# Patient Record
Sex: MALE | Race: WHITE | NOT HISPANIC OR LATINO | Employment: OTHER | URBAN - METROPOLITAN AREA
[De-identification: names, ages, dates, MRNs, and addresses within clinical notes are randomized per-mention and may not be internally consistent; named-entity substitution may affect disease eponyms.]

---

## 2017-02-01 ENCOUNTER — SLEEP CENTER VISIT (OUTPATIENT)
Dept: SLEEP MEDICINE | Facility: MEDICAL CENTER | Age: 67
End: 2017-02-01
Payer: MEDICARE

## 2017-02-01 VITALS
SYSTOLIC BLOOD PRESSURE: 116 MMHG | HEIGHT: 72 IN | RESPIRATION RATE: 16 BRPM | DIASTOLIC BLOOD PRESSURE: 66 MMHG | HEART RATE: 82 BPM | WEIGHT: 191 LBS | BODY MASS INDEX: 25.87 KG/M2

## 2017-02-01 DIAGNOSIS — R42 DIZZINESS, NONSPECIFIC: ICD-10-CM

## 2017-02-01 DIAGNOSIS — I25.10 ATHEROSCLEROSIS OF NATIVE CORONARY ARTERY OF NATIVE HEART WITHOUT ANGINA PECTORIS: ICD-10-CM

## 2017-02-01 DIAGNOSIS — I10 ESSENTIAL HYPERTENSION: ICD-10-CM

## 2017-02-01 DIAGNOSIS — G47.33 OSA (OBSTRUCTIVE SLEEP APNEA): ICD-10-CM

## 2017-02-01 PROCEDURE — 99203 OFFICE O/P NEW LOW 30 MIN: CPT | Performed by: INTERNAL MEDICINE

## 2017-02-01 NOTE — PROGRESS NOTES
Chief Complaint   Patient presents with   • New Patient     Sleep eval       HPI: This patient is a 66 y.o. Male who is referred for sleep apnea reevaluation. He has a history of blurred vision and dizziness of unclear etiology. He has had workup by Dr. Calvo with unremarkable MRI of the brain, other than mild sinus congestion. He underwent sinus surgery without subjective improvement. He does not identify specific triggers to his symptoms. He has chronic tinnitus, denies vertigo. He underwent polysomnography in 2011 identifying mild ANTIONE with AHI of 5 events per hour associated with desaturations for 24 minutes, to a lexii of 87%. He did not pursue specific treatment for his ANTIONE. His wife notes ongoing intermittent snoring and apneas. He feels that overall he sleeps well and awakens rested. In terms of sleep habits he goes to bed by 11:30 PM, falls asleep within minutes, denies any significant awakenings nocturnally. He gets up at 7:30 AM feeling rested. He denies any significant daytime hypersomnolence. He drinks 4-5 cups of coffee in the morning, denies any alcohol, tobacco or recreational drug use. Uses unspecified Chinese herbs for high cholesterol and prostate. His BMI is 25.  He has a history of coronary disease status post myocardial infarction. He also has a history of chronic gastric bloating, treated with gabapentin.      Past Medical History   Diagnosis Date   • Acute myocardial infarction of other anterior wall, episode of care unspecified     • Anxiety state, unspecified     • Unspecified essential hypertension     • S/P coronary artery stent placement     • Hyperlipidemia    • CAD (coronary artery disease) September 2009     PCI/stent (Promus 3.0 x 15mm) to the LAD.   • Chickenpox    • Mumps        Social History     Social History   • Marital Status:      Spouse Name: N/A   • Number of Children: N/A   • Years of Education: N/A     Occupational History   • Not on file.     Social History Main  Topics   • Smoking status: Former Smoker     Types: Cigarettes     Quit date: 08/30/1997   • Smokeless tobacco: Never Used   • Alcohol Use: No   • Drug Use: No   • Sexual Activity: Not on file     Other Topics Concern   • Not on file     Social History Narrative       Family History   Problem Relation Age of Onset   • Heart Disease Maternal Aunt    • Cancer Mother      colon       Current Outpatient Prescriptions on File Prior to Visit   Medication Sig Dispense Refill   • GINSENG CHINESE RED PO Take  by mouth.     • gabapentin (NEURONTIN) 100 MG Cap Take 100 mg by mouth 3 times a day.     • pantoprazole (PROTONIX) 40 MG TBEC Take 40 mg by mouth every day.     • aspirin 81 MG tablet Take 81 mg by mouth every day.     • metformin (GLUCOPHAGE) 500 MG TABS Take 500 mg by mouth every day.     • fenofibrate (TRICOR) 48 MG TABS Take 48 mg by mouth every day.     • Probiotic Product (PROBIOTIC DAILY PO) Take  by mouth every day.     • nitroglycerin (NITROSTAT) 0.4 MG SL Tab Place 1 Tab under tongue as needed for Chest Pain (Place 1 tablet under the tongue every 5 minutes up to 3 doses as needed for chest pain). 25 Tab 5   • Lidocaine-Hydrocortisone Ace 3-1 % KIT by Apply externally route. APPLY TO AFFECTED AREA  Indications: NOT TAKING  2   • timolol (TIMOPTIC) 0.5 % SOLN Indications: NOT TAKING  3     No current facility-administered medications on file prior to visit.       Allergies: Levaquin; Aspirin; Lisinopril; and Statins    ROS:   Constitutional: Denies fevers, chills, night sweats, fatigue or weight loss  Eyes: Denies vision loss, pain, drainage, double vision  Ears, Nose, Throat: As in history of present illness  Cardiovascular: Denies chest pain, tightness, palpitations, orthopnea or edema  Respiratory: Denies shortness of breath, cough, wheezing, hemoptysis  Sleep: As in history of present illness  GI: Denies heartburn, dysphagia, nausea, abdominal pain, diarrhea or constipation  : Denies frequent urination,  hematuria, discharge or painful urination  Musculoskeletal: Denies back pain, painful joints, sore muscles  Neurological: Denies weakness or headaches  Skin: No rashes    Blood pressure 116/66, pulse 82, resp. rate 16, height 1.829 m (6'), weight 86.637 kg (191 lb).  Multi-Ox Readings  Multi Ox #1     O2 sat % at rest     O2 sat % on exertion     O2 sat average on exertion     Multi Ox #2     O2 sat % at rest     O2 sat % on exertion     O2 sat average on exertion       Oxygen Use     Oxygen Frequency     Duration of need     Is the patient mobile within the home?     CPAP Use? no   BIPAP Use?     Servo Titration         Physical Exam:  Appearance: Well-nourished, well-developed, in no acute distress  HEENT: Normocephalic, atraumatic, white sclera, PERRLA, oropharynx clear, Mallampati 3  Neck: No adenopathy or masses  Respiratory: no intercostal retractions or accessory muscle use  Lungs auscultation: Clear to auscultation bilaterally  Cardiovascular: Regular rate rhythm. No murmurs, rubs or gallops.  No LE edema  Abdomen: soft, nondistended  Gait: Normal  Digits: No clubbing, cyanosis  Motor: No focal deficits  Orientation: Oriented to time, person and place    Diagnosis:  1. ANTIONE (obstructive sleep apnea)  OVERNIGHT HOME SLEEP STUDY   2. Atherosclerosis of native coronary artery of native heart without angina pectoris     3. Essential hypertension     4. Dizziness, nonspecific         Plan:  The patient has a history of mild ANTIONE diagnosed per home polysomnography in 2011, now returns for reevaluation of persistent intermittent snoring and apneas. He is on no current treatment.  He describes dizziness of unclear etiology, has undergone ENT and cardiac workup. He is aware that these symptoms are not classic for sleep disordered breathing however is interested in reevaluation of ANTIONE.  We will arrange for home polysomnography with close follow-up.  Return for after testing, follow up visit with Kailee Galan  MD.

## 2017-02-01 NOTE — Clinical Note
Kailee Galan M.D.  [facility]   [address] [place] [address]  Phone: [phone] - Fax: [phone]           Encounter Date: 2/1/2017  Provider: Kailee Galan M.D.  Location of Care: [facility]      Patient:   Darren Brunson   MR Number: [id number]   YOB: 1950     PROGRESS NOTE:  Chief Complaint   Patient presents with   • New Patient     Sleep eval       HPI: This patient is a 66 y.o. Male who is referred for sleep apnea reevaluation. He has a history of blurred vision and dizziness of unclear etiology. He has had workup by Dr. Calvo with unremarkable MRI of the brain, other than mild sinus congestion. He underwent sinus surgery without subjective improvement. He does not identify specific triggers to his symptoms. He has chronic tinnitus, denies vertigo. He underwent polysomnography in 2011 identifying mild ANTIONE with AHI of 5 events per hour associated with desaturations for 24 minutes, to a lexii of 87%. He did not pursue specific treatment for his ANTIONE. His wife notes ongoing intermittent snoring and apneas. He feels that overall he sleeps well and awakens rested. In terms of sleep habits he goes to bed by 11:30 PM, falls asleep within minutes, denies any significant awakenings nocturnally. He gets up at 7:30 AM feeling rested. He denies any significant daytime hypersomnolence. He drinks 4-5 cups of coffee in the morning, denies any alcohol, tobacco or recreational drug use. Uses unspecified Chinese herbs for high cholesterol and prostate. His BMI is 25.  He has a history of coronary disease status post myocardial infarction. He also has a history of chronic gastric bloating, treated with gabapentin.      Past Medical History   Diagnosis Date   • Acute myocardial infarction of other anterior wall, episode of care unspecified     • Anxiety state, unspecified     • Unspecified essential hypertension     •  S/P coronary artery stent placement     • Hyperlipidemia    • CAD (coronary artery disease) September 2009     PCI/stent (Promus 3.0 x 15mm) to the LAD.   • Chickenpox    • Mumps        Social History     Social History   • Marital Status:      Spouse Name: N/A   • Number of Children: N/A   • Years of Education: N/A     Occupational History   • Not on file.     Social History Main Topics   • Smoking status: Former Smoker     Types: Cigarettes     Quit date: 08/30/1997   • Smokeless tobacco: Never Used   • Alcohol Use: No   • Drug Use: No   • Sexual Activity: Not on file     Other Topics Concern   • Not on file     Social History Narrative       Family History   Problem Relation Age of Onset   • Heart Disease Maternal Aunt    • Cancer Mother      colon       Current Outpatient Prescriptions on File Prior to Visit   Medication Sig Dispense Refill   • GINSENG CHINESE RED PO Take  by mouth.     • gabapentin (NEURONTIN) 100 MG Cap Take 100 mg by mouth 3 times a day.     • pantoprazole (PROTONIX) 40 MG TBEC Take 40 mg by mouth every day.     • aspirin 81 MG tablet Take 81 mg by mouth every day.     • metformin (GLUCOPHAGE) 500 MG TABS Take 500 mg by mouth every day.     • fenofibrate (TRICOR) 48 MG TABS Take 48 mg by mouth every day.     • Probiotic Product (PROBIOTIC DAILY PO) Take  by mouth every day.     • nitroglycerin (NITROSTAT) 0.4 MG SL Tab Place 1 Tab under tongue as needed for Chest Pain (Place 1 tablet under the tongue every 5 minutes up to 3 doses as needed for chest pain). 25 Tab 5   • Lidocaine-Hydrocortisone Ace 3-1 % KIT by Apply externally route. APPLY TO AFFECTED AREA  Indications: NOT TAKING  2   • timolol (TIMOPTIC) 0.5 % SOLN Indications: NOT TAKING  3     No current facility-administered medications on file prior to visit.       Allergies: Levaquin; Aspirin; Lisinopril; and Statins    ROS:   Constitutional: Denies fevers, chills, night sweats, fatigue or weight loss  Eyes: Denies vision loss,  pain, drainage, double vision  Ears, Nose, Throat: As in history of present illness  Cardiovascular: Denies chest pain, tightness, palpitations, orthopnea or edema  Respiratory: Denies shortness of breath, cough, wheezing, hemoptysis  Sleep: As in history of present illness  GI: Denies heartburn, dysphagia, nausea, abdominal pain, diarrhea or constipation  : Denies frequent urination, hematuria, discharge or painful urination  Musculoskeletal: Denies back pain, painful joints, sore muscles  Neurological: Denies weakness or headaches  Skin: No rashes    Blood pressure 116/66, pulse 82, resp. rate 16, height 1.829 m (6'), weight 86.637 kg (191 lb).  Multi-Ox Readings  Multi Ox #1     O2 sat % at rest     O2 sat % on exertion     O2 sat average on exertion     Multi Ox #2     O2 sat % at rest     O2 sat % on exertion     O2 sat average on exertion       Oxygen Use     Oxygen Frequency     Duration of need     Is the patient mobile within the home?     CPAP Use? no   BIPAP Use?     Servo Titration         Physical Exam:  Appearance: Well-nourished, well-developed, in no acute distress  HEENT: Normocephalic, atraumatic, white sclera, PERRLA, oropharynx clear, Mallampati 3  Neck: No adenopathy or masses  Respiratory: no intercostal retractions or accessory muscle use  Lungs auscultation: Clear to auscultation bilaterally  Cardiovascular: Regular rate rhythm. No murmurs, rubs or gallops.  No LE edema  Abdomen: soft, nondistended  Gait: Normal  Digits: No clubbing, cyanosis  Motor: No focal deficits  Orientation: Oriented to time, person and place    Diagnosis:  1. ANTIONE (obstructive sleep apnea)  OVERNIGHT HOME SLEEP STUDY   2. Atherosclerosis of native coronary artery of native heart without angina pectoris     3. Essential hypertension     4. Dizziness, nonspecific         Plan:  The patient has a history of mild ANTIONE diagnosed per home polysomnography in 2011, now returns for reevaluation of persistent intermittent  snoring and apneas. He is on no current treatment.  He describes dizziness of unclear etiology, has undergone ENT and cardiac workup. He is aware that these symptoms are not classic for sleep disordered breathing however is interested in reevaluation of ANTIONE.  We will arrange for home polysomnography with close follow-up.  Return for after testing, follow up visit with Kailee Galan MD.            Electronically signed by Kailee Galan M.D.  on 02/01/2017    Kailee Pappas M.D.  [address] [place] [id number]  VIA Facsimile: [phone]

## 2017-02-01 NOTE — MR AVS SNAPSHOT
Darren Brunson   2017 1:20 PM   Sleep Center Visit   MRN: [id number]    Department:  [facility]   Dept Phone:  [phone]    Description:  Male : 1950   Provider:  Kailee Galan M.D.           Reason for Visit     New Patient Sleep eval      Allergies as of 2017     Allergen Noted Reactions    Levaquin 2014       Aspirin 2012       Lisinopril 2012       Statins [Hmg-Coa-R Inhibitors] 2012         You were diagnosed with     ANTIONE (obstructive sleep apnea)   [984931]       Atherosclerosis of native coronary artery of native heart without angina pectoris   [0272837]       Essential hypertension   [6639115]       Dizziness, nonspecific   [924784]         Vital Signs     Blood Pressure Pulse Respirations Height Weight Body Mass Index    116/66 mmHg 82 16 1.829 m (6') 86.637 kg (191 lb) 25.90 kg/m2    Smoking Status                   Former Smoker           Basic Information     Date Of Birth Sex Race Ethnicity Preferred Language    1950 Male White Non- English      Your appointments     2017  2:10 PM   Sleep Study Home with SLEEP CLINIC   [facility] (--)    [address]  [address]  [place] [address]   [phone]            2017  9:40 AM   Follow UP with Kailee Galan M.D.   [facility] (--)    [address]  [place] [address]   [phone]            2017 11:00 AM   FOLLOW UP with Matty Amado M.D.   [facility] (--)    [address], [address] [facility] [address]  [place] [address]   [phone]              Problem List              ICD-10-CM Priority Class Noted - Resolved    Acute myocardial infarction of other anterior wall, episode of care unspecified I21.09 High  3/30/2012 - Present    Anxiety state F41.1 Medium  3/30/2012 - Present    Coronary atherosclerosis of native coronary artery I25.10 High  3/30/2012 - Present     Hypercholesteremia E78.00 High  3/30/2012 - Present    Essential hypertension I10 High  3/30/2012 - Present    S/P coronary artery stent placement Z95.5 High  3/30/2012 - Present    Other chest pain R07.89 High  8/30/2012 - Present    Statin intolerance Z78.9   8/5/2013 - Present    ACE inhibitor-aggravated angioedema T46.4X1A, T78.3XXA   9/8/2014 - Present      Health Maintenance        Date Due Completion Dates    IMM DTaP/Tdap/Td Vaccine (1 - Tdap) 6/10/1967 ---    COLONOSCOPY 6/10/1998 ---    IMM ZOSTER VACCINE 6/10/2008 ---    IMM PNEUMOCOCCAL 65+ (ADULT) LOW/MEDIUM RISK SERIES (1 of 2 - PCV13) 6/10/2013 ---    IMM INFLUENZA (1) 9/1/2016 ---            Current Immunizations     No immunizations on file.      Below and/or attached are the medications your provider expects you to take. Review all of your home medications and newly ordered medications with your provider and/or pharmacist. Follow medication instructions as directed by your provider and/or pharmacist. Please keep your medication list with you and share with your provider. Update the information when medications are discontinued, doses are changed, or new medications (including over-the-counter products) are added; and carry medication information at all times in the event of emergency situations     Allergies:  LEVAQUIN - (reactions not documented)     ASPIRIN - (reactions not documented)     LISINOPRIL - (reactions not documented)     STATINS - (reactions not documented)               Medications  Valid as of: February 01, 2017 -  2:06 PM    Generic Name Brand Name Tablet Size Instructions for use    Aspirin (Tab) aspirin 81 MG Take 81 mg by mouth every day.        Fenofibrate (Tab) TRICOR 48 MG Take 48 mg by mouth every day.        Gabapentin (Cap) NEURONTIN 100 MG Take 100 mg by mouth 3 times a day.        Ginseng   Take  by mouth.        Lidocaine-Hydrocortisone Ace (Kit) Lidocaine-Hydrocortisone Ace 3-1 % by Apply externally route. APPLY TO  AFFECTED AREA  Indications: NOT TAKING        MetFORMIN HCl (Tab) GLUCOPHAGE 500 MG Take 500 mg by mouth every day.        Nitroglycerin (SL Tab) NITROSTAT 0.4 MG Place 1 Tab under tongue as needed for Chest Pain (Place 1 tablet under the tongue every 5 minutes up to 3 doses as needed for chest pain).        Pantoprazole Sodium (Tablet Delayed Response) PROTONIX 40 MG Take 40 mg by mouth every day.        Probiotic Product   Take  by mouth every day.        Timolol Maleate (Solution) TIMOPTIC 0.5 % Indications: NOT TAKING        .                 Medicines prescribed today were sent to:     [id number] - [place], [place] - [address]    [address] [place] [address]    Phone: [phone] Fax: [phone]    Open 24 Hours?: No      Medication refill instructions:       If your prescription bottle indicates you have medication refills left, it is not necessary to call your provider’s office. Please contact your pharmacy and they will refill your medication.    If your prescription bottle indicates you do not have any refills left, you may request refills at any time through one of the following ways: The online [employer] system (except Urgent Care), by calling your provider’s office, or by asking your pharmacy to contact your provider’s office with a refill request. Medication refills are processed only during regular business hours and may not be available until the next business day. Your provider may request additional information or to have a follow-up visit with you prior to refilling your medication.   *Please Note: Medication refills are assigned a new Rx number when refilled electronically. Your pharmacy may indicate that no refills were authorized even though a new prescription for the same medication is available at the pharmacy. Please request the medicine by name with the pharmacy before contacting your provider for a refill.        Your To Do List     Future Labs/Procedures Complete By Expires     OVERNIGHT HOME SLEEP STUDY  As directed 2/1/2018         MojoPages Access Code: [email or web address]  Expires: 3/3/2017  2:06 PM    MojoPages  A secure, online tool to manage your health information     [employer]’s MojoPages® is a secure, online tool that connects you to your personalized health information from the privacy of your home -- day or night - making it very easy for you to manage your healthcare. Once the activation process is completed, you can even access your medical information using the MojoPages alva, which is available for free in the Apple Alva store or Google Play store.     MojoPages provides the following levels of access (as shown below):   My Chart Features   [facility] Primary Care Doctor [facility]  Specialists [facility]  [facility]  Primary Care  Doctor   Email your healthcare team securely and privately 24/7 X X X    Manage appointments: schedule your next appointment; view details of past/upcoming appointments X      Request prescription refills. X      View recent personal medical records, including lab and immunizations X X X X   View health record, including health history, allergies, medications X X X X   Read reports about your outpatient visits, procedures, consult and ER notes X X X X   See your discharge summary, which is a recap of your hospital and/or ER visit that includes your diagnosis, lab results, and care plan. X X       How to register for MojoPages:  1. Go to  [email or web address].  2. Click on the Sign Up Now box, which takes you to the New Member Sign Up page. You will need to provide the following information:  a. Enter your MojoPages Access Code exactly as it appears at the top of this page. (You will not need to use this code after you’ve completed the sign-up process. If you do not sign up before the expiration date, you must request a new code.)   b. Enter your date of birth.   c. Enter your home email address.   d. Click Submit, and follow the next screen’s  instructions.  3. Create a KnewCoint ID. This will be your KnewCoint login ID and cannot be changed, so think of one that is secure and easy to remember.  4. Create a KnewCoint password. You can change your password at any time.  5. Enter your Password Reset Question and Answer. This can be used at a later time if you forget your password.   6. Enter your e-mail address. This allows you to receive e-mail notifications when new information is available in Eyefreight.  7. Click Sign Up. You can now view your health information.    For assistance activating your Eyefreight account, call [phone]

## 2017-02-06 ENCOUNTER — HOME STUDY (OUTPATIENT)
Dept: SLEEP MEDICINE | Facility: MEDICAL CENTER | Age: 67
End: 2017-02-06
Attending: INTERNAL MEDICINE
Payer: MEDICARE

## 2017-02-06 DIAGNOSIS — G47.33 OSA (OBSTRUCTIVE SLEEP APNEA): ICD-10-CM

## 2017-02-06 PROCEDURE — G0399 HOME SLEEP TEST/TYPE 3 PORTA: HCPCS | Performed by: INTERNAL MEDICINE

## 2017-02-06 NOTE — MR AVS SNAPSHOT
Darren Brunson   2017 2:10 PM   Appointment   MRN: [id number]    Department:  [facility]   Dept Phone:  [phone]    Description:  Male : 1950   Provider:  [facility]           Allergies as of 2017     Allergen Noted Reactions    Levaquin 2014       Aspirin 2012       Lisinopril 2012       Statins [Hmg-Coa-R Inhibitors] 2012         You were diagnosed with     ANTIONE (obstructive sleep apnea)   [347049]         Vital Signs     Smoking Status                   Former Smoker           Basic Information     Date Of Birth Sex Race Ethnicity Preferred Language    1950 Male White Non- English      Your appointments     2017  9:40 AM   Follow UP with Kailee Galan M.D.   [facility] (--)    [address]  [address]  [place] [address] [phone]            2017 11:00 AM   FOLLOW UP with Matty Amado M.D.   [facility] (--)    [address]  [place] [address] [phone]              Problem List              ICD-10-CM Priority Class Noted - Resolved    Acute myocardial infarction of other anterior wall, episode of care unspecified I21.09 High  3/30/2012 - Present    Anxiety state F41.1 Medium  3/30/2012 - Present    Coronary atherosclerosis of native coronary artery I25.10 High  3/30/2012 - Present    Hypercholesteremia E78.00 High  3/30/2012 - Present    Essential hypertension I10 High  3/30/2012 - Present    S/P coronary artery stent placement Z95.5 High  3/30/2012 - Present    Other chest pain R07.89 High  2012 - Present    Statin intolerance Z78.9   2013 - Present    ACE inhibitor-aggravated angioedema T46.4X1A, T78.3XXA   2014 - Present      Health Maintenance        Date Due Completion Dates    IMM DTaP/Tdap/Td Vaccine (1 - Tdap) 6/10/1967 ---    COLONOSCOPY 6/10/1998 ---    IMM ZOSTER VACCINE 6/10/2008 ---    IMM PNEUMOCOCCAL  65+ (ADULT) LOW/MEDIUM RISK SERIES (1 of 2 - PCV13) 6/10/2013 ---    IMM INFLUENZA (1) 9/1/2016 ---            Current Immunizations     No immunizations on file.      Below and/or attached are the medications your provider expects you to take. Review all of your home medications and newly ordered medications with your provider and/or pharmacist. Follow medication instructions as directed by your provider and/or pharmacist. Please keep your medication list with you and share with your provider. Update the information when medications are discontinued, doses are changed, or new medications (including over-the-counter products) are added; and carry medication information at all times in the event of emergency situations     Allergies:  LEVAQUIN - (reactions not documented)     ASPIRIN - (reactions not documented)     LISINOPRIL - (reactions not documented)     STATINS - (reactions not documented)               Medications  Valid as of: February 06, 2017 -  3:16 PM    Generic Name Brand Name Tablet Size Instructions for use    Aspirin (Tab) aspirin 81 MG Take 81 mg by mouth every day.        Fenofibrate (Tab) TRICOR 48 MG Take 48 mg by mouth every day.        Gabapentin (Cap) NEURONTIN 100 MG Take 100 mg by mouth 3 times a day.        Ginseng   Take  by mouth.        Lidocaine-Hydrocortisone Ace (Kit) Lidocaine-Hydrocortisone Ace 3-1 % by Apply externally route. APPLY TO AFFECTED AREA  Indications: NOT TAKING        MetFORMIN HCl (Tab) GLUCOPHAGE 500 MG Take 500 mg by mouth every day.        Nitroglycerin (SL Tab) NITROSTAT 0.4 MG Place 1 Tab under tongue as needed for Chest Pain (Place 1 tablet under the tongue every 5 minutes up to 3 doses as needed for chest pain).        Pantoprazole Sodium (Tablet Delayed Response) PROTONIX 40 MG Take 40 mg by mouth every day.        Probiotic Product   Take  by mouth every day.        Timolol Maleate (Solution) TIMOPTIC 0.5 % Indications: NOT TAKING        .                    Medicines prescribed today were sent to:     [facility]/PHARMACY #[id number] - [place], [place] - [address]    [address] [place] [address]    Phone: [phone] Fax: [phone]    Open 24 Hours?: No      Medication refill instructions:       If your prescription bottle indicates you have medication refills left, it is not necessary to call your provider’s office. Please contact your pharmacy and they will refill your medication.    If your prescription bottle indicates you do not have any refills left, you may request refills at any time through one of the following ways: The online [employer] system (except Urgent Care), by calling your provider’s office, or by asking your pharmacy to contact your provider’s office with a refill request. Medication refills are processed only during regular business hours and may not be available until the next business day. Your provider may request additional information or to have a follow-up visit with you prior to refilling your medication.   *Please Note: Medication refills are assigned a new Rx number when refilled electronically. Your pharmacy may indicate that no refills were authorized even though a new prescription for the same medication is available at the pharmacy. Please request the medicine by name with the pharmacy before contacting your provider for a refill.           [employer] Access Code: [email or web address]  Expires: 3/3/2017  2:06 PM    [employer]  A secure, online tool to manage your health information     [employer]’s [employer]® is a secure, online tool that connects you to your personalized health information from the privacy of your home -- day or night - making it very easy for you to manage your healthcare. Once the activation process is completed, you can even access your medical information using the [employer] alva, which is available for free in the Apple Alva store or Google Play store.     [employer] provides the following levels of access (as shown below):   My  Chart Features   [facility] Doctor [facility]  Specialists [facility]  Urgent  Care [facility]  Primary Care  Doctor   Email your healthcare team securely and privately 24/7 X X X    Manage appointments: schedule your next appointment; view details of past/upcoming appointments X      Request prescription refills. X      View recent personal medical records, including lab and immunizations X X X X   View health record, including health history, allergies, medications X X X X   Read reports about your outpatient visits, procedures, consult and ER notes X X X X   See your discharge summary, which is a recap of your hospital and/or ER visit that includes your diagnosis, lab results, and care plan. X X       How to register for [employer]:  1. Go to  [email or web address].  2. Click on the Sign Up Now box, which takes you to the New Member Sign Up page. You will need to provide the following information:  a. Enter your [employer] Access Code exactly as it appears at the top of this page. (You will not need to use this code after you’ve completed the sign-up process. If you do not sign up before the expiration date, you must request a new code.)   b. Enter your date of birth.   c. Enter your home email address.   d. Click Submit, and follow the next screen’s instructions.  3. Create a [employer] ID. This will be your [employer] login ID and cannot be changed, so think of one that is secure and easy to remember.  4. Create a [employer] password. You can change your password at any time.  5. Enter your Password Reset Question and Answer. This can be used at a later time if you forget your password.   6. Enter your e-mail address. This allows you to receive e-mail notifications when new information is available in [employer].  7. Click Sign Up. You can now view your health information.    For assistance activating your [employer] account, call [phone]

## 2017-02-07 NOTE — PROCEDURES
Mr. Brunson presented with symptoms suggesting sleep-disordered breathing. He has a history of coronary artery disease but does not have medical or sleep diagnoses that would contraindicate home sleep test.    7 hours and 14 minutes of data are available for review in the information appears to be of good quality for analysis.    The apnea hypopnea index is 32.1 events per hour, including hypopnea and apnea events. The lowest arterial oxygen saturation is 79% on room air with an oxygen desaturation index of 39.5 events per hour. He spends 58% of the study time, more than 4 hours in total, with a saturation below 89%. The heart rate varies from 6205 bpm. Intermittent snoring is identified.    Assessment:  Severe obstructive sleep apnea hypopnea with an apnea hypopnea index of 32.1 events per hour and a lowest arterial oxygen saturation of 79% on room air.    Recommendations:  Airway pressurization, ideally guided by titration polysomnogram. Auto titrating CPAP is an option. Alternative treatments for sleep-disordered breathing include reconstructive otolaryngologic surgery, dental appliances and weight loss. If any these latter treatment options are selected, a follow-up polysomnogram is suggested to assess the efficacy of therapy. Behavioral measures including avoidance of sedatives, alcohol and supine body position may be of additional benefit.

## 2017-02-24 ENCOUNTER — SLEEP CENTER VISIT (OUTPATIENT)
Dept: SLEEP MEDICINE | Facility: MEDICAL CENTER | Age: 67
End: 2017-02-24
Payer: MEDICARE

## 2017-02-24 VITALS
SYSTOLIC BLOOD PRESSURE: 132 MMHG | HEIGHT: 72 IN | RESPIRATION RATE: 16 BRPM | DIASTOLIC BLOOD PRESSURE: 90 MMHG | BODY MASS INDEX: 25.73 KG/M2 | HEART RATE: 81 BPM | OXYGEN SATURATION: 95 % | WEIGHT: 190 LBS

## 2017-02-24 DIAGNOSIS — J40 BRONCHITIS: ICD-10-CM

## 2017-02-24 DIAGNOSIS — G47.33 OSA (OBSTRUCTIVE SLEEP APNEA): ICD-10-CM

## 2017-02-24 PROCEDURE — 99213 OFFICE O/P EST LOW 20 MIN: CPT | Performed by: INTERNAL MEDICINE

## 2017-02-24 NOTE — MR AVS SNAPSHOT
Darren Brunson   2017 9:40 AM   Sleep Center Visit   MRN: [id number]    Department:  [facility]   Dept Phone:  [phone]    Description:  Male : 1950   Provider:  Kailee Galan M.D.           Reason for Visit     Follow-Up HST results      Allergies as of 2017     Allergen Noted Reactions    Levaquin 2014       Aspirin 2012       Lisinopril 2012       Statins [Hmg-Coa-R Inhibitors] 2012         You were diagnosed with     ANTIONE (obstructive sleep apnea)   [249987]       Bronchitis   [833029]         Vital Signs     Blood Pressure Pulse Respirations Height Weight Body Mass Index    132/90 mmHg 81 16 1.829 m (6') 86.183 kg (190 lb) 25.76 kg/m2    Oxygen Saturation Smoking Status                95% Former Smoker          Basic Information     Date Of Birth Sex Race Ethnicity Preferred Language    1950 Male White Non- English      Your appointments     2017  3:00 PM   Follow UP with Kailee Galan M.D.   [facility] (--)    [address]  [place] [address] [phone]            2017 11:00 AM   FOLLOW UP with Matty Amado M.D.   [facility] (--)    [address]  [place] [address] [phone]              Problem List              ICD-10-CM Priority Class Noted - Resolved    Acute myocardial infarction of other anterior wall, episode of care unspecified I21.09 High  3/30/2012 - Present    Anxiety state F41.1 Medium  3/30/2012 - Present    Coronary atherosclerosis of native coronary artery I25.10 High  3/30/2012 - Present    Hypercholesteremia E78.00 High  3/30/2012 - Present    Essential hypertension I10 High  3/30/2012 - Present    S/P coronary artery stent placement Z95.5 High  3/30/2012 - Present    Other chest pain R07.89 High  2012 - Present    Statin intolerance Z78.9   2013 - Present    ACE inhibitor-aggravated  angioedema T46.4X1A, T78.3XXA   9/8/2014 - Present      Health Maintenance        Date Due Completion Dates    IMM DTaP/Tdap/Td Vaccine (1 - Tdap) 6/10/1967 ---    COLONOSCOPY 6/10/1998 ---    IMM ZOSTER VACCINE 6/10/2008 ---    IMM PNEUMOCOCCAL 65+ (ADULT) LOW/MEDIUM RISK SERIES (1 of 2 - PCV13) 6/10/2013 ---    IMM INFLUENZA (1) 9/1/2016 ---            Current Immunizations     No immunizations on file.      Below and/or attached are the medications your provider expects you to take. Review all of your home medications and newly ordered medications with your provider and/or pharmacist. Follow medication instructions as directed by your provider and/or pharmacist. Please keep your medication list with you and share with your provider. Update the information when medications are discontinued, doses are changed, or new medications (including over-the-counter products) are added; and carry medication information at all times in the event of emergency situations     Allergies:  LEVAQUIN - (reactions not documented)     ASPIRIN - (reactions not documented)     LISINOPRIL - (reactions not documented)     STATINS - (reactions not documented)               Medications  Valid as of: February 24, 2017 - 10:13 AM    Generic Name Brand Name Tablet Size Instructions for use    Aspirin (Tab) aspirin 81 MG Take 81 mg by mouth every day.        Fenofibrate (Tab) TRICOR 48 MG Take 48 mg by mouth every day.        Gabapentin (Cap) NEURONTIN 100 MG Take 100 mg by mouth 3 times a day.        Ginseng   Take  by mouth.        Hydrocodone-Chlorpheniramine (Solution) Hydrocodone-Chlorpheniramine 5-4 MG/5ML Take 1 Dose by mouth 2 Times a Day. Take 1 tsp 2 times daily as needed for cough        Lidocaine-Hydrocortisone Ace (Kit) Lidocaine-Hydrocortisone Ace 3-1 % by Apply externally route. APPLY TO AFFECTED AREA  Indications: NOT TAKING        MetFORMIN HCl (Tab) GLUCOPHAGE 500 MG Take 500 mg by mouth every day.        Nitroglycerin (SL  Tab) NITROSTAT 0.4 MG Place 1 Tab under tongue as needed for Chest Pain (Place 1 tablet under the tongue every 5 minutes up to 3 doses as needed for chest pain).        Pantoprazole Sodium (Tablet Delayed Response) PROTONIX 40 MG Take 40 mg by mouth every day.        Probiotic Product   Take  by mouth every day.        Timolol Maleate (Solution) TIMOPTIC 0.5 % Indications: NOT TAKING        .                 Medicines prescribed today were sent to:     [facility]/PHARMACY #[id number] - [place], [place] - [id number] [facility] [place] [address]    Phone: [phone] Fax: [phone]    Open 24 Hours?: No      Medication refill instructions:       If your prescription bottle indicates you have medication refills left, it is not necessary to call your provider’s office. Please contact your pharmacy and they will refill your medication.    If your prescription bottle indicates you do not have any refills left, you may request refills at any time through one of the following ways: The online [email or web address] system (except Urgent Care), by calling your provider’s office, or by asking your pharmacy to contact your provider’s office with a refill request. Medication refills are processed only during regular business hours and may not be available until the next business day. Your provider may request additional information or to have a follow-up visit with you prior to refilling your medication.   *Please Note: Medication refills are assigned a new Rx number when refilled electronically. Your pharmacy may indicate that no refills were authorized even though a new prescription for the same medication is available at the pharmacy. Please request the medicine by name with the pharmacy before contacting your provider for a refill.           [employer] Status: Patient Declined

## 2017-02-24 NOTE — PROGRESS NOTES
Chief Complaint   Patient presents with   • Follow-Up     HST results     HPI: This patient is a 66 y.o. Male who returns to discuss home sleep study results He has a history of blurred vision and dizziness of unclear etiology. He has had workup by Dr. Calvo with unremarkable MRI of the brain, other than mild sinus congestion. He underwent sinus surgery without subjective improvement. He does not identify specific triggers to his symptoms. He has chronic tinnitus, denies vertigo. He underwent polysomnography in 2011 identifying mild ANTIONE with AHI of 5 events per hour associated with desaturations for 24 minutes, to a lexii of 87%. He did not pursue specific treatment for his ANTIONE. His wife notes ongoing intermittent snoring and apneas. He feels that overall he sleeps well and awakens rested. He has a history of coronary disease status post myocardial infarction.  Home polysomnography was obtained for reassessment of his sleep disordered breathing. Results showed now severe obstructive sleep apnea with AHI 32 events per hour, 61 events per hour in the supine position, with associated oxygen desaturations to 79%.  He has been ill with productive cough over the past week, denies fevers, chills or myalgias.      Past Medical History   Diagnosis Date   • Acute myocardial infarction of other anterior wall, episode of care unspecified     • Anxiety state, unspecified     • Unspecified essential hypertension     • S/P coronary artery stent placement     • Hyperlipidemia    • CAD (coronary artery disease) September 2009     PCI/stent (Promus 3.0 x 15mm) to the LAD.   • Chickenpox    • Mumps        Social History     Social History   • Marital Status:      Spouse Name: N/A   • Number of Children: N/A   • Years of Education: N/A     Occupational History   • Not on file.     Social History Main Topics   • Smoking status: Former Smoker     Types: Cigarettes     Quit date: 08/30/1997   • Smokeless tobacco: Never Used   •  Alcohol Use: No   • Drug Use: No   • Sexual Activity: Not on file     Other Topics Concern   • Not on file     Social History Narrative       Family History   Problem Relation Age of Onset   • Heart Disease Maternal Aunt    • Cancer Mother      colon       Current Outpatient Prescriptions on File Prior to Visit   Medication Sig Dispense Refill   • GINSENG CHINESE RED PO Take  by mouth.     • gabapentin (NEURONTIN) 100 MG Cap Take 100 mg by mouth 3 times a day.     • pantoprazole (PROTONIX) 40 MG TBEC Take 40 mg by mouth every day.     • aspirin 81 MG tablet Take 81 mg by mouth every day.     • metformin (GLUCOPHAGE) 500 MG TABS Take 500 mg by mouth every day.     • fenofibrate (TRICOR) 48 MG TABS Take 48 mg by mouth every day.     • Probiotic Product (PROBIOTIC DAILY PO) Take  by mouth every day.     • nitroglycerin (NITROSTAT) 0.4 MG SL Tab Place 1 Tab under tongue as needed for Chest Pain (Place 1 tablet under the tongue every 5 minutes up to 3 doses as needed for chest pain). 25 Tab 5   • Lidocaine-Hydrocortisone Ace 3-1 % KIT by Apply externally route. APPLY TO AFFECTED AREA  Indications: NOT TAKING  2   • timolol (TIMOPTIC) 0.5 % SOLN Indications: NOT TAKING  3     No current facility-administered medications on file prior to visit.       Allergies: Levaquin; Aspirin; Lisinopril; and Statins    ROS:   Constitutional: Denies fevers, chills, night sweats, fatigue or weight loss  Eyes: Denies vision loss, pain, drainage, double vision  Ears, Nose, Throat: Denies earache, difficulty hearing, tinnitus, nasal congestion, hoarseness  Cardiovascular: Denies chest pain, tightness, palpitations, orthopnea or edema  Respiratory: Denies shortness of breath, +cough, denies wheezing, hemoptysis  Sleep: As in history of present illness  GI: Denies heartburn, dysphagia, nausea, abdominal pain, diarrhea or constipation  : Denies frequent urination, hematuria, discharge or painful urination  Musculoskeletal: Denies back pain,  painful joints, sore muscles  Neurological: Denies weakness or headaches  Skin: No rashes    Blood pressure 132/90, pulse 81, resp. rate 16, height 1.829 m (6'), weight 86.183 kg (190 lb), SpO2 95 %.  Multi-Ox Readings  Multi Ox #1     O2 sat % at rest     O2 sat % on exertion     O2 sat average on exertion     Multi Ox #2     O2 sat % at rest     O2 sat % on exertion     O2 sat average on exertion       Oxygen Use     Oxygen Frequency     Duration of need     Is the patient mobile within the home?     CPAP Use?     BIPAP Use?     Servo Titration         Physical Exam:  Appearance: Well-nourished, well-developed, in no acute distress  HEENT: Normocephalic, atraumatic, white sclera, PERRLA, oropharynx clear  Neck: No adenopathy or masses  Respiratory: no intercostal retractions or accessory muscle use  Lungs auscultation: Decreased breath sounds, scattered rhonchi  Cardiovascular: Regular rate rhythm. No murmurs, rubs or gallops.  No LE edema  Abdomen: soft, nondistended  Gait: Normal  Digits: No clubbing, cyanosis  Motor: No focal deficits  Orientation: Oriented to time, person and place    Diagnosis:  1. ANTIONE (obstructive sleep apnea)     2. Bronchitis  Hydrocodone-Chlorpheniramine 5-4 MG/5ML Solution       Plan:  The patient's home sleep study confirms severe obstructive sleep apnea syndrome, warranting treatment. Recommend AutoPap therapy 5-20 cm of water using nasal pillows with follow-up within 6 weeks to monitor response to treatment. If he cannot tolerate CPAP, then UPPP or a dental appliance could be considered.  Prescription for hydrocodone cough syrup provided when necessary cough. NARxCELIECER was performed.  Return in about 6 weeks (around 4/7/2017) for follow up visit with Kailee Galan MD.

## 2017-02-27 ENCOUNTER — TELEPHONE (OUTPATIENT)
Dept: SLEEP MEDICINE | Facility: MEDICAL CENTER | Age: 67
End: 2017-02-27

## 2017-02-27 DIAGNOSIS — J40 BRONCHITIS: ICD-10-CM

## 2017-02-27 NOTE — TELEPHONE ENCOUNTER
"Pt came in stating the the Rx Dr. Galan gave him last week for the hydrocodone is incorrect according to the pharmacy adn they will not fill it. The Rx is supposed to read, \"Hydrocodone-Chlorpheniramine ER susp 5ML q12hrs Quantity of 240 per his last Rx he got for this. I advised him I would put the message in to see what we can do, let me know thanks!  "

## 2017-02-28 NOTE — TELEPHONE ENCOUNTER
Spoke with his pharmacist as the Rx is standard for [facility].  Also spoke with Dr. Moyer regarding the medication and was okay with me reordering it. Rx hand written.

## 2017-05-10 ENCOUNTER — SLEEP CENTER VISIT (OUTPATIENT)
Dept: SLEEP MEDICINE | Facility: MEDICAL CENTER | Age: 67
End: 2017-05-10
Payer: MEDICARE

## 2017-05-10 VITALS
BODY MASS INDEX: 25.63 KG/M2 | HEART RATE: 77 BPM | TEMPERATURE: 97.5 F | SYSTOLIC BLOOD PRESSURE: 118 MMHG | WEIGHT: 189.2 LBS | HEIGHT: 72 IN | DIASTOLIC BLOOD PRESSURE: 78 MMHG | RESPIRATION RATE: 16 BRPM

## 2017-05-10 DIAGNOSIS — G47.33 OSA (OBSTRUCTIVE SLEEP APNEA): ICD-10-CM

## 2017-05-10 DIAGNOSIS — I10 ESSENTIAL HYPERTENSION: ICD-10-CM

## 2017-05-10 PROCEDURE — 99213 OFFICE O/P EST LOW 20 MIN: CPT | Performed by: INTERNAL MEDICINE

## 2017-05-10 NOTE — PROGRESS NOTES
Chief Complaint   Patient presents with   • Follow-Up     6 week follow up       HPI: This patient is a 66 y.o. Male who returns for ANTIONE. To reiterate, he has a history of blurred vision and dizziness with workup by Dr. Calvo with unremarkable MRI of the brain, other than mild sinus congestion. He underwent sinus surgery without subjective improvement. He underwent polysomnography in 2011 identifying mild ANTIONE with AHI of 5 events per hour associated with desaturations for 24 minutes, to a lexii of 87%. He did not pursue specific treatment for his ANTIONE at that time. Updated home polysomnography showed severe obstructive sleep apnea with AHI 32 events per hour, 61 events per hour in the supine position, with associated oxygen desaturations to 79%.  He was started on AutoPap:5-20 cm H20 with compliance card showing 87% usage, for average 5 hours and 46 minutes. His average CPAP pressures are 6 cm of water, and AHI 12.5. Significant mask leakage is noted. He is tried various trials of CPAP mask and cannot tolerate any of them. He states he feels utterly miserable using CPAP and cannot use treatment.        Past Medical History   Diagnosis Date   • Acute myocardial infarction of other anterior wall, episode of care unspecified     • Anxiety state, unspecified     • Unspecified essential hypertension     • S/P coronary artery stent placement     • Hyperlipidemia    • CAD (coronary artery disease) September 2009     PCI/stent (Promus 3.0 x 15mm) to the LAD.   • Chickenpox    • Mumps        Social History     Social History   • Marital Status:      Spouse Name: N/A   • Number of Children: N/A   • Years of Education: N/A     Occupational History   • Not on file.     Social History Main Topics   • Smoking status: Former Smoker     Types: Cigarettes     Quit date: 08/30/1997   • Smokeless tobacco: Never Used   • Alcohol Use: No   • Drug Use: No   • Sexual Activity: Not on file     Other Topics Concern   • Not on file      Social History Narrative       Family History   Problem Relation Age of Onset   • Heart Disease Maternal Aunt    • Cancer Mother      colon       Current Outpatient Prescriptions on File Prior to Visit   Medication Sig Dispense Refill   • GINSENG CHINESE RED PO Take  by mouth.     • gabapentin (NEURONTIN) 100 MG Cap Take 100 mg by mouth 3 times a day.     • pantoprazole (PROTONIX) 40 MG TBEC Take 40 mg by mouth every day.     • aspirin 81 MG tablet Take 81 mg by mouth every day.     • metformin (GLUCOPHAGE) 500 MG TABS Take 500 mg by mouth every day.     • fenofibrate (TRICOR) 48 MG TABS Take 48 mg by mouth every day.     • Probiotic Product (PROBIOTIC DAILY PO) Take  by mouth every day.     • Hydrocodone-Chlorpheniramine 5-4 MG/5ML Solution Take 1 Dose by mouth 2 Times a Day. Take 1 tsp 2 times daily as needed for cough 120 mL 0   • nitroglycerin (NITROSTAT) 0.4 MG SL Tab Place 1 Tab under tongue as needed for Chest Pain (Place 1 tablet under the tongue every 5 minutes up to 3 doses as needed for chest pain). 25 Tab 5   • Lidocaine-Hydrocortisone Ace 3-1 % KIT by Apply externally route. APPLY TO AFFECTED AREA  Indications: NOT TAKING  2   • timolol (TIMOPTIC) 0.5 % SOLN Indications: NOT TAKING  3     No current facility-administered medications on file prior to visit.       Allergies: Levaquin; Aspirin; Lisinopril; and Statins    ROS:   Constitutional: Denies fevers, chills, night sweats, fatigue or weight loss  Eyes: Denies vision loss, pain, drainage, double vision  Ears, Nose, Throat: Denies earache, difficulty hearing, tinnitus, nasal congestion, hoarseness  Cardiovascular: Denies chest pain, tightness, palpitations, orthopnea or edema  Respiratory: Denies shortness of breath, cough, wheezing, hemoptysis  Sleep: Denies daytime sleepiness, snoring, apneas, insomnia, morning headaches  GI: Denies heartburn, dysphagia, nausea, abdominal pain, diarrhea or constipation  : Denies frequent urination, hematuria,  discharge or painful urination  Musculoskeletal: Denies back pain, painful joints, sore muscles  Neurological: Denies weakness or headaches  Skin: No rashes    Blood pressure 118/78, pulse 77, temperature 36.4 °C (97.5 °F), temperature source Temporal, resp. rate 16, height 1.829 m (6'), weight 85.821 kg (189 lb 3.2 oz).  Multi-Ox Readings  Multi Ox #1     O2 sat % at rest     O2 sat % on exertion     O2 sat average on exertion     Multi Ox #2     O2 sat % at rest     O2 sat % on exertion     O2 sat average on exertion       Oxygen Use     Oxygen Frequency     Duration of need     Is the patient mobile within the home?     CPAP Use? yes   BIPAP Use?     Servo Titration         Physical Exam:  Appearance: Well-nourished, well-developed, in no acute distress  HEENT: Normocephalic, atraumatic, white sclera, PERRLA, oropharynx clear  Neck: No adenopathy or masses  Respiratory: no intercostal retractions or accessory muscle use  Lungs auscultation: Clear to auscultation bilaterally  Cardiovascular: Regular rate rhythm. No murmurs, rubs or gallops.  No LE edema  Abdomen: soft, nondistended  Gait: Normal  Digits: No clubbing, cyanosis  Motor: No focal deficits  Orientation: Oriented to time, person and place    Diagnosis:  1. ANTIONE (obstructive sleep apnea)         Plan:  The patient is intolerant of CPAP. We discussed alternative therapies which may be less effective, such as UPPP or a dental appliance. He had talked about a dental appliance with his dentist, and would prefer surgical treatment if possible.  He has an appointment pending with Dr. Alanis for the next week to address.  He will discontinue CPAP. Recommend avoid sleeping supine, minimizing alcohol or sedatives.  No Follow-up on file.

## 2017-05-10 NOTE — Clinical Note
Kailee Galan M.D.  [facility]   [address]   [place] [address]  Phone: [phone] - Fax: [phone]           Encounter Date: 5/10/2017  Provider: Kailee Galan M.D.  Location of Care: [facility]      Patient:   Darren Brunson   MR Number: [id number]   YOB: 1950     PROGRESS NOTE:  Chief Complaint   Patient presents with   • Follow-Up     6 week follow up       HPI: This patient is a 66 y.o. Male who returns for ANTIONE. To reiterate, he has a history of blurred vision and dizziness with workup by Dr. Calvo with unremarkable MRI of the brain, other than mild sinus congestion. He underwent sinus surgery without subjective improvement. He underwent polysomnography in 2011 identifying mild ANTIONE with AHI of 5 events per hour associated with desaturations for 24 minutes, to a lexii of 87%. He did not pursue specific treatment for his ANTIONE at that time. Updated home polysomnography showed severe obstructive sleep apnea with AHI 32 events per hour, 61 events per hour in the supine position, with associated oxygen desaturations to 79%.  He was started on AutoPap:5-20 cm H20 with compliance card showing 87% usage, for average 5 hours and 46 minutes. His average CPAP pressures are 6 cm of water, and AHI 12.5. Significant mask leakage is noted. He is tried various trials of CPAP mask and cannot tolerate any of them. He states he feels utterly miserable using CPAP and cannot use treatment.        Past Medical History   Diagnosis Date   • Acute myocardial infarction of other anterior wall, episode of care unspecified     • Anxiety state, unspecified     • Unspecified essential hypertension     • S/P coronary artery stent placement     • Hyperlipidemia    • CAD (coronary artery disease) September 2009     PCI/stent (Promus 3.0 x 15mm) to the LAD.   • Chickenpox    • Mumps        Social History     Social History   • Marital  Status:      Spouse Name: N/A   • Number of Children: N/A   • Years of Education: N/A     Occupational History   • Not on file.     Social History Main Topics   • Smoking status: Former Smoker     Types: Cigarettes     Quit date: 08/30/1997   • Smokeless tobacco: Never Used   • Alcohol Use: No   • Drug Use: No   • Sexual Activity: Not on file     Other Topics Concern   • Not on file     Social History Narrative       Family History   Problem Relation Age of Onset   • Heart Disease Maternal Aunt    • Cancer Mother      colon       Current Outpatient Prescriptions on File Prior to Visit   Medication Sig Dispense Refill   • GINSENG CHINESE RED PO Take  by mouth.     • gabapentin (NEURONTIN) 100 MG Cap Take 100 mg by mouth 3 times a day.     • pantoprazole (PROTONIX) 40 MG TBEC Take 40 mg by mouth every day.     • aspirin 81 MG tablet Take 81 mg by mouth every day.     • metformin (GLUCOPHAGE) 500 MG TABS Take 500 mg by mouth every day.     • fenofibrate (TRICOR) 48 MG TABS Take 48 mg by mouth every day.     • Probiotic Product (PROBIOTIC DAILY PO) Take  by mouth every day.     • Hydrocodone-Chlorpheniramine 5-4 MG/5ML Solution Take 1 Dose by mouth 2 Times a Day. Take 1 tsp 2 times daily as needed for cough 120 mL 0   • nitroglycerin (NITROSTAT) 0.4 MG SL Tab Place 1 Tab under tongue as needed for Chest Pain (Place 1 tablet under the tongue every 5 minutes up to 3 doses as needed for chest pain). 25 Tab 5   • Lidocaine-Hydrocortisone Ace 3-1 % KIT by Apply externally route. APPLY TO AFFECTED AREA  Indications: NOT TAKING  2   • timolol (TIMOPTIC) 0.5 % SOLN Indications: NOT TAKING  3     No current facility-administered medications on file prior to visit.       Allergies: Levaquin; Aspirin; Lisinopril; and Statins    ROS:   Constitutional: Denies fevers, chills, night sweats, fatigue or weight loss  Eyes: Denies vision loss, pain, drainage, double vision  Ears, Nose, Throat: Denies earache, difficulty hearing,  tinnitus, nasal congestion, hoarseness  Cardiovascular: Denies chest pain, tightness, palpitations, orthopnea or edema  Respiratory: Denies shortness of breath, cough, wheezing, hemoptysis  Sleep: Denies daytime sleepiness, snoring, apneas, insomnia, morning headaches  GI: Denies heartburn, dysphagia, nausea, abdominal pain, diarrhea or constipation  : Denies frequent urination, hematuria, discharge or painful urination  Musculoskeletal: Denies back pain, painful joints, sore muscles  Neurological: Denies weakness or headaches  Skin: No rashes    Blood pressure 118/78, pulse 77, temperature 36.4 °C (97.5 °F), temperature source Temporal, resp. rate 16, height 1.829 m (6'), weight 85.821 kg (189 lb 3.2 oz).  Multi-Ox Readings  Multi Ox #1     O2 sat % at rest     O2 sat % on exertion     O2 sat average on exertion     Multi Ox #2     O2 sat % at rest     O2 sat % on exertion     O2 sat average on exertion       Oxygen Use     Oxygen Frequency     Duration of need     Is the patient mobile within the home?     CPAP Use? yes   BIPAP Use?     Servo Titration         Physical Exam:  Appearance: Well-nourished, well-developed, in no acute distress  HEENT: Normocephalic, atraumatic, white sclera, PERRLA, oropharynx clear  Neck: No adenopathy or masses  Respiratory: no intercostal retractions or accessory muscle use  Lungs auscultation: Clear to auscultation bilaterally  Cardiovascular: Regular rate rhythm. No murmurs, rubs or gallops.  No LE edema  Abdomen: soft, nondistended  Gait: Normal  Digits: No clubbing, cyanosis  Motor: No focal deficits  Orientation: Oriented to time, person and place    Diagnosis:  1. ANTIONE (obstructive sleep apnea)         Plan:  The patient is intolerant of CPAP. We discussed alternative therapies which may be less effective, such as UPPP or a dental appliance. He had talked about a dental appliance with his dentist, and would prefer surgical treatment if possible.  He has an appointment  pending with Dr. Alanis for the next week to address.  He will discontinue CPAP.  No Follow-up on file.            Electronically signed by Kailee Galan M.D.  on 05/10/2017    CHARLI Sanchez M.D.  [address] [facility]  [place] [address]  VIA Facsimile: [phone]

## 2017-05-10 NOTE — MR AVS SNAPSHOT
Darren Brunson   5/10/2017 11:00 AM   Sleep Center Visit   MRN: [id number]    Department:  [facility]   Dept Phone:  [phone]    Description:  Male : 1950   Provider:  Kailee Galan M.D.           Reason for Visit     Follow-Up 6 week follow up      Allergies as of 5/10/2017     Allergen Noted Reactions    Levaquin 2014       Aspirin 2012       Lisinopril 2012       Statins [Hmg-Coa-R Inhibitors] 2012         You were diagnosed with     ANTIONE (obstructive sleep apnea)   [397487]         Vital Signs     Blood Pressure Pulse Temperature Respirations Height Weight    118/78 mmHg 77 36.4 °C (97.5 °F) (Temporal) 16 1.829 m (6') 85.821 kg (189 lb 3.2 oz)    Body Mass Index Smoking Status                25.65 kg/m2 Former Smoker          Basic Information     Date Of Birth Sex Race Ethnicity Preferred Language    1950 Male White Non- English      Your appointments     2017 11:00 AM   FOLLOW UP with Matty Amado M.D.   [facility] (--)    [address], [address]  [place] [id number] [phone]              Problem List              ICD-10-CM Priority Class Noted - Resolved    Acute myocardial infarction of other anterior wall, episode of care unspecified I21.09 High  3/30/2012 - Present    Anxiety state F41.1 Medium  3/30/2012 - Present    Coronary atherosclerosis of native coronary artery I25.10 High  3/30/2012 - Present    Hypercholesteremia E78.00 High  3/30/2012 - Present    Essential hypertension I10 High  3/30/2012 - Present    S/P coronary artery stent placement Z95.5 High  3/30/2012 - Present    Other chest pain R07.89 High  2012 - Present    Statin intolerance Z78.9   2013 - Present    ACE inhibitor-aggravated angioedema T46.4X1A, T78.3XXA   2014 - Present      Health Maintenance        Date Due Completion Dates    IMM DTaP/Tdap/Td Vaccine (1 - Tdap) 6/10/1967 ---    COLONOSCOPY  6/10/1998 ---    IMM ZOSTER VACCINE 6/10/2008 ---    IMM PNEUMOCOCCAL 65+ (ADULT) LOW/MEDIUM RISK SERIES (1 of 2 - PCV13) 6/10/2013 ---            Current Immunizations     13-VALENT PCV PREVNAR 7/20/2015    Influenza TIV (IM) 10/1/2016, 10/8/2015    Pneumococcal polysaccharide vaccine (PPSV-23) 1/12/2017      Below and/or attached are the medications your provider expects you to take. Review all of your home medications and newly ordered medications with your provider and/or pharmacist. Follow medication instructions as directed by your provider and/or pharmacist. Please keep your medication list with you and share with your provider. Update the information when medications are discontinued, doses are changed, or new medications (including over-the-counter products) are added; and carry medication information at all times in the event of emergency situations     Allergies:  LEVAQUIN - (reactions not documented)     ASPIRIN - (reactions not documented)     LISINOPRIL - (reactions not documented)     STATINS - (reactions not documented)               Medications  Valid as of: May 10, 2017 - 11:24 AM    Generic Name Brand Name Tablet Size Instructions for use    Aspirin (Tab) aspirin 81 MG Take 81 mg by mouth every day.        Fenofibrate (Tab) TRICOR 48 MG Take 48 mg by mouth every day.        Gabapentin (Cap) NEURONTIN 100 MG Take 100 mg by mouth 3 times a day.        Ginseng   Take  by mouth.        Hydrocodone-Chlorpheniramine (Solution) Hydrocodone-Chlorpheniramine 5-4 MG/5ML Take 1 Dose by mouth 2 Times a Day. Take 1 tsp 2 times daily as needed for cough        Lidocaine-Hydrocortisone Ace (Kit) Lidocaine-Hydrocortisone Ace 3-1 % by Apply externally route. APPLY TO AFFECTED AREA  Indications: NOT TAKING        MetFORMIN HCl (Tab) GLUCOPHAGE 500 MG Take 500 mg by mouth every day.        Nitroglycerin (SL Tab) NITROSTAT 0.4 MG Place 1 Tab under tongue as needed for Chest Pain (Place 1 tablet under the tongue every  5 minutes up to 3 doses as needed for chest pain).        Pantoprazole Sodium (Tablet Delayed Response) PROTONIX 40 MG Take 40 mg by mouth every day.        Probiotic Product   Take  by mouth every day.        Timolol Maleate (Solution) TIMOPTIC 0.5 % Indications: NOT TAKING        .                 Medicines prescribed today were sent to:     [facility]/PHARMACY #[id number] - [place], [place] - [address] [place] [address]    Phone: [phone] Fax: [phone]    Open 24 Hours?: No      Medication refill instructions:       If your prescription bottle indicates you have medication refills left, it is not necessary to call your provider’s office. Please contact your pharmacy and they will refill your medication.    If your prescription bottle indicates you do not have any refills left, you may request refills at any time through one of the following ways: The online dotloop system (except Urgent Care), by calling your provider’s office, or by asking your pharmacy to contact your provider’s office with a refill request. Medication refills are processed only during regular business hours and may not be available until the next business day. Your provider may request additional information or to have a follow-up visit with you prior to refilling your medication.   *Please Note: Medication refills are assigned a new Rx number when refilled electronically. Your pharmacy may indicate that no refills were authorized even though a new prescription for the same medication is available at the pharmacy. Please request the medicine by name with the pharmacy before contacting your provider for a refill.           [employer] Status: Patient Declined

## 2017-06-02 ENCOUNTER — OFFICE VISIT (OUTPATIENT)
Dept: CARDIOLOGY | Facility: MEDICAL CENTER | Age: 67
End: 2017-06-02
Payer: MEDICARE

## 2017-06-02 VITALS
DIASTOLIC BLOOD PRESSURE: 80 MMHG | HEIGHT: 72 IN | SYSTOLIC BLOOD PRESSURE: 110 MMHG | WEIGHT: 188 LBS | HEART RATE: 62 BPM | BODY MASS INDEX: 25.47 KG/M2 | OXYGEN SATURATION: 96 %

## 2017-06-02 DIAGNOSIS — I10 ESSENTIAL HYPERTENSION: ICD-10-CM

## 2017-06-02 DIAGNOSIS — E78.00 HYPERCHOLESTEREMIA: ICD-10-CM

## 2017-06-02 DIAGNOSIS — Z95.5 S/P CORONARY ARTERY STENT PLACEMENT: ICD-10-CM

## 2017-06-02 DIAGNOSIS — I25.10 ATHEROSCLEROSIS OF NATIVE CORONARY ARTERY OF NATIVE HEART WITHOUT ANGINA PECTORIS: ICD-10-CM

## 2017-06-02 PROCEDURE — 1036F TOBACCO NON-USER: CPT | Performed by: INTERNAL MEDICINE

## 2017-06-02 PROCEDURE — G8417 CALC BMI ABV UP PARAM F/U: HCPCS | Performed by: INTERNAL MEDICINE

## 2017-06-02 PROCEDURE — 4040F PNEUMOC VAC/ADMIN/RCVD: CPT | Performed by: INTERNAL MEDICINE

## 2017-06-02 PROCEDURE — G8598 ASA/ANTIPLAT THER USED: HCPCS | Performed by: INTERNAL MEDICINE

## 2017-06-02 PROCEDURE — G8432 DEP SCR NOT DOC, RNG: HCPCS | Performed by: INTERNAL MEDICINE

## 2017-06-02 PROCEDURE — 3017F COLORECTAL CA SCREEN DOC REV: CPT | Mod: 8P | Performed by: INTERNAL MEDICINE

## 2017-06-02 PROCEDURE — 1101F PT FALLS ASSESS-DOCD LE1/YR: CPT | Mod: 8P | Performed by: INTERNAL MEDICINE

## 2017-06-02 PROCEDURE — 99213 OFFICE O/P EST LOW 20 MIN: CPT | Performed by: INTERNAL MEDICINE

## 2017-06-02 RX ORDER — METFORMIN HYDROCHLORIDE 500 MG/1
500 TABLET, EXTENDED RELEASE ORAL EVERY EVENING
COMMUNITY
Start: 2017-05-30

## 2017-06-02 RX ORDER — KETOROLAC TROMETHAMINE 4 MG/ML
0.4 SOLUTION/ DROPS OPHTHALMIC DAILY
Refills: 0 | COMMUNITY
Start: 2017-05-18 | End: 2017-10-16

## 2017-06-02 ASSESSMENT — ENCOUNTER SYMPTOMS
HEARTBURN: 1
CONSTITUTIONAL NEGATIVE: 1
FEVER: 0
CARDIOVASCULAR NEGATIVE: 1
PSYCHIATRIC NEGATIVE: 1
NEUROLOGICAL NEGATIVE: 1

## 2017-06-02 NOTE — MR AVS SNAPSHOT
"        Darren Brunson   2017 11:00 AM   Office Visit   MRN: [id number]    Department:  [facility]   Dept Phone:  [phone]    Description:  Male : 1950   Provider:  Matty Amado M.D.           Reason for Visit     Follow-Up           Allergies as of 2017     Allergen Noted Reactions    Levaquin 2014       Aspirin 2012       Lisinopril 2012       Statins [Hmg-Coa-R Inhibitors] 2012         Vital Signs     Blood Pressure Pulse Height Weight Body Mass Index Oxygen Saturation    110/80 mmHg 62 1.829 m (6' 0.01\") 85.276 kg (188 lb) 25.49 kg/m2 96%    Smoking Status                   Former Smoker           Basic Information     Date Of Birth Sex Race Ethnicity Preferred Language    1950 Male White Non- English      Problem List              ICD-10-CM Priority Class Noted - Resolved    Acute myocardial infarction of other anterior wall, episode of care unspecified I21.09 High  3/30/2012 - Present    Anxiety state F41.1 Medium  3/30/2012 - Present    Coronary atherosclerosis of native coronary artery I25.10 High  3/30/2012 - Present    Hypercholesteremia E78.00 High  3/30/2012 - Present    Essential hypertension I10 High  3/30/2012 - Present    S/P coronary artery stent placement Z95.5 High  3/30/2012 - Present    Other chest pain R07.89 High  2012 - Present    Statin intolerance Z78.9   2013 - Present    ACE inhibitor-aggravated angioedema T46.4X1A, T78.3XXA   2014 - Present      Health Maintenance        Date Due Completion Dates    IMM DTaP/Tdap/Td Vaccine (1 - Tdap) 6/10/1967 ---    COLONOSCOPY 6/10/1998 ---    IMM ZOSTER VACCINE 6/10/2008 ---            Current Immunizations     13-VALENT PCV PREVNAR 2015    Influenza TIV (IM) 10/1/2016, 10/8/2015    Pneumococcal polysaccharide vaccine (PPSV-23) 2017      Below and/or attached are the medications your provider expects you to take. Review all of your home medications and newly " ordered medications with your provider and/or pharmacist. Follow medication instructions as directed by your provider and/or pharmacist. Please keep your medication list with you and share with your provider. Update the information when medications are discontinued, doses are changed, or new medications (including over-the-counter products) are added; and carry medication information at all times in the event of emergency situations     Allergies:  LEVAQUIN - (reactions not documented)     ASPIRIN - (reactions not documented)     LISINOPRIL - (reactions not documented)     STATINS - (reactions not documented)               Medications  Valid as of: June 02, 2017 - 11:44 AM    Generic Name Brand Name Tablet Size Instructions for use    Aspirin (Tab) aspirin 81 MG Take 81 mg by mouth every day.        Fenofibrate (Tab) TRICOR 48 MG Take 48 mg by mouth every day.        Gabapentin (Cap) NEURONTIN 100 MG Take 100 mg by mouth 3 times a day.        Ginseng   Take  by mouth.        Hydrocodone-Chlorpheniramine (Solution) Hydrocodone-Chlorpheniramine 5-4 MG/5ML Take 1 Dose by mouth 2 Times a Day. Take 1 tsp 2 times daily as needed for cough        Ketorolac Tromethamine (Solution) KETOROLAC TROMETHAMINE (OPHTH) 0.4 % Place 0.4 L in both eyes every day.        Lidocaine-Hydrocortisone Ace (Kit) Lidocaine-Hydrocortisone Ace 3-1 % by Apply externally route. APPLY TO AFFECTED AREA  Indications: NOT TAKING        MetFORMIN HCl (Tab) GLUCOPHAGE 500 MG Take 500 mg by mouth every day.        MetFORMIN HCl (TABLET SR 24 HR) GLUCOPHAGE  MG Take 500 mg by mouth every day.        Nitroglycerin (SL Tab) NITROSTAT 0.4 MG Place 1 Tab under tongue as needed for Chest Pain (Place 1 tablet under the tongue every 5 minutes up to 3 doses as needed for chest pain).        Pantoprazole Sodium (Tablet Delayed Response) PROTONIX 40 MG Take 40 mg by mouth every day.        Probiotic Product   Take  by mouth every day.        Timolol Maleate  (Solution) TIMOPTIC 0.5 % Indications: NOT TAKING        .                 Medicines prescribed today were sent to:     [facility]ACY #[id number] - [place], [place] - [address]    [address] [place] [address] [place] [address]    Phone: [phone] Fax: [phone]    Open 24 Hours?: No      Medication refill instructions:       If your prescription bottle indicates you have medication refills left, it is not necessary to call your provider’s office. Please contact your pharmacy and they will refill your medication.    If your prescription bottle indicates you do not have any refills left, you may request refills at any time through one of the following ways: The online [employer] system (except Urgent Care), by calling your provider’s office, or by asking your pharmacy to contact your provider’s office with a refill request. Medication refills are processed only during regular business hours and may not be available until the next business day. Your provider may request additional information or to have a follow-up visit with you prior to refilling your medication.   *Please Note: Medication refills are assigned a new Rx number when refilled electronically. Your pharmacy may indicate that no refills were authorized even though a new prescription for the same medication is available at the pharmacy. Please request the medicine by name with the pharmacy before contacting your provider for a refill.           [employer] Status: Patient Declined

## 2017-06-02 NOTE — Clinical Note
[facility]   [address], [facility] [address]  [place] [address]  Phone: [phone]  Fax: [phone]              Darren Brunson  1950    Encounter Date: 6/2/2017    Matty Amado M.D.          PROGRESS NOTE:  Subjective:   Darren Brunson is a 66 y.o. male who presents today for follow-up of coronary artery disease with stenting of his right coronary artery in February 2015 with overlapping 3.5 mm stents, and stenting of his LAD in March, 2009. He's had no angina at all. The patient cannot tolerate statins and does not want to consider PCSK9 inhibitor drugs. His cholesterol is actually well-controlled on herbal medications. He has not been too physically active. The patient is going to have surgery for sleep apnea in the near future. He cannot tolerate BiPAP masks.  Healthwise, is also some difficulty with abdominal pain and bloating and is following up with GI regarding this.   Past Medical History   Diagnosis Date   • Acute myocardial infarction of other anterior wall, episode of care unspecified     • Anxiety state, unspecified     • Unspecified essential hypertension     • S/P coronary artery stent placement     • Hyperlipidemia    • CAD (coronary artery disease) September 2009     PCI/stent (Promus 3.0 x 15mm) to the LAD.   • Chickenpox    • Mumps      Past Surgical History   Procedure Laterality Date   • Other  November 2011     Sinus surgery   • Tonsillectomy     • Sinuscope       Family History   Problem Relation Age of Onset   • Heart Disease Maternal Aunt    • Cancer Mother      colon     History   Smoking status   • Former Smoker   • Types: Cigarettes   • Quit date: 08/30/1997   Smokeless tobacco   • Never Used     Allergies   Allergen Reactions   • Levaquin    • Aspirin    • Lisinopril    • Statins [Hmg-Coa-R Inhibitors]      Outpatient Encounter Prescriptions as of 6/2/2017   Medication Sig Dispense Refill   • metformin ER (GLUCOPHAGE XR) 500 MG  "TABLET SR 24 HR Take 500 mg by mouth every day.     • GINSENG CHINESE RED PO Take  by mouth.     • gabapentin (NEURONTIN) 100 MG Cap Take 100 mg by mouth 3 times a day.     • timolol (TIMOPTIC) 0.5 % SOLN Indications: NOT TAKING  3   • pantoprazole (PROTONIX) 40 MG TBEC Take 40 mg by mouth every day.     • aspirin 81 MG tablet Take 81 mg by mouth every day.     • metformin (GLUCOPHAGE) 500 MG TABS Take 500 mg by mouth every day.     • fenofibrate (TRICOR) 48 MG TABS Take 48 mg by mouth every day.     • Probiotic Product (PROBIOTIC DAILY PO) Take  by mouth every day.     • KETOROLAC TROMETHAMINE, OPHTH, 0.4 % Solution Place 0.4 L in both eyes every day.  0   • Hydrocodone-Chlorpheniramine 5-4 MG/5ML Solution Take 1 Dose by mouth 2 Times a Day. Take 1 tsp 2 times daily as needed for cough 120 mL 0   • nitroglycerin (NITROSTAT) 0.4 MG SL Tab Place 1 Tab under tongue as needed for Chest Pain (Place 1 tablet under the tongue every 5 minutes up to 3 doses as needed for chest pain). 25 Tab 5   • Lidocaine-Hydrocortisone Ace 3-1 % KIT by Apply externally route. APPLY TO AFFECTED AREA  Indications: NOT TAKING  2     No facility-administered encounter medications on file as of 6/2/2017.     Review of Systems   Constitutional: Negative.  Negative for fever.   Respiratory:        History of sleep apnea   Cardiovascular: Negative.    Gastrointestinal: Positive for heartburn.   Neurological: Negative.    Psychiatric/Behavioral: Negative.         Objective:   /80 mmHg  Pulse 62  Ht 1.829 m (6' 0.01\")  Wt 85.276 kg (188 lb)  BMI 25.49 kg/m2  SpO2 96%    Physical Exam   Constitutional: He is oriented to person, place, and time. He appears well-developed and well-nourished. No distress.   HENT:   Head: Atraumatic.   Eyes: Conjunctivae and EOM are normal. Pupils are equal, round, and reactive to light.   Neck: Neck supple. No JVD present.   Cardiovascular: Normal rate and regular rhythm.    No murmur " heard.  Pulmonary/Chest: Effort normal and breath sounds normal. No respiratory distress. He has no wheezes. He has no rales.   Abdominal: Soft. There is no tenderness.   Musculoskeletal: He exhibits no edema.   Neurological: He is alert and oriented to person, place, and time.   Skin: Skin is warm and dry. He is not diaphoretic.       Assessment:     1. Hypercholesteremia     2. Essential hypertension     3. S/P coronary artery stent placement     4. Atherosclerosis of native coronary artery of native heart without angina pectoris         Medical Decision Making:  Today's Assessment / Status / Plan:     The patient seems to do well without any angina at all. He is going to have his lipid profile checked in a month or so and send me a copy. Consider stress testing next visit. Otherwise continue his current medical regimen.    Cc: Diego Vo M.D.  [address] [place] [address]  Diego [place]ER[place] [address]  VIA Facsimile: [phone]

## 2017-06-02 NOTE — PROGRESS NOTES
Subjective:   Darren Brunson is a 66 y.o. male who presents today for follow-up of coronary artery disease with stenting of his right coronary artery in February 2015 with overlapping 3.5 mm stents, and stenting of his LAD in March, 2009. He's had no angina at all. The patient cannot tolerate statins and does not want to consider PCSK9 inhibitor drugs. His cholesterol is actually well-controlled on herbal medications. He has not been too physically active. The patient is going to have surgery for sleep apnea in the near future. He cannot tolerate BiPAP masks.  Healthwise, is also some difficulty with abdominal pain and bloating and is following up with GI regarding this.   Past Medical History   Diagnosis Date   • Acute myocardial infarction of other anterior wall, episode of care unspecified     • Anxiety state, unspecified     • Unspecified essential hypertension     • S/P coronary artery stent placement     • Hyperlipidemia    • CAD (coronary artery disease) September 2009     PCI/stent (Promus 3.0 x 15mm) to the LAD.   • Chickenpox    • Mumps      Past Surgical History   Procedure Laterality Date   • Other  November 2011     Sinus surgery   • Tonsillectomy     • Sinuscope       Family History   Problem Relation Age of Onset   • Heart Disease Maternal Aunt    • Cancer Mother      colon     History   Smoking status   • Former Smoker   • Types: Cigarettes   • Quit date: 08/30/1997   Smokeless tobacco   • Never Used     Allergies   Allergen Reactions   • Levaquin    • Aspirin    • Lisinopril    • Statins [Hmg-Coa-R Inhibitors]      Outpatient Encounter Prescriptions as of 6/2/2017   Medication Sig Dispense Refill   • metformin ER (GLUCOPHAGE XR) 500 MG TABLET SR 24 HR Take 500 mg by mouth every day.     • GINSENG CHINESE RED PO Take  by mouth.     • gabapentin (NEURONTIN) 100 MG Cap Take 100 mg by mouth 3 times a day.     • timolol (TIMOPTIC) 0.5 % SOLN Indications: NOT TAKING  3   • pantoprazole (PROTONIX) 40 MG  "TBEC Take 40 mg by mouth every day.     • aspirin 81 MG tablet Take 81 mg by mouth every day.     • metformin (GLUCOPHAGE) 500 MG TABS Take 500 mg by mouth every day.     • fenofibrate (TRICOR) 48 MG TABS Take 48 mg by mouth every day.     • Probiotic Product (PROBIOTIC DAILY PO) Take  by mouth every day.     • KETOROLAC TROMETHAMINE, OPHTH, 0.4 % Solution Place 0.4 L in both eyes every day.  0   • Hydrocodone-Chlorpheniramine 5-4 MG/5ML Solution Take 1 Dose by mouth 2 Times a Day. Take 1 tsp 2 times daily as needed for cough 120 mL 0   • nitroglycerin (NITROSTAT) 0.4 MG SL Tab Place 1 Tab under tongue as needed for Chest Pain (Place 1 tablet under the tongue every 5 minutes up to 3 doses as needed for chest pain). 25 Tab 5   • Lidocaine-Hydrocortisone Ace 3-1 % KIT by Apply externally route. APPLY TO AFFECTED AREA  Indications: NOT TAKING  2     No facility-administered encounter medications on file as of 6/2/2017.     Review of Systems   Constitutional: Negative.  Negative for fever.   Respiratory:        History of sleep apnea   Cardiovascular: Negative.    Gastrointestinal: Positive for heartburn.   Neurological: Negative.    Psychiatric/Behavioral: Negative.         Objective:   /80 mmHg  Pulse 62  Ht 1.829 m (6' 0.01\")  Wt 85.276 kg (188 lb)  BMI 25.49 kg/m2  SpO2 96%    Physical Exam   Constitutional: He is oriented to person, place, and time. He appears well-developed and well-nourished. No distress.   HENT:   Head: Atraumatic.   Eyes: Conjunctivae and EOM are normal. Pupils are equal, round, and reactive to light.   Neck: Neck supple. No JVD present.   Cardiovascular: Normal rate and regular rhythm.    No murmur heard.  Pulmonary/Chest: Effort normal and breath sounds normal. No respiratory distress. He has no wheezes. He has no rales.   Abdominal: Soft. There is no tenderness.   Musculoskeletal: He exhibits no edema.   Neurological: He is alert and oriented to person, place, and time.   Skin: " Skin is warm and dry. He is not diaphoretic.       Assessment:     1. Hypercholesteremia     2. Essential hypertension     3. S/P coronary artery stent placement     4. Atherosclerosis of native coronary artery of native heart without angina pectoris         Medical Decision Making:  Today's Assessment / Status / Plan:     The patient seems to do well without any angina at all. He is going to have his lipid profile checked in a month or so and send me a copy. Consider stress testing next visit. Otherwise continue his current medical regimen.    Cc: Diego Vo

## 2017-06-22 ENCOUNTER — TELEPHONE (OUTPATIENT)
Dept: PULMONOLOGY | Facility: HOSPICE | Age: 67
End: 2017-06-22

## 2017-06-22 NOTE — TELEPHONE ENCOUNTER
1. Caller Name: Patient                      Call Back Number: [phone] (home) [phone] (work)    2. Message: Patient called and has some questions about his Central Apnea. He would like to speak to .    3. Patient approves office to leave a detailed voicemail/MyChart message: N\A

## 2017-07-17 ENCOUNTER — TELEPHONE (OUTPATIENT)
Dept: PULMONOLOGY | Facility: HOSPICE | Age: 67
End: 2017-07-17

## 2017-07-17 NOTE — TELEPHONE ENCOUNTER
1. Caller Name: Patient                      Call Back Number: [phone]    2. Message: He would like  to call him in regard's to a question he has about central sleep apnea before he goes back to see Dr.Van Sim.    3. Patient approves office to leave a detailed voicemail/MyChart message: yes

## 2017-10-04 ENCOUNTER — TELEPHONE (OUTPATIENT)
Dept: CARDIOLOGY | Facility: MEDICAL CENTER | Age: 67
End: 2017-10-04

## 2017-10-04 NOTE — TELEPHONE ENCOUNTER
Clearance form received from [facility]-Dr Sanchez for pt's Nasal procedure on 10/23/17.    Clearance form to RS folder to be signed

## 2017-10-05 NOTE — TELEPHONE ENCOUNTER
To Dr. Amado for clearance for open septal reconstruction scheduled 10-23-17.    Kandice Gunter  [phone] phone, [phone] fax.

## 2017-10-12 NOTE — TELEPHONE ENCOUNTER
Clearance letter faxed.        Lyn Wheat R.N. routed conversation to You 18 hours ago (4:14 PM)      Lyn Wheat R.N. routed conversation to Lyn Wheat R.N. 20 hours ago (2:54 PM)      Matty Amado M.D.   You 2 days ago      Low risk for surgery.   ACC guidelines recommend he stay on aspirin if possible, but that is up to surgeon. (Routing comment)

## 2017-10-16 DIAGNOSIS — Z01.810 PRE-OPERATIVE CARDIOVASCULAR EXAMINATION: ICD-10-CM

## 2017-10-16 DIAGNOSIS — Z01.812 PRE-OPERATIVE LABORATORY EXAMINATION: ICD-10-CM

## 2017-10-16 LAB
ANION GAP SERPL CALC-SCNC: 6 MMOL/L (ref 0–11.9)
BUN SERPL-MCNC: 21 MG/DL (ref 8–22)
CALCIUM SERPL-MCNC: 7.1 MG/DL (ref 8.5–10.5)
CHLORIDE SERPL-SCNC: 101 MMOL/L (ref 96–112)
CO2 SERPL-SCNC: 28 MMOL/L (ref 20–33)
CREAT SERPL-MCNC: 0.81 MG/DL (ref 0.5–1.4)
ERYTHROCYTE [DISTWIDTH] IN BLOOD BY AUTOMATED COUNT: 42.7 FL (ref 35.9–50)
GFR SERPL CREATININE-BSD FRML MDRD: >60 ML/MIN/1.73 M 2
GLUCOSE SERPL-MCNC: 97 MG/DL (ref 65–99)
HCT VFR BLD AUTO: 42.3 % (ref 42–52)
HGB BLD-MCNC: 14.9 G/DL (ref 14–18)
MCH RBC QN AUTO: 31.7 PG (ref 27–33)
MCHC RBC AUTO-ENTMCNC: 35.2 G/DL (ref 33.7–35.3)
MCV RBC AUTO: 90 FL (ref 81.4–97.8)
PLATELET # BLD AUTO: 211 K/UL (ref 164–446)
PMV BLD AUTO: 9.4 FL (ref 9–12.9)
POTASSIUM SERPL-SCNC: 3.6 MMOL/L (ref 3.6–5.5)
RBC # BLD AUTO: 4.7 M/UL (ref 4.7–6.1)
SODIUM SERPL-SCNC: 135 MMOL/L (ref 135–145)
WBC # BLD AUTO: 3.6 K/UL (ref 4.8–10.8)

## 2017-10-16 PROCEDURE — 93005 ELECTROCARDIOGRAM TRACING: CPT

## 2017-10-16 PROCEDURE — 93010 ELECTROCARDIOGRAM REPORT: CPT | Performed by: INTERNAL MEDICINE

## 2017-10-16 PROCEDURE — 85027 COMPLETE CBC AUTOMATED: CPT

## 2017-10-16 PROCEDURE — 80048 BASIC METABOLIC PNL TOTAL CA: CPT

## 2017-10-16 PROCEDURE — 36415 COLL VENOUS BLD VENIPUNCTURE: CPT

## 2017-10-17 LAB — EKG IMPRESSION: NORMAL

## 2017-10-23 ENCOUNTER — HOSPITAL ENCOUNTER (OUTPATIENT)
Facility: MEDICAL CENTER | Age: 67
End: 2017-10-23
Attending: OTOLARYNGOLOGY | Admitting: OTOLARYNGOLOGY
Payer: MEDICARE

## 2017-10-23 VITALS
TEMPERATURE: 98.4 F | HEIGHT: 72 IN | WEIGHT: 185.19 LBS | DIASTOLIC BLOOD PRESSURE: 87 MMHG | RESPIRATION RATE: 20 BRPM | BODY MASS INDEX: 25.08 KG/M2 | HEART RATE: 97 BPM | OXYGEN SATURATION: 95 % | SYSTOLIC BLOOD PRESSURE: 142 MMHG

## 2017-10-23 PROBLEM — J34.9: Status: ACTIVE | Noted: 2017-10-23

## 2017-10-23 PROBLEM — G47.33 OBSTRUCTIVE SLEEP APNEA SYNDROME: Status: ACTIVE | Noted: 2017-10-23

## 2017-10-23 LAB — GLUCOSE BLD-MCNC: 96 MG/DL (ref 65–99)

## 2017-10-23 PROCEDURE — 700111 HCHG RX REV CODE 636 W/ 250 OVERRIDE (IP)

## 2017-10-23 PROCEDURE — 501838 HCHG SUTURE GENERAL: Performed by: OTOLARYNGOLOGY

## 2017-10-23 PROCEDURE — 160002 HCHG RECOVERY MINUTES (STAT): Performed by: OTOLARYNGOLOGY

## 2017-10-23 PROCEDURE — 160035 HCHG PACU - 1ST 60 MINS PHASE I: Performed by: OTOLARYNGOLOGY

## 2017-10-23 PROCEDURE — 500126 HCHG BOVIE, NEEDLE TIP: Performed by: OTOLARYNGOLOGY

## 2017-10-23 PROCEDURE — 500127 HCHG BOVIE, NEEDLE TIP 1: Performed by: OTOLARYNGOLOGY

## 2017-10-23 PROCEDURE — 501423 HCHG SPONGE, SURGIFOAM 12X7: Performed by: OTOLARYNGOLOGY

## 2017-10-23 PROCEDURE — 88304 TISSUE EXAM BY PATHOLOGIST: CPT

## 2017-10-23 PROCEDURE — 82962 GLUCOSE BLOOD TEST: CPT

## 2017-10-23 PROCEDURE — 500257: Performed by: OTOLARYNGOLOGY

## 2017-10-23 PROCEDURE — A9270 NON-COVERED ITEM OR SERVICE: HCPCS

## 2017-10-23 PROCEDURE — 160028 HCHG SURGERY MINUTES - 1ST 30 MINS LEVEL 3: Performed by: OTOLARYNGOLOGY

## 2017-10-23 PROCEDURE — 160009 HCHG ANES TIME/MIN: Performed by: OTOLARYNGOLOGY

## 2017-10-23 PROCEDURE — 160048 HCHG OR STATISTICAL LEVEL 1-5: Performed by: OTOLARYNGOLOGY

## 2017-10-23 PROCEDURE — 500123 HCHG BOVIE, CONTROL W/BLADE: Performed by: OTOLARYNGOLOGY

## 2017-10-23 PROCEDURE — 502573 HCHG PACK, ENT: Performed by: OTOLARYNGOLOGY

## 2017-10-23 PROCEDURE — 700101 HCHG RX REV CODE 250

## 2017-10-23 PROCEDURE — 700102 HCHG RX REV CODE 250 W/ 637 OVERRIDE(OP)

## 2017-10-23 PROCEDURE — 160039 HCHG SURGERY MINUTES - EA ADDL 1 MIN LEVEL 3: Performed by: OTOLARYNGOLOGY

## 2017-10-23 PROCEDURE — 500125 HCHG BOVIE, HANDLE: Performed by: OTOLARYNGOLOGY

## 2017-10-23 PROCEDURE — 500331 HCHG COTTONOID, SURG PATTIE: Performed by: OTOLARYNGOLOGY

## 2017-10-23 PROCEDURE — 88305 TISSUE EXAM BY PATHOLOGIST: CPT

## 2017-10-23 PROCEDURE — 160036 HCHG PACU - EA ADDL 30 MINS PHASE I: Performed by: OTOLARYNGOLOGY

## 2017-10-23 RX ORDER — BACITRACIN ZINC 500 [USP'U]/G
OINTMENT TOPICAL
Status: DISCONTINUED
Start: 2017-10-23 | End: 2017-10-23 | Stop reason: HOSPADM

## 2017-10-23 RX ORDER — OXYMETAZOLINE HYDROCHLORIDE 0.05 G/100ML
SPRAY NASAL
Status: COMPLETED
Start: 2017-10-23 | End: 2017-10-23

## 2017-10-23 RX ORDER — OXYCODONE HCL 5 MG/5 ML
SOLUTION, ORAL ORAL
Status: COMPLETED
Start: 2017-10-23 | End: 2017-10-23

## 2017-10-23 RX ORDER — ONDANSETRON 4 MG/1
4 TABLET, ORALLY DISINTEGRATING ORAL EVERY 6 HOURS PRN
Qty: 10 TAB | Refills: 0 | Status: SHIPPED | OUTPATIENT
Start: 2017-10-23 | End: 2018-03-19

## 2017-10-23 RX ORDER — OXYCODONE HYDROCHLORIDE 5 MG/1
5 TABLET ORAL EVERY 4 HOURS PRN
Qty: 30 TAB | Refills: 0 | Status: SHIPPED | OUTPATIENT
Start: 2017-10-23 | End: 2018-03-19

## 2017-10-23 RX ORDER — AMOXICILLIN 400 MG/5ML
800 POWDER, FOR SUSPENSION ORAL 2 TIMES DAILY
Qty: 200 ML | Refills: 0 | Status: SHIPPED | OUTPATIENT
Start: 2017-10-23 | End: 2018-03-19

## 2017-10-23 RX ORDER — OXYCODONE HYDROCHLORIDE 5 MG/1
5 TABLET ORAL
Status: DISCONTINUED | OUTPATIENT
Start: 2017-10-23 | End: 2017-10-23 | Stop reason: HOSPADM

## 2017-10-23 RX ORDER — LIDOCAINE HYDROCHLORIDE AND EPINEPHRINE 10; 10 MG/ML; UG/ML
INJECTION, SOLUTION INFILTRATION; PERINEURAL
Status: DISCONTINUED | OUTPATIENT
Start: 2017-10-23 | End: 2017-10-23 | Stop reason: HOSPADM

## 2017-10-23 RX ORDER — SODIUM CHLORIDE, SODIUM LACTATE, POTASSIUM CHLORIDE, CALCIUM CHLORIDE 600; 310; 30; 20 MG/100ML; MG/100ML; MG/100ML; MG/100ML
INJECTION, SOLUTION INTRAVENOUS CONTINUOUS
Status: DISCONTINUED | OUTPATIENT
Start: 2017-10-23 | End: 2017-10-23

## 2017-10-23 RX ORDER — IBUPROFEN 400 MG/1
800 TABLET ORAL
Status: DISCONTINUED | OUTPATIENT
Start: 2017-10-23 | End: 2017-10-23 | Stop reason: HOSPADM

## 2017-10-23 RX ORDER — MORPHINE SULFATE 4 MG/ML
1 INJECTION, SOLUTION INTRAMUSCULAR; INTRAVENOUS
Status: DISCONTINUED | OUTPATIENT
Start: 2017-10-23 | End: 2017-10-23 | Stop reason: HOSPADM

## 2017-10-23 RX ORDER — GINSENG 100 MG
CAPSULE ORAL
Status: DISCONTINUED | OUTPATIENT
Start: 2017-10-23 | End: 2017-10-23 | Stop reason: HOSPADM

## 2017-10-23 RX ORDER — ACETAMINOPHEN 500 MG
TABLET ORAL
Status: DISCONTINUED
Start: 2017-10-23 | End: 2017-10-23 | Stop reason: HOSPADM

## 2017-10-23 RX ORDER — DEXTROSE AND SODIUM CHLORIDE 5; .45 G/100ML; G/100ML
INJECTION, SOLUTION INTRAVENOUS CONTINUOUS
Status: DISCONTINUED | OUTPATIENT
Start: 2017-10-23 | End: 2017-10-23 | Stop reason: HOSPADM

## 2017-10-23 RX ORDER — ONDANSETRON 2 MG/ML
4 INJECTION INTRAMUSCULAR; INTRAVENOUS EVERY 6 HOURS PRN
Status: DISCONTINUED | OUTPATIENT
Start: 2017-10-23 | End: 2017-10-23 | Stop reason: HOSPADM

## 2017-10-23 RX ADMIN — OXYMETAZOLINE HYDROCHLORIDE 2 SPRAY: 5 SPRAY NASAL at 09:45

## 2017-10-23 RX ADMIN — OXYCODONE HYDROCHLORIDE 5 MG: 5 SOLUTION ORAL at 13:15

## 2017-10-23 RX ADMIN — FENTANYL CITRATE 25 MCG: 50 INJECTION, SOLUTION INTRAMUSCULAR; INTRAVENOUS at 15:09

## 2017-10-23 RX ADMIN — FENTANYL CITRATE 25 MCG: 50 INJECTION, SOLUTION INTRAMUSCULAR; INTRAVENOUS at 13:10

## 2017-10-23 RX ADMIN — SODIUM CHLORIDE, SODIUM LACTATE, POTASSIUM CHLORIDE, CALCIUM CHLORIDE: 600; 310; 30; 20 INJECTION, SOLUTION INTRAVENOUS at 09:10

## 2017-10-23 ASSESSMENT — PAIN SCALES - GENERAL
PAINLEVEL_OUTOF10: 5
PAINLEVEL_OUTOF10: 2
PAINLEVEL_OUTOF10: 2
PAINLEVEL_OUTOF10: 3
PAINLEVEL_OUTOF10: 4
PAINLEVEL_OUTOF10: 3
PAINLEVEL_OUTOF10: 0
PAINLEVEL_OUTOF10: 0
PAINLEVEL_OUTOF10: 4
PAINLEVEL_OUTOF10: 0
PAINLEVEL_OUTOF10: 2
PAINLEVEL_OUTOF10: 4

## 2017-10-23 NOTE — OR SURGEON
Immediate Post OP Note    PreOp Diagnosis: nasal obstruction, ANTIONE skin lesion  PostOp Diagnosis: same  Procedure(s):  SEPTAL RECONSTRUCTION - Wound Class: Clean Contaminated  UVULOPHARYNGOPALATOPLASTY - Wound Class: Clean Contaminated  BIOPSY GENERAL LEFT UPPER NECK - Wound Class: Clean    Surgeon(s):  CHARLI Sanchez M.D.    Anesthesiologist/Type of Anesthesia:  Anesthesiologist: Jose Sams M.D./General    Surgical Staff:  Circulator: Izzy Galan R.N.; Elana Hager R.N.  Relief Scrub: Manuel Little  Scrub Person: Darren Becker R.N.    Specimens:  * No specimens in log *    Estimated Blood Loss: min  Findings: vestib stenosis, low palate, skin lesion left upper neck    Complications: 0      10/23/2017 1:00 PM CHARLI Sanchez

## 2017-10-23 NOTE — OR NURSING
1254 Pt received to pacu, awake but sleepy, with spontaneous respirations noted.  Vitals signs taken and stable.  No distress.Nasal splint intact to outside bridge of nose. Nasal sling and 4x4 dressing applied to nose.    1315  Pt more awake, complains of throat pain, medicated. Pt taking sips of water.    1345  Pt denies pain and nausea, taking sips of water. No drainage noted from nose.     1430  Pt awake, wife here at bedside. Pt denies pain and nausea.    1455  Handoff report to Sheila SILVA.

## 2017-10-23 NOTE — OR NURSING
1451 Report received from Lisa Berger.  4/10. Nasal drip pad CDI.      1509 Iv pain medication given per Pt request.  4/10 pain.      1530 Dc instructions completed with PT and his wife.    1545 Pt is sitting on EOB getting dressed.  Steady gait noted.  States tolerable pain.    1548 Pt ambulated to bathroom.  Pt voided without difficulty.      1600 Dc to car via wheel chair. Pt has all belongings.

## 2017-10-23 NOTE — DISCHARGE INSTRUCTIONS
ACTIVITY: Rest and take it easy for the first 24 hours.  A responsible adult is recommended to remain with you during that time.  It is normal to feel sleepy.  We encourage you to not do anything that requires balance, judgment or coordination.    MILD FLU-LIKE SYMPTOMS ARE NORMAL. YOU MAY EXPERIENCE GENERALIZED MUSCLE ACHES, THROAT IRRITATION, HEADACHE AND/OR SOME NAUSEA.    FOR 24 HOURS DO NOT:  Drive, operate machinery or run household appliances.  Drink beer or alcoholic beverages.   Make important decisions or sign legal documents.    SPECIAL INSTRUCTIONS: *FOLLOW INSTRUCTIONS FROM DR. ROCHA. SEE ATTACHED INSTRUCTIONS. KEEP HEAD ELEVATED UP AT ALL TIMES. DO NOT BLOW NOSE.**    DIET: To avoid nausea, slowly advance diet as tolerated, avoiding spicy or greasy foods for the first day.  Add more substantial food to your diet according to your physician's instructions.  Babies can be fed formula or breast milk as soon as they are hungry.  INCREASE FLUIDS AND FIBER TO AVOID CONSTIPATION.    SURGICAL DRESSING/BATHING: *CHANGE NASAL DRESSING AS NEEDED.**    FOLLOW-UP APPOINTMENT:  A follow-up appointment should be arranged with your doctor in *THURSDAY FOR SPLINT REMOVAL.**; call to schedule.    You should CALL YOUR PHYSICIAN if you develop:  Fever greater than 101 degrees F.  Pain not relieved by medication, or persistent nausea or vomiting.  Excessive bleeding (blood soaking through dressing) or unexpected drainage from the wound.  Extreme redness or swelling around the incision site, drainage of pus or foul smelling drainage.  Inability to urinate or empty your bladder within 8 hours.  Problems with breathing or chest pain.    You should call 911 if you develop problems with breathing or chest pain.  If you are unable to contact your doctor or surgical center, you should go to the nearest emergency room or urgent care center.  Physician's telephone #: *[phone]**    If any questions arise, call your doctor.  If  your doctor is not available, please feel free to call the Surgical Center at [phone].  The Center is open Monday through Friday from 7AM to 7PM.  You can also call the HEALTH HOTLINE open 24 hours/day, 7 days/week and speak to a nurse at [phone], or toll free at [phone].    A registered nurse may call you a few days after your surgery to see how you are doing after your procedure.    MEDICATIONS: Resume taking daily medication.  Take prescribed pain medication with food.  If no medication is prescribed, you may take non-aspirin pain medication if needed.  PAIN MEDICATION CAN BE VERY CONSTIPATING.  Take a stool softener or laxative such as senokot, pericolace, or milk of magnesia if needed.    Prescription given for *OXYCODONE (PAIN MEDICINE), ZOFRAN (NAUSEA MEDICINE), AMOXICILLIN (ANTIBIOTIC)**.  Last pain medication given at *OXYCODONE GIVEN AT 1:15PM.**.    If your physician has prescribed pain medication that includes Acetaminophen (Tylenol), do not take additional Acetaminophen (Tylenol) while taking the prescribed medication.    Depression / Suicide Risk    As you are discharged from this [facility] facility, it is important to learn how to keep safe from harming yourself.    Recognize the warning signs:  · Abrupt changes in personality, positive or negative- including increase in energy   · Giving away possessions  · Change in eating patterns- significant weight changes-  positive or negative  · Change in sleeping patterns- unable to sleep or sleeping all the time   · Unwillingness or inability to communicate  · Depression  · Unusual sadness, discouragement and loneliness  · Talk of wanting to die  · Neglect of personal appearance   · Rebelliousness- reckless behavior  · Withdrawal from people/activities they love  · Confusion- inability to concentrate     If you or a loved one observes any of these behaviors or has concerns about self-harm, here's what you can do:  · Talk about it-  your feelings and reasons for harming yourself  · Remove any means that you might use to hurt yourself (examples: pills, rope, extension cords, firearm)  · Get professional help from the community (Mental Health, Substance Abuse, psychological counseling)  · Do not be alone:Call your Safe Contact- someone whom you trust who will be there for you.  · Call your local CRISIS HOTLINE 260-9894 or 960-379-6990  · Call your local Children's Mobile Crisis Response Team [place] (237) 295-0055 or www.NotaryAct  · Call the toll free National Suicide Prevention Hotlines   · National Suicide Prevention Lifeline 048-712-BXEQ (3669)  · National Hope Line Network 800-SUICIDE (842-2971)

## 2017-10-28 NOTE — OP REPORT
DATE OF SERVICE:  10/23/2017    PREOPERATIVE DIAGNOSIS:  Nasal obstruction despite previous nasoseptoplasty   with previous open reduction of nasal septal fracture.    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    PROCEDURE:  Open septal reconstruction with placement of  graft.    SURGEON:  NICK Sanchez MD    ANESTHESIA:  General endotracheal anesthesia.    ANESTHESIOLOGIST:  Jose Sams MD    COMPLICATIONS:  None.    INDICATIONS FOR OPERATION:  This is a very pleasant gentleman who has had   previous history of nasal obstruction, also obstructive sleep apnea, coronary   artery disease and some other medical issues.  The patient is currently well   and ready for improvement of the nasal breathing to help with his breathing at   night.  He has had previous nasoseptoplasty which did give some improvement   for several years until he began having more problems with lower lateral   cartilage collapse.  There are no contraindications for surgery.    DETAILS OF THE OPERATION:  After obtaining informed consent from patient,   patient was brought to the operating room and placed supine on the operating   room table.  General anesthesia was administered and the patient was intubated   without difficulties using the GlideScope.  The eyes were taped shut for   protection.  The patient was prepped and draped in a suitable fashion for   nasal surgery.  A time-out confirmed patient and procedure.  Approximately 8   mL of 1% lidocaine with 1:100,000 epinephrine was injected intranasally and   over the nasal dorsum.  Following sufficient time for local anesthetic and   vasoconstrictive effect, the 11 blade was used to open a gull-wing incision   across the columella, which had been previously marked.  Marginal incisions   were extended  internally and the skin flap was elevated off of the lower   lateral, upper lateral cartilages and bone.  Mucoperichondrial flaps were   elevated bilaterally.  The patient was noted to have sufficient quadrangular   cartilage remaining for the purpose of harvesting the  graft.  The   dissection ensued and the cartilage was harvested from the quadrangular   cartilage also allowing for further straightening of the nasal septum, which   was noted to be deviated more superiorly to the left.  The cartilage was   fashioned into  grafts and also caudal strut.  The upper lateral   cartilages were transected from the septum.  The  grafts were placed   between the upper lateral cartilages and septum securing with 1 interrupted   Vicryl suture.  The caudal strut was placed between the medial crura and   secured using a 4-0 chromic suture in an interrupted fashion.  The lower   lateral cartilages were reapproximated in the midline with a 5-0 black nylon   suture.  The skin was redraped and the incision closed using several   interrupted 6-0 black nylon suture.  Mastisol was placed over the nasal dorsum   and half inch Steri-Strips were applied.  The inferior turbinates were   outfractured bilaterally.  Surgifoam was placed in the patient's nose   bilaterally.  No splints were placed.  A 4-0 plain on a mini Kolton was used to   reapproximate the mucoperichondrial flaps prior to application of the   Surgifoam.  The patient was returned to the anesthesiologist for reversal of   the anesthetic.  Patient was awakened in the operating room, extubated and   brought to the recovery room in excellent condition breathing spontaneously.    There were no complications.  All needle and sponge counts were correct x2 at   the end of the procedure.       ____________________________________     M MD SREE WASHINGTON / ISAIAH    DD:  10/27/2017 17:11:45  DT:  10/27/2017 18:06:31    D#:  [id number]  Job#:  [id number]

## 2017-11-13 NOTE — OP REPORT
DATE OF SERVICE:  10/23/2017    ADDENDUM    He had nasal obstruction with extreme vestibular stenosis as well as an   exophytic pigmented skin lesion on the left neck as well as asymmetry of the   palate with a very floppy uvula.  The addendum is to record the treatment of   the palate with uvulopalatopharyngoplasty and for the neck biopsy with   excisional biopsy of the skin lesion on the left neck.    PREOPERATIVE DIAGNOSES:  1.  Sleep apnea with asymmetry and redundant tissue in the palate.  2.  Pigmented lesion of left neck skin.    POSTOPERATIVE DIAGNOSES:  1.  Sleep apnea with asymmetry and redundant tissue in the palate.  2.  Pigmented lesion of left neck skin.    PROCEDURES:  1.  Uvulopalatopharyngoplasty.  2.  Excisional biopsy, left neck skin measuring 6 mm.    SURGEON:  NICK Sanchez MD    ANESTHESIA:  General endotracheal anesthesia.    ANESTHESIOLOGIST:  Jose Sams MD.  This was in conjunction with nasal   surgery.    COMPLICATIONS:  None.    INDICATIONS FOR OPERATION:  This is a 67-year-old who presented with the   above.  He also had the neck skin lesion and the palate issues contributing to   the sleep apnea.  There were no contraindications for surgery.    DETAILS OF THE OPERATION:  After obtaining informed consent and undergoing a   nasal surgery, please see previous operative report on 10/23/2017, the table   was rotated 90 degrees.  The McIvor mouthpiece was carefully inserted into the   oral cavity exposing the oropharynx.  The palate was then trimmed and a   marsha-uvula was created.  Several 3-0 chromic sutures were placed from the   posterior aspect of the palatal mucosa to the anterior aspect.  The area was   injected with 2 mL of 0.5% Marcaine with 1:200,000 epinephrine.  The left neck   biopsy was addressed by injection with 1% lidocaine with 1:100,000   epinephrine.  A marking pen was used to morris about the lesion.  This was   excised using a 15 blade.  Several interrupted 4-0  Vicryl sutures were placed   to reapproximate the subcutaneous tissues.  A 5-0 fast absorbing was used to   reapproximate the skin.  The specimen was sent to pathology for cancer   precautions.  The patient was then returned to the anesthesiologist for   reversal of the anesthetic.  Patient was awakened in the operating room,   extubated and brought to the recovery room in excellent condition breathing   spontaneously.  There were no complications.  All needle and sponge counts   were correct x2 at the end of the procedure.       ____________________________________     M JAVED ROCHA MD MAV / NTS    DD:  11/13/2017 09:49:43  DT:  11/13/2017 10:03:34    D#:  [id number]  Job#:  [id number]

## 2017-11-29 ENCOUNTER — OFFICE VISIT (OUTPATIENT)
Dept: CARDIOLOGY | Facility: MEDICAL CENTER | Age: 67
End: 2017-11-29
Payer: MEDICARE

## 2017-11-29 VITALS
OXYGEN SATURATION: 93 % | HEART RATE: 88 BPM | DIASTOLIC BLOOD PRESSURE: 70 MMHG | WEIGHT: 188 LBS | BODY MASS INDEX: 25.47 KG/M2 | HEIGHT: 72 IN | SYSTOLIC BLOOD PRESSURE: 120 MMHG | RESPIRATION RATE: 14 BRPM

## 2017-11-29 DIAGNOSIS — E78.00 HYPERCHOLESTEREMIA: ICD-10-CM

## 2017-11-29 DIAGNOSIS — Z95.5 S/P CORONARY ARTERY STENT PLACEMENT: ICD-10-CM

## 2017-11-29 DIAGNOSIS — I10 ESSENTIAL HYPERTENSION: ICD-10-CM

## 2017-11-29 DIAGNOSIS — I25.10 ATHEROSCLEROSIS OF NATIVE CORONARY ARTERY OF NATIVE HEART WITHOUT ANGINA PECTORIS: ICD-10-CM

## 2017-11-29 DIAGNOSIS — Z78.9 STATIN INTOLERANCE: ICD-10-CM

## 2017-11-29 PROCEDURE — 99214 OFFICE O/P EST MOD 30 MIN: CPT | Performed by: INTERNAL MEDICINE

## 2017-11-29 RX ORDER — DOXYCYCLINE 100 MG/1
CAPSULE ORAL
COMMUNITY
Start: 2017-11-17 | End: 2018-03-19

## 2017-11-29 RX ORDER — PHENAZOPYRIDINE HYDROCHLORIDE 100 MG/1
TABLET, FILM COATED ORAL
COMMUNITY
Start: 2017-11-17 | End: 2018-03-19

## 2017-11-29 RX ORDER — FLUCONAZOLE 150 MG/1
TABLET ORAL
COMMUNITY
Start: 2017-11-22 | End: 2018-03-19

## 2017-11-29 RX ORDER — ROSUVASTATIN CALCIUM 5 MG/1
5 TABLET, COATED ORAL
Qty: 15 TAB | Refills: 6 | Status: SHIPPED | OUTPATIENT
Start: 2017-11-29 | End: 2018-03-19

## 2017-11-29 RX ORDER — CIPROFLOXACIN 500 MG/1
500 TABLET, FILM COATED ORAL 2 TIMES DAILY
COMMUNITY
Start: 2017-11-28 | End: 2018-03-19

## 2017-11-29 ASSESSMENT — ENCOUNTER SYMPTOMS
CARDIOVASCULAR NEGATIVE: 1
CONSTITUTIONAL NEGATIVE: 1
PSYCHIATRIC NEGATIVE: 1
HEARTBURN: 1
FEVER: 0
NEUROLOGICAL NEGATIVE: 1

## 2017-11-30 ENCOUNTER — TELEPHONE (OUTPATIENT)
Dept: CARDIOLOGY | Facility: MEDICAL CENTER | Age: 67
End: 2017-11-30

## 2017-11-30 NOTE — PROGRESS NOTES
Subjective:   Darren Brunson is a 66 y.o. male who presents today for follow-up of coronary artery disease with stenting of his right coronary artery in February 2015 with overlapping 3.5 mm stents, and stenting of his LAD in March, 2009.   The patient is intolerant to statins and is taking a Chinese herbal medicine. LDL is a little over 100. He's had no angina.  The patient had recent surgery for sleep apnea and is still recovering from this but is feeling better. He has not been as physically active as he was previously.  Past Medical History:   Diagnosis Date   • Acute myocardial infarction of other anterior wall, episode of care unspecified 2009; 2014   • Anxiety state, unspecified     • CAD (coronary artery disease) September 2009    PCI/stent (Promus 3.0 x 15mm) to the LAD.   • Cataract     left eye   • Chickenpox    • Diabetes (CMS-HCC)     on oral meds   • Glaucoma    • Heart murmur    • High cholesterol    • Hyperlipidemia    • Mumps    • Pain    • S/P coronary artery stent placement     • Sleep apnea    • Snoring    • Unspecified essential hypertension       Past Surgical History:   Procedure Laterality Date   • SEPTAL RECONSTRUCTION N/A 10/23/2017    Procedure: SEPTAL RECONSTRUCTION;  Surgeon: CHARLI Sanchez M.D.;  Location: SURGERY SAME DAY [facility];  Service: Ent   • UVULOPHARYNGOPALATOPLASTY N/A 10/23/2017    Procedure: UVULOPHARYNGOPALATOPLASTY;  Surgeon: CHARLI Sanchez M.D.;  Location: SURGERY SAME DAY [facility];  Service: Ent   • BIOPSY GENERAL Left 10/23/2017    Procedure: BIOPSY GENERAL LEFT UPPER NECK;  Surgeon: CHARLI Sanchez M.D.;  Location: SURGERY SAME DAY [facility];  Service: Ent   • OTHER  November 2011    Sinus surgery   • INGUINAL HERNIA REPAIR BILATERAL     • OTHER CARDIAC SURGERY      cardiac cath with stent placement   • SINUSCOPE     • TONSILLECTOMY     • UMBILICAL HERNIA REPAIR       Family History   Problem Relation Age of Onset   • Heart Disease Maternal  "Aunt    • Cancer Mother      colon     History   Smoking Status   • Former Smoker   • Types: Cigarettes   • Quit date: 8/30/1997   Smokeless Tobacco   • Never Used     Allergies   Allergen Reactions   • Aspirin      High dose only - aspirin induced tinnitus   • Lisinopril    • Statins [Hmg-Coa-R Inhibitors]      Outpatient Encounter Prescriptions as of 11/29/2017   Medication Sig Dispense Refill   • ciprofloxacin (CIPRO) 500 MG Tab Take 500 mg by mouth 2 times a day. X 14 days, Started 11/28/17     • rosuvastatin (CRESTOR) 5 MG Tab Take 1 Tab by mouth 2X A WEEK. 15 Tab 6   • NON SPECIFIED \"kera he wan\" and \"Ivan cruz qiwan\"  Herbs for cholesterol and prostate     • metformin ER (GLUCOPHAGE XR) 500 MG TABLET SR 24 HR Take 500 mg by mouth every day.     • gabapentin (NEURONTIN) 100 MG Cap Take 100 mg by mouth every evening.     • nitroglycerin (NITROSTAT) 0.4 MG SL Tab Place 1 Tab under tongue as needed for Chest Pain (Place 1 tablet under the tongue every 5 minutes up to 3 doses as needed for chest pain). 25 Tab 5   • pantoprazole (PROTONIX) 40 MG TBEC Take 40 mg by mouth every day.     • aspirin 81 MG tablet Take 81 mg by mouth every day.     • fenofibrate (TRICOR) 48 MG TABS Take 48 mg by mouth every day.     • Probiotic Product (PROBIOTIC DAILY PO) Take  by mouth every day.     • doxycycline (MONODOX) 100 MG capsule      • fluconazole (DIFLUCAN) 150 MG tablet      • phenazopyridine (PYRIDIUM) 100 MG Tab      • ondansetron (ZOFRAN ODT) 4 MG TABLET DISPERSIBLE Take 1 Tab by mouth every 6 hours as needed for Nausea/Vomiting. (Patient not taking: Reported on 11/29/2017) 10 Tab 0   • amoxicillin (AMOXIL) 400 MG/5ML suspension Take 10 mL by mouth 2 times a day. (Patient not taking: Reported on 11/29/2017) 200 mL 0   • oxycodone immediate-release (ROXICODONE) 5 MG Tab Take 1 Tab by mouth every four hours as needed for Severe Pain. (Patient not taking: Reported on 11/29/2017) 30 Tab 0     No facility-administered " encounter medications on file as of 11/29/2017.      Review of Systems   Constitutional: Negative.  Negative for fever.   Respiratory:        History of sleep apnea   Cardiovascular: Negative.    Gastrointestinal: Positive for heartburn.   Neurological: Negative.    Psychiatric/Behavioral: Negative.         Objective:   /70   Pulse 88   Resp 14   Ht 1.829 m (6')   Wt 85.3 kg (188 lb)   SpO2 93%   BMI 25.50 kg/m²     Physical Exam   Constitutional: He is oriented to person, place, and time. He appears well-developed and well-nourished. No distress.   HENT:   Head: Atraumatic.   Eyes: Conjunctivae and EOM are normal. Pupils are equal, round, and reactive to light.   Neck: Neck supple. No JVD present.   Cardiovascular: Normal rate and regular rhythm.    No murmur heard.  Pulmonary/Chest: Effort normal and breath sounds normal. No respiratory distress. He has no wheezes. He has no rales.   Abdominal: Soft. There is no tenderness.   Musculoskeletal: He exhibits no edema.   Neurological: He is alert and oriented to person, place, and time.   Skin: Skin is warm and dry. He is not diaphoretic.       Assessment:     1. Atherosclerosis of native coronary artery of native heart without angina pectoris  LIPID PROFILE   2. Essential hypertension  LIPID PROFILE   3. Hypercholesteremia  LIPID PROFILE   4. S/P coronary artery stent placement  LIPID PROFILE   5. Statin intolerance  LIPID PROFILE       Medical Decision Making:  Today's Assessment / Status / Plan:     He has not had any chest pains. I would like to be more aggressive with his LDL and we discussed trying low-dose rosuvastatin today. He was started on 5 mg twice weekly and labs will be checked in 2 months. He is still reluctant to tryPCSK9 inhibitors.  Consider stress testing when he returns in 6 months.

## 2017-12-01 NOTE — TELEPHONE ENCOUNTER
----- Message from Deisi Otto sent at 11/30/2017  1:36 PM PST -----  Regarding: Requesting call back from RS has another question about medication  Contact: [phone]  LUAN/Prisca Rosenbaum is requesting a call back from Dr. Amado states its non urgent. Has another question about his medication in regards to his visit yesterday. He can be reached at [phone].

## 2017-12-01 NOTE — TELEPHONE ENCOUNTER
"Dr. Amado recommended pt. Start Crestor 5mg 2 times eekly at [facility] yesterday.  Pt. calls back today to relate he REALLY doesn't want to take a statin. He has had a \"bad\" year, not exercising or following usual diet, and he thinks his cholesterol is not that abnormal.   "

## 2017-12-04 NOTE — TELEPHONE ENCOUNTER
Pt. Will exercise more, follow stricter diet, and get CMP, LP drawn  the first week in Feb. At that point he and Dr. Amado can revisit statinToby UMANA to Dr. Amado.

## 2017-12-14 NOTE — TELEPHONE ENCOUNTER
Matty Amado M.D.   Prisca Lopez L.P.ESTHER 2 hours ago (2:57 PM)      My recommendation remains the same

## 2017-12-14 NOTE — TELEPHONE ENCOUNTER
Called pt., left detailed message that Dr. Amado recommends he take Crestor 5mg twice a week.

## 2018-01-22 ENCOUNTER — TELEPHONE (OUTPATIENT)
Dept: CARDIOLOGY | Facility: MEDICAL CENTER | Age: 68
End: 2018-01-22

## 2018-01-22 NOTE — TELEPHONE ENCOUNTER
----- Message from Deisi Otto sent at 1/22/2018  2:25 PM PST -----  Regarding: Pt has question about medication   Contact: [phone]  Jyoti    Pt has a question about medication, did not want to specify further. He would please like a call back at [phone].

## 2018-01-22 NOTE — TELEPHONE ENCOUNTER
Pt. Said when he saw Dr. Amado in Nov., Dr. Amado said that it was okay for Dr. Khan' PA, Akin Darnell, to RX Viagra.  To Dr. Amado for authorization; then nurse will call Akin Darnell.

## 2018-01-23 NOTE — TELEPHONE ENCOUNTER
Faxed Dr. Amado's note below to GUIDO Shah, [phone]. Pt. Advised.        Message   Received: Today   Message Contents   Matty Amado M.D.  TESSA Chatman.P.ESTHER   Caller: Unspecified (Yesterday,  3:05 PM)             OK for viagra

## 2018-01-24 ENCOUNTER — TELEPHONE (OUTPATIENT)
Dept: CARDIOLOGY | Facility: MEDICAL CENTER | Age: 68
End: 2018-01-24

## 2018-01-25 NOTE — TELEPHONE ENCOUNTER
----- Message from Nelida Riojas R.N. sent at 1/24/2018  3:58 PM PST -----  Regarding: FW: refax to different fax#  Contact: [phone]      ----- Message -----  From: Staci Flores  Sent: 1/24/2018   3:28 PM  To: Lyn Wheat R.N.  Subject: refax to different fax#                          RS/lyn    Pt calling to ask you to refax same information as odalys muellerxed yesterday to GUIDO Shah, but to a completely different fax # at same urology office.  Please fax to: [phone], attention: Miguelina.    Pt is requesting a call back when completed. [phone]

## 2018-01-25 NOTE — TELEPHONE ENCOUNTER
refaxed Prisca's progress note from yesterday with ok to prescribe Viagra to number requested. Confirmation received.

## 2018-03-19 ENCOUNTER — APPOINTMENT (OUTPATIENT)
Dept: RADIOLOGY | Facility: MEDICAL CENTER | Age: 68
End: 2018-03-19
Attending: EMERGENCY MEDICINE
Payer: MEDICARE

## 2018-03-19 ENCOUNTER — HOSPITAL ENCOUNTER (OUTPATIENT)
Facility: MEDICAL CENTER | Age: 68
End: 2018-03-20
Attending: EMERGENCY MEDICINE | Admitting: INTERNAL MEDICINE
Payer: MEDICARE

## 2018-03-19 ENCOUNTER — RESOLUTE PROFESSIONAL BILLING HOSPITAL PROF FEE (OUTPATIENT)
Dept: HOSPITALIST | Facility: MEDICAL CENTER | Age: 68
End: 2018-03-19
Payer: MEDICARE

## 2018-03-19 DIAGNOSIS — R07.9 CHEST PAIN, UNSPECIFIED TYPE: ICD-10-CM

## 2018-03-19 LAB
ALBUMIN SERPL BCP-MCNC: 4.1 G/DL (ref 3.2–4.9)
ALBUMIN/GLOB SERPL: 1.4 G/DL
ALP SERPL-CCNC: 39 U/L (ref 30–99)
ALT SERPL-CCNC: 25 U/L (ref 2–50)
ANION GAP SERPL CALC-SCNC: 12 MMOL/L (ref 0–11.9)
APTT PPP: 29 SEC (ref 24.7–36)
AST SERPL-CCNC: 22 U/L (ref 12–45)
BASOPHILS # BLD AUTO: 0.4 % (ref 0–1.8)
BASOPHILS # BLD: 0.02 K/UL (ref 0–0.12)
BILIRUB SERPL-MCNC: 0.5 MG/DL (ref 0.1–1.5)
BNP SERPL-MCNC: 36 PG/ML (ref 0–100)
BUN SERPL-MCNC: 15 MG/DL (ref 8–22)
CALCIUM SERPL-MCNC: 9.4 MG/DL (ref 8.5–10.5)
CHLORIDE SERPL-SCNC: 99 MMOL/L (ref 96–112)
CO2 SERPL-SCNC: 25 MMOL/L (ref 20–33)
CREAT SERPL-MCNC: 0.85 MG/DL (ref 0.5–1.4)
EKG IMPRESSION: NORMAL
EOSINOPHIL # BLD AUTO: 0.09 K/UL (ref 0–0.51)
EOSINOPHIL NFR BLD: 1.8 % (ref 0–6.9)
ERYTHROCYTE [DISTWIDTH] IN BLOOD BY AUTOMATED COUNT: 44.1 FL (ref 35.9–50)
GLOBULIN SER CALC-MCNC: 3 G/DL (ref 1.9–3.5)
GLUCOSE BLD-MCNC: 95 MG/DL (ref 65–99)
GLUCOSE SERPL-MCNC: 100 MG/DL (ref 65–99)
HCT VFR BLD AUTO: 46.5 % (ref 42–52)
HGB BLD-MCNC: 15.8 G/DL (ref 14–18)
IMM GRANULOCYTES # BLD AUTO: 0.01 K/UL (ref 0–0.11)
IMM GRANULOCYTES NFR BLD AUTO: 0.2 % (ref 0–0.9)
INR PPP: 0.99 (ref 0.87–1.13)
LIPASE SERPL-CCNC: <3 U/L (ref 11–82)
LYMPHOCYTES # BLD AUTO: 0.83 K/UL (ref 1–4.8)
LYMPHOCYTES NFR BLD: 16.3 % (ref 22–41)
MCH RBC QN AUTO: 30.6 PG (ref 27–33)
MCHC RBC AUTO-ENTMCNC: 34 G/DL (ref 33.7–35.3)
MCV RBC AUTO: 89.9 FL (ref 81.4–97.8)
MONOCYTES # BLD AUTO: 0.52 K/UL (ref 0–0.85)
MONOCYTES NFR BLD AUTO: 10.2 % (ref 0–13.4)
NEUTROPHILS # BLD AUTO: 3.63 K/UL (ref 1.82–7.42)
NEUTROPHILS NFR BLD: 71.1 % (ref 44–72)
NRBC # BLD AUTO: 0 K/UL
NRBC BLD-RTO: 0 /100 WBC
PLATELET # BLD AUTO: 222 K/UL (ref 164–446)
PMV BLD AUTO: 8.8 FL (ref 9–12.9)
POTASSIUM SERPL-SCNC: 3.3 MMOL/L (ref 3.6–5.5)
PROT SERPL-MCNC: 7.1 G/DL (ref 6–8.2)
PROTHROMBIN TIME: 12.8 SEC (ref 12–14.6)
RBC # BLD AUTO: 5.17 M/UL (ref 4.7–6.1)
SODIUM SERPL-SCNC: 136 MMOL/L (ref 135–145)
TROPONIN I SERPL-MCNC: <0.01 NG/ML (ref 0–0.04)
WBC # BLD AUTO: 5.1 K/UL (ref 4.8–10.8)

## 2018-03-19 PROCEDURE — 700102 HCHG RX REV CODE 250 W/ 637 OVERRIDE(OP): Performed by: INTERNAL MEDICINE

## 2018-03-19 PROCEDURE — 71045 X-RAY EXAM CHEST 1 VIEW: CPT

## 2018-03-19 PROCEDURE — 99220 PR INITIAL OBSERVATION CARE,LEVL III: CPT | Performed by: INTERNAL MEDICINE

## 2018-03-19 PROCEDURE — 80053 COMPREHEN METABOLIC PANEL: CPT

## 2018-03-19 PROCEDURE — 83880 ASSAY OF NATRIURETIC PEPTIDE: CPT

## 2018-03-19 PROCEDURE — 700102 HCHG RX REV CODE 250 W/ 637 OVERRIDE(OP): Performed by: EMERGENCY MEDICINE

## 2018-03-19 PROCEDURE — A9270 NON-COVERED ITEM OR SERVICE: HCPCS | Performed by: INTERNAL MEDICINE

## 2018-03-19 PROCEDURE — 85730 THROMBOPLASTIN TIME PARTIAL: CPT

## 2018-03-19 PROCEDURE — A9270 NON-COVERED ITEM OR SERVICE: HCPCS | Performed by: STUDENT IN AN ORGANIZED HEALTH CARE EDUCATION/TRAINING PROGRAM

## 2018-03-19 PROCEDURE — 93005 ELECTROCARDIOGRAM TRACING: CPT

## 2018-03-19 PROCEDURE — 83690 ASSAY OF LIPASE: CPT

## 2018-03-19 PROCEDURE — 80048 BASIC METABOLIC PNL TOTAL CA: CPT

## 2018-03-19 PROCEDURE — A9270 NON-COVERED ITEM OR SERVICE: HCPCS | Performed by: EMERGENCY MEDICINE

## 2018-03-19 PROCEDURE — 99215 OFFICE O/P EST HI 40 MIN: CPT | Performed by: INTERNAL MEDICINE

## 2018-03-19 PROCEDURE — 85610 PROTHROMBIN TIME: CPT

## 2018-03-19 PROCEDURE — 84484 ASSAY OF TROPONIN QUANT: CPT

## 2018-03-19 PROCEDURE — 85027 COMPLETE CBC AUTOMATED: CPT

## 2018-03-19 PROCEDURE — 99285 EMERGENCY DEPT VISIT HI MDM: CPT

## 2018-03-19 PROCEDURE — G0378 HOSPITAL OBSERVATION PER HR: HCPCS

## 2018-03-19 PROCEDURE — 93005 ELECTROCARDIOGRAM TRACING: CPT | Performed by: EMERGENCY MEDICINE

## 2018-03-19 PROCEDURE — 36415 COLL VENOUS BLD VENIPUNCTURE: CPT

## 2018-03-19 PROCEDURE — 82962 GLUCOSE BLOOD TEST: CPT

## 2018-03-19 PROCEDURE — 700102 HCHG RX REV CODE 250 W/ 637 OVERRIDE(OP): Performed by: STUDENT IN AN ORGANIZED HEALTH CARE EDUCATION/TRAINING PROGRAM

## 2018-03-19 PROCEDURE — 85025 COMPLETE CBC W/AUTO DIFF WBC: CPT

## 2018-03-19 RX ORDER — DEXTROSE MONOHYDRATE 25 G/50ML
25 INJECTION, SOLUTION INTRAVENOUS
Status: DISCONTINUED | OUTPATIENT
Start: 2018-03-19 | End: 2018-03-20 | Stop reason: HOSPADM

## 2018-03-19 RX ORDER — POLYETHYLENE GLYCOL 3350 17 G/17G
1 POWDER, FOR SOLUTION ORAL
Status: DISCONTINUED | OUTPATIENT
Start: 2018-03-19 | End: 2018-03-20 | Stop reason: HOSPADM

## 2018-03-19 RX ORDER — FENOFIBRATE 67 MG/1
67 CAPSULE ORAL DAILY
Status: DISCONTINUED | OUTPATIENT
Start: 2018-03-20 | End: 2018-03-20 | Stop reason: HOSPADM

## 2018-03-19 RX ORDER — COVID-19 ANTIGEN TEST
1 KIT MISCELLANEOUS 2 TIMES DAILY PRN
COMMUNITY
End: 2020-06-26

## 2018-03-19 RX ORDER — BISACODYL 10 MG
10 SUPPOSITORY, RECTAL RECTAL
Status: DISCONTINUED | OUTPATIENT
Start: 2018-03-19 | End: 2018-03-20 | Stop reason: HOSPADM

## 2018-03-19 RX ORDER — ACETAMINOPHEN 325 MG/1
650 TABLET ORAL EVERY 6 HOURS PRN
Status: DISCONTINUED | OUTPATIENT
Start: 2018-03-19 | End: 2018-03-20 | Stop reason: HOSPADM

## 2018-03-19 RX ORDER — GABAPENTIN 100 MG/1
100 CAPSULE ORAL EVERY EVENING
Status: DISCONTINUED | OUTPATIENT
Start: 2018-03-19 | End: 2018-03-20 | Stop reason: HOSPADM

## 2018-03-19 RX ORDER — PANTOPRAZOLE SODIUM 40 MG/1
40 TABLET, DELAYED RELEASE ORAL DAILY
Status: DISCONTINUED | OUTPATIENT
Start: 2018-03-19 | End: 2018-03-19

## 2018-03-19 RX ORDER — OMEPRAZOLE 20 MG/1
20 CAPSULE, DELAYED RELEASE ORAL DAILY
Status: DISCONTINUED | OUTPATIENT
Start: 2018-03-20 | End: 2018-03-20 | Stop reason: HOSPADM

## 2018-03-19 RX ORDER — AMOXICILLIN 250 MG
2 CAPSULE ORAL 2 TIMES DAILY
Status: DISCONTINUED | OUTPATIENT
Start: 2018-03-19 | End: 2018-03-20 | Stop reason: HOSPADM

## 2018-03-19 RX ORDER — NITROGLYCERIN 0.4 MG/1
0.4 TABLET SUBLINGUAL PRN
Status: DISCONTINUED | OUTPATIENT
Start: 2018-03-19 | End: 2018-03-20 | Stop reason: HOSPADM

## 2018-03-19 RX ORDER — ASPIRIN 81 MG/1
162 TABLET, CHEWABLE ORAL ONCE
Status: COMPLETED | OUTPATIENT
Start: 2018-03-19 | End: 2018-03-19

## 2018-03-19 RX ORDER — ROSUVASTATIN CALCIUM 5 MG/1
5 TABLET, COATED ORAL
Status: DISCONTINUED | OUTPATIENT
Start: 2018-03-19 | End: 2018-03-20 | Stop reason: HOSPADM

## 2018-03-19 RX ORDER — ZINC OXIDE 13 %
1 CREAM (GRAM) TOPICAL DAILY
Status: DISCONTINUED | OUTPATIENT
Start: 2018-03-19 | End: 2018-03-19

## 2018-03-19 RX ADMIN — STANDARDIZED SENNA CONCENTRATE AND DOCUSATE SODIUM 2 TABLET: 8.6; 5 TABLET, FILM COATED ORAL at 21:59

## 2018-03-19 RX ADMIN — ROSUVASTATIN CALCIUM 5 MG: 5 TABLET, FILM COATED ORAL at 21:59

## 2018-03-19 RX ADMIN — ACETAMINOPHEN 650 MG: 325 TABLET, FILM COATED ORAL at 23:27

## 2018-03-19 RX ADMIN — GUAIFENESIN 200 MG: 100 SOLUTION ORAL at 23:27

## 2018-03-19 RX ADMIN — GABAPENTIN 100 MG: 100 CAPSULE ORAL at 21:59

## 2018-03-19 RX ADMIN — ASPIRIN 162 MG: 81 TABLET, CHEWABLE ORAL at 16:05

## 2018-03-19 ASSESSMENT — LIFESTYLE VARIABLES
ALCOHOL_USE: NO
EVER_SMOKED: YES

## 2018-03-19 ASSESSMENT — ENCOUNTER SYMPTOMS
FEVER: 1
SHORTNESS OF BREATH: 0

## 2018-03-19 ASSESSMENT — PAIN SCALES - GENERAL
PAINLEVEL_OUTOF10: 3
PAINLEVEL_OUTOF10: 2
PAINLEVEL_OUTOF10: 3
PAINLEVEL_OUTOF10: 3

## 2018-03-19 ASSESSMENT — PATIENT HEALTH QUESTIONNAIRE - PHQ9
SUM OF ALL RESPONSES TO PHQ QUESTIONS 1-9: 0
2. FEELING DOWN, DEPRESSED, IRRITABLE, OR HOPELESS: NOT AT ALL
SUM OF ALL RESPONSES TO PHQ9 QUESTIONS 1 AND 2: 0
1. LITTLE INTEREST OR PLEASURE IN DOING THINGS: NOT AT ALL

## 2018-03-19 NOTE — CONSULTS
Cardiology Consult Note    Date of note:    3/19/2018    Referring MD:  Dr. Pramod Lazar MD    Patient ID:    Name:             Darren Brunson   YOB: 1950  Age:                 67 y.o.  male   MRN:               [id number]                                                             Consult Question: Evaluation of chest discomfort    History of Present Illness:      Darren Brunson is a 67-year-old man with a history of coronary artery disease status post PCI to his LAD in 2009 and then PCI to his RCA in February 2015 at which time his LAD stents were patent. He had his most recent angiogram at [facility] in 2015 and notable to that admission was that he did have elevated troponins at that time in addition to his atypical chest pain.    He comes in today with 2 days of a left arm numbness and tingling that radiates to his left shoulder blade as well as a burning midsternal chest discomfort and left jaw discomfort. His episodes last for seconds at a time and are not provoked by exertion by the history he gives me. He can identify no other palliative or provoking factors and he has not been diaphoretic with such episodes. He has no other cardiovascular complaints today. He has stopped his outpatient statin related to concern for side effects. He has previously declined a CS canine inhibitor.    Also of note his outpatient cardiologist was planning for a stress test in the near future.    Review of Systems:  All others reviewed and negative.    Past Medical History:   Past Medical History:   Diagnosis Date   • Acute myocardial infarction of other anterior wall, episode of care unspecified 2009; 2014   • Anxiety state, unspecified     • CAD (coronary artery disease) September 2009    PCI/stent (Promus 3.0 x 15mm) to the LAD.   • Cataract     left eye   • Chickenpox    • Diabetes (CMS-HCC)     on oral meds   • Glaucoma    • Heart murmur    • High cholesterol    •  "Hyperlipidemia    • Mumps    • Pain    • S/P coronary artery stent placement     • Sleep apnea    • Snoring    • Unspecified essential hypertension       There are no active hospital problems to display for this patient.      Past Surgical History:  Past Surgical History:   Procedure Laterality Date   • SEPTAL RECONSTRUCTION N/A 10/23/2017    Procedure: SEPTAL RECONSTRUCTION;  Surgeon: CHARLI Sanchez M.D.;  Location: SURGERY SAME DAY [facility];  Service: Ent   • UVULOPHARYNGOPALATOPLASTY N/A 10/23/2017    Procedure: UVULOPHARYNGOPALATOPLASTY;  Surgeon: CHARLI Sanchez M.D.;  Location: SURGERY SAME DAY [facility];  Service: Ent   • BIOPSY GENERAL Left 10/23/2017    Procedure: BIOPSY GENERAL LEFT UPPER NECK;  Surgeon: CHARLI Sanchez M.D.;  Location: SURGERY SAME DAY [facility];  Service: Ent   • OTHER  November 2011    Sinus surgery   • INGUINAL HERNIA REPAIR BILATERAL     • OTHER CARDIAC SURGERY      cardiac cath with stent placement   • SINUSCOPE     • TONSILLECTOMY     • UMBILICAL HERNIA REPAIR         Hospital Medications:  No current facility-administered medications for this encounter.     Current Outpatient Prescriptions:   •  rosuvastatin (CRESTOR) 5 MG Tab, Take 1 Tab by mouth 2X A WEEK., Disp: 15 Tab, Rfl: 6  •  NON SPECIFIED, \"kera he wan\" and \"Ji annika cruz qiwan\"  Herbs for cholesterol and prostate, Disp: , Rfl:   •  metformin ER (GLUCOPHAGE XR) 500 MG TABLET SR 24 HR, Take 500 mg by mouth every day., Disp: , Rfl:   •  gabapentin (NEURONTIN) 100 MG Cap, Take 100 mg by mouth every evening., Disp: , Rfl:   •  nitroglycerin (NITROSTAT) 0.4 MG SL Tab, Place 1 Tab under tongue as needed for Chest Pain (Place 1 tablet under the tongue every 5 minutes up to 3 doses as needed for chest pain)., Disp: 25 Tab, Rfl: 5  •  pantoprazole (PROTONIX) 40 MG TBEC, Take 40 mg by mouth every day., Disp: , Rfl:   •  aspirin 81 MG tablet, Take 81 mg by mouth every day., Disp: , Rfl:   •  fenofibrate (TRICOR) " "48 MG TABS, Take 48 mg by mouth every day., Disp: , Rfl:   •  Probiotic Product (PROBIOTIC DAILY PO), Take  by mouth every day., Disp: , Rfl:     Current Outpatient Medications:    (Not in a hospital admission)    Medication Allergy:  Allergies   Allergen Reactions   • Aspirin      High dose only - aspirin induced tinnitus   • Ciprofloxacin      Tendon pain   • Lisinopril    • Statins [Hmg-Coa-R Inhibitors]        Family History:  Family History   Problem Relation Age of Onset   • Heart Disease Maternal Aunt    • Cancer Mother      colon       Social History:  Social History     Social History   • Marital status:      Spouse name: N/A   • Number of children: N/A   • Years of education: N/A     Occupational History   • Not on file.     Social History Main Topics   • Smoking status: Former Smoker     Types: Cigarettes     Quit date: 8/30/1997   • Smokeless tobacco: Never Used   • Alcohol use No   • Drug use: No   • Sexual activity: Not on file     Other Topics Concern   • Not on file     Social History Narrative   • No narrative on file         Physical Exam:  Vitals/   Weight/BMI: Body mass index is 29.21 kg/m².  Blood pressure 133/91, pulse 96, temperature 36.8 °C (98.2 °F), resp. rate 15, height 1.702 m (5' 7\"), weight 84.6 kg (186 lb 8.2 oz), SpO2 97 %.  Vitals:    03/19/18 1517 03/19/18 1521 03/19/18 1530 03/19/18 1600   BP: 133/91      Pulse: 99 97 98 96   Resp: 15      Temp:       SpO2: 97% 97% 98% 97%   Weight:       Height:         Oxygen Therapy:  Pulse Oximetry: 97 %, O2 Delivery: None (Room Air)    General: Pleasant, elderly man accompanied by his wife in no distress  HEENT: No jugular venous distension sitting upright, pupils equal, round and reactive to light, extraocular movements intact  Heart: Normal rate, regular rhythm, no murmurs, no rubs or gallops  Lungs: Clear to auscultation bilaterally  Abdomen: Bowel sounds positive, non tender, non distended, no masses   Extremities: No lower " extremity edema, no clubbing, no cyanosis  Neurological: Alert and oriented x 3, no focal deficit  Skin: no rashes    MDM (Data Review):     Records reviewed and summarized in current documentation    Lab Data Review:  Recent Results (from the past 24 hour(s))   EKG (ER)    Collection Time: 18 12:46 PM   Result Value Ref Range    Report       [facility]    Test Date:  2018  Pt Name:    MONE CUELLAR              Department: ER  MRN:        [id number]                      Room:  Gender:     Male                         Technician: [id number]  :        1950                   Requested By:ER TRIAGE PROTOCOL  Order #:    [id number]                    Reading MD:    Measurements  Intervals                                Axis  Rate:       102                          P:          59  TX:         176                          QRS:        39  QRSD:       94                           T:          21  QT:         352  QTc:        459    Interpretive Statements  SINUS TACHYCARDIA  Compared to ECG 10/16/2017 11:59:46  Sinus rhythm no longer present     Troponin    Collection Time: 18  1:16 PM   Result Value Ref Range    Troponin I <0.01 0.00 - 0.04 ng/mL   Btype Natriuretic Peptide    Collection Time: 18  1:16 PM   Result Value Ref Range    B Natriuretic Peptide 36 0 - 100 pg/mL   CBC with Differential    Collection Time: 18  1:16 PM   Result Value Ref Range    WBC 5.1 4.8 - 10.8 K/uL    RBC 5.17 4.70 - 6.10 M/uL    Hemoglobin 15.8 14.0 - 18.0 g/dL    Hematocrit 46.5 42.0 - 52.0 %    MCV 89.9 81.4 - 97.8 fL    MCH 30.6 27.0 - 33.0 pg    MCHC 34.0 33.7 - 35.3 g/dL    RDW 44.1 35.9 - 50.0 fL    Platelet Count 222 164 - 446 K/uL    MPV 8.8 (L) 9.0 - 12.9 fL    Neutrophils-Polys 71.10 44.00 - 72.00 %    Lymphocytes 16.30 (L) 22.00 - 41.00 %    Monocytes 10.20 0.00 - 13.40 %    Eosinophils 1.80 0.00 - 6.90 %    Basophils 0.40 0.00 - 1.80 %    Immature Granulocytes 0.20  0.00 - 0.90 %    Nucleated RBC 0.00 /100 WBC    Neutrophils (Absolute) 3.63 1.82 - 7.42 K/uL    Lymphs (Absolute) 0.83 (L) 1.00 - 4.80 K/uL    Monos (Absolute) 0.52 0.00 - 0.85 K/uL    Eos (Absolute) 0.09 0.00 - 0.51 K/uL    Baso (Absolute) 0.02 0.00 - 0.12 K/uL    Immature Granulocytes (abs) 0.01 0.00 - 0.11 K/uL    NRBC (Absolute) 0.00 K/uL   Complete Metabolic Panel (CMP)    Collection Time: 18  1:16 PM   Result Value Ref Range    Sodium 136 135 - 145 mmol/L    Potassium 3.3 (L) 3.6 - 5.5 mmol/L    Chloride 99 96 - 112 mmol/L    Co2 25 20 - 33 mmol/L    Anion Gap 12.0 (H) 0.0 - 11.9    Glucose 100 (H) 65 - 99 mg/dL    Bun 15 8 - 22 mg/dL    Creatinine 0.85 0.50 - 1.40 mg/dL    Calcium 9.4 8.5 - 10.5 mg/dL    AST(SGOT) 22 12 - 45 U/L    ALT(SGPT) 25 2 - 50 U/L    Alkaline Phosphatase 39 30 - 99 U/L    Total Bilirubin 0.5 0.1 - 1.5 mg/dL    Albumin 4.1 3.2 - 4.9 g/dL    Total Protein 7.1 6.0 - 8.2 g/dL    Globulin 3.0 1.9 - 3.5 g/dL    A-G Ratio 1.4 g/dL   Prothrombin Time    Collection Time: 18  1:16 PM   Result Value Ref Range    PT 12.8 12.0 - 14.6 sec    INR 0.99 0.87 - 1.13   APTT    Collection Time: 18  1:16 PM   Result Value Ref Range    APTT 29.0 24.7 - 36.0 sec   Lipase    Collection Time: 18  1:16 PM   Result Value Ref Range    Lipase <3 (L) 11 - 82 U/L   ESTIMATED GFR    Collection Time: 18  1:16 PM   Result Value Ref Range    GFR If African American >60 >60 mL/min/1.73 m 2    GFR If Non African American >60 >60 mL/min/1.73 m 2       Imaging/Procedures Review:      EKG (from today, tracings reviewed, my interpretation): Shows sinus tachycardia at a rate of 102 bpm, some nonspecific inferior T-wave flattening and uptrending lateral ST changes again nonspecific. Comparing it to his previous EKG from 10/16/2017 the rate is a bit faster, and otherwise there are no significant changes      Medical Decision Makin. Chest pain: This is atypical for angina, and he has no  biomarker elevation as he previously exhibited at the time of progression of his coronary artery disease. Provided that his troponin continues to be negative as it is trended I would order a stress test and echocardiogram tomorrow. If his troponin becomes elevated, then I would plan for cardiac catheterization. I reviewed that decision making with the patient was comfortable with it. I discussed with him also my recommendation to resume Crestor at 5 mg by mouth daily at bedtime until we have some clarity as to the nature of his chest discomfort. Otherwise, resume outpatient aspirin, and titrate beta blocker as pressures will allow. ACE inhibitor is contraindicated in the setting of an allergy.   - Again, nothing by mouth after midnight    Rj Lewis MD  [facility]

## 2018-03-19 NOTE — ED NOTES
Chest pain protocol initiated.  Blood drawn and sent to lab.  Returned to lobby and assured that he would be roomed as soon as possible.

## 2018-03-19 NOTE — ED PROVIDER NOTES
"ED Provider Note    Scribed for Froilan Lazar M.D. by Emiliano Malik. 3/19/2018, 3:35 PM.    Primary care provider: Kailee Pappas M.D.  Means of arrival: Walk in  History obtained from: Patient  History limited by: None    CHIEF COMPLAINT  Chief Complaint   Patient presents with   • Chest Pain     dull ache - hx of MI w stent placement   • Arm Pain     L arm pain started friday worse today   • Jaw Pain     started today \"numbness\"       HPI  Darren Brunson is a 67 y.o. male who presents to the Emergency Department complaining of chest pain described as a dull ache onset yesterday with associated left arm pain described as tingling onset Friday and jaw pain onset today described as numbness. He has also been experiencing associated low grade fevers measured at 100 °F. His chest pain is intermittent and lasts for approximately a couple seconds. He reports he does not present normally for MIs, and today's symptoms feel similar to his last MI. His jaw numbness is a new symptom. He has a history of 2 Mis with stent placement. He takes aspirin every day, however has not taken any today. No complaints of shortness of breath at this time.    REVIEW OF SYSTEMS  Review of Systems   Constitutional: Positive for fever.   HENT:        Jaw pain   Respiratory: Negative for shortness of breath.    Cardiovascular: Positive for chest pain.   Musculoskeletal:        Left arm pain   All other systems reviewed and are negative.    C.     PAST MEDICAL HISTORY   has a past medical history of Acute myocardial infarction of other anterior wall, episode of care unspecified (2009; 2014); Anxiety state, unspecified ( ); CAD (coronary artery disease) (September 2009); Cataract; Chickenpox; Diabetes (CMS-[facility]); Glaucoma; Heart murmur; High cholesterol; Hyperlipidemia; Mumps; Pain; S/P coronary artery stent placement ( ); Sleep apnea; Snoring; and Unspecified essential hypertension ( ).    SURGICAL HISTORY   has a past surgical " "history that includes other (November 2011); tonsillectomy; sinuscope; other cardiac surgery; umbilical hernia repair; inguinal hernia repair bilateral; septal reconstruction (N/A, 10/23/2017); uvulopharyngopalatoplasty (N/A, 10/23/2017); and biopsy general (Left, 10/23/2017).    SOCIAL HISTORY  Social History   Substance Use Topics   • Smoking status: Former Smoker     Types: Cigarettes     Quit date: 8/30/1997   • Smokeless tobacco: Never Used   • Alcohol use No      History   Drug Use No       FAMILY HISTORY  Family History   Problem Relation Age of Onset   • Heart Disease Maternal Aunt    • Cancer Mother      colon       CURRENT MEDICATIONS  No current facility-administered medications on file prior to encounter.      Current Outpatient Prescriptions on File Prior to Encounter   Medication Sig Dispense Refill   • rosuvastatin (CRESTOR) 5 MG Tab Take 1 Tab by mouth 2X A WEEK. 15 Tab 6   • NON SPECIFIED \"kera he wan\" and \"Ji annika cruz qiwan\"  Herbs for cholesterol and prostate     • metformin ER (GLUCOPHAGE XR) 500 MG TABLET SR 24 HR Take 500 mg by mouth every day.     • gabapentin (NEURONTIN) 100 MG Cap Take 100 mg by mouth every evening.     • nitroglycerin (NITROSTAT) 0.4 MG SL Tab Place 1 Tab under tongue as needed for Chest Pain (Place 1 tablet under the tongue every 5 minutes up to 3 doses as needed for chest pain). 25 Tab 5   • pantoprazole (PROTONIX) 40 MG TBEC Take 40 mg by mouth every day.     • aspirin 81 MG tablet Take 81 mg by mouth every day.     • fenofibrate (TRICOR) 48 MG TABS Take 48 mg by mouth every day.     • Probiotic Product (PROBIOTIC DAILY PO) Take  by mouth every day.        ALLERGIES  Allergies   Allergen Reactions   • Aspirin      High dose only - aspirin induced tinnitus   • Ciprofloxacin      Tendon pain   • Lisinopril    • Statins [Hmg-Coa-R Inhibitors]        PHYSICAL EXAM  VITAL SIGNS: /91   Pulse 97   Temp 36.8 °C (98.2 °F)   Resp 15   Ht 1.702 m (5' 7\")   Wt 84.6 kg " (186 lb 8.2 oz)   SpO2 97%   BMI 29.21 kg/m²     Constitutional:  No acute distress  HENT:  Moist mucous membranes  Eyes:  No conjunctivitis or icterus  Neck:  trachea is midline, no palpable thyroid  Lymphatic:  No cervical lymphadenopathy  Cardiovascular:  Regular rate and rhythm, no murmurs  Thorax & Lungs:  Normal breath sounds, no rhonchi  Abdomen:  Soft, Non-tender  Skin:.  no rash  Back:  Non-tender, no CVA tenderness  Extremities:   no edema  Vascular:  symmetric radial pulse  Neurologic:  Normal gross motor    LABS  Labs Reviewed   CBC WITH DIFFERENTIAL - Abnormal; Notable for the following:        Result Value    MPV 8.8 (*)     Lymphocytes 16.30 (*)     Lymphs (Absolute) 0.83 (*)     All other components within normal limits    Narrative:     Indicate which anticoagulants the patient is on:->UNKNOWN   COMP METABOLIC PANEL - Abnormal; Notable for the following:     Potassium 3.3 (*)     Anion Gap 12.0 (*)     Glucose 100 (*)     All other components within normal limits    Narrative:     Indicate which anticoagulants the patient is on:->UNKNOWN   LIPASE - Abnormal; Notable for the following:     Lipase <3 (*)     All other components within normal limits    Narrative:     Indicate which anticoagulants the patient is on:->UNKNOWN   TROPONIN    Narrative:     Indicate which anticoagulants the patient is on:->UNKNOWN   BTYPE NATRIURETIC PEPTIDE    Narrative:     Indicate which anticoagulants the patient is on:->UNKNOWN   PROTHROMBIN TIME    Narrative:     Indicate which anticoagulants the patient is on:->UNKNOWN   APTT    Narrative:     Indicate which anticoagulants the patient is on:->UNKNOWN   ESTIMATED GFR    Narrative:     Indicate which anticoagulants the patient is on:->UNKNOWN     All labs reviewed by me.    EKG Interpretation  Interpreted by me  Normal Sinus Tachycardia Rhythm. Rate 102  Normal corrected QTc  Normal QRS  Normal Axis  Non diagnostic ST changes    RADIOLOGY  DX-CHEST-LIMITED (1 VIEW)    Final Result      No acute cardiopulmonary disease.        The radiologist's interpretation of all radiological studies have been reviewed by me.    COURSE & MEDICAL DECISION MAKING  Pertinent Labs & Imaging studies reviewed. (See chart for details)    3:35 PM - Patient seen and examined at bedside. Patient will be treated with aspirin 162 mg. Ordered chest x-ray, estimated GFR, troponin, BNP, CBC, CMP, prothrombin time, APTT, lipase and EKG to evaluate his symptoms. The differential diagnoses include but are not limited to: Myocardial infarction. Discussed with the patient secondary to his history, I will admit him to the hospital for monitoring.     3:43 PM Reviewed the patient's EKG and preliminary lab results.     4:07 PM I discussed the patient's case and the above findings with Dr. Rj Lewis (cardiology) who will consult.     4:20 PM I discussed the patient's case and the above findings with Dr. Willis (Hospitalist) who agrees to admit       DISPOSITION:  Patient will be admitted to Dr. Willis, Hospitalist, in guarded condition.     FINAL IMPRESSION  1. Chest pain, unspecified type          I, Emiliano Malik (Mack), am scribing for, and in the presence of, Froilan Lazar M.D..    Electronically signed by: Emiliano Malik (Mack), 3/19/2018    IFroilan M.D. personally performed the services described in this documentation, as scribed by Emiliano Malik in my presence, and it is both accurate and complete.    The note accurately reflects work and decisions made by me.  Froilan Lazar  3/19/2018  5:05 PM

## 2018-03-19 NOTE — ED TRIAGE NOTES
"Pt ambulates to triage after EKG  Chief Complaint   Patient presents with   • Chest Pain     dull ache - hx of MI w stent placement   • Arm Pain     L arm pain started friday worse today   • Jaw Pain     started today \"numbness\"   Pt asked to wait in lobby, pt updated on triage process and pt asked to inform RN of any changes.     "

## 2018-03-20 ENCOUNTER — PATIENT OUTREACH (OUTPATIENT)
Dept: HEALTH INFORMATION MANAGEMENT | Facility: OTHER | Age: 68
End: 2018-03-20

## 2018-03-20 ENCOUNTER — APPOINTMENT (OUTPATIENT)
Dept: RADIOLOGY | Facility: MEDICAL CENTER | Age: 68
End: 2018-03-20
Attending: INTERNAL MEDICINE
Payer: MEDICARE

## 2018-03-20 VITALS
HEART RATE: 87 BPM | TEMPERATURE: 98.5 F | SYSTOLIC BLOOD PRESSURE: 140 MMHG | BODY MASS INDEX: 28.72 KG/M2 | OXYGEN SATURATION: 92 % | DIASTOLIC BLOOD PRESSURE: 76 MMHG | RESPIRATION RATE: 18 BRPM | HEIGHT: 67 IN | WEIGHT: 182.98 LBS

## 2018-03-20 PROBLEM — E11.9 TYPE 2 DIABETES MELLITUS (HCC): Status: ACTIVE | Noted: 2018-03-20

## 2018-03-20 LAB
ANION GAP SERPL CALC-SCNC: 11 MMOL/L (ref 0–11.9)
BUN SERPL-MCNC: 14 MG/DL (ref 8–22)
CALCIUM SERPL-MCNC: 9 MG/DL (ref 8.5–10.5)
CHLORIDE SERPL-SCNC: 101 MMOL/L (ref 96–112)
CHOLEST SERPL-MCNC: 160 MG/DL (ref 100–199)
CO2 SERPL-SCNC: 22 MMOL/L (ref 20–33)
CREAT SERPL-MCNC: 0.95 MG/DL (ref 0.5–1.4)
ERYTHROCYTE [DISTWIDTH] IN BLOOD BY AUTOMATED COUNT: 43 FL (ref 35.9–50)
GLUCOSE BLD-MCNC: 109 MG/DL (ref 65–99)
GLUCOSE BLD-MCNC: 89 MG/DL (ref 65–99)
GLUCOSE SERPL-MCNC: 180 MG/DL (ref 65–99)
HCT VFR BLD AUTO: 41.7 % (ref 42–52)
HDLC SERPL-MCNC: 44 MG/DL
HGB BLD-MCNC: 14.7 G/DL (ref 14–18)
LDLC SERPL CALC-MCNC: 98 MG/DL
LV EJECT FRACT  99904: 70
LV EJECT FRACT MOD 2C 99903: 67.88
LV EJECT FRACT MOD 4C 99902: 73.48
LV EJECT FRACT MOD BP 99901: 72.05
MCH RBC QN AUTO: 31.3 PG (ref 27–33)
MCHC RBC AUTO-ENTMCNC: 35.3 G/DL (ref 33.7–35.3)
MCV RBC AUTO: 88.7 FL (ref 81.4–97.8)
PLATELET # BLD AUTO: 227 K/UL (ref 164–446)
PMV BLD AUTO: 9 FL (ref 9–12.9)
POTASSIUM SERPL-SCNC: 3.5 MMOL/L (ref 3.6–5.5)
RBC # BLD AUTO: 4.7 M/UL (ref 4.7–6.1)
SODIUM SERPL-SCNC: 134 MMOL/L (ref 135–145)
TRIGL SERPL-MCNC: 88 MG/DL (ref 0–149)
TROPONIN I SERPL-MCNC: <0.01 NG/ML (ref 0–0.04)
TROPONIN I SERPL-MCNC: <0.01 NG/ML (ref 0–0.04)
WBC # BLD AUTO: 4.4 K/UL (ref 4.8–10.8)

## 2018-03-20 PROCEDURE — 84484 ASSAY OF TROPONIN QUANT: CPT

## 2018-03-20 PROCEDURE — 700111 HCHG RX REV CODE 636 W/ 250 OVERRIDE (IP): Performed by: INTERNAL MEDICINE

## 2018-03-20 PROCEDURE — A9502 TC99M TETROFOSMIN: HCPCS

## 2018-03-20 PROCEDURE — 93306 TTE W/DOPPLER COMPLETE: CPT

## 2018-03-20 PROCEDURE — 700111 HCHG RX REV CODE 636 W/ 250 OVERRIDE (IP)

## 2018-03-20 PROCEDURE — 700102 HCHG RX REV CODE 250 W/ 637 OVERRIDE(OP): Performed by: STUDENT IN AN ORGANIZED HEALTH CARE EDUCATION/TRAINING PROGRAM

## 2018-03-20 PROCEDURE — G0378 HOSPITAL OBSERVATION PER HR: HCPCS

## 2018-03-20 PROCEDURE — 82962 GLUCOSE BLOOD TEST: CPT

## 2018-03-20 PROCEDURE — 80061 LIPID PANEL: CPT

## 2018-03-20 PROCEDURE — 36415 COLL VENOUS BLD VENIPUNCTURE: CPT

## 2018-03-20 PROCEDURE — 700102 HCHG RX REV CODE 250 W/ 637 OVERRIDE(OP): Performed by: INTERNAL MEDICINE

## 2018-03-20 PROCEDURE — A9270 NON-COVERED ITEM OR SERVICE: HCPCS | Performed by: STUDENT IN AN ORGANIZED HEALTH CARE EDUCATION/TRAINING PROGRAM

## 2018-03-20 PROCEDURE — 99217 PR OBSERVATION CARE DISCHARGE: CPT | Performed by: HOSPITALIST

## 2018-03-20 PROCEDURE — 93306 TTE W/DOPPLER COMPLETE: CPT | Mod: 26 | Performed by: INTERNAL MEDICINE

## 2018-03-20 PROCEDURE — 96372 THER/PROPH/DIAG INJ SC/IM: CPT

## 2018-03-20 PROCEDURE — A9270 NON-COVERED ITEM OR SERVICE: HCPCS | Performed by: INTERNAL MEDICINE

## 2018-03-20 PROCEDURE — 94760 N-INVAS EAR/PLS OXIMETRY 1: CPT

## 2018-03-20 PROCEDURE — 99212 OFFICE O/P EST SF 10 MIN: CPT | Performed by: INTERNAL MEDICINE

## 2018-03-20 RX ORDER — REGADENOSON 0.08 MG/ML
INJECTION, SOLUTION INTRAVENOUS
Status: COMPLETED
Start: 2018-03-20 | End: 2018-03-20

## 2018-03-20 RX ADMIN — ASPIRIN 81 MG: 81 TABLET, COATED ORAL at 08:50

## 2018-03-20 RX ADMIN — GUAIFENESIN 200 MG: 100 SOLUTION ORAL at 08:50

## 2018-03-20 RX ADMIN — REGADENOSON 0.4 MG: 0.08 INJECTION, SOLUTION INTRAVENOUS at 15:13

## 2018-03-20 RX ADMIN — OMEPRAZOLE 20 MG: 20 CAPSULE, DELAYED RELEASE ORAL at 08:50

## 2018-03-20 RX ADMIN — ACETAMINOPHEN 650 MG: 325 TABLET, FILM COATED ORAL at 07:37

## 2018-03-20 RX ADMIN — STANDARDIZED SENNA CONCENTRATE AND DOCUSATE SODIUM 2 TABLET: 8.6; 5 TABLET, FILM COATED ORAL at 08:50

## 2018-03-20 RX ADMIN — ENOXAPARIN SODIUM 40 MG: 100 INJECTION SUBCUTANEOUS at 08:51

## 2018-03-20 ASSESSMENT — COPD QUESTIONNAIRES
DO YOU EVER COUGH UP ANY MUCUS OR PHLEGM?: NO/ONLY WITH OCCASIONAL COLDS OR INFECTIONS
COPD SCREENING SCORE: 4
HAVE YOU SMOKED AT LEAST 100 CIGARETTES IN YOUR ENTIRE LIFE: YES
DURING THE PAST 4 WEEKS HOW MUCH DID YOU FEEL SHORT OF BREATH: NONE/LITTLE OF THE TIME

## 2018-03-20 ASSESSMENT — ENCOUNTER SYMPTOMS
FALLS: 0
MYALGIAS: 0
CHILLS: 0
WHEEZING: 0
EYE PAIN: 0
VOMITING: 0
BLOOD IN STOOL: 0
EYE REDNESS: 0
FEVER: 0
PALPITATIONS: 0
DIZZINESS: 0
CONSTIPATION: 0
WEAKNESS: 0
TREMORS: 0
NERVOUS/ANXIOUS: 0
CARDIOVASCULAR NEGATIVE: 1
HEMOPTYSIS: 0
COUGH: 0
LOSS OF CONSCIOUSNESS: 0
NAUSEA: 0
INSOMNIA: 0
FOCAL WEAKNESS: 0
SEIZURES: 0
DIARRHEA: 0
SHORTNESS OF BREATH: 0
HEADACHES: 0
ABDOMINAL PAIN: 0

## 2018-03-20 ASSESSMENT — PAIN SCALES - GENERAL
PAINLEVEL_OUTOF10: 3
PAINLEVEL_OUTOF10: 2
PAINLEVEL_OUTOF10: 0
PAINLEVEL_OUTOF10: 1
PAINLEVEL_OUTOF10: 5

## 2018-03-20 ASSESSMENT — LIFESTYLE VARIABLES
EVER_SMOKED: YES
EVER_SMOKED: YES

## 2018-03-20 NOTE — CARE PLAN
Problem: Pain Management  Goal: Pain level will decrease to patient's comfort goal  Outcome: PROGRESSING AS EXPECTED  Pain is assessed throughout the shift. Pharmacological and non-pharmacological options to treat pain are offered to pt. Pt is medicated per MAR.

## 2018-03-20 NOTE — H&P
"Hospital Medicine History and Physical    Date of Service  3/19/2018    Chief Complaint  Chief Complaint   Patient presents with   • Chest Pain     dull ache - hx of MI w stent placement   • Arm Pain     L arm pain started friday worse today   • Jaw Pain     started today \"numbness\"       History of Presenting Illness  67 y.o. male who presented 3/19/2018 with Chest Pain (dull ache - hx of MI w stent placement); Arm Pain (L arm pain started friday worse today); and Jaw Pain (started today \"numbness\")  Had minor chest discomfort yesterday, described as a pressure, on and off, 1/10 intensity, like a \"short heartburn\". Today had pain in the back and L jaw. He also felt his BP was high.No other symptoms to describe. He has a history of CAD with stents and follows Cardiology. Currently not smoking. Has, HTN, HPL and diabetes. Fam history of heart disease, none premature.  At the ED, afebrile, hemodynamically stable. CXR clear, EKG sinus. Trop x 1 negative. Because of his history of CAD, Dr. Lewis, Cardiology consulted.  When I saw him at ED, not having chest pain. No acute distress. Auscultation clear.    Primary Care Physician  Kailee Pappas M.D.    Consultants  Dr. Lewis cardiology    Code Status  full    Review of Systems  Review of Systems   Constitutional: Negative for chills and fever.   HENT: Negative for congestion, hearing loss and nosebleeds.    Eyes: Negative for pain and redness.   Respiratory: Negative for cough, hemoptysis, shortness of breath and wheezing.    Cardiovascular: Positive for chest pain. Negative for palpitations.   Gastrointestinal: Negative for abdominal pain, blood in stool, constipation, diarrhea, nausea and vomiting.   Genitourinary: Negative for dysuria, frequency and hematuria.   Musculoskeletal: Negative for falls, joint pain and myalgias.   Skin: Negative for rash.   Neurological: Negative for dizziness, tremors, focal weakness, seizures, loss of consciousness, weakness " "and headaches.   Psychiatric/Behavioral: The patient is not nervous/anxious and does not have insomnia.    All other systems reviewed and are negative.       Past Medical History  Past Medical History:   Diagnosis Date   • CAD (coronary artery disease) September 2009    PCI/stent (Promus 3.0 x 15mm) to the LAD.   • Acute myocardial infarction of other anterior wall, episode of care unspecified 2009; 2014   • Anxiety state, unspecified     • Cataract     left eye   • Chickenpox    • Diabetes (CMS-HCC)     on oral meds   • Glaucoma    • Heart murmur    • High cholesterol    • Hyperlipidemia    • Mumps    • Pain    • S/P coronary artery stent placement     • Sleep apnea    • Snoring    • Unspecified essential hypertension         Surgical History  Past Surgical History:   Procedure Laterality Date   • SEPTAL RECONSTRUCTION N/A 10/23/2017    Procedure: SEPTAL RECONSTRUCTION;  Surgeon: CHARLI Sanchez M.D.;  Location: SURGERY SAME DAY [facility];  Service: Ent   • UVULOPHARYNGOPALATOPLASTY N/A 10/23/2017    Procedure: UVULOPHARYNGOPALATOPLASTY;  Surgeon: CHARLI Sanchez M.D.;  Location: SURGERY SAME DAY [facility];  Service: Ent   • BIOPSY GENERAL Left 10/23/2017    Procedure: BIOPSY GENERAL LEFT UPPER NECK;  Surgeon: CHARLI Sanchez M.D.;  Location: SURGERY SAME DAY [facility];  Service: Ent   • OTHER  November 2011    Sinus surgery   • INGUINAL HERNIA REPAIR BILATERAL     • OTHER CARDIAC SURGERY      cardiac cath with stent placement   • SINUSCOPE     • TONSILLECTOMY     • UMBILICAL HERNIA REPAIR         Medications  No current facility-administered medications on file prior to encounter.      Current Outpatient Prescriptions on File Prior to Encounter   Medication Sig Dispense Refill   • NON SPECIFIED \"kera he wan\" and \"Ji annika cruz qiwan\"  Herbs for cholesterol and prostate     • metformin ER (GLUCOPHAGE XR) 500 MG TABLET SR 24 HR Take 500 mg by mouth every day.     • gabapentin (NEURONTIN) 100 MG " Cap Take 100-200 mg by mouth every evening.     • pantoprazole (PROTONIX) 40 MG TBEC Take 40 mg by mouth every day.     • aspirin 81 MG tablet Take 81 mg by mouth every day.     • fenofibrate (TRICOR) 48 MG TABS Take 48 mg by mouth every day.     • Probiotic Product (PROBIOTIC DAILY PO) Take  by mouth every day.         Family History  Family History   Problem Relation Age of Onset   • Heart Disease Maternal Aunt    • Cancer Mother      colon       Social History  Social History   Substance Use Topics   • Smoking status: Former Smoker     Types: Cigarettes     Quit date: 1997   • Smokeless tobacco: Never Used   • Alcohol use No       Allergies  Allergies   Allergen Reactions   • Ciprofloxacin      Tendon pain   • Lisinopril    • Statins [Hmg-Coa-R Inhibitors]         Physical Exam  Laboratory   Hemodynamics  Temp (24hrs), Av.8 °C (98.3 °F), Min:36.8 °C (98.2 °F), Max:36.8 °C (98.3 °F)   Temperature: 36.8 °C (98.3 °F)  Pulse  Av.8  Min: 87  Max: 115 Heart Rate (Monitored): 92  Blood Pressure : (!) 170/95 (RN notified), NIBP: 143/92      Respiratory      Respiration: 16, Pulse Oximetry: 94 %             Physical Exam   Constitutional: He appears well-developed and well-nourished.   HENT:   Head: Normocephalic and atraumatic.   Eyes: Conjunctivae and EOM are normal. No scleral icterus.   Neck: Normal range of motion. Neck supple.   Cardiovascular: Normal rate and regular rhythm.  Exam reveals no gallop and no friction rub.    No murmur heard.  Pulmonary/Chest: Effort normal and breath sounds normal. No respiratory distress. He has no wheezes. He has no rales.   Abdominal: Soft. Bowel sounds are normal. He exhibits no distension. There is no tenderness. There is no rebound and no guarding.   Musculoskeletal: He exhibits no edema or tenderness.   Neurological: He is alert.   Skin: Skin is warm.   Psychiatric: He has a normal mood and affect. His behavior is normal.       Recent Labs      18   1316    WBC  5.1   RBC  5.17   HEMOGLOBIN  15.8   HEMATOCRIT  46.5   MCV  89.9   MCH  30.6   MCHC  34.0   RDW  44.1   PLATELETCT  222   MPV  8.8*     Recent Labs      03/19/18   1316   SODIUM  136   POTASSIUM  3.3*   CHLORIDE  99   CO2  25   GLUCOSE  100*   BUN  15   CREATININE  0.85   CALCIUM  9.4     Recent Labs      03/19/18   1316   ALTSGPT  25   ASTSGOT  22   ALKPHOSPHAT  39   TBILIRUBIN  0.5   LIPASE  <3*   GLUCOSE  100*     Recent Labs      03/19/18   1316   APTT  29.0   INR  0.99     Recent Labs      03/19/18   1316   BNPBTYPENAT  36         Lab Results   Component Value Date    TROPONINI <0.01 03/19/2018     Urinalysis:  No results found for: SPECGRAVITY, GLUCOSEUR, KETONES, NITRITE, WBCURINE, RBCURINE, BACTERIA, EPITHELCELL     Imaging  Dx-chest-limited (1 View)    Result Date: 3/19/2018  3/19/2018 3:19 PM HISTORY/REASON FOR EXAM:  Chest Pain. TECHNIQUE/EXAM DESCRIPTION AND NUMBER OF VIEWS: Single AP view of the chest. COMPARISON: None FINDINGS: The lungs are clear. The cardiac silhouette is normal in size. There are no pleural effusions There are no pneumothoraces. No significant bony abnormality is present.     No acute cardiopulmonary disease.     Assessment/Plan     I anticipate this patient obs status    * Chest pain- (present on admission)   Assessment & Plan    History of CAD with stents  Cardiology consulted  Telemetry, trend troponins, stress test if troponins are negative, ASA, statin, ordered lipid panel.          Essential hypertension- (present on admission)   Assessment & Plan    Continue statin        Hypercholesteremia- (present on admission)   Assessment & Plan    Continue Tricor        Type 2 diabetes mellitus (CMS-HCC)   Assessment & Plan    Hold metformin in case of contrast study  Correctional insulin        ACE inhibitor-aggravated angioedema- (present on admission)   Assessment & Plan    Do not give ACEI or ARBs            VTE prophylaxis: pharmacologic.    I spent 73 minutes, reviewing  the chart, notes, vitals, labs, imaging, ordering labs, evaluating Darren Brunson for assessment, enacting the plan above. 50% of the time was spent in counseling Darren Brunson and family, answering questions. Discussed with ED physician. Medical decision making is therefore complex. Time was devoted to counseling and coordinating care including review of records, pertinent lab data and studies, as well as discussing diagnostic evaluation and work up, planned therapeutic interventions and future disposition of care. Where indicated, the assessment and plan reflect discussion of patient with consultants, other healthcare providers, family members, and additional research needed to obtain further information in formulating the plan of care for Darren Brunson.

## 2018-03-20 NOTE — PROGRESS NOTES
Pt sitting in chair, VSS, A/Ox4. Discussed lab values with pt per patient request. Pt verbalized understanding.

## 2018-03-20 NOTE — PROGRESS NOTES
"Pt verbalized having the same chest discomfort that goes to the left jaw and left arm that he came in with, pt rates it a 3/10. Requested medication that was \"not nitro or morphine\". Pt also requested cough medicine. Pt states he came in with a small cold and would like something for the cough if he will be NPO at midnight. MD Olmos notified. New orders placed.   "

## 2018-03-20 NOTE — ASSESSMENT & PLAN NOTE
History of CAD with stents  Cardiology consulted  Telemetry, trend troponins, stress test if troponins are negative, ASA, statin, ordered lipid panel.

## 2018-03-20 NOTE — PROGRESS NOTES
Pt arrived to floor with RN and on a zoll. Pt placed on tele monitor. Monitor room informed. Pt updated on POC. Pt denies any needs at this time. Bed locked and in lowest position. Call light within reach. Hourly rounding in place.

## 2018-03-20 NOTE — DISCHARGE INSTRUCTIONS
Discharge Instructions    Discharged to home by car with relative. Discharged via walking, hospital escort: Refused.  Special equipment needed: Not Applicable    Be sure to schedule a follow-up appointment with your primary care doctor or any specialists as instructed.     Discharge Plan:   Diet Plan: Discussed  Activity Level: Discussed  Confirmed Follow up Appointment: Patient to Call and Schedule Appointment  Confirmed Symptoms Management: Discussed  Medication Reconciliation Updated: Yes  Influenza Vaccine Indication: Not indicated: Previously immunized this influenza season and > 8 years of age    I understand that a diet low in cholesterol, fat, and sodium is recommended for good health. Unless I have been given specific instructions below for another diet, I accept this instruction as my diet prescription.   Other diet: cardiac, heart healthy diet    Special Instructions: None    · Is patient discharged on Warfarin / Coumadin?   No     Chest Pain, Nonspecific  It is often hard to give a specific diagnosis for the cause of chest pain. There is always a chance that your pain could be related to something serious, like a heart attack or a blood clot in the lungs. You need to follow up with your caregiver for further evaluation. More lab tests or other studies such as X-rays, electrocardiography, stress testing, or cardiac imaging may be needed to find the cause of your pain.  Most of the time, nonspecific chest pain improves within 2 to 3 days with rest and mild pain medicine. For the next few days, avoid physical exertion or activities that bring on pain. Do not smoke. Avoid drinking alcohol. Call your caregiver for routine follow-up as advised.   SEEK IMMEDIATE MEDICAL CARE IF:  · You develop increased chest pain or pain that radiates to the arm, neck, jaw, back, or abdomen.   · You develop shortness of breath, increased coughing, or you start coughing up blood.   · You have severe back or abdominal pain,  nausea, or vomiting.   · You develop severe weakness, fainting, fever, or chills.   Document Released: 12/18/2006 Document Revised: 03/11/2013 Document Reviewed: 06/06/2008  [employer]® Patient Information ©2013 [employer].    Depression / Suicide Risk    As you are discharged from this [facility] facility, it is important to learn how to keep safe from harming yourself.    Recognize the warning signs:  · Abrupt changes in personality, positive or negative- including increase in energy   · Giving away possessions  · Change in eating patterns- significant weight changes-  positive or negative  · Change in sleeping patterns- unable to sleep or sleeping all the time   · Unwillingness or inability to communicate  · Depression  · Unusual sadness, discouragement and loneliness  · Talk of wanting to die  · Neglect of personal appearance   · Rebelliousness- reckless behavior  · Withdrawal from people/activities they love  · Confusion- inability to concentrate     If you or a loved one observes any of these behaviors or has concerns about self-harm, here's what you can do:  · Talk about it- your feelings and reasons for harming yourself  · Remove any means that you might use to hurt yourself (examples: pills, rope, extension cords, firearm)  · Get professional help from the community (Mental Health, Substance Abuse, psychological counseling)  · Do not be alone:Call your Safe Contact- someone whom you trust who will be there for you.  · Call your local CRISIS HOTLINE 408-6079 or 663-244-5865  · Call your local Children's Mobile Crisis Response Team [place] (968) 010-0183 or www.Inv[employer].cz  · Call the toll free National Suicide Prevention Hotlines   · National Suicide Prevention Lifeline 937-778-TTSI (9522)  · National Hope Line Network 800-SUICIDE (436-3103)

## 2018-03-20 NOTE — PROGRESS NOTES
Bedside report received from night nurse. Assumed care of pt. Pt awake sitting in bed. A/Ox4. Tele box on. Pt updated on plan of care. Verbalized understanding. Call light in reach. Bed locked and in lowest position with 2 upper bed rails up.

## 2018-03-20 NOTE — DISCHARGE PLANNING
Care Transition Team Assessment    Information Source  Orientation : Oriented x 4  Information Given By: Patient  Who is responsible for making decisions for patient? : Patient    Readmission Evaluation  Is this a readmission?: No    Elopement Risk  Legal Hold: No  Ambulatory or Self Mobile in Wheelchair: Yes  Disoriented: No  Psychiatric Symptoms: None  History of Wandering: No  Elopement this Admit: No  Vocalizing Wanting to Leave: No  Displays Behaviors, Body Language Wanting to Leave: No-Not at Risk for Elopement  Elopement Risk: Not at Risk for Elopement    Interdisciplinary Discharge Planning  Does Admitting Nurse Feel This Could be a Complex Discharge?: No  Primary Care Physician: Dr. Kailee Pappas  Lives with - Patient's Self Care Capacity: Spouse  Support Systems: [employer] [place] [employer], Family Member(s), Friends / Neighbors  Housing / Facility: [address]  Do You Take your Prescribed Medications Regularly: Yes  Able to Return to Previous ADL's: Yes  Mobility Issues: No  Prior Services: None  Patient Expects to be Discharged to:: home  Assistance Needed: No  Durable Medical Equipment: Not Applicable    Discharge Preparedness  What is your plan after discharge?: Other (comment) (home with spouse)  What are your discharge supports?: Spouse  Prior Functional Level: Drives Self, Independent with Activities of Daily Living, Independent with Medication Management    Functional Assesment  Prior Functional Level: Drives Self, Independent with Activities of Daily Living, Independent with Medication Management    Finances  Financial Barriers to Discharge: No  Prescription Coverage: Yes    Vision / Hearing Impairment  Vision Impairment : Yes  Right Eye Vision: Wears Glasses (reading)  Left Eye Vision: Wears Glasses (reading)  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)    Values / Beliefs / Concerns  Values / Beliefs Concerns : No    Advance Directive  Advance Directive?: Living Will, DPOA for Health  Care (not on file)  Durable Power of  Name and Contact : Maribel Brunson [phone]    Domestic Abuse  Have you ever been the victim of abuse or violence?: No    Psychological Assessment  History of Substance Abuse: None  History of Psychiatric Problems: No    Discharge Risks or Barriers  Discharge risks or barriers?: No    Anticipated Discharge Information  Anticipated discharge disposition: Home  Discharge Address: [address] [place] [address]  Discharge Contact Phone Number: [phone]

## 2018-03-20 NOTE — CARE PLAN
Problem: Safety  Goal: Will remain free from falls    Intervention: Assess risk factors for falls  Pt mobility assessed during morning assessment. Pt is steady on feet without assistance. Pt denied dizziness or weakness.

## 2018-03-20 NOTE — CARE PLAN
Problem: Safety  Goal: Will remain free from injury  Outcome: PROGRESSING AS EXPECTED  Fall precautions in place. Bed wheels locked, bed is in the lowest position. Call light in reach. Non-skid socks provided. Patient provides successful verbalization of fall and safety precautions and demonstration of use of call bell. Upper side rails are up. Hourly rounding in progress.

## 2018-03-20 NOTE — CARE PLAN
Problem: Communication  Goal: The ability to communicate needs accurately and effectively will improve    Intervention: Educate patient and significant other/support system about the plan of care, procedures, treatments, medications and allow for questions  Pt educated on plan of care for the day verbally and on the white board. Pt encouraged to ask questions and verbalized understanding. Educated pt on procedures (echo and stress test) for the day and new medications.

## 2018-03-21 ENCOUNTER — PATIENT OUTREACH (OUTPATIENT)
Dept: HEALTH INFORMATION MANAGEMENT | Facility: OTHER | Age: 68
End: 2018-03-21

## 2018-03-21 NOTE — PROGRESS NOTES
Cardiology Progress Note               Author: Rj Lewis Date & Time created: 3/20/2018  5:24 PM     ID: 67-year-old man with history of coronary artery disease status post PCI of his LAD in  and RCA in 2015 who presents with chest discomfort atypical for angina and negative biomarkers.    Interval History:  Myocardial perfusion imaging showing to be negative for ischemia    Chief Complaint:  Follow-up chest pain    Review of Systems   Cardiovascular: Negative.    All other systems reviewed and are negative.      Physical Exam   Constitutional: He is oriented to person, place, and time. He appears well-developed and well-nourished. No distress.   HENT:   Head: Normocephalic and atraumatic.   Eyes: EOM are normal. Pupils are equal, round, and reactive to light.   Neck: No JVD present.   Cardiovascular: Normal rate, regular rhythm and intact distal pulses.  Exam reveals no gallop and no friction rub.    No murmur heard.  Pulmonary/Chest: Effort normal and breath sounds normal. No respiratory distress. He has no wheezes. He has no rales. He exhibits no tenderness.   Abdominal: Soft. Bowel sounds are normal. He exhibits no distension.   Musculoskeletal: He exhibits no edema.   Neurological: He is alert and oriented to person, place, and time.   Skin: Skin is warm and dry. No rash noted. He is not diaphoretic. No erythema. No pallor.   Psychiatric: He has a normal mood and affect. Judgment and thought content normal.   Nursing note and vitals reviewed.      Hemodynamics:  Temp (24hrs), Av.8 °C (98.2 °F), Min:36.6 °C (97.9 °F), Max:36.9 °C (98.5 °F)  Temperature: 36.9 °C (98.5 °F)  Pulse  Av.1  Min: 76  Max: 115Heart Rate (Monitored): 92  Blood Pressure : 140/76, NIBP: 143/92     Respiratory:    Respiration: 18, Pulse Oximetry: 92 %, O2 Daily Delivery Respiratory : Room Air with O2 Available     Work Of Breathing / Effort: Mild  RUL Breath Sounds: Clear, RML Breath Sounds: Clear, RLL Breath  Sounds: Clear;Diminished, MARION Breath Sounds: Clear;Diminished, LLL Breath Sounds: Clear;Diminished  Fluids:  Date 03/20/18 0700 - 03/21/18 0659   Shift 8728-1083 2517-3884 5249-3640 24 Hour Total   I  N  T  A  K  E   Other 50   50    Shift Total 50   50   O  U  T  P  U  T   Urine 200   200    Shift Total 200   200   Weight (kg) 83 83 83 83       Weight: 83 kg (182 lb 15.7 oz)  GI/Nutrition:  Orders Placed This Encounter   Procedures   • DIET NPO     Standing Status:   Standing     Number of Occurrences:   8     Order Specific Question:   Restrict to:     Answer:   Sips with Medications [3]   • DISCONTINUE DIET TRAY     Standing Status:   Standing     Number of Occurrences:   1     Lab Results:  Recent Labs      03/19/18   1316  03/19/18   2332   WBC  5.1  4.4*   RBC  5.17  4.70   HEMOGLOBIN  15.8  14.7   HEMATOCRIT  46.5  41.7*   MCV  89.9  88.7   MCH  30.6  31.3   MCHC  34.0  35.3   RDW  44.1  43.0   PLATELETCT  222  227   MPV  8.8*  9.0     Recent Labs      03/19/18   1316  03/19/18   2332   SODIUM  136  134*   POTASSIUM  3.3*  3.5*   CHLORIDE  99  101   CO2  25  22   GLUCOSE  100*  180*   BUN  15  14   CREATININE  0.85  0.95   CALCIUM  9.4  9.0     Recent Labs      03/19/18   1316   APTT  29.0   INR  0.99     Recent Labs      03/19/18   1316   BNPBTYPENAT  36     Recent Labs      03/19/18   1316  03/19/18   2332  03/20/18   0633   TROPONINI  <0.01  <0.01  <0.01   BNPBTYPENAT  36   --    --      Recent Labs      03/20/18   0633   TRIGLYCERIDE  88   HDL  44   LDL  98         Medical Decision Making, by Problem:  Active Hospital Problems    Diagnosis   • *Chest pain [R07.9]   • Essential hypertension [I10]   • Hypercholesteremia [E78.00]   • Type 2 diabetes mellitus (CMS-HCC) [E11.9]   • ACE inhibitor-aggravated angioedema [T78.3XXA, T46.4X5A]       Plan:    Noncardiac chest pain: Imaging shows no progression of his coronary artery disease. I do not think that he needs additional workup at this time.    From my  perspective he needs no additional testing, and is stable for discharge. I will sign off at this time.    Coronary artery disease: Continue outpatient medical management.    Quality-Core Measures   Reviewed items::  EKG reviewed, Labs reviewed, Medications reviewed and Radiology images reviewed    Thank you for allowing me to participate in the care of this patient. Please call if I any clarification would be useful.    Rj Lewis MD  Cardiologist, [facility] [place]

## 2018-03-21 NOTE — PROGRESS NOTES
Pt discharged home with wife. Pt refused wheelchair and wanted to walk with wife. Belongings with pt. Tele box removed. IV removed. Discharge instructions reviewed and pt verbalized understanding.

## 2018-03-23 ENCOUNTER — TELEPHONE (OUTPATIENT)
Dept: CARDIOLOGY | Facility: MEDICAL CENTER | Age: 68
End: 2018-03-23

## 2018-03-23 RX ORDER — ROSUVASTATIN CALCIUM 5 MG/1
TABLET, COATED ORAL
Qty: 8 TAB | Refills: 0
Start: 2018-03-23 | End: 2020-06-26

## 2018-03-23 NOTE — TELEPHONE ENCOUNTER
----- Message from Yoselin Weaver sent at 3/23/2018 11:48 AM PDT -----  Regarding: Question about going out of town  LUAN/Prisca    Patient wants to make sure that it's okay for him to go out of town and he can be reached at [phone].

## 2018-03-23 NOTE — TELEPHONE ENCOUNTER
Pt. Went to ER recently for chest pain. Stress test and troponins were negative/normal and pain was dx'd as muscoloskeletal.  Pt. Advised that he may leave town. He has FV 4-26-18 with Dr. Amado. He saw his PCP today.  He said he wants to restart Crestor 5mg twice a week.

## 2018-06-07 ENCOUNTER — APPOINTMENT (RX ONLY)
Dept: URBAN - METROPOLITAN AREA CLINIC 31 | Facility: CLINIC | Age: 68
Setting detail: DERMATOLOGY
End: 2018-06-07

## 2018-06-07 DIAGNOSIS — L82.0 INFLAMED SEBORRHEIC KERATOSIS: ICD-10-CM

## 2018-06-07 DIAGNOSIS — Z87.2 PERSONAL HISTORY OF DISEASES OF THE SKIN AND SUBCUTANEOUS TISSUE: ICD-10-CM

## 2018-06-07 DIAGNOSIS — L81.4 OTHER MELANIN HYPERPIGMENTATION: ICD-10-CM

## 2018-06-07 DIAGNOSIS — L82.1 OTHER SEBORRHEIC KERATOSIS: ICD-10-CM

## 2018-06-07 DIAGNOSIS — D22 MELANOCYTIC NEVI: ICD-10-CM

## 2018-06-07 PROBLEM — L57.0 ACTINIC KERATOSIS: Status: ACTIVE | Noted: 2018-06-07

## 2018-06-07 PROBLEM — D48.5 NEOPLASM OF UNCERTAIN BEHAVIOR OF SKIN: Status: ACTIVE | Noted: 2018-06-07

## 2018-06-07 PROBLEM — D22.5 MELANOCYTIC NEVI OF TRUNK: Status: ACTIVE | Noted: 2018-06-07

## 2018-06-07 PROBLEM — E78.5 HYPERLIPIDEMIA, UNSPECIFIED: Status: ACTIVE | Noted: 2018-06-07

## 2018-06-07 PROCEDURE — 17110 DESTRUCTION B9 LES UP TO 14: CPT

## 2018-06-07 PROCEDURE — ? LIQUID NITROGEN

## 2018-06-07 PROCEDURE — ? BIOPSY BY SHAVE METHOD

## 2018-06-07 PROCEDURE — ? ADDITIONAL NOTES

## 2018-06-07 PROCEDURE — ? COUNSELING

## 2018-06-07 PROCEDURE — 11101: CPT

## 2018-06-07 PROCEDURE — 99212 OFFICE O/P EST SF 10 MIN: CPT | Mod: 25

## 2018-06-07 PROCEDURE — 11100: CPT | Mod: 59

## 2018-06-07 ASSESSMENT — LOCATION DETAILED DESCRIPTION DERM
LOCATION DETAILED: LEFT SUPERIOR PARIETAL SCALP
LOCATION DETAILED: RIGHT DISTAL DORSAL FOREARM
LOCATION DETAILED: LEFT CLAVICULAR SKIN
LOCATION DETAILED: RIGHT MEDIAL UPPER BACK
LOCATION DETAILED: EPIGASTRIC SKIN
LOCATION DETAILED: LEFT CLAVICULAR NECK
LOCATION DETAILED: RIGHT CLAVICULAR NECK
LOCATION DETAILED: LEFT INFERIOR UPPER BACK

## 2018-06-07 ASSESSMENT — LOCATION SIMPLE DESCRIPTION DERM
LOCATION SIMPLE: RIGHT ANTERIOR NECK
LOCATION SIMPLE: RIGHT FOREARM
LOCATION SIMPLE: LEFT UPPER BACK
LOCATION SIMPLE: ABDOMEN
LOCATION SIMPLE: LEFT ANTERIOR NECK
LOCATION SIMPLE: RIGHT UPPER BACK
LOCATION SIMPLE: LEFT CLAVICULAR SKIN
LOCATION SIMPLE: SCALP

## 2018-06-07 ASSESSMENT — LOCATION ZONE DERM
LOCATION ZONE: ARM
LOCATION ZONE: NECK
LOCATION ZONE: SCALP
LOCATION ZONE: TRUNK

## 2018-06-07 NOTE — PROCEDURE: BIOPSY BY SHAVE METHOD
Billing Type: Third-Party Bill
Biopsy Type: H and E
Lab Facility: 
Consent: Written consent was obtained and risks were reviewed including but not limited to scarring, infection, bleeding, scabbing, incomplete removal, nerve damage and allergy to anesthesia.
Wound Care: Aquaphor
Electrodesiccation And Curettage Text: The wound bed was treated with electrodesiccation and curettage after the biopsy was performed.
Biopsy Method: Personna blade
Size Of Lesion In Cm: 0.9
Lab: [id number]
Dressing: bandage
Hemostasis: Drysol and Electrocautery
Cryotherapy Text: The wound bed was treated with cryotherapy after the biopsy was performed.
X Size Of Lesion In Cm: 0
Notification Instructions: Patient will be notified of biopsy results. However, patient instructed to call the office if not contacted within 2 weeks.
Post-Care Instructions: I reviewed with the patient in detail post-care instructions. Patient is to keep the biopsy site dry overnight, and then apply bacitracin or petroleum twice daily until healed. Patient may apply hydrogen peroxide soaks to remove any crusting.
Destruction After The Procedure: No
Detail Level: Detailed
Electrodesiccation Text: The wound bed was treated with electrodesiccation after the biopsy was performed.
Type Of Destruction Used: Electrodesiccation
Anesthesia Volume In Cc: 1
Render Post-Care Instructions In Note?: yes
Silver Nitrate Text: The wound bed was treated with silver nitrate after the biopsy was performed.
Anesthesia Type: 1% lidocaine with 1:100,000 epinephrine and a 1:12 solution of 8.4% sodium bicarbonate
X Size Of Lesion In Cm: 1.5
Size Of Lesion In Cm: 2.5

## 2018-06-07 NOTE — PROCEDURE: ADDITIONAL NOTES
Additional Notes: Previously biopsied: 04/22/2014 [id number] A. Lt. ant.vertex Dx: Macular SK; 01/21/2014 [id number] Lt.frontal scalp Dx: SK w/inflammation.

## 2018-08-02 ENCOUNTER — OFFICE VISIT (OUTPATIENT)
Dept: CARDIOLOGY | Facility: MEDICAL CENTER | Age: 68
End: 2018-08-02
Payer: MEDICARE

## 2018-08-02 VITALS
SYSTOLIC BLOOD PRESSURE: 132 MMHG | HEIGHT: 67 IN | BODY MASS INDEX: 28.88 KG/M2 | DIASTOLIC BLOOD PRESSURE: 80 MMHG | OXYGEN SATURATION: 94 % | WEIGHT: 184 LBS | HEART RATE: 85 BPM

## 2018-08-02 DIAGNOSIS — I25.10 ATHEROSCLEROSIS OF NATIVE CORONARY ARTERY OF NATIVE HEART WITHOUT ANGINA PECTORIS: ICD-10-CM

## 2018-08-02 DIAGNOSIS — E78.00 HYPERCHOLESTEREMIA: ICD-10-CM

## 2018-08-02 DIAGNOSIS — I10 ESSENTIAL HYPERTENSION: ICD-10-CM

## 2018-08-02 DIAGNOSIS — Z78.9 STATIN INTOLERANCE: ICD-10-CM

## 2018-08-02 PROCEDURE — 99213 OFFICE O/P EST LOW 20 MIN: CPT | Performed by: INTERNAL MEDICINE

## 2018-08-02 RX ORDER — BUDESONIDE 0.5 MG/2ML
INHALANT ORAL
Refills: 4 | COMMUNITY
Start: 2018-05-31 | End: 2020-06-26

## 2018-08-02 ASSESSMENT — ENCOUNTER SYMPTOMS
FEVER: 0
HEARTBURN: 1
NEUROLOGICAL NEGATIVE: 1
CONSTITUTIONAL NEGATIVE: 1
CARDIOVASCULAR NEGATIVE: 1
PSYCHIATRIC NEGATIVE: 1

## 2018-08-03 NOTE — PROGRESS NOTES
Chief Complaint   Patient presents with   • Coronary Artery Disease       Subjective:   Darren Brunson is a 68 y.o. male who presents today for follow-up of coronary disease with stenting of his right coronary artery in February 2015 and prior stenting of his LAD in 2009.  He was hospitalized with atypical chest pain in March and nuclear scan at that time showed no ischemia.  Lab from  July 23 reveals LDL 91.  He cannot take statins.    Past Medical History:   Diagnosis Date   • Acute myocardial infarction of other anterior wall, episode of care unspecified 2009; 2014   • Anxiety state, unspecified     • CAD (coronary artery disease) September 2009    PCI/stent (Promus 3.0 x 15mm) to the LAD.   • Cataract     left eye   • Chickenpox    • Diabetes (HCC)     on oral meds   • Glaucoma    • Heart murmur    • High cholesterol    • Hyperlipidemia    • Mumps    • Pain    • S/P coronary artery stent placement     • Sleep apnea    • Snoring    • Unspecified essential hypertension       Past Surgical History:   Procedure Laterality Date   • SEPTAL RECONSTRUCTION N/A 10/23/2017    Procedure: SEPTAL RECONSTRUCTION;  Surgeon: CHARLI Sanchez M.D.;  Location: SURGERY SAME DAY [facility];  Service: Ent   • UVULOPHARYNGOPALATOPLASTY N/A 10/23/2017    Procedure: UVULOPHARYNGOPALATOPLASTY;  Surgeon: CHARLI Sanchez M.D.;  Location: SURGERY SAME DAY [facility];  Service: Ent   • BIOPSY GENERAL Left 10/23/2017    Procedure: BIOPSY GENERAL LEFT UPPER NECK;  Surgeon: CHARLI Sanchez M.D.;  Location: SURGERY SAME DAY [facility];  Service: Ent   • OTHER  November 2011    Sinus surgery   • INGUINAL HERNIA REPAIR BILATERAL     • OTHER CARDIAC SURGERY      cardiac cath with stent placement   • SINUSCOPE     • TONSILLECTOMY     • UMBILICAL HERNIA REPAIR       Family History   Problem Relation Age of Onset   • Heart Disease Maternal Aunt    • Cancer Mother         colon     Social History     Social History   • Marital  "status:      Spouse name: N/A   • Number of children: N/A   • Years of education: N/A     Occupational History   • Not on file.     Social History Main Topics   • Smoking status: Former Smoker     Types: Cigarettes     Quit date: 8/30/1997   • Smokeless tobacco: Never Used   • Alcohol use No   • Drug use: No   • Sexual activity: Not on file     Other Topics Concern   • Not on file     Social History Narrative   • No narrative on file     Allergies   Allergen Reactions   • Crestor [Rosuvastatin Calcium] Unspecified     Muscle pain    • Ciprofloxacin      Tendon pain   • Lisinopril    • Statins [Hmg-Coa-R Inhibitors]      Outpatient Encounter Prescriptions as of 8/2/2018   Medication Sig Dispense Refill   • budesonide (PULMICORT) 0.5 MG/2ML Suspension INHALE 1 VIAL IN NEBULIZER AS DIRECTED TWICE A DAY  4   • NON SPECIFIED \"kera he wan\" and \"Ivan annika cruz qiwan\"  Herbs for cholesterol and prostate     • metformin ER (GLUCOPHAGE XR) 500 MG TABLET SR 24 HR Take 500 mg by mouth every day.     • gabapentin (NEURONTIN) 100 MG Cap Take 100-200 mg by mouth every evening.     • pantoprazole (PROTONIX) 40 MG TBEC Take 40 mg by mouth every day.     • aspirin 81 MG tablet Take 81 mg by mouth every day.     • fenofibrate (TRICOR) 48 MG TABS Take 48 mg by mouth every day.     • Probiotic Product (PROBIOTIC DAILY PO) Take  by mouth every day.     • rosuvastatin (CRESTOR) 5 MG Tab Take 1 tablet Mon. And Fri. 8 Tab 0   • Naproxen Sodium (ALEVE) 220 MG Cap Take 1 Cap by mouth 2 times a day as needed (PAIN, MUSCLE ACHE).       No facility-administered encounter medications on file as of 8/2/2018.      Review of Systems   Constitutional: Negative.  Negative for fever.   Respiratory:        History of sleep apnea   Cardiovascular: Negative.    Gastrointestinal: Positive for heartburn.   Neurological: Negative.    Psychiatric/Behavioral: Negative.         Objective:   /80   Pulse 85   Ht 1.702 m (5' 7\")   Wt 83.5 kg (184 lb) "   SpO2 94%   BMI 28.82 kg/m²     Physical Exam   Constitutional: He is oriented to person, place, and time. No distress.   HENT:   Head: Normocephalic and atraumatic.   Cardiovascular: Normal rate and regular rhythm.    No murmur heard.  Pulmonary/Chest: Breath sounds normal. No respiratory distress. He has no wheezes.   Abdominal: Soft. Bowel sounds are normal. He exhibits no distension.   Musculoskeletal: He exhibits no edema.   Neurological: He is alert and oriented to person, place, and time.   Skin: Skin is warm and dry.   Psychiatric: He has a normal mood and affect.       Assessment:     1. Essential hypertension     2. Hypercholesteremia     3. Statin intolerance     4. Atherosclerosis of native coronary artery of native heart without angina pectoris         Medical Decision Making:  Today's Assessment / Status / Plan:   Coronary disease: Status post RCA and LAD.  He is doing well.  No angina.  He is statin intolerant.  The patient does take a Chinese herbal medicine for his lipids.  Return in 6 months.

## 2018-10-25 DIAGNOSIS — I25.119 CORONARY ARTERY DISEASE INVOLVING NATIVE CORONARY ARTERY OF NATIVE HEART WITH ANGINA PECTORIS (HCC): ICD-10-CM

## 2018-10-25 RX ORDER — NITROGLYCERIN 0.4 MG/1
0.4 TABLET SUBLINGUAL PRN
Qty: 25 TAB | Refills: 5 | Status: SHIPPED | OUTPATIENT
Start: 2018-10-25 | End: 2020-07-28

## 2019-07-16 ENCOUNTER — TELEPHONE (OUTPATIENT)
Dept: CARDIOLOGY | Facility: MEDICAL CENTER | Age: 69
End: 2019-07-16

## 2019-07-16 NOTE — TELEPHONE ENCOUNTER
"Pt. Has FV 8-2-19 with Rs.   He has been referred to a neurologist. Sx.:\"forehead pressure\", imbalance \"when I'm walking and I stop, I feel like Im still moving\".   He has seen ENT for these sx. Dr. Cadena ruled out ENT causes.   Pt. Saw Dr. Cortez, neurologist in , but didn't like him. He has been referred to Dr. Mcdaniel and Dr. Barger. Dr. Mcdaniel is too busy. Pt. Wants to know Dr. Amado's opinion of Dr. Barger.  To Dr. Amado.  "

## 2019-07-16 NOTE — TELEPHONE ENCOUNTER
----- Message from Miranda Otto sent at 7/16/2019  3:18 PM PDT -----  Regarding: linda ROLAND opinion on another specialty doctor  Contact: [phone]  LUAN/Prisca    Pt is calling to get Dr. Amado opinion on another specialty doctor did not specify( not cardiology relation). Please call pt at [phone] for further details.

## 2019-07-17 NOTE — TELEPHONE ENCOUNTER
Matty Amado M.D.  TESSA Chatman.PCHRIS.   Caller: Unspecified (Yesterday,  3:46 PM)             I have no idea who Toby Hearn is. I think probably new to [facility]      Advised patient as above, he verbalized understanding.

## 2019-08-01 ENCOUNTER — APPOINTMENT (RX ONLY)
Dept: URBAN - METROPOLITAN AREA CLINIC 4 | Facility: CLINIC | Age: 69
Setting detail: DERMATOLOGY
End: 2019-08-01

## 2019-08-01 DIAGNOSIS — L81.4 OTHER MELANIN HYPERPIGMENTATION: ICD-10-CM

## 2019-08-01 DIAGNOSIS — Z87.2 PERSONAL HISTORY OF DISEASES OF THE SKIN AND SUBCUTANEOUS TISSUE: ICD-10-CM

## 2019-08-01 DIAGNOSIS — L82.1 OTHER SEBORRHEIC KERATOSIS: ICD-10-CM

## 2019-08-01 DIAGNOSIS — L81.0 POSTINFLAMMATORY HYPERPIGMENTATION: ICD-10-CM

## 2019-08-01 DIAGNOSIS — L82.0 INFLAMED SEBORRHEIC KERATOSIS: ICD-10-CM

## 2019-08-01 DIAGNOSIS — D22 MELANOCYTIC NEVI: ICD-10-CM

## 2019-08-01 PROBLEM — D22.5 MELANOCYTIC NEVI OF TRUNK: Status: ACTIVE | Noted: 2019-08-01

## 2019-08-01 PROCEDURE — ? PATIENT SPECIFIC COUNSELING

## 2019-08-01 PROCEDURE — 99213 OFFICE O/P EST LOW 20 MIN: CPT | Mod: 25

## 2019-08-01 PROCEDURE — ? COUNSELING

## 2019-08-01 PROCEDURE — ? LIQUID NITROGEN

## 2019-08-01 PROCEDURE — 17110 DESTRUCTION B9 LES UP TO 14: CPT

## 2019-08-01 ASSESSMENT — LOCATION DETAILED DESCRIPTION DERM
LOCATION DETAILED: SUPERIOR LUMBAR SPINE
LOCATION DETAILED: LEFT INFERIOR UPPER BACK
LOCATION DETAILED: LEFT MEDIAL UPPER BACK
LOCATION DETAILED: PERIUMBILICAL SKIN
LOCATION DETAILED: LEFT POSTERIOR SHOULDER
LOCATION DETAILED: RIGHT SUPERIOR UPPER BACK
LOCATION DETAILED: LEFT MID-UPPER BACK

## 2019-08-01 ASSESSMENT — LOCATION ZONE DERM
LOCATION ZONE: ARM
LOCATION ZONE: TRUNK

## 2019-08-01 ASSESSMENT — LOCATION SIMPLE DESCRIPTION DERM
LOCATION SIMPLE: LEFT SHOULDER
LOCATION SIMPLE: RIGHT UPPER BACK
LOCATION SIMPLE: ABDOMEN
LOCATION SIMPLE: LEFT UPPER BACK
LOCATION SIMPLE: LOWER BACK

## 2019-08-01 NOTE — PROCEDURE: PATIENT SPECIFIC COUNSELING
The patient declined an Rx for a topical steroid today. He is advised to moisturize with heavy creams, aquaphor or Vaseline. He may also try otc HC Cream if he desires.\\nIf the otc topical fail to given improvement, he may call to get an Rx for a topical steroid.
Detail Level: Detailed
Detail Level: Zone

## 2019-08-02 ENCOUNTER — OFFICE VISIT (OUTPATIENT)
Dept: CARDIOLOGY | Facility: MEDICAL CENTER | Age: 69
End: 2019-08-02
Payer: MEDICARE

## 2019-08-02 VITALS
HEIGHT: 72 IN | DIASTOLIC BLOOD PRESSURE: 74 MMHG | BODY MASS INDEX: 23.98 KG/M2 | WEIGHT: 177 LBS | SYSTOLIC BLOOD PRESSURE: 126 MMHG | OXYGEN SATURATION: 95 % | HEART RATE: 92 BPM

## 2019-08-02 DIAGNOSIS — I25.10 ATHEROSCLEROSIS OF NATIVE CORONARY ARTERY OF NATIVE HEART WITHOUT ANGINA PECTORIS: ICD-10-CM

## 2019-08-02 DIAGNOSIS — T78.3XXA ANGIOTENSIN CONVERTING ENZYME INHIBITOR-AGGRAVATED ANGIOEDEMA, INITIAL ENCOUNTER: ICD-10-CM

## 2019-08-02 DIAGNOSIS — T46.4X5A ANGIOTENSIN CONVERTING ENZYME INHIBITOR-AGGRAVATED ANGIOEDEMA, INITIAL ENCOUNTER: ICD-10-CM

## 2019-08-02 DIAGNOSIS — Z78.9 STATIN INTOLERANCE: ICD-10-CM

## 2019-08-02 DIAGNOSIS — I10 ESSENTIAL HYPERTENSION: ICD-10-CM

## 2019-08-02 DIAGNOSIS — E78.00 HYPERCHOLESTEREMIA: ICD-10-CM

## 2019-08-02 PROCEDURE — 99214 OFFICE O/P EST MOD 30 MIN: CPT | Performed by: INTERNAL MEDICINE

## 2019-08-02 ASSESSMENT — ENCOUNTER SYMPTOMS
CARDIOVASCULAR NEGATIVE: 1
HEADACHES: 1
PSYCHIATRIC NEGATIVE: 1
CONSTITUTIONAL NEGATIVE: 1
FEVER: 0
HEARTBURN: 1

## 2019-08-02 NOTE — PROGRESS NOTES
Chief Complaint   Patient presents with   • HTN (Controlled)       Subjective:   Darren Brunson is a 69 y.o. male who presents today for follow-up of coronary artery disease.  Underwent stenting of his RCA in February 2015, stenting of his LAD in 2009.  The patient has a history of hyperlipidemia and is intolerant to statins.  He has been tried on several different statins and even failed low-dose (5 mg) rosuvastatin given thrice weekly.  He is failed low-dose Mevacor as well as other statins.  PCSK9 inhibitors have been recommended in the past but the patient has been reluctant to try them.  Patient was taking a Chinese herbal preparation which he tolerated and had some initial success with.  However most recent lab reveals total cholesterol is 212 with non-HDL cholesterol 158 and LDL of 138.    Past Medical History:   Diagnosis Date   • Acute myocardial infarction of other anterior wall, episode of care unspecified 2009; 2014   • Anxiety state, unspecified     • CAD (coronary artery disease) September 2009    PCI/stent (Promus 3.0 x 15mm) to the LAD.   • Cataract     left eye   • Chickenpox    • Diabetes (HCC)     on oral meds   • Glaucoma    • Heart murmur    • High cholesterol    • Hyperlipidemia    • Mumps    • Pain    • S/P coronary artery stent placement     • Sleep apnea    • Snoring    • Unspecified essential hypertension       Past Surgical History:   Procedure Laterality Date   • SEPTAL RECONSTRUCTION N/A 10/23/2017    Procedure: SEPTAL RECONSTRUCTION;  Surgeon: CHARLI Sanchez M.D.;  Location: SURGERY SAME DAY [facility];  Service: Ent   • UVULOPHARYNGOPALATOPLASTY N/A 10/23/2017    Procedure: UVULOPHARYNGOPALATOPLASTY;  Surgeon: CHARLI Sanchez M.D.;  Location: SURGERY SAME DAY [facility];  Service: Ent   • BIOPSY GENERAL Left 10/23/2017    Procedure: BIOPSY GENERAL LEFT UPPER NECK;  Surgeon: CHARLI Sanchez M.D.;  Location: SURGERY SAME DAY [facility];  Service: Ent   • OTHER   2011    Sinus surgery   • INGUINAL HERNIA REPAIR BILATERAL     • OTHER CARDIAC SURGERY      cardiac cath with stent placement   • SINUSCOPE     • TONSILLECTOMY     • UMBILICAL HERNIA REPAIR       Family History   Problem Relation Age of Onset   • Heart Disease Maternal Aunt    • Cancer Mother         colon     Social History     Socioeconomic History   • Marital status:      Spouse name: Not on file   • Number of children: Not on file   • Years of education: Not on file   • Highest education level: Not on file   Occupational History   • Not on file   Social Needs   • Financial resource strain: Not on file   • Food insecurity:     Worry: Not on file     Inability: Not on file   • Transportation needs:     Medical: Not on file     Non-medical: Not on file   Tobacco Use   • Smoking status: Former Smoker     Types: Cigarettes     Last attempt to quit: 1997     Years since quittin.9   • Smokeless tobacco: Never Used   Substance and Sexual Activity   • Alcohol use: No   • Drug use: No   • Sexual activity: Not on file   Lifestyle   • Physical activity:     Days per week: Not on file     Minutes per session: Not on file   • Stress: Not on file   Relationships   • Social connections:     Talks on phone: Not on file     Gets together: Not on file     Attends Yazidism service: Not on file     Active member of club or organization: Not on file     Attends meetings of clubs or organizations: Not on file     Relationship status: Not on file   • Intimate partner violence:     Fear of current or ex partner: Not on file     Emotionally abused: Not on file     Physically abused: Not on file     Forced sexual activity: Not on file   Other Topics Concern   • Not on file   Social History Narrative   • Not on file     Allergies   Allergen Reactions   • Crestor [Rosuvastatin Calcium] Unspecified     Muscle pain    • Ciprofloxacin      Tendon pain   • Lisinopril    • Statins [Hmg-Coa-R Inhibitors]      Outpatient  "Encounter Medications as of 8/2/2019   Medication Sig Dispense Refill   • Alirocumab (PRALUENT) 75 MG/ML Solution Pen-injector Inject 75 mg as instructed every 14 days for 90 days. 6 PEN 3   • nitroglycerin (NITROSTAT) 0.4 MG SL Tab Place 1 Tab under tongue as needed for Chest Pain (Place 1 tablet under the tongue every 5 minutes up to 3 doses as needed for chest pain). 25 Tab 5   • budesonide (PULMICORT) 0.5 MG/2ML Suspension INHALE 1 VIAL IN NEBULIZER AS DIRECTED TWICE A DAY  4   • NON SPECIFIED \"kera sahu wan\" and \"Ivan cruz qiwan\"  Herbs for cholesterol and prostate     • metformin ER (GLUCOPHAGE XR) 500 MG TABLET SR 24 HR Take 500 mg by mouth every day.     • gabapentin (NEURONTIN) 100 MG Cap Take 100-200 mg by mouth every evening.     • pantoprazole (PROTONIX) 40 MG TBEC Take 40 mg by mouth every day.     • aspirin 81 MG tablet Take 81 mg by mouth every day.     • fenofibrate (TRICOR) 48 MG TABS Take 48 mg by mouth every day.     • Probiotic Product (PROBIOTIC DAILY PO) Take  by mouth every day.     • rosuvastatin (CRESTOR) 5 MG Tab Take 1 tablet Mon. And Fri. (Patient not taking: Reported on 8/2/2019) 8 Tab 0   • Naproxen Sodium (ALEVE) 220 MG Cap Take 1 Cap by mouth 2 times a day as needed (PAIN, MUSCLE ACHE).       No facility-administered encounter medications on file as of 8/2/2019.      Review of Systems   Constitutional: Negative.  Negative for fever.   Respiratory:        History of sleep apnea.  Intolerant to BiPAP mask   Cardiovascular: Negative.    Gastrointestinal: Positive for heartburn.   Neurological: Positive for headaches.   Psychiatric/Behavioral: Negative.         Objective:   /74 (BP Location: Left arm, Patient Position: Sitting, BP Cuff Size: Adult)   Pulse 92   Ht 1.829 m (6')   Wt 80.3 kg (177 lb)   SpO2 95%   BMI 24.01 kg/m²     Physical Exam   Constitutional: He is oriented to person, place, and time. No distress.   HENT:   Head: Normocephalic and atraumatic.   Eyes: Pupils " are equal, round, and reactive to light. No scleral icterus.   Neck: No JVD present.   Cardiovascular: Normal rate and regular rhythm.   No murmur heard.  Pulmonary/Chest: Breath sounds normal. No respiratory distress. He has no wheezes.   Abdominal: Soft. Bowel sounds are normal. He exhibits no distension.   Musculoskeletal: He exhibits no edema.   Neurological: He is alert and oriented to person, place, and time.   Skin: Skin is warm and dry.   Psychiatric: He has a normal mood and affect.       Assessment:     1. Angiotensin converting enzyme inhibitor-aggravated angioedema, initial encounter  Lipid Profile   2. Essential hypertension     3. Hypercholesteremia  Lipid Profile   4. Statin intolerance     5. Atherosclerosis of native coronary artery of native heart without angina pectoris         Medical Decision Making:  Today's Assessment / Status / Plan:   Coronary disease: Status post stenting of LAD and RCA.  He has had no angina.  Myocardial perfusion scan in February, 2018 showed no ischemia.    Hypertension: Under control with dietary measures he developed angioedema with ACE inhibitors.    Hyperlipidemia: Patient has a history of statin intolerance he is failed low-dose Mevacor and low-dose rosuvastatin documented in recent notes and also has been on a atorvastatin another medications in the same family that he cannot tolerate.  He was on a Chinese herbal preparation but his LDL is not to target.  He is finally consented to trying purulent and this will be started at low dose.  Check lab in 2 months.

## 2019-08-19 ENCOUNTER — TELEPHONE (OUTPATIENT)
Dept: CARDIOLOGY | Facility: MEDICAL CENTER | Age: 69
End: 2019-08-19

## 2019-08-19 DIAGNOSIS — Z78.9 STATIN INTOLERANCE: ICD-10-CM

## 2019-08-19 DIAGNOSIS — Z95.5 S/P CORONARY ARTERY STENT PLACEMENT: ICD-10-CM

## 2019-08-19 DIAGNOSIS — I25.10 ATHEROSCLEROSIS OF NATIVE CORONARY ARTERY OF NATIVE HEART WITHOUT ANGINA PECTORIS: ICD-10-CM

## 2019-08-19 DIAGNOSIS — E78.00 HYPERCHOLESTEREMIA: ICD-10-CM

## 2019-08-19 NOTE — TELEPHONE ENCOUNTER
Patient's preferred therapy is Repatha    Alirocumab (PRALUENT) 75 MG/ML Solution Pen-injector  Is not covered    Please consider changing therapy      Patient's INS information for PA processing:  BIN: [id number]  PCN: [id number]  GROUP: [id number]  ID: [id number]

## 2019-08-21 ENCOUNTER — TELEPHONE (OUTPATIENT)
Dept: CARDIOLOGY | Facility: MEDICAL CENTER | Age: 69
End: 2019-08-21

## 2019-08-21 NOTE — TELEPHONE ENCOUNTER
D/w RS who agreed to changing Praluent to Repatha. D/w pharmacist at lipid clinic to verify equivalent dose: Repatha 140mg every 14 days. Rx sent. MAR updated.

## 2019-08-21 NOTE — TELEPHONE ENCOUNTER
PAR sent to plan:  MONE CUELLAR (Key: [id number])   Repatha SureClick 140MG/ML auto-injectors     Form  [facility] Electronic PA Form   Created   3 minutes ago   Sent to Plan   1 minute ago   Determination    Wait for Questions  [facility] typically responds with questions in less than 15 minutes, but may take up to 24 hours.

## 2019-08-28 ENCOUNTER — TELEPHONE (OUTPATIENT)
Dept: CARDIOLOGY | Facility: MEDICAL CENTER | Age: 69
End: 2019-08-28

## 2019-08-28 NOTE — TELEPHONE ENCOUNTER
RS    Pt is requesting to speak to you in regards to changing Praluent to Repatha. Please call pt for further details at [phone].

## 2019-08-29 NOTE — TELEPHONE ENCOUNTER
Spoke with pt. Repatha has been approved but 30-day copay=$200, too expensive. Pt. Has investigated several pt. Assistance programs but his income is above the cut-off. He has another program to check. He will call back with information.

## 2019-09-11 ENCOUNTER — TELEPHONE (OUTPATIENT)
Dept: VASCULAR LAB | Facility: MEDICAL CENTER | Age: 69
End: 2019-09-11

## 2019-09-11 NOTE — TELEPHONE ENCOUNTER
[facility]    Pt called asking if our clinic knew of any patient assistant programs for Repatha or Praluent.  Pt has tried utilizing [employer] Safety Net and PASS program for each PCSK9 without any success.  Our MA is very experienced with patient assistant programs and I told the pt our MA could give him a call to brainstorm options for the future. Pt has been unable to tolerate statins, including low dose every other day.  Will try exhausting MA's input for assistance.  Otherwise, pt might be a candidate for low dose pitavastatin post Vit D replacement, but that would require a referral request from cardiology or PCP.     Kyrie Blas, PharmD

## 2019-09-13 ENCOUNTER — TELEPHONE (OUTPATIENT)
Dept: VASCULAR LAB | Facility: MEDICAL CENTER | Age: 69
End: 2019-09-13

## 2019-09-13 NOTE — TELEPHONE ENCOUNTER
Spoke with patient regarding patient assistance for pcsk9i, patient make well over the cut off for either assistance program. Patient states that his co pay is $200/ mo but is not doable for him. Let patient know he can get a referral to be seen in the lipid clinic where other options may be discussed. Patient was grateful that we called back to try and help him, he states that he may get a referral to come to the clinic.

## 2020-01-20 NOTE — DISCHARGE SUMMARY
"CHIEF COMPLAINT ON ADMISSION  Chief Complaint   Patient presents with   • Chest Pain     dull ache - hx of MI w stent placement   • Arm Pain     L arm pain started friday worse today   • Jaw Pain     started today \"numbness\"       CODE STATUS  Prior    HPI & HOSPITAL COURSE  This is a 67 y.o. male here with chest pain. He was ruled out for a myocardia infarction with serial troponins and had a normal stress test. His sx are persistent and very consistent with musculoskeletal pain as he was recently moving boxes at home. He will follow up with his pcp and cardiologist as needed      The patient met 2-midnight criteria for an inpatient stay at the time of discharge.    Therefore, he is discharged in good and stable condition with close outpatient follow-up.    SPECIFIC OUTPATIENT FOLLOW-UP  Pcp and cards    DISCHARGE PROBLEM LIST  Principal Problem:    Chest pain POA: Yes  Active Problems:    Hypercholesteremia POA: Yes    Essential hypertension POA: Yes      Overview: ICD-10 transition    ACE inhibitor-aggravated angioedema POA: Yes    Type 2 diabetes mellitus (CMS-HCC) POA: Unknown  Resolved Problems:    * No resolved hospital problems. *      FOLLOW UP  No future appointments.  Kailee Pappas M.D.  [address] [place] [id number]  [phone]    Call on 3/22/2018  [facility]  called and left a message for office to call to schedule your appointment. If you do not hear from them please call to schedule. Thank you      MEDICATIONS ON DISCHARGE   Darren Brunson   Home Medication Instructions WILLI:[id number]    Printed on:03/21/18 3566   Medication Information                      aspirin 81 MG tablet  Take 81 mg by mouth every day.             fenofibrate (TRICOR) 48 MG TABS  Take 48 mg by mouth every day.             gabapentin (NEURONTIN) 100 MG Cap  Take 100-200 mg by mouth every evening.             metformin ER (GLUCOPHAGE XR) 500 MG TABLET SR 24 HR  Take 500 mg by mouth every day.           " Please advise on patient lab question.    "  Naproxen Sodium (ALEVE) 220 MG Cap  Take 1 Cap by mouth 2 times a day as needed (PAIN, MUSCLE ACHE).             NON SPECIFIED  \"kera rosas\" and \"Ivan cruz qialison\"  Herbs for cholesterol and prostate             pantoprazole (PROTONIX) 40 MG TBEC  Take 40 mg by mouth every day.             Probiotic Product (PROBIOTIC DAILY PO)  Take  by mouth every day.                 DIET  No orders of the defined types were placed in this encounter.      ACTIVITY  As tolerated.  Weight bearing as tolerated      CONSULTATIONS  none    PROCEDURES  Stress test   Myocardial Perfusion   Report   NUCLEAR IMAGING INTERPRETATION   No evidence of significant jeopardized viable myocardium or prior myocardial    infarction.   Normal left ventricular size, ejection fraction, and wall motion.      LABORATORY  Lab Results   Component Value Date/Time    SODIUM 134 (L) 03/19/2018 11:32 PM    POTASSIUM 3.5 (L) 03/19/2018 11:32 PM    CHLORIDE 101 03/19/2018 11:32 PM    CO2 22 03/19/2018 11:32 PM    GLUCOSE 180 (H) 03/19/2018 11:32 PM    BUN 14 03/19/2018 11:32 PM    CREATININE 0.95 03/19/2018 11:32 PM        Lab Results   Component Value Date/Time    WBC 4.4 (L) 03/19/2018 11:32 PM    HEMOGLOBIN 14.7 03/19/2018 11:32 PM    HEMATOCRIT 41.7 (L) 03/19/2018 11:32 PM    PLATELETCT 227 03/19/2018 11:32 PM        Total time of the discharge process exceeds 32 minutes  "

## 2020-06-23 ENCOUNTER — TELEPHONE (OUTPATIENT)
Dept: HEALTH INFORMATION MANAGEMENT | Facility: OTHER | Age: 70
End: 2020-06-23

## 2020-06-23 NOTE — TELEPHONE ENCOUNTER
1. Caller Name: Darren Brunson              Call Back Number: [phone]  [facility] PCP or Specialty Provider: Yes Dr. Kailee Pappas        2.  In the last two weeks, has the patient had any new or worsening symptoms (not explained by alternative diagnosis)? Yes, the patient reports the following COVID-19 consistent symptoms: shortness of breath or difficulty breathing and sore throat. Same on and off sore throat, same  uncomfortable while breathing in r/t COPD    3.  Does patient have any comoribidities? COPD, DM    4.  Has the patient traveled in the last 14 days OR had any known contact with someone who is suspected or confirmed to have COVID-19?  No.    5. Disposition: Advised to perform self care, monitor for worsening symptoms and to call back in 3 days if no improvement    Note routed to [facility] Provider: LYNDSAY only.   Advised patient to call for screening the morning of 7/2 prior to scheduled  appt.

## 2020-06-25 ENCOUNTER — TELEPHONE (OUTPATIENT)
Dept: CARDIOLOGY | Facility: MEDICAL CENTER | Age: 70
End: 2020-06-25

## 2020-06-25 NOTE — TELEPHONE ENCOUNTER
Spoke w/ pt. Regarding upcoming appt. W/ RS. Pt. Has labs completed every 6 mos at [facility] ordered by his primary care. He states he will bring a copy in as well as his negative COVID-19 results. Screening process and mandatory mask explained to pt., pt. Confirms understanding.   Appt. Time & date confirmed.

## 2020-06-26 ENCOUNTER — APPOINTMENT (OUTPATIENT)
Dept: RADIOLOGY | Facility: MEDICAL CENTER | Age: 70
End: 2020-06-26
Attending: EMERGENCY MEDICINE
Payer: MEDICARE

## 2020-06-26 ENCOUNTER — APPOINTMENT (OUTPATIENT)
Dept: CARDIOLOGY | Facility: MEDICAL CENTER | Age: 70
End: 2020-06-26
Attending: FAMILY MEDICINE
Payer: MEDICARE

## 2020-06-26 ENCOUNTER — TELEPHONE (OUTPATIENT)
Dept: CARDIOLOGY | Facility: MEDICAL CENTER | Age: 70
End: 2020-06-26

## 2020-06-26 ENCOUNTER — HOSPITAL ENCOUNTER (OUTPATIENT)
Facility: MEDICAL CENTER | Age: 70
End: 2020-06-27
Attending: EMERGENCY MEDICINE | Admitting: FAMILY MEDICINE
Payer: MEDICARE

## 2020-06-26 DIAGNOSIS — R06.02 SHORTNESS OF BREATH: ICD-10-CM

## 2020-06-26 DIAGNOSIS — R07.9 CHEST PAIN, UNSPECIFIED TYPE: ICD-10-CM

## 2020-06-26 LAB
ALBUMIN SERPL BCP-MCNC: 4.2 G/DL (ref 3.2–4.9)
ALBUMIN/GLOB SERPL: 1.8 G/DL
ALP SERPL-CCNC: 44 U/L (ref 30–99)
ALT SERPL-CCNC: 25 U/L (ref 2–50)
ANION GAP SERPL CALC-SCNC: 14 MMOL/L (ref 7–16)
AST SERPL-CCNC: 26 U/L (ref 12–45)
BASOPHILS # BLD AUTO: 0.4 % (ref 0–1.8)
BASOPHILS # BLD: 0.02 K/UL (ref 0–0.12)
BILIRUB SERPL-MCNC: 0.3 MG/DL (ref 0.1–1.5)
BUN SERPL-MCNC: 20 MG/DL (ref 8–22)
CALCIUM SERPL-MCNC: 9.3 MG/DL (ref 8.5–10.5)
CHLORIDE SERPL-SCNC: 99 MMOL/L (ref 96–112)
CO2 SERPL-SCNC: 21 MMOL/L (ref 20–33)
CREAT SERPL-MCNC: 0.93 MG/DL (ref 0.5–1.4)
EKG IMPRESSION: NORMAL
EOSINOPHIL # BLD AUTO: 0.13 K/UL (ref 0–0.51)
EOSINOPHIL NFR BLD: 2.7 % (ref 0–6.9)
ERYTHROCYTE [DISTWIDTH] IN BLOOD BY AUTOMATED COUNT: 43.5 FL (ref 35.9–50)
GLOBULIN SER CALC-MCNC: 2.4 G/DL (ref 1.9–3.5)
GLUCOSE BLD-MCNC: 112 MG/DL (ref 65–99)
GLUCOSE SERPL-MCNC: 102 MG/DL (ref 65–99)
HCT VFR BLD AUTO: 43 % (ref 42–52)
HGB BLD-MCNC: 15.2 G/DL (ref 14–18)
IMM GRANULOCYTES # BLD AUTO: 0.01 K/UL (ref 0–0.11)
IMM GRANULOCYTES NFR BLD AUTO: 0.2 % (ref 0–0.9)
LV EJECT FRACT MOD 2C 99903: 63.9
LV EJECT FRACT MOD 4C 99902: 52.84
LV EJECT FRACT MOD BP 99901: 56.66
LYMPHOCYTES # BLD AUTO: 0.8 K/UL (ref 1–4.8)
LYMPHOCYTES NFR BLD: 16.4 % (ref 22–41)
MCH RBC QN AUTO: 31.5 PG (ref 27–33)
MCHC RBC AUTO-ENTMCNC: 35.3 G/DL (ref 33.7–35.3)
MCV RBC AUTO: 89.2 FL (ref 81.4–97.8)
MONOCYTES # BLD AUTO: 0.5 K/UL (ref 0–0.85)
MONOCYTES NFR BLD AUTO: 10.2 % (ref 0–13.4)
NEUTROPHILS # BLD AUTO: 3.43 K/UL (ref 1.82–7.42)
NEUTROPHILS NFR BLD: 70.1 % (ref 44–72)
NRBC # BLD AUTO: 0 K/UL
NRBC BLD-RTO: 0 /100 WBC
PLATELET # BLD AUTO: 210 K/UL (ref 164–446)
PMV BLD AUTO: 8.8 FL (ref 9–12.9)
POTASSIUM SERPL-SCNC: 3.8 MMOL/L (ref 3.6–5.5)
PROT SERPL-MCNC: 6.6 G/DL (ref 6–8.2)
RBC # BLD AUTO: 4.82 M/UL (ref 4.7–6.1)
SODIUM SERPL-SCNC: 134 MMOL/L (ref 135–145)
TROPONIN T SERPL-MCNC: 12 NG/L (ref 6–19)
TROPONIN T SERPL-MCNC: 13 NG/L (ref 6–19)
WBC # BLD AUTO: 4.9 K/UL (ref 4.8–10.8)

## 2020-06-26 PROCEDURE — 700102 HCHG RX REV CODE 250 W/ 637 OVERRIDE(OP): Performed by: FAMILY MEDICINE

## 2020-06-26 PROCEDURE — 93306 TTE W/DOPPLER COMPLETE: CPT | Mod: 26 | Performed by: INTERNAL MEDICINE

## 2020-06-26 PROCEDURE — 85025 COMPLETE CBC W/AUTO DIFF WBC: CPT

## 2020-06-26 PROCEDURE — A9270 NON-COVERED ITEM OR SERVICE: HCPCS | Performed by: INTERNAL MEDICINE

## 2020-06-26 PROCEDURE — 700102 HCHG RX REV CODE 250 W/ 637 OVERRIDE(OP): Performed by: INTERNAL MEDICINE

## 2020-06-26 PROCEDURE — 93005 ELECTROCARDIOGRAM TRACING: CPT | Performed by: EMERGENCY MEDICINE

## 2020-06-26 PROCEDURE — 71045 X-RAY EXAM CHEST 1 VIEW: CPT

## 2020-06-26 PROCEDURE — 36415 COLL VENOUS BLD VENIPUNCTURE: CPT

## 2020-06-26 PROCEDURE — 93306 TTE W/DOPPLER COMPLETE: CPT

## 2020-06-26 PROCEDURE — 93005 ELECTROCARDIOGRAM TRACING: CPT

## 2020-06-26 PROCEDURE — 82962 GLUCOSE BLOOD TEST: CPT

## 2020-06-26 PROCEDURE — 99220 PR INITIAL OBSERVATION CARE,LEVL III: CPT | Performed by: FAMILY MEDICINE

## 2020-06-26 PROCEDURE — 84484 ASSAY OF TROPONIN QUANT: CPT

## 2020-06-26 PROCEDURE — 93005 ELECTROCARDIOGRAM TRACING: CPT | Performed by: FAMILY MEDICINE

## 2020-06-26 PROCEDURE — 99285 EMERGENCY DEPT VISIT HI MDM: CPT

## 2020-06-26 PROCEDURE — G0378 HOSPITAL OBSERVATION PER HR: HCPCS

## 2020-06-26 PROCEDURE — A9270 NON-COVERED ITEM OR SERVICE: HCPCS | Performed by: FAMILY MEDICINE

## 2020-06-26 PROCEDURE — 700117 HCHG RX CONTRAST REV CODE 255: Performed by: FAMILY MEDICINE

## 2020-06-26 PROCEDURE — 80053 COMPREHEN METABOLIC PANEL: CPT

## 2020-06-26 PROCEDURE — 99214 OFFICE O/P EST MOD 30 MIN: CPT | Performed by: INTERNAL MEDICINE

## 2020-06-26 RX ORDER — POLYETHYLENE GLYCOL 3350 17 G/17G
1 POWDER, FOR SOLUTION ORAL
Status: DISCONTINUED | OUTPATIENT
Start: 2020-06-26 | End: 2020-06-27 | Stop reason: HOSPADM

## 2020-06-26 RX ORDER — AMOXICILLIN AND CLAVULANATE POTASSIUM 875; 125 MG/1; MG/1
1 TABLET, FILM COATED ORAL 2 TIMES DAILY
Status: DISCONTINUED | OUTPATIENT
Start: 2020-06-26 | End: 2020-06-27 | Stop reason: HOSPADM

## 2020-06-26 RX ORDER — NITROGLYCERIN 0.4 MG/1
0.4 TABLET SUBLINGUAL PRN
Status: DISCONTINUED | OUTPATIENT
Start: 2020-06-26 | End: 2020-06-27 | Stop reason: HOSPADM

## 2020-06-26 RX ORDER — EZETIMIBE 10 MG/1
10 TABLET ORAL DAILY
Status: DISCONTINUED | OUTPATIENT
Start: 2020-06-26 | End: 2020-06-27 | Stop reason: HOSPADM

## 2020-06-26 RX ORDER — OXYCODONE HYDROCHLORIDE 10 MG/1
10 TABLET ORAL
Status: DISCONTINUED | OUTPATIENT
Start: 2020-06-26 | End: 2020-06-27 | Stop reason: HOSPADM

## 2020-06-26 RX ORDER — BISACODYL 10 MG
10 SUPPOSITORY, RECTAL RECTAL
Status: DISCONTINUED | OUTPATIENT
Start: 2020-06-26 | End: 2020-06-27 | Stop reason: HOSPADM

## 2020-06-26 RX ORDER — ONDANSETRON 2 MG/ML
4 INJECTION INTRAMUSCULAR; INTRAVENOUS EVERY 4 HOURS PRN
Status: DISCONTINUED | OUTPATIENT
Start: 2020-06-26 | End: 2020-06-27 | Stop reason: HOSPADM

## 2020-06-26 RX ORDER — DEXTROSE MONOHYDRATE 25 G/50ML
50 INJECTION, SOLUTION INTRAVENOUS
Status: DISCONTINUED | OUTPATIENT
Start: 2020-06-26 | End: 2020-06-27 | Stop reason: HOSPADM

## 2020-06-26 RX ORDER — ALBUTEROL SULFATE 90 UG/1
1-2 AEROSOL, METERED RESPIRATORY (INHALATION) EVERY 4 HOURS PRN
Status: DISCONTINUED | OUTPATIENT
Start: 2020-06-26 | End: 2020-06-27 | Stop reason: HOSPADM

## 2020-06-26 RX ORDER — HYDRALAZINE HYDROCHLORIDE 20 MG/ML
10 INJECTION INTRAMUSCULAR; INTRAVENOUS EVERY 6 HOURS PRN
Status: DISCONTINUED | OUTPATIENT
Start: 2020-06-26 | End: 2020-06-27 | Stop reason: HOSPADM

## 2020-06-26 RX ORDER — MORPHINE SULFATE 4 MG/ML
4 INJECTION, SOLUTION INTRAMUSCULAR; INTRAVENOUS
Status: DISCONTINUED | OUTPATIENT
Start: 2020-06-26 | End: 2020-06-27 | Stop reason: HOSPADM

## 2020-06-26 RX ORDER — OMEPRAZOLE 20 MG/1
20 CAPSULE, DELAYED RELEASE ORAL DAILY
Status: DISCONTINUED | OUTPATIENT
Start: 2020-06-27 | End: 2020-06-27 | Stop reason: HOSPADM

## 2020-06-26 RX ORDER — ALBUTEROL SULFATE 90 UG/1
1-2 AEROSOL, METERED RESPIRATORY (INHALATION) EVERY 4 HOURS PRN
COMMUNITY
End: 2020-12-15 | Stop reason: SDUPTHER

## 2020-06-26 RX ORDER — GABAPENTIN 100 MG/1
200 CAPSULE ORAL EVERY EVENING
Status: DISCONTINUED | OUTPATIENT
Start: 2020-06-26 | End: 2020-06-27 | Stop reason: HOSPADM

## 2020-06-26 RX ORDER — LACTOBACILLUS RHAMNOSUS GG 10B CELL
1 CAPSULE ORAL
Status: DISCONTINUED | OUTPATIENT
Start: 2020-06-27 | End: 2020-06-27 | Stop reason: HOSPADM

## 2020-06-26 RX ORDER — PANTOPRAZOLE SODIUM 40 MG/1
40 TABLET, DELAYED RELEASE ORAL DAILY
Status: DISCONTINUED | OUTPATIENT
Start: 2020-06-26 | End: 2020-06-26

## 2020-06-26 RX ORDER — AMOXICILLIN 250 MG
2 CAPSULE ORAL 2 TIMES DAILY
Status: DISCONTINUED | OUTPATIENT
Start: 2020-06-26 | End: 2020-06-27 | Stop reason: HOSPADM

## 2020-06-26 RX ORDER — AZITHROMYCIN 250 MG/1
250-500 TABLET, FILM COATED ORAL DAILY
Status: ON HOLD | COMMUNITY
End: 2020-06-27

## 2020-06-26 RX ORDER — OXYCODONE HYDROCHLORIDE 5 MG/1
5 TABLET ORAL
Status: DISCONTINUED | OUTPATIENT
Start: 2020-06-26 | End: 2020-06-27 | Stop reason: HOSPADM

## 2020-06-26 RX ORDER — AMOXICILLIN AND CLAVULANATE POTASSIUM 875; 125 MG/1; MG/1
1 TABLET, FILM COATED ORAL 2 TIMES DAILY
COMMUNITY
Start: 2020-06-25 | End: 2021-03-04

## 2020-06-26 RX ORDER — ONDANSETRON 4 MG/1
4 TABLET, ORALLY DISINTEGRATING ORAL EVERY 4 HOURS PRN
Status: DISCONTINUED | OUTPATIENT
Start: 2020-06-26 | End: 2020-06-27 | Stop reason: HOSPADM

## 2020-06-26 RX ORDER — ZINC OXIDE 13 %
1 CREAM (GRAM) TOPICAL DAILY
Status: DISCONTINUED | OUTPATIENT
Start: 2020-06-26 | End: 2020-06-26

## 2020-06-26 RX ORDER — ACETAMINOPHEN 325 MG/1
650 TABLET ORAL EVERY 6 HOURS PRN
Status: DISCONTINUED | OUTPATIENT
Start: 2020-06-26 | End: 2020-06-27 | Stop reason: HOSPADM

## 2020-06-26 RX ADMIN — EZETIMIBE 10 MG: 10 TABLET ORAL at 16:36

## 2020-06-26 RX ADMIN — AMOXICILLIN AND CLAVULANATE POTASSIUM 1 TABLET: 875; 125 TABLET, FILM COATED ORAL at 21:40

## 2020-06-26 RX ADMIN — GABAPENTIN 200 MG: 100 CAPSULE ORAL at 21:40

## 2020-06-26 RX ADMIN — HUMAN ALBUMIN MICROSPHERES AND PERFLUTREN 3 ML: 10; .22 INJECTION, SOLUTION INTRAVENOUS at 20:53

## 2020-06-26 RX ADMIN — ASPIRIN 81 MG: 81 TABLET, COATED ORAL at 22:49

## 2020-06-26 ASSESSMENT — ENCOUNTER SYMPTOMS
COUGH: 0
BLURRED VISION: 0
HEARTBURN: 0
FEVER: 0
HEADACHES: 0
NERVOUS/ANXIOUS: 1
SPEECH CHANGE: 0
CHILLS: 0
NECK PAIN: 0
SHORTNESS OF BREATH: 1
WHEEZING: 0
SENSORY CHANGE: 0
RESPIRATORY NEGATIVE: 1
DIARRHEA: 0
NEUROLOGICAL NEGATIVE: 1
NAUSEA: 0
SORE THROAT: 0
DIZZINESS: 1
PALPITATIONS: 0
BRUISES/BLEEDS EASILY: 0
SLEEP DISTURBANCE: 1
FOCAL WEAKNESS: 0
ABDOMINAL PAIN: 0
BACK PAIN: 0
VOMITING: 0
WEAKNESS: 0
DIAPHORESIS: 0
FLANK PAIN: 0
MYALGIAS: 0
NERVOUS/ANXIOUS: 0

## 2020-06-26 ASSESSMENT — LIFESTYLE VARIABLES
TOTAL SCORE: 0
HOW MANY TIMES IN THE PAST YEAR HAVE YOU HAD 5 OR MORE DRINKS IN A DAY: 0
HAVE YOU EVER FELT YOU SHOULD CUT DOWN ON YOUR DRINKING: NO
HAVE PEOPLE ANNOYED YOU BY CRITICIZING YOUR DRINKING: NO
DOES PATIENT WANT TO STOP DRINKING: NO
EVER HAD A DRINK FIRST THING IN THE MORNING TO STEADY YOUR NERVES TO GET RID OF A HANGOVER: NO
ON A TYPICAL DAY WHEN YOU DRINK ALCOHOL HOW MANY DRINKS DO YOU HAVE: 0
AVERAGE NUMBER OF DAYS PER WEEK YOU HAVE A DRINK CONTAINING ALCOHOL: 0
ALCOHOL_USE: NO
TOTAL SCORE: 0
EVER_SMOKED: NEVER
CONSUMPTION TOTAL: NEGATIVE
TOTAL SCORE: 0
EVER FELT BAD OR GUILTY ABOUT YOUR DRINKING: NO

## 2020-06-26 ASSESSMENT — FIBROSIS 4 INDEX: FIB4 SCORE: 1.78

## 2020-06-26 ASSESSMENT — PATIENT HEALTH QUESTIONNAIRE - PHQ9
2. FEELING DOWN, DEPRESSED, IRRITABLE, OR HOPELESS: NOT AT ALL
1. LITTLE INTEREST OR PLEASURE IN DOING THINGS: NOT AT ALL
SUM OF ALL RESPONSES TO PHQ9 QUESTIONS 1 AND 2: 0

## 2020-06-26 NOTE — ED NOTES
"Pt to triage, c/o chest pain , ;lt arm numbness/ pain / bilat jaw pain , pt has a hx of MI x2 , states \" he is a pt of Dr. Rubio and usually presents pretty atypical and has had stents placed in the past\" . Pt to room 4 / EKG done in triage  "

## 2020-06-26 NOTE — TELEPHONE ENCOUNTER
I spoke to him and he is in ER or waiting to get into ER now.  I let Dr. Amado know per his request.

## 2020-06-26 NOTE — ED NOTES
Med rec completed per pt   Allergies reviewed    Pt completed a Z-Magdi on 6/22/2020    Pt started a 10 day course of Augmentin yesterday 6/25/2020

## 2020-06-26 NOTE — CONSULTS
Cardiology Initial Consultation    Date of Service  6/26/2020    Referring Physician  Judson Mcdonnell M.D.    Reason for Consultation  Chest pain    History of Presenting Illness  Darren Brunson is a 70 y.o. male with a past medical history of known coronary disease who presented 6/26/2020 with chest pain since yesterday.  Yesterday afternoon, he developed localized left renal status chest pain that was worse with pushing on the area or deep breathing.  The discomfort reoccurred today.  There is no radiation of pain to his neck or jaw or back and he was not sweaty with it.  He knows that today the pain radiated to the other side of his chest.    He has a history of known coronary disease with a non-STEMI MI in 2009 and a  3.5 mm Promus stent was placed in his LAD at that time.  In February 2015, he presented with chest discomfort and underwent stenting of his right coronary artery with overlapping 3.5×12 and 3.5 x 15 mm Xience stents.    The patient has been intolerant to statins and has been on a Chinese medicine called Abhinav he wan 50B which she states has controlled his cholesterol.  Recent lab revealed LDL of 108.    He was exercising regularly until about 2 months ago when the gymnasium was close due to COVID virus pandemic.  He was walking on the treadmill and exercised without any chest pain at all.    He has history of sleep apnea was treated with nasopharyngeal surgery.    Patient also has history of anxiety as well as GE reflux.    Review of Systems  Review of Systems   Constitutional: Negative for fever.   HENT: Negative.    Eyes: Negative for visual disturbance.   Respiratory: Negative.         History of sleep apnea treated with nasal surgery   Cardiovascular: Positive for chest pain.   Gastrointestinal: Negative for abdominal pain.        History of GE reflux   Neurological: Negative.    Hematological: Does not bruise/bleed easily.   Psychiatric/Behavioral: Positive for sleep disturbance. The  "patient is nervous/anxious.         History of anxiety       Past Medical History   has a past medical history of Acute myocardial infarction of other anterior wall, episode of care unspecified (2009; 2014), Anxiety state, unspecified ( ), CAD (coronary artery disease) (September 2009), Cataract, Chickenpox, Diabetes (HCC), Glaucoma, Heart murmur, High cholesterol, Hyperlipidemia, Mumps, Pain, S/P coronary artery stent placement ( ), Sleep apnea, Snoring, and Unspecified essential hypertension ( ).    Surgical History   has a past surgical history that includes other (November 2011); tonsillectomy; sinuscope; other cardiac surgery; umbilical hernia repair; inguinal hernia repair bilateral; septal reconstruction (N/A, 10/23/2017); uvulopharyngopalatoplasty (N/A, 10/23/2017); and biopsy general (Left, 10/23/2017).    Family History  family history includes Cancer in his mother; Heart Disease in his maternal aunt.    Social History   reports that he quit smoking about 22 years ago. His smoking use included cigarettes. He has never used smokeless tobacco. He reports that he does not drink alcohol or use drugs.   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 Taking?   Evolocumab (REPATHA) 140 MG/ML Solution Prefilled Syringe   No No   Sig: Inject 140 mg as instructed every 14 days.   NON SPECIFIED  Patient Yes No   Sig: \"kera he wan\" and \"Ji annika cruz qiwan\"  Herbs for cholesterol and prostate   Naproxen Sodium (ALEVE) 220 MG Cap  Patient Yes No   Sig: Take 1 Cap by mouth 2 times a day as needed (PAIN, MUSCLE ACHE).   Probiotic Product (PROBIOTIC DAILY PO)  Patient Yes No   Sig: Take  by mouth every day.   aspirin 81 MG tablet  Patient Yes No   Sig: Take 81 mg by mouth every day.   budesonide (PULMICORT) 0.5 MG/2ML Suspension   Yes No   Sig: INHALE 1 VIAL IN NEBULIZER AS DIRECTED TWICE " A DAY   fenofibrate (TRICOR) 48 MG TABS  Patient Yes No   Sig: Take 48 mg by mouth every day.   gabapentin (NEURONTIN) 100 MG Cap  Patient Yes No   Sig: Take 100-200 mg by mouth every evening.   metformin ER (GLUCOPHAGE XR) 500 MG TABLET SR 24 HR  Patient Yes No   Sig: Take 500 mg by mouth every day.   nitroglycerin (NITROSTAT) 0.4 MG SL Tab   No No   Sig: Place 1 Tab under tongue as needed for Chest Pain (Place 1 tablet under the tongue every 5 minutes up to 3 doses as needed for chest pain).   pantoprazole (PROTONIX) 40 MG TBEC  Patient Yes No   Sig: Take 40 mg by mouth every day.   rosuvastatin (CRESTOR) 5 MG Tab   No No   Sig: Take 1 tablet Mon. And Fri.   Patient not taking: Reported on 8/2/2019      Facility-Administered Medications: None       Allergies  Allergies   Allergen Reactions   • Crestor [Rosuvastatin Calcium] Unspecified     Muscle pain    • Ciprofloxacin      Tendon pain   • Lisinopril    • Statins [Hmg-Coa-R Inhibitors]        Vital signs in last 24 hours  Temp:  [37.1 °C (98.7 °F)] 37.1 °C (98.7 °F)  Pulse:  [] 97  Resp:  [11-20] 20  BP: (142-175)/(66-95) 175/87  SpO2:  [94 %-96 %] 96 %    Physical Exam  Physical Exam  Constitutional:       General: He is not in acute distress.     Appearance: He is well-developed. He is not diaphoretic.   HENT:      Head: Atraumatic.   Eyes:      Conjunctiva/sclera: Conjunctivae normal.      Pupils: Pupils are equal, round, and reactive to light.   Neck:      Musculoskeletal: Neck supple.      Vascular: No JVD.   Cardiovascular:      Rate and Rhythm: Normal rate and regular rhythm.      Heart sounds: No murmur.   Pulmonary:      Effort: Pulmonary effort is normal. No respiratory distress.      Breath sounds: Normal breath sounds. No wheezing or rales.   Abdominal:      Palpations: Abdomen is soft.      Tenderness: There is no abdominal tenderness.   Musculoskeletal:      Comments: Chest wall pain to palpation left third costochondral junction.  Palpation  this area mimics his prior pain   Skin:     General: Skin is warm and dry.   Neurological:      Mental Status: He is alert and oriented to person, place, and time.   Psychiatric:         Mood and Affect: Mood is anxious.         Speech: Speech is rapid and pressured.         Behavior: Behavior is agitated.         Lab Review  Lab Results   Component Value Date/Time    WBC 4.9 06/26/2020 01:50 PM    RBC 4.82 06/26/2020 01:50 PM    HEMOGLOBIN 15.2 06/26/2020 01:50 PM    HEMATOCRIT 43.0 06/26/2020 01:50 PM    MCV 89.2 06/26/2020 01:50 PM    MCH 31.5 06/26/2020 01:50 PM    MCHC 35.3 06/26/2020 01:50 PM    MPV 8.8 (L) 06/26/2020 01:50 PM      Lab Results   Component Value Date/Time    SODIUM 134 (L) 06/26/2020 01:50 PM    POTASSIUM 3.8 06/26/2020 01:50 PM    CHLORIDE 99 06/26/2020 01:50 PM    CO2 21 06/26/2020 01:50 PM    GLUCOSE 102 (H) 06/26/2020 01:50 PM    BUN 20 06/26/2020 01:50 PM    CREATININE 0.93 06/26/2020 01:50 PM      Lab Results   Component Value Date/Time    ASTSGOT 26 06/26/2020 01:50 PM    ALTSGPT 25 06/26/2020 01:50 PM     Lab Results   Component Value Date/Time    CHOLSTRLTOT 160 03/20/2018 06:33 AM    LDL 98 03/20/2018 06:33 AM    HDL 44 03/20/2018 06:33 AM    TRIGLYCERIDE 88 03/20/2018 06:33 AM    TROPONINT 13 06/26/2020 01:50 PM       EKG: EKG from 1:23 PM today has been personally reviewed.  Interpretation is that this demonstrates sinus tachycardia and no acute changes.  The EKG is unchanged from his prior tracing of 2017 save for an increased heart rate.    Chest pain: The patient's pain is atypical and that it is pleuritic and worse with palpation.  He does have a history of known coronary disease with somewhat atypical presentation.    Hyperlipidemia: Patient absolutely refuses to take statins. he has been using a Chinese herbal preparation.  Repatha has been strongly urged in the past the patient is on to take this due to financial reasons.  We will start him on Zetia and his addition to his  fenofibrate.    Anxiety: This is a chronic problem.    History of sleep apnea treated with nasal surgery.      A myocardial perfusion scan will be ordered for tomorrow.  If this is negative for a large area of ischemic myocardium he can be discharged.  Discussed with patient and wife today  Matty Amado M.D.   Cardiologist, [facility]  [phone]

## 2020-06-26 NOTE — ED PROVIDER NOTES
ED Provider Note    CHIEF COMPLAINT  Chief Complaint   Patient presents with   • Chest Pain   • Jaw Pain       HPI  Darren Brunson is a 70 y.o. male with a history of coronary artery disease, status post MI x2, status post multiple stent placement, type 2 diabetes mellitus, hypercholesterolemia, sleep apnea who presents with complaints of chest pain radiating to his left jaw, and left arm along with some shortness of breath, increased fatigue the past day.  The patient says he has had similar symptoms the past with normal EKGs, prior to his heart attacks and stent placement.  He has noticed some increased shortness of breath and fatigue for the past 3 weeks and had a COVID test which was negative.  He denies any fever, sore throat, productive cough, vomiting, or diarrhea.  He has noticed some increased shortness of breath and fatigue especially with exertional activity.    REVIEW OF SYSTEMS  See HPI for further details. All other systems are negative.     PAST MEDICAL HISTORY  Past Medical History:   Diagnosis Date   • Acute myocardial infarction of other anterior wall, episode of care unspecified ;    • Anxiety state, unspecified     • CAD (coronary artery disease) 2009    PCI/stent (Promus 3.0 x 15mm) to the LAD.   • Cataract     left eye   • Chickenpox    • Diabetes (HCC)     on oral meds   • Glaucoma    • Heart murmur    • High cholesterol    • Hyperlipidemia    • Mumps    • Pain    • S/P coronary artery stent placement     • Sleep apnea    • Snoring    • Unspecified essential hypertension         FAMILY HISTORY  Family History   Problem Relation Age of Onset   • Heart Disease Maternal Aunt    • Cancer Mother         colon       SOCIAL HISTORY  Social History     Tobacco Use   • Smoking status: Former Smoker     Types: Cigarettes     Last attempt to quit: 1997     Years since quittin.8   • Smokeless tobacco: Never Used   Substance Use Topics   • Alcohol use: No   • Drug use:  No      Social History     Substance and Sexual Activity   Drug Use No       SURGICAL HISTORY  Past Surgical History:   Procedure Laterality Date   • SEPTAL RECONSTRUCTION N/A 10/23/2017    Procedure: SEPTAL RECONSTRUCTION;  Surgeon: CHARLI Sanchez M.D.;  Location: SURGERY SAME DAY [facility] ORS;  Service: Ent   • UVULOPHARYNGOPALATOPLASTY N/A 10/23/2017    Procedure: UVULOPHARYNGOPALATOPLASTY;  Surgeon: CHARLI Sanchez M.D.;  Location: SURGERY SAME DAY [facility] ORS;  Service: Ent   • BIOPSY GENERAL Left 10/23/2017    Procedure: BIOPSY GENERAL LEFT UPPER NECK;  Surgeon: CHARLI Sanchez M.D.;  Location: SURGERY SAME DAY [facility] ORS;  Service: Ent   • OTHER  November 2011    Sinus surgery   • INGUINAL HERNIA REPAIR BILATERAL     • OTHER CARDIAC SURGERY      cardiac cath with stent placement   • SINUSCOPE     • TONSILLECTOMY     • UMBILICAL HERNIA REPAIR         CURRENT MEDICATIONS  Home Medications     Reviewed by Mayco Wu (Pharmacy Tech) on 06/26/20 at 1651  Med List Status: Complete    Medication Last Dose Status    albuterol 108 (90 Base) MCG/ACT Aero Soln inhalation aerosol PRN Active    amoxicillin-clavulanate (AUGMENTIN) 875-125 MG Tab 6/26/2020 Active    aspirin 81 MG tablet 6/25/2020 Active    azithromycin (ZITHROMAX) 250 MG Tab 6/22/2020 Active    fenofibrate (TRICOR) 48 MG TABS 6/25/2020 Active    gabapentin (NEURONTIN) 100 MG Cap 6/25/2020 Active    metformin ER (GLUCOPHAGE XR) 500 MG TABLET SR 24 HR 6/25/2020 Active    nitroglycerin (NITROSTAT) 0.4 MG SL Tab PRN Active    pantoprazole (PROTONIX) 40 MG TBEC 6/25/2020 Active    Probiotic Product (PROBIOTIC DAILY PO) 6/25/2020 Active                ALLERGIES  Allergies   Allergen Reactions   • Crestor [Rosuvastatin Calcium] Unspecified     Muscle pain    • Ciprofloxacin      Tendon pain   • Lisinopril    • Statins [Hmg-Coa-R Inhibitors]        PHYSICAL EXAM0  VITAL SIGNS: Blood Pressure 137/95   Pulse 80   Temperature 37.1 °C (98.7  °F) (Temporal)   Respiration 20   Height 1.829 m (6')   Weight 85.4 kg (188 lb 4.4 oz)   Oxygen Saturation 96%   Body Mass Index 25.53 kg/m²   Constitutional: Awake, alert, in no acute distress, Non-toxic appearance.   HENT: Atraumatic. Bilateral external ears normal, mucous membranes moist, throat nonerythematous without exudates, nose is normal.  Eyes: PERRL, EOMI, conjunctiva moist, noninjected.  Neck: Nontender, Normal range of motion, No nuchal rigidity, No stridor.   Lymphatic: No lymphadenopathy noted.   Cardiovascular: Regular rate and rhythm, no murmurs, rubs, gallops.  Thorax & Lungs:  Good breath sounds bilaterally, no wheezes, rales, or retractions.  No chest tenderness.  Abdomen: Bowel sounds normal, Soft, nontender, nondistended, no rebound, guarding, masses.  Back: No CVA or spinal tenderness.  Extremities: Intact distal pulses, No edema, No tenderness.   Skin: Warm, Dry, No rashes.   Musculoskeletal: No joint swelling or tenderness.  Neurologic: Alert & oriented x 3, sensory and motor function normal. No focal deficits.   Psychiatric: Affect normal, Judgment normal, Mood normal.     EKG  EKG Interpretation:  Interpreted by me    Rhythm: Sinus tachycardia  Rate: 100  Intervals: Normal  Axis: normal  Ectopy: none  Conduction: normal  ST Segments: no evidence of elevation or depression  T Waves: no acute change  Q Waves: none  Clinical Impression: No acute injury or ischemic pattern.   Comparison to previous EKG from March 2018 there are no acute changes.      LABS  Labs Reviewed   CBC WITH DIFFERENTIAL - Abnormal; Notable for the following components:       Result Value    MPV 8.8 (*)     Lymphocytes 16.40 (*)     Lymphs (Absolute) 0.80 (*)     All other components within normal limits   COMP METABOLIC PANEL - Abnormal; Notable for the following components:    Sodium 134 (*)     Glucose 102 (*)     All other components within normal limits   TROPONIN   ESTIMATED GFR   TROPONIN       All labs  reviewed by me.      RADIOLOGY/PROCEDURES  DX-CHEST-PORTABLE (1 VIEW)   Final Result      No acute cardiopulmonary abnormality.          The radiologist's interpretations of all radiological studies have been reviewed by me.        COURSE & MEDICAL DECISION MAKING  Pertinent Labs & Imaging studies reviewed. (See chart for details)  The patient presents with the above complaints.  He is currently chest pain-free.  He does have significant cardiac history in the past.  EKG shows a sinus tachycardia.  CBC shows a white count of 4900, hemoglobin 15.2, 70% polys, Chemistry shows a sodium of 134, glucose 102, otherwise normal, troponin 13.  Findings were discussed with the patient.  Dr. Amado of cardiology was consulted and is recommend admission and stress testing.  Case discussed with Dr. Dawson the hospitalist for admission.    FINAL IMPRESSION  1. Chest pain, unspecified type    2. Shortness of breath          Electronically signed by: Judson Mcdonnell M.D., 6/26/2020 3:49 PM

## 2020-06-26 NOTE — TELEPHONE ENCOUNTER
"RS/rafa    Pt calling on way to [facility] E/R now.  Pt states, all the signs of an impending MI.   Pt now at E/R with wife  parking car.    Jaw line out of whack\", there is a \"sour feeling\",  discomfort in esophagus up to neck, pain in left arm, & across left shoulder blade.    Pt just wants RS to know asap, he said \"we go back a long time, used to work together with RS\"    Pt ph# is: [phone]    "

## 2020-06-27 ENCOUNTER — APPOINTMENT (OUTPATIENT)
Dept: RADIOLOGY | Facility: MEDICAL CENTER | Age: 70
End: 2020-06-27
Attending: INTERNAL MEDICINE
Payer: MEDICARE

## 2020-06-27 VITALS
HEIGHT: 72 IN | OXYGEN SATURATION: 95 % | TEMPERATURE: 97.4 F | HEART RATE: 96 BPM | DIASTOLIC BLOOD PRESSURE: 89 MMHG | RESPIRATION RATE: 17 BRPM | SYSTOLIC BLOOD PRESSURE: 146 MMHG | BODY MASS INDEX: 25.5 KG/M2 | WEIGHT: 188.27 LBS

## 2020-06-27 PROBLEM — E87.6 HYPOKALEMIA: Status: ACTIVE | Noted: 2020-06-27

## 2020-06-27 LAB
ANION GAP SERPL CALC-SCNC: 13 MMOL/L (ref 7–16)
BASOPHILS # BLD AUTO: 0.5 % (ref 0–1.8)
BASOPHILS # BLD: 0.02 K/UL (ref 0–0.12)
BUN SERPL-MCNC: 17 MG/DL (ref 8–22)
CALCIUM SERPL-MCNC: 8.8 MG/DL (ref 8.5–10.5)
CHLORIDE SERPL-SCNC: 100 MMOL/L (ref 96–112)
CHOLEST SERPL-MCNC: 190 MG/DL (ref 100–199)
CO2 SERPL-SCNC: 24 MMOL/L (ref 20–33)
CREAT SERPL-MCNC: 0.96 MG/DL (ref 0.5–1.4)
EKG IMPRESSION: NORMAL
EOSINOPHIL # BLD AUTO: 0.2 K/UL (ref 0–0.51)
EOSINOPHIL NFR BLD: 4.9 % (ref 0–6.9)
ERYTHROCYTE [DISTWIDTH] IN BLOOD BY AUTOMATED COUNT: 43 FL (ref 35.9–50)
GLUCOSE SERPL-MCNC: 113 MG/DL (ref 65–99)
HCT VFR BLD AUTO: 39.2 % (ref 42–52)
HDLC SERPL-MCNC: 47 MG/DL
HGB BLD-MCNC: 14 G/DL (ref 14–18)
IMM GRANULOCYTES # BLD AUTO: 0.01 K/UL (ref 0–0.11)
IMM GRANULOCYTES NFR BLD AUTO: 0.2 % (ref 0–0.9)
LDLC SERPL CALC-MCNC: 120 MG/DL
LYMPHOCYTES # BLD AUTO: 0.94 K/UL (ref 1–4.8)
LYMPHOCYTES NFR BLD: 23 % (ref 22–41)
MCH RBC QN AUTO: 31.5 PG (ref 27–33)
MCHC RBC AUTO-ENTMCNC: 35.7 G/DL (ref 33.7–35.3)
MCV RBC AUTO: 88.3 FL (ref 81.4–97.8)
MONOCYTES # BLD AUTO: 0.47 K/UL (ref 0–0.85)
MONOCYTES NFR BLD AUTO: 11.5 % (ref 0–13.4)
NEUTROPHILS # BLD AUTO: 2.44 K/UL (ref 1.82–7.42)
NEUTROPHILS NFR BLD: 59.9 % (ref 44–72)
NRBC # BLD AUTO: 0 K/UL
NRBC BLD-RTO: 0 /100 WBC
PLATELET # BLD AUTO: 189 K/UL (ref 164–446)
PMV BLD AUTO: 8.6 FL (ref 9–12.9)
POTASSIUM SERPL-SCNC: 3.3 MMOL/L (ref 3.6–5.5)
RBC # BLD AUTO: 4.44 M/UL (ref 4.7–6.1)
SODIUM SERPL-SCNC: 137 MMOL/L (ref 135–145)
TRIGL SERPL-MCNC: 116 MG/DL (ref 0–149)
TROPONIN T SERPL-MCNC: 16 NG/L (ref 6–19)
WBC # BLD AUTO: 4.1 K/UL (ref 4.8–10.8)

## 2020-06-27 PROCEDURE — A9502 TC99M TETROFOSMIN: HCPCS

## 2020-06-27 PROCEDURE — 80061 LIPID PANEL: CPT

## 2020-06-27 PROCEDURE — 700102 HCHG RX REV CODE 250 W/ 637 OVERRIDE(OP): Performed by: INTERNAL MEDICINE

## 2020-06-27 PROCEDURE — 80048 BASIC METABOLIC PNL TOTAL CA: CPT

## 2020-06-27 PROCEDURE — 99212 OFFICE O/P EST SF 10 MIN: CPT | Performed by: INTERNAL MEDICINE

## 2020-06-27 PROCEDURE — 84484 ASSAY OF TROPONIN QUANT: CPT

## 2020-06-27 PROCEDURE — 700102 HCHG RX REV CODE 250 W/ 637 OVERRIDE(OP): Performed by: FAMILY MEDICINE

## 2020-06-27 PROCEDURE — 93010 ELECTROCARDIOGRAM REPORT: CPT | Performed by: INTERNAL MEDICINE

## 2020-06-27 PROCEDURE — A9270 NON-COVERED ITEM OR SERVICE: HCPCS | Performed by: FAMILY MEDICINE

## 2020-06-27 PROCEDURE — 99217 PR OBSERVATION CARE DISCHARGE: CPT | Performed by: FAMILY MEDICINE

## 2020-06-27 PROCEDURE — G0378 HOSPITAL OBSERVATION PER HR: HCPCS

## 2020-06-27 PROCEDURE — 85025 COMPLETE CBC W/AUTO DIFF WBC: CPT

## 2020-06-27 PROCEDURE — A9270 NON-COVERED ITEM OR SERVICE: HCPCS | Performed by: INTERNAL MEDICINE

## 2020-06-27 RX ORDER — POTASSIUM CHLORIDE 20 MEQ/1
20 TABLET, EXTENDED RELEASE ORAL DAILY
Status: DISCONTINUED | OUTPATIENT
Start: 2020-06-27 | End: 2020-06-27 | Stop reason: HOSPADM

## 2020-06-27 RX ADMIN — EZETIMIBE 10 MG: 10 TABLET ORAL at 05:37

## 2020-06-27 RX ADMIN — OMEPRAZOLE 20 MG: 20 CAPSULE, DELAYED RELEASE ORAL at 05:37

## 2020-06-27 RX ADMIN — AMOXICILLIN AND CLAVULANATE POTASSIUM 1 TABLET: 875; 125 TABLET, FILM COATED ORAL at 05:37

## 2020-06-27 RX ADMIN — POTASSIUM CHLORIDE 20 MEQ: 1500 TABLET, EXTENDED RELEASE ORAL at 16:39

## 2020-06-27 ASSESSMENT — ENCOUNTER SYMPTOMS
BRUISES/BLEEDS EASILY: 0
FEVER: 0
NEUROLOGICAL NEGATIVE: 1
NERVOUS/ANXIOUS: 1
ABDOMINAL PAIN: 0
RESPIRATORY NEGATIVE: 1

## 2020-06-27 ASSESSMENT — PATIENT HEALTH QUESTIONNAIRE - PHQ9
SUM OF ALL RESPONSES TO PHQ9 QUESTIONS 1 AND 2: 0
1. LITTLE INTEREST OR PLEASURE IN DOING THINGS: NOT AT ALL
2. FEELING DOWN, DEPRESSED, IRRITABLE, OR HOPELESS: NOT AT ALL

## 2020-06-27 NOTE — CARE PLAN
Problem: Communication  Goal: The ability to communicate needs accurately and effectively will improve  Outcome: PROGRESSING AS EXPECTED

## 2020-06-27 NOTE — DISCHARGE SUMMARY
Discharge Summary    CHIEF COMPLAINT ON ADMISSION  Chief Complaint   Patient presents with   • Chest Pain   • Jaw Pain       Reason for Admission  CHEST PAIN, LEFT ARM PAIN, FACIAL *     Admission Date  6/26/2020    CODE STATUS  Full Code    HPI & HOSPITAL COURSE  This is a 70 y.o. male here with pain.  Patient does have a history of CAD with 3 stents in place.  Cardiology was consulted on the case.  Echocardiogram done which showed normal left ventricular chamber size, normal left ventricular wall thickness, normal left ventricular systolic function, normal regional wall motion, normal diastolic function.  Stress test showed No reversible defects that would indicate ischemia, matched defect in the inferior apex is either artifactual or due to a small inferior wall infarct.  These findings were discussed with cardiology, it was felt that this was more likely an artifact.  His chest pain has resolved.  He will have close follow-up with cardiology who has cleared him for discharge as well.       Therefore, he is discharged in good and stable condition to home with close outpatient follow-up.    The patient recovered much more quickly than anticipated on admission.    Discharge Date  6/27/2020    FOLLOW UP ITEMS POST DISCHARGE  Follow-up with cardiology as scheduled    DISCHARGE DIAGNOSES  Active Problems:    Chest pain POA: Yes    Coronary atherosclerosis of native coronary artery POA: Yes      Overview: September 2009: Acute anterior MI. PCI/stent (3.0 x 15mm Promus) to the       LAD.      September 2011: Stress echocardiogram negative for ischemia or infarct.    Hypercholesteremia POA: Yes    Essential hypertension POA: Yes      Overview: ICD-10 transition    Type 2 diabetes mellitus without complication, without long-term current use of insulin (HCC) POA: Yes    Hypokalemia POA: Unknown  Resolved Problems:    * No resolved hospital problems. *      FOLLOW UP  Future Appointments   Date Time Provider Department  [facility]   7/2/2020 10:20 AM Matty Amado M.D. [facility] None     No follow-up provider specified.    MEDICATIONS ON DISCHARGE     Medication List      CONTINUE taking these medications      Instructions   albuterol 108 (90 Base) MCG/ACT Aers inhalation aerosol   Inhale 1-2 Puffs by mouth every four hours as needed for Shortness of Breath.  Dose:  1-2 Puff     amoxicillin-clavulanate 875-125 MG Tabs  Commonly known as:  AUGMENTIN   Take 1 Tab by mouth 2 times a day. 10 day course  Dose:  1 Tab     aspirin 81 MG tablet   Take 81 mg by mouth every day.  Dose:  81 mg     fenofibrate 48 MG Tabs  Commonly known as:  TRICOR   Take 48 mg by mouth every day.  Dose:  48 mg     gabapentin 100 MG Caps  Commonly known as:  NEURONTIN   Take 200 mg by mouth every evening.  Dose:  200 mg     metFORMIN  MG Tb24  Commonly known as:  GLUCOPHAGE XR   Take 500 mg by mouth every day.  Dose:  500 mg     nitroglycerin 0.4 MG Subl  Commonly known as:  Nitrostat   Place 1 Tab under tongue as needed for Chest Pain (Place 1 tablet under the tongue every 5 minutes up to 3 doses as needed for chest pain).  Dose:  0.4 mg     pantoprazole 40 MG Tbec  Commonly known as:  PROTONIX   Take 40 mg by mouth every day.  Dose:  40 mg     PROBIOTIC DAILY PO   Take 1 Tab by mouth every day.  Dose:  1 Tab        STOP taking these medications    azithromycin 250 MG Tabs  Commonly known as:  ZITHROMAX            Allergies  Allergies   Allergen Reactions   • Crestor [Rosuvastatin Calcium] Unspecified     Muscle pain    • Ciprofloxacin      Tendon pain   • Lisinopril    • Statins [Hmg-Coa-R Inhibitors]        DIET  Orders Placed This Encounter   Procedures   • Diet Order Diabetic     Standing Status:   Standing     Number of Occurrences:   1     Order Specific Question:   Diet:     Answer:   Diabetic [3]     Order Specific Question:   Miscellaneous modifications:     Answer:   No Decaf, No Caffeine(for test) [11]   • Discontinue Diet Tray     Standing Status:    Standing     Number of Occurrences:   1       ACTIVITY  As tolerated.  Weight bearing as tolerated    CONSULTATIONS  Cardiology - Ingris    PROCEDURES  None    LABORATORY  Lab Results   Component Value Date    SODIUM 137 06/27/2020    POTASSIUM 3.3 (L) 06/27/2020    CHLORIDE 100 06/27/2020    CO2 24 06/27/2020    GLUCOSE 113 (H) 06/27/2020    BUN 17 06/27/2020    CREATININE 0.96 06/27/2020        Lab Results   Component Value Date    WBC 4.1 (L) 06/27/2020    HEMOGLOBIN 14.0 06/27/2020    HEMATOCRIT 39.2 (L) 06/27/2020    PLATELETCT 189 06/27/2020        Total time of the discharge process exceeds 30 minutes.

## 2020-06-27 NOTE — PROGRESS NOTES
Cardiology Follow Up Progress Note    Date of Service  6/27/2020    Attending Physician  Brandin Dawson M.D.    Chief Complaint   Chest pain    HPI  Darren Brunson is a 70 y.o. male admitted 6/26/2020 with chest pain.  Has history of known coronary disease with prior stenting.  He suffered a non-STEMI in 2009 and had a stent placed in his LAD.  In February 2015 he had overlapping stents placed in his RCA.  The patient has history of hyperlipidemia and is tolerant to statins.  LDL is not to target.  His symptoms of pain yesterday were typical and that they were localized and painful to palpation.  Troponins were negative overnight.  Echo reveals normal LV function.      Review of Systems  Review of Systems   Constitutional: Negative for fever.   HENT: Negative.    Eyes: Negative for visual disturbance.   Respiratory: Negative.    Cardiovascular: Positive for chest pain.        Localized chest wall pain that is worse with palpation   Gastrointestinal: Negative for abdominal pain.   Neurological: Negative.    Hematological: Does not bruise/bleed easily.   Psychiatric/Behavioral: The patient is nervous/anxious.        Vital signs in last 24 hours  Temp:  [36.3 °C (97.3 °F)-37.1 °C (98.7 °F)] 36.3 °C (97.3 °F)  Pulse:  [] 84  Resp:  [11-20] 16  BP: (118-187)/(65-97) 139/75  SpO2:  [93 %-96 %] 95 %    Physical Exam  Physical Exam  Constitutional:       General: He is not in acute distress.     Appearance: He is well-developed. He is not diaphoretic.   HENT:      Head: Atraumatic.   Eyes:      Conjunctiva/sclera: Conjunctivae normal.      Pupils: Pupils are equal, round, and reactive to light.   Neck:      Musculoskeletal: Neck supple.      Vascular: No JVD.   Cardiovascular:      Rate and Rhythm: Normal rate and regular rhythm.      Heart sounds: No murmur.   Pulmonary:      Effort: Pulmonary effort is normal. No respiratory distress.      Breath sounds: Normal breath sounds. No wheezing or rales.    Abdominal:      Palpations: Abdomen is soft.   Skin:     General: Skin is warm and dry.   Neurological:      Mental Status: He is alert and oriented to person, place, and time.   Psychiatric:         Mood and Affect: Mood is anxious.         Lab Review  Lab Results   Component Value Date/Time    WBC 4.1 (L) 06/27/2020 12:47 AM    RBC 4.44 (L) 06/27/2020 12:47 AM    HEMOGLOBIN 14.0 06/27/2020 12:47 AM    HEMATOCRIT 39.2 (L) 06/27/2020 12:47 AM    MCV 88.3 06/27/2020 12:47 AM    MCH 31.5 06/27/2020 12:47 AM    MCHC 35.7 (H) 06/27/2020 12:47 AM    MPV 8.6 (L) 06/27/2020 12:47 AM      Lab Results   Component Value Date/Time    SODIUM 137 06/27/2020 12:47 AM    POTASSIUM 3.3 (L) 06/27/2020 12:47 AM    CHLORIDE 100 06/27/2020 12:47 AM    CO2 24 06/27/2020 12:47 AM    GLUCOSE 113 (H) 06/27/2020 12:47 AM    BUN 17 06/27/2020 12:47 AM    CREATININE 0.96 06/27/2020 12:47 AM      Lab Results   Component Value Date/Time    ASTSGOT 26 06/26/2020 01:50 PM    ALTSGPT 25 06/26/2020 01:50 PM     Lab Results   Component Value Date/Time    CHOLSTRLTOT 190 06/27/2020 12:47 AM     (H) 06/27/2020 12:47 AM    HDL 47 06/27/2020 12:47 AM    TRIGLYCERIDE 116 06/27/2020 12:47 AM    TROPONINT 16 06/27/2020 12:47 AM       No results for input(s): NTPROBNP in the last 72 hours.  Chest pain: Atypical in that the pain was localized and worse with palpation.  He does have history of known coronary disease and stenting of 2 vessels.  Myocardial perfusion scan is pending.    Hyperlipidemia: LDL not to target.  He cannot take statins as he has documented allergy.  We will add Zetia to his regimen.    Anxiety: This is been a chronic problem.    History of sleep apnea: Treated with oral pharyngeal surgery.  Requests referral to pulmonary and this has been accomplished.    If myocardial perfusion scan is normal.  He be discharged on his current medicines with addition of Zetia.  Discussed with patient.  Pulmonary referral has been  ordered.    Matty Amado M.D.   Cardiologist, [facility]  [phone]

## 2020-06-27 NOTE — CARE PLAN
Problem: Communication  Goal: The ability to communicate needs accurately and effectively will improve  6/26/2020 2001 by Herrera Cam R.N.  Outcome: PROGRESSING AS EXPECTED  6/26/2020 2001 by Herrera Cam R.N.  Outcome: PROGRESSING AS EXPECTED

## 2020-06-27 NOTE — PROGRESS NOTES
Assessment completed. Pt A&Ox4.  Respirations even, unlabored on room air.  Pt reports pain.  SR on telemetry monitor.  POC discussed: stress test in the morning. Communication board updated.   Bed in locked, lowest position.  Call light and belongings within reach.  Needs met, will continue to monitor.

## 2020-06-27 NOTE — PROGRESS NOTES
Assessment done, Pt educated on plan of care. Waiting on dr rounds  Patient resting in bed, no signs of distress,  no complaints of pain at this time. Call light within reach,  side rails up, will monitor. Condition stable currently npo for stress test this am

## 2020-06-27 NOTE — H&P
Hospital Medicine History & Physical Note    Date of Service  6/26/2020    Primary Care Physician  Kailee Pappas M.D.    Code Status  Full Code    Chief Complaint  Chief Complaint   Patient presents with   • Chest Pain   • Jaw Pain       History of Presenting Illness  70 y.o. male who presented 6/26/2020 with chest pain.  Patient states he started having chest pain yesterday, accompanied by mild shortness of breath.  The pain persisted and this morning he noticed that the pain was radiating to his jaw and left arm.  He also had episodes of dizziness.  He denied having any palpitations or diaphoresis.  Denies having any fever or chills.  He has had some cough and congestion, he was checked for COVID-19 which was negative.  He was started on a Z-Magdi, later on changed to Augmentin for which which he still taking.  His EKG and troponins are negative.  Cardiology was consulted on the case.    Review of Systems  Review of Systems   Constitutional: Negative for chills, diaphoresis, fever and malaise/fatigue.   HENT: Negative for congestion, hearing loss and sore throat.    Eyes: Negative for blurred vision.   Respiratory: Positive for shortness of breath. Negative for cough and wheezing.    Cardiovascular: Positive for chest pain and leg swelling. Negative for palpitations.   Gastrointestinal: Negative for abdominal pain, diarrhea, heartburn, nausea and vomiting.   Genitourinary: Negative for dysuria, flank pain and hematuria.   Musculoskeletal: Negative for back pain, joint pain, myalgias and neck pain.   Skin: Negative for rash.   Neurological: Positive for dizziness. Negative for sensory change, speech change, focal weakness, weakness and headaches.   Psychiatric/Behavioral: The patient is not nervous/anxious.        Past Medical History   has a past medical history of Acute myocardial infarction of other anterior wall, episode of care unspecified (2009; 2014), Anxiety state, unspecified ( ), CAD (coronary  artery disease) (September 2009), Cataract, Chickenpox, Diabetes (HCC), Glaucoma, Heart murmur, High cholesterol, Hyperlipidemia, Mumps, Pain, S/P coronary artery stent placement ( ), Sleep apnea, Snoring, and Unspecified essential hypertension ( ).    Surgical History   has a past surgical history that includes other (November 2011); tonsillectomy; sinuscope; other cardiac surgery; umbilical hernia repair; inguinal hernia repair bilateral; septal reconstruction (N/A, 10/23/2017); uvulopharyngopalatoplasty (N/A, 10/23/2017); and biopsy general (Left, 10/23/2017).     Family History  family history includes Cancer in his mother; Heart Disease in his maternal aunt.     Social History   reports that he quit smoking about 22 years ago. His smoking use included cigarettes. He has never used smokeless tobacco. He reports that he does not drink alcohol or use drugs.    Allergies  Allergies   Allergen Reactions   • Crestor [Rosuvastatin Calcium] Unspecified     Muscle pain    • Ciprofloxacin      Tendon pain   • Lisinopril    • Statins [Hmg-Coa-R Inhibitors]        Medications  Prior to Admission Medications   Prescriptions Last Dose Informant Patient Reported? Taking?   Probiotic Product (PROBIOTIC DAILY PO) 6/25/2020 at PM Patient Yes No   Sig: Take 1 Tab by mouth every day.   albuterol 108 (90 Base) MCG/ACT Aero Soln inhalation aerosol PRN at PRN Patient Yes Yes   Sig: Inhale 1-2 Puffs by mouth every four hours as needed for Shortness of Breath.   amoxicillin-clavulanate (AUGMENTIN) 875-125 MG Tab 6/26/2020 at AM Patient Yes Yes   Sig: Take 1 Tab by mouth 2 times a day. 10 day course   aspirin 81 MG tablet 6/25/2020 at PM Patient Yes No   Sig: Take 81 mg by mouth every day.   azithromycin (ZITHROMAX) 250 MG Tab 6/22/2020 at COMPLETED Patient Yes Yes   Sig: Take 250-500 mg by mouth every day. Z-Magdi   fenofibrate (TRICOR) 48 MG TABS 6/25/2020 at PM Patient Yes No   Sig: Take 48 mg by mouth every day.   gabapentin  (NEURONTIN) 100 MG Cap 6/25/2020 at PM Patient Yes No   Sig: Take 200 mg by mouth every evening.   metformin ER (GLUCOPHAGE XR) 500 MG TABLET SR 24 HR 6/25/2020 at PM Patient Yes No   Sig: Take 500 mg by mouth every day.   nitroglycerin (NITROSTAT) 0.4 MG SL Tab PRN at PRN Patient No No   Sig: Place 1 Tab under tongue as needed for Chest Pain (Place 1 tablet under the tongue every 5 minutes up to 3 doses as needed for chest pain).   pantoprazole (PROTONIX) 40 MG TBEC 6/25/2020 at PM Patient Yes No   Sig: Take 40 mg by mouth every day.      Facility-Administered Medications: None       Physical Exam  Temp:  [37.1 °C (98.7 °F)] 37.1 °C (98.7 °F)  Pulse:  [] 80  Resp:  [11-20] 20  BP: (137-187)/(66-97) 137/95  SpO2:  [94 %-96 %] 96 %    Physical Exam  Vitals signs and nursing note reviewed.   HENT:      Head: Normocephalic and atraumatic.      Nose: No congestion.      Mouth/Throat:      Mouth: Mucous membranes are moist.   Eyes:      Conjunctiva/sclera: Conjunctivae normal.      Pupils: Pupils are equal, round, and reactive to light.   Neck:      Musculoskeletal: No muscular tenderness.   Cardiovascular:      Rate and Rhythm: Normal rate and regular rhythm.   Pulmonary:      Effort: Pulmonary effort is normal.      Breath sounds: Normal breath sounds.   Abdominal:      General: Bowel sounds are normal. There is no distension.      Palpations: Abdomen is soft.      Tenderness: There is no abdominal tenderness. There is no guarding or rebound.   Musculoskeletal:      Right lower leg: Edema present.      Left lower leg: Edema present.   Lymphadenopathy:      Cervical: No cervical adenopathy.   Skin:     General: Skin is warm and dry.   Neurological:      General: No focal deficit present.      Mental Status: He is alert and oriented to person, place, and time.      Cranial Nerves: No cranial nerve deficit.         Laboratory:  Recent Labs     06/26/20  1350   WBC 4.9   RBC 4.82   HEMOGLOBIN 15.2   HEMATOCRIT 43.0    MCV 89.2   MCH 31.5   MCHC 35.3   RDW 43.5   PLATELETCT 210   MPV 8.8*     Recent Labs     06/26/20  1350   SODIUM 134*   POTASSIUM 3.8   CHLORIDE 99   CO2 21   GLUCOSE 102*   BUN 20   CREATININE 0.93   CALCIUM 9.3     Recent Labs     06/26/20  1350   ALTSGPT 25   ASTSGOT 26   ALKPHOSPHAT 44   TBILIRUBIN 0.3   GLUCOSE 102*         No results for input(s): NTPROBNP in the last 72 hours.      Recent Labs     06/26/20  1350   TROPONINT 13       Imaging:  DX-CHEST-PORTABLE (1 VIEW)   Final Result      No acute cardiopulmonary abnormality.      EC-ECHOCARDIOGRAM COMPLETE W/O CONT    (Results Pending)         Assessment/Plan:    Chest pain- (present on admission)  Assessment & Plan  ASA  Serial troponin  Check stress test and Echocardiogram    Type 2 diabetes mellitus without complication, without long-term current use of insulin (HCC)- (present on admission)  Assessment & Plan  SSI    Essential hypertension- (present on admission)  Assessment & Plan  IV Hydralazine as needed with parameters    Hypercholesteremia- (present on admission)  Assessment & Plan  Zetia    Coronary atherosclerosis of native coronary artery- (present on admission)  Assessment & Plan  History of RCA stent 2015, LAD stent 2009  ASA

## 2020-06-28 LAB — EKG IMPRESSION: NORMAL

## 2020-06-28 PROCEDURE — 93010 ELECTROCARDIOGRAM REPORT: CPT | Performed by: INTERNAL MEDICINE

## 2020-06-28 NOTE — DISCHARGE INSTRUCTIONS
Discharge Instructions    Discharged to home by car with relative. Discharged via walking, hospital escort: Yes.  Special equipment needed: Not Applicable    Be sure to schedule a follow-up appointment with your primary care doctor or any specialists as instructed.     Discharge Plan:   Diet Plan: Discussed  Activity Level: Discussed  Confirmed Follow up Appointment: Patient to Call and Schedule Appointment  Confirmed Symptoms Management: Discussed  Medication Reconciliation Updated: Yes    I understand that a diet low in cholesterol, fat, and sodium is recommended for good health. Unless I have been given specific instructions below for another diet, I accept this instruction as my diet prescription.   Other diet: low fat    Special Instructions: None    · Is patient discharged on Warfarin / Coumadin?   No     Depression / Suicide Risk    As you are discharged from this [facility], it is important to learn how to keep safe from harming yourself.    Recognize the warning signs:  · Abrupt changes in personality, positive or negative- including increase in energy   · Giving away possessions  · Change in eating patterns- significant weight changes-  positive or negative  · Change in sleeping patterns- unable to sleep or sleeping all the time   · Unwillingness or inability to communicate  · Depression  · Unusual sadness, discouragement and loneliness  · Talk of wanting to die  · Neglect of personal appearance   · Rebelliousness- reckless behavior  · Withdrawal from people/activities they love  · Confusion- inability to concentrate     If you or a loved one observes any of these behaviors or has concerns about self-harm, here's what you can do:  · Talk about it- your feelings and reasons for harming yourself  · Remove any means that you might use to hurt yourself (examples: pills, rope, extension cords, firearm)  · Get professional help from the community (Mental Health, Substance Abuse, psychological  counseling)  · Do not be alone:Call your Safe Contact- someone whom you trust who will be there for you.  · Call your local CRISIS HOTLINE 898-4722 or 410-157-6513  · Call your local Children's Mobile Crisis Response Team [place] (323) 240-3720 or www.[employer]  · Call the toll free National Suicide Prevention Hotlines   · National Suicide Prevention Lifeline 339-685-ITTS (3718)  · National Hope Line Network 800-SUICIDE (785-0895)

## 2020-06-29 ENCOUNTER — TELEPHONE (OUTPATIENT)
Dept: CARDIOLOGY | Facility: MEDICAL CENTER | Age: 70
End: 2020-06-29

## 2020-06-29 DIAGNOSIS — G47.33 OBSTRUCTIVE SLEEP APNEA SYNDROME: ICD-10-CM

## 2020-06-29 NOTE — TELEPHONE ENCOUNTER
Referral ordered to [facility], dx obstructive sleep apnea.

## 2020-06-29 NOTE — TELEPHONE ENCOUNTER
----- Message from Matty Amado M.D. sent at 6/27/2020  9:08 AM PDT -----  Regarding: Referral  Patient has seen pulmonary before and request a referral to see Dr. Adama pastor

## 2020-06-30 ENCOUNTER — TELEPHONE (OUTPATIENT)
Dept: CARDIOLOGY | Facility: MEDICAL CENTER | Age: 70
End: 2020-06-30

## 2020-06-30 DIAGNOSIS — R06.00 DYSPNEA AND RESPIRATORY ABNORMALITIES: ICD-10-CM

## 2020-06-30 DIAGNOSIS — R06.89 DYSPNEA AND RESPIRATORY ABNORMALITIES: ICD-10-CM

## 2020-06-30 NOTE — TELEPHONE ENCOUNTER
Spoke with pt. Pt. Has decided to keep the appointment on 7-2-20 with RS. He can request records then.

## 2020-06-30 NOTE — TELEPHONE ENCOUNTER
Spoke with pt. Who said he had surgical procedure and sleep apnea has resolved. Ordered urgent pulmonary referral.

## 2020-06-30 NOTE — TELEPHONE ENCOUNTER
LUAN/odalys    Pt calling to report seeing RS during [facility] visit end of last week.    Pt wants 6/26 test results (chest x ray, n/m stress, echo) and to ask if the appt 7/2 is necessary inasmuch as pt remembers RS indicated it may not be, also to discuss taking zetia.    Pt wants to point out he would like to keep the 7/2 appt, however based on RS's comments he is unsure what to do.    Please call Darren .

## 2020-06-30 NOTE — TELEPHONE ENCOUNTER
"Prisca    Pt calling to report the referral to pulmonary should be for \"pulmonary testing\" not \"sleep study\".    Please call Darren when resolved, [phone].    "

## 2020-07-02 ENCOUNTER — OFFICE VISIT (OUTPATIENT)
Dept: CARDIOLOGY | Facility: MEDICAL CENTER | Age: 70
End: 2020-07-02
Payer: MEDICARE

## 2020-07-02 VITALS
DIASTOLIC BLOOD PRESSURE: 60 MMHG | HEART RATE: 76 BPM | OXYGEN SATURATION: 95 % | HEIGHT: 72 IN | SYSTOLIC BLOOD PRESSURE: 122 MMHG | BODY MASS INDEX: 25.47 KG/M2 | WEIGHT: 188 LBS

## 2020-07-02 DIAGNOSIS — I25.10 ATHEROSCLEROSIS OF NATIVE CORONARY ARTERY OF NATIVE HEART WITHOUT ANGINA PECTORIS: ICD-10-CM

## 2020-07-02 DIAGNOSIS — I10 ESSENTIAL HYPERTENSION: ICD-10-CM

## 2020-07-02 DIAGNOSIS — E78.00 HYPERCHOLESTEREMIA: ICD-10-CM

## 2020-07-02 DIAGNOSIS — Z78.9 STATIN INTOLERANCE: ICD-10-CM

## 2020-07-02 PROCEDURE — 99213 OFFICE O/P EST LOW 20 MIN: CPT | Performed by: INTERNAL MEDICINE

## 2020-07-02 RX ORDER — EZETIMIBE 10 MG/1
10 TABLET ORAL DAILY
Qty: 30 TAB | Refills: 9 | Status: SHIPPED | OUTPATIENT
Start: 2020-07-02 | End: 2020-10-14 | Stop reason: SINTOL

## 2020-07-02 ASSESSMENT — ENCOUNTER SYMPTOMS
CARDIOVASCULAR NEGATIVE: 1
NERVOUS/ANXIOUS: 1
HEARTBURN: 1
FEVER: 0
HEADACHES: 1
CONSTITUTIONAL NEGATIVE: 1

## 2020-07-02 ASSESSMENT — FIBROSIS 4 INDEX: FIB4 SCORE: 1.98

## 2020-07-02 NOTE — PROGRESS NOTES
Chief Complaint   Patient presents with   • Hypertension     Follow up   • Coronary Artery Disease       Subjective:   Darren Brunson is a 70 y.o. male who presents today for follow-up of coronary disease and recent hospitalization for noncardiac chest pain.  He has history of known coronary disease with a non-STEMI in 2009 with stenting of his LAD at that time.  In 2015 he had stenting of his RCA as well.  The patient presented to the hospital on June 26 with atypical chest pain.  Troponins were negative and myocardial scan showed no ischemia.  The patient cannot tolerate statins and cannot afford Repatha.  He is on a Chinese herbal medicine plus fenofibrate.  LDL is not to target.  Zetia will be added to his regimen.    Past Medical History:   Diagnosis Date   • Acute myocardial infarction of other anterior wall, episode of care unspecified 2009; 2014   • Anxiety state, unspecified     • CAD (coronary artery disease) September 2009    PCI/stent (Promus 3.0 x 15mm) to the LAD.   • Cataract     left eye   • Chickenpox    • Diabetes (HCC)     on oral meds   • Glaucoma    • Heart murmur    • High cholesterol    • Hyperlipidemia    • Mumps    • Pain    • S/P coronary artery stent placement     • Sleep apnea    • Snoring    • Unspecified essential hypertension       Past Surgical History:   Procedure Laterality Date   • SEPTAL RECONSTRUCTION N/A 10/23/2017    Procedure: SEPTAL RECONSTRUCTION;  Surgeon: CHARLI Sanchez M.D.;  Location: SURGERY SAME DAY [facility];  Service: Ent   • UVULOPHARYNGOPALATOPLASTY N/A 10/23/2017    Procedure: UVULOPHARYNGOPALATOPLASTY;  Surgeon: CHARLI Sanchez M.D.;  Location: SURGERY SAME DAY [facility];  Service: Ent   • BIOPSY GENERAL Left 10/23/2017    Procedure: BIOPSY GENERAL LEFT UPPER NECK;  Surgeon: CHARLI Sanchez M.D.;  Location: SURGERY SAME DAY [facility];  Service: Ent   • OTHER  November 2011    Sinus surgery   • INGUINAL HERNIA REPAIR BILATERAL     •  OTHER CARDIAC SURGERY      cardiac cath with stent placement   • SINUSCOPE     • TONSILLECTOMY     • UMBILICAL HERNIA REPAIR       Family History   Problem Relation Age of Onset   • Heart Disease Maternal Aunt    • Cancer Mother         colon     Social History     Socioeconomic History   • Marital status:      Spouse name: Not on file   • Number of children: Not on file   • Years of education: Not on file   • Highest education level: Not on file   Occupational History   • Not on file   Social Needs   • Financial resource strain: Not on file   • Food insecurity     Worry: Not on file     Inability: Not on file   • Transportation needs     Medical: Not on file     Non-medical: Not on file   Tobacco Use   • Smoking status: Former Smoker     Types: Cigarettes     Last attempt to quit: 1997     Years since quittin.8   • Smokeless tobacco: Never Used   Substance and Sexual Activity   • Alcohol use: No   • Drug use: No   • Sexual activity: Not on file   Lifestyle   • Physical activity     Days per week: Not on file     Minutes per session: Not on file   • Stress: Not on file   Relationships   • Social connections     Talks on phone: Not on file     Gets together: Not on file     Attends [employer] service: Not on file     Active member of club or organization: Not on file     Attends meetings of clubs or organizations: Not on file     Relationship status: Not on file   • Intimate partner violence     Fear of current or ex partner: Not on file     Emotionally abused: Not on file     Physically abused: Not on file     Forced sexual activity: Not on file   Other Topics Concern   • Not on file   Social History Narrative   • Not on file     Allergies   Allergen Reactions   • Crestor [Rosuvastatin Calcium] Unspecified     Muscle pain    • Ciprofloxacin      Tendon pain   • Lisinopril    • Statins [Hmg-Coa-R Inhibitors]      Outpatient Encounter Medications as of 2020   Medication Sig Dispense Refill   •  ezetimibe (ZETIA) 10 MG Tab Take 1 Tab by mouth every day. 30 Tab 9   • albuterol 108 (90 Base) MCG/ACT Aero Soln inhalation aerosol Inhale 1-2 Puffs by mouth every four hours as needed for Shortness of Breath.     • amoxicillin-clavulanate (AUGMENTIN) 875-125 MG Tab Take 1 Tab by mouth 2 times a day. 10 day course     • nitroglycerin (NITROSTAT) 0.4 MG SL Tab Place 1 Tab under tongue as needed for Chest Pain (Place 1 tablet under the tongue every 5 minutes up to 3 doses as needed for chest pain). 25 Tab 5   • metformin ER (GLUCOPHAGE XR) 500 MG TABLET SR 24 HR Take 500 mg by mouth every day.     • gabapentin (NEURONTIN) 100 MG Cap Take 200 mg by mouth every evening.     • pantoprazole (PROTONIX) 40 MG TBEC Take 40 mg by mouth every day.     • aspirin 81 MG tablet Take 81 mg by mouth every day.     • fenofibrate (TRICOR) 48 MG TABS Take 48 mg by mouth every day.     • Probiotic Product (PROBIOTIC DAILY PO) Take 1 Tab by mouth every day.       No facility-administered encounter medications on file as of 7/2/2020.      Review of Systems   Constitutional: Negative.  Negative for fever.   Respiratory:        History of sleep apnea.  Intolerant to BiPAP mask   Cardiovascular: Negative.    Gastrointestinal: Positive for heartburn.   Neurological: Positive for headaches.   Psychiatric/Behavioral: The patient is nervous/anxious.         Objective:   /60 (BP Location: Left arm, Patient Position: Sitting, BP Cuff Size: Adult)   Pulse 76   Ht 1.829 m (6')   Wt 85.3 kg (188 lb)   SpO2 95%   BMI 25.50 kg/m²     Physical Exam   Constitutional: He is oriented to person, place, and time. He appears well-nourished. No distress.   HENT:   Head: Normocephalic and atraumatic.   Eyes: Pupils are equal, round, and reactive to light. No scleral icterus.   Neck: No JVD present. No tracheal deviation present.   Cardiovascular: Normal rate and regular rhythm.   No murmur heard.  Pulmonary/Chest: Breath sounds normal. No  respiratory distress. He has no wheezes.   Abdominal: Soft. Bowel sounds are normal. He exhibits no distension. There is no abdominal tenderness.   Musculoskeletal:         General: No edema.   Neurological: He is alert and oriented to person, place, and time.   Skin: Skin is warm and dry.   Psychiatric: He has a normal mood and affect.       Assessment:     1. Atherosclerosis of native coronary artery of native heart without angina pectoris  Lipid Profile   2. Essential hypertension  Lipid Profile   3. Hypercholesteremia  Lipid Profile   4. Statin intolerance  Lipid Profile       Medical Decision Making:  Today's Assessment / Status / Plan:   Coronary disease: As noted above patient had a myocardial perfusion scan that was very reassuring.  He has prior stenting of his LAD and RCA.  Anxiety is still a problem with him and we discussed that again today.  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    Hyperlipidemia: LDL not to target.  He cannot tolerate statin.  We will add Zetia to his fenofibrate.  His co-pay for Repatha was approximately $200 a month by his research.    Return in 6 months.

## 2020-07-06 ENCOUNTER — TELEPHONE (OUTPATIENT)
Dept: PULMONOLOGY | Facility: HOSPICE | Age: 70
End: 2020-07-06

## 2020-07-23 ENCOUNTER — OFFICE VISIT (OUTPATIENT)
Dept: PULMONOLOGY | Facility: HOSPICE | Age: 70
End: 2020-07-23
Payer: MEDICARE

## 2020-07-23 VITALS
OXYGEN SATURATION: 95 % | HEIGHT: 72 IN | DIASTOLIC BLOOD PRESSURE: 60 MMHG | RESPIRATION RATE: 16 BRPM | WEIGHT: 188 LBS | BODY MASS INDEX: 25.47 KG/M2 | SYSTOLIC BLOOD PRESSURE: 116 MMHG | HEART RATE: 78 BPM

## 2020-07-23 DIAGNOSIS — G47.33 OBSTRUCTIVE SLEEP APNEA SYNDROME: ICD-10-CM

## 2020-07-23 DIAGNOSIS — R06.02 SOB (SHORTNESS OF BREATH): ICD-10-CM

## 2020-07-23 DIAGNOSIS — R06.02 SHORTNESS OF BREATH: ICD-10-CM

## 2020-07-23 PROCEDURE — 99204 OFFICE O/P NEW MOD 45 MIN: CPT | Performed by: INTERNAL MEDICINE

## 2020-07-23 RX ORDER — FLUTICASONE PROPIONATE 50 MCG
1 SPRAY, SUSPENSION (ML) NASAL DAILY
COMMUNITY
End: 2022-10-17

## 2020-07-23 ASSESSMENT — FIBROSIS 4 INDEX: FIB4 SCORE: 1.98

## 2020-07-23 NOTE — LETTER
Kailee Galan M.D.  [facility]   [address] [place] [address] [place] [address]  Phone: [phone] - Fax: [phone]           Encounter Date: 7/23/2020  Provider: Kailee Galan M.D.  Location of Care: [facility]  [id number]      Patient:   Darren Brunson   MR Number: [id number]   YOB: 1950     PROGRESS NOTE:  Chief Complaint   Patient presents with   • Establish Care     Ref by Dr. Amado Dx: Dyspnea and respiratory abnormalities       HPI: This patient is a 70 y.o. male who is referred for shortness of breath.  Since May 2020 he has experienced dysphagia, abdominal bloating and occasional shortness of breath.  He describes mild cough with productive sputum.  He has experienced chest pain and was hospitalized for cardiac work-up, which was unremarkable.  COVID19 testing was negative.  Chest X-ray showed no acute process.  He has a known history of coronary artery disease with stenting in 2009 and routinely follows with cardiology.  He is scheduled for EGD with Dr. Dela Cruz to address his gastric concerns.  He has a 30-pack-year history of tobacco use, however quit in 1998.  He denies any past pulmonary history.    Past Medical History:   Diagnosis Date   • Acute myocardial infarction of other anterior wall, episode of care unspecified 2009; 2014   • Anxiety state, unspecified     • CAD (coronary artery disease) September 2009    PCI/stent (Promus 3.0 x 15mm) to the LAD.   • Cataract     left eye   • Chickenpox    • Diabetes (HCC)     on oral meds   • Glaucoma    • Heart murmur    • High cholesterol    • Hyperlipidemia    • Mumps    • Pain    • S/P coronary artery stent placement     • Sleep apnea    • Snoring    • Unspecified essential hypertension         Social History     Socioeconomic History   • Marital status:      Spouse name: Not on file   • Number of children: Not on file   • Years of education: Not on file   •  Highest education level: Not on file   Occupational History   • Not on file   Social Needs   • Financial resource strain: Not on file   • Food insecurity     Worry: Not on file     Inability: Not on file   • Transportation needs     Medical: Not on file     Non-medical: Not on file   Tobacco Use   • Smoking status: Former Smoker     Packs/day: 1.00     Years: 30.00     Pack years: 30.00     Types: Cigarettes     Last attempt to quit: 1997     Years since quittin.9   • Smokeless tobacco: Never Used   Substance and Sexual Activity   • Alcohol use: No   • Drug use: No   • Sexual activity: Not on file   Lifestyle   • Physical activity     Days per week: Not on file     Minutes per session: Not on file   • Stress: Not on file   Relationships   • Social connections     Talks on phone: Not on file     Gets together: Not on file     Attends Orthodox service: Not on file     Active member of club or organization: Not on file     Attends meetings of clubs or organizations: Not on file     Relationship status: Not on file   • Intimate partner violence     Fear of current or ex partner: Not on file     Emotionally abused: Not on file     Physically abused: Not on file     Forced sexual activity: Not on file   Other Topics Concern   • Not on file   Social History Narrative   • Not on file       Family History   Problem Relation Age of Onset   • Heart Disease Maternal Aunt    • Cancer Mother         colon       Current Outpatient Medications on File Prior to Visit   Medication Sig Dispense Refill   • fluticasone (FLONASE) 50 MCG/ACT nasal spray Spray 1 Spray in nose every day.     • ezetimibe (ZETIA) 10 MG Tab Take 1 Tab by mouth every day. 30 Tab 9   • albuterol 108 (90 Base) MCG/ACT Aero Soln inhalation aerosol Inhale 1-2 Puffs by mouth every four hours as needed for Shortness of Breath.     • nitroglycerin (NITROSTAT) 0.4 MG SL Tab Place 1 Tab under tongue as needed for Chest Pain (Place 1 tablet under the tongue  every 5 minutes up to 3 doses as needed for chest pain). 25 Tab 5   • metformin ER (GLUCOPHAGE XR) 500 MG TABLET SR 24 HR Take 500 mg by mouth every day.     • gabapentin (NEURONTIN) 100 MG Cap Take 200 mg by mouth every evening.     • pantoprazole (PROTONIX) 40 MG TBEC Take 40 mg by mouth every day.     • aspirin 81 MG tablet Take 81 mg by mouth every day.     • fenofibrate (TRICOR) 48 MG TABS Take 48 mg by mouth every day.     • Probiotic Product (PROBIOTIC DAILY PO) Take 1 Tab by mouth every day.     • amoxicillin-clavulanate (AUGMENTIN) 875-125 MG Tab Take 1 Tab by mouth 2 times a day. 10 day course       No current facility-administered medications on file prior to visit.        Allergies: Crestor [rosuvastatin calcium]; Ciprofloxacin; Lisinopril; and Statins [hmg-coa-r inhibitors]    ROS:   Constitutional: Denies fevers, chills, night sweats, +fatigue, denies weight loss  Eyes: Denies vision loss, pain, drainage, double vision  Ears, Nose, Throat: Denies earache, difficulty hearing, +tinnitus, denies nasal congestion, hoarseness  Cardiovascular: Denies chest pain, tightness, palpitations, orthopnea, +LE edema  Respiratory: Denies shortness of breath, cough, wheezing, hemoptysis  Sleep: As in HPI  GI: Denies heartburn, +dysphagia, denies nausea, abdominal pain, +bloating, denies diarrhea or constipation  : Denies frequent urination, hematuria, discharge or painful urination  Musculoskeletal: +back pain, sore muscles  Neurological: Denies weakness or headaches,+dizziness  Skin: No rashes    /60 (BP Location: Left arm, Patient Position: Sitting, BP Cuff Size: Adult)   Pulse 78   Resp 16   Ht 1.829 m (6')   Wt 85.3 kg (188 lb)   SpO2 95%     Physical Exam:  Appearance: Well-nourished, well-developed, in no acute distress  HEENT: Normocephalic, atraumatic, white sclera, PERRLA, oropharynx clear  Neck: No adenopathy or masses  Respiratory: no intercostal retractions or accessory muscle use  Lungs  auscultation: Clear to auscultation bilaterally  Cardiovascular: Regular rate rhythm. No murmurs, rubs or gallops.  No LE edema  Abdomen: soft, nondistended  Gait: Normal  Digits: No clubbing, cyanosis  Motor: No focal deficits  Orientation: Oriented to time, person and place    Diagnosis:  1. SOB (shortness of breath)     2. Shortness of breath  PULMONARY FUNCTION TESTS -Test requested: Complete Pulmonary Function Test    CT-CHEST (THORAX) WITH    BUN+CREAT   3. Obstructive sleep apnea syndrome         Plan:  The patient describes intermittent shortness of breath the past 3 months of unclear etiology.  Cardiovascular work-up has been negative.  He has associated gastric symptoms and we discussed how both GERD as well as abdominal distention could impact his breathing.  Chest x-ray shows no acute process.  Potential pulmonary etiologies include bronchospasm, thrombosis, pulmonary pulmonary disease.  Recommend pulmonary function testing work-up.  Proceed with chest CT to exclude pulmonary parenchymal disease, thrombosis etc.  Concur with GI work up i.e. EGD.  Return in about 4 weeks (around 8/20/2020).          Electronically signed by Kailee Galan M.D.  on 07/24/20    No Recipients

## 2020-07-24 NOTE — PROGRESS NOTES
Chief Complaint   Patient presents with   • Establish Care     Ref by Dr. Amado Dx: Dyspnea and respiratory abnormalities       HPI: This patient is a 70 y.o. male who is referred for shortness of breath.  Since May 2020 he has experienced dysphagia, abdominal bloating and occasional shortness of breath.  He describes mild cough with productive sputum.  He has experienced chest pain and was hospitalized for cardiac work-up, which was unremarkable.  COVID19 testing was negative.  Chest X-ray showed no acute process.  He has a known history of coronary artery disease with stenting in 2009 and routinely follows with cardiology.  He is scheduled for EGD with Dr. Dela Cruz to address his gastric concerns.  He has a 30-pack-year history of tobacco use, however quit in 1998.  He denies any past pulmonary history.    Past Medical History:   Diagnosis Date   • Acute myocardial infarction of other anterior wall, episode of care unspecified 2009; 2014   • Anxiety state, unspecified     • CAD (coronary artery disease) September 2009    PCI/stent (Promus 3.0 x 15mm) to the LAD.   • Cataract     left eye   • Chickenpox    • Diabetes (HCC)     on oral meds   • Glaucoma    • Heart murmur    • High cholesterol    • Hyperlipidemia    • Mumps    • Pain    • S/P coronary artery stent placement     • Sleep apnea    • Snoring    • Unspecified essential hypertension         Social History     Socioeconomic History   • Marital status:      Spouse name: Not on file   • Number of children: Not on file   • Years of education: Not on file   • Highest education level: Not on file   Occupational History   • Not on file   Social Needs   • Financial resource strain: Not on file   • Food insecurity     Worry: Not on file     Inability: Not on file   • Transportation needs     Medical: Not on file     Non-medical: Not on file   Tobacco Use   • Smoking status: Former Smoker     Packs/day: 1.00     Years: 30.00     Pack years: 30.00     Types:  Cigarettes     Last attempt to quit: 1997     Years since quittin.9   • Smokeless tobacco: Never Used   Substance and Sexual Activity   • Alcohol use: No   • Drug use: No   • Sexual activity: Not on file   Lifestyle   • Physical activity     Days per week: Not on file     Minutes per session: Not on file   • Stress: Not on file   Relationships   • Social connections     Talks on phone: Not on file     Gets together: Not on file     Attends Alevism service: Not on file     Active member of club or organization: Not on file     Attends meetings of clubs or organizations: Not on file     Relationship status: Not on file   • Intimate partner violence     Fear of current or ex partner: Not on file     Emotionally abused: Not on file     Physically abused: Not on file     Forced sexual activity: Not on file   Other Topics Concern   • Not on file   Social History Narrative   • Not on file       Family History   Problem Relation Age of Onset   • Heart Disease Maternal Aunt    • Cancer Mother         colon       Current Outpatient Medications on File Prior to Visit   Medication Sig Dispense Refill   • fluticasone (FLONASE) 50 MCG/ACT nasal spray Spray 1 Spray in nose every day.     • ezetimibe (ZETIA) 10 MG Tab Take 1 Tab by mouth every day. 30 Tab 9   • albuterol 108 (90 Base) MCG/ACT Aero Soln inhalation aerosol Inhale 1-2 Puffs by mouth every four hours as needed for Shortness of Breath.     • nitroglycerin (NITROSTAT) 0.4 MG SL Tab Place 1 Tab under tongue as needed for Chest Pain (Place 1 tablet under the tongue every 5 minutes up to 3 doses as needed for chest pain). 25 Tab 5   • metformin ER (GLUCOPHAGE XR) 500 MG TABLET SR 24 HR Take 500 mg by mouth every day.     • gabapentin (NEURONTIN) 100 MG Cap Take 200 mg by mouth every evening.     • pantoprazole (PROTONIX) 40 MG TBEC Take 40 mg by mouth every day.     • aspirin 81 MG tablet Take 81 mg by mouth every day.     • fenofibrate (TRICOR) 48 MG TABS Take  48 mg by mouth every day.     • Probiotic Product (PROBIOTIC DAILY PO) Take 1 Tab by mouth every day.     • amoxicillin-clavulanate (AUGMENTIN) 875-125 MG Tab Take 1 Tab by mouth 2 times a day. 10 day course       No current facility-administered medications on file prior to visit.        Allergies: Crestor [rosuvastatin calcium]; Ciprofloxacin; Lisinopril; and Statins [hmg-coa-r inhibitors]    ROS:   Constitutional: Denies fevers, chills, night sweats, +fatigue, denies weight loss  Eyes: Denies vision loss, pain, drainage, double vision  Ears, Nose, Throat: Denies earache, difficulty hearing, +tinnitus, denies nasal congestion, hoarseness  Cardiovascular: Denies chest pain, tightness, palpitations, orthopnea, +LE edema  Respiratory: Denies shortness of breath, cough, wheezing, hemoptysis  Sleep: As in HPI  GI: Denies heartburn, +dysphagia, denies nausea, abdominal pain, +bloating, denies diarrhea or constipation  : Denies frequent urination, hematuria, discharge or painful urination  Musculoskeletal: +back pain, sore muscles  Neurological: Denies weakness or headaches,+dizziness  Skin: No rashes    /60 (BP Location: Left arm, Patient Position: Sitting, BP Cuff Size: Adult)   Pulse 78   Resp 16   Ht 1.829 m (6')   Wt 85.3 kg (188 lb)   SpO2 95%     Physical Exam:  Appearance: Well-nourished, well-developed, in no acute distress  HEENT: Normocephalic, atraumatic, white sclera, PERRLA, oropharynx clear  Neck: No adenopathy or masses  Respiratory: no intercostal retractions or accessory muscle use  Lungs auscultation: Clear to auscultation bilaterally  Cardiovascular: Regular rate rhythm. No murmurs, rubs or gallops.  No LE edema  Abdomen: soft, nondistended  Gait: Normal  Digits: No clubbing, cyanosis  Motor: No focal deficits  Orientation: Oriented to time, person and place    Diagnosis:  1. SOB (shortness of breath)     2. Shortness of breath  PULMONARY FUNCTION TESTS -Test requested: Complete Pulmonary  Function Test    CT-CHEST (THORAX) WITH    BUN+CREAT   3. Obstructive sleep apnea syndrome         Plan:  The patient describes intermittent shortness of breath the past 3 months of unclear etiology.  Cardiovascular work-up has been negative.  He has associated gastric symptoms and we discussed how both GERD as well as abdominal distention could impact his breathing.  Chest x-ray shows no acute process.  Potential pulmonary etiologies include bronchospasm, thrombosis, pulmonary pulmonary disease.  Recommend pulmonary function testing work-up.  Proceed with chest CT to exclude pulmonary parenchymal disease, thrombosis etc.  Concur with GI work up i.e. EGD.  Return in about 4 weeks (around 8/20/2020).

## 2020-07-27 DIAGNOSIS — I25.119 CORONARY ARTERY DISEASE INVOLVING NATIVE CORONARY ARTERY OF NATIVE HEART WITH ANGINA PECTORIS (HCC): ICD-10-CM

## 2020-07-28 RX ORDER — NITROGLYCERIN 0.4 MG/1
TABLET SUBLINGUAL
Qty: 25 TAB | Refills: 5 | Status: SHIPPED | OUTPATIENT
Start: 2020-07-28 | End: 2022-07-13 | Stop reason: SDUPTHER

## 2020-08-06 ENCOUNTER — NON-PROVIDER VISIT (OUTPATIENT)
Dept: PULMONOLOGY | Facility: HOSPICE | Age: 70
End: 2020-08-06
Attending: INTERNAL MEDICINE
Payer: MEDICARE

## 2020-08-06 VITALS — HEIGHT: 71 IN | WEIGHT: 186 LBS | BODY MASS INDEX: 26.04 KG/M2

## 2020-08-06 DIAGNOSIS — R06.02 SHORTNESS OF BREATH: ICD-10-CM

## 2020-08-06 PROCEDURE — 94729 DIFFUSING CAPACITY: CPT | Performed by: INTERNAL MEDICINE

## 2020-08-06 PROCEDURE — 94726 PLETHYSMOGRAPHY LUNG VOLUMES: CPT | Performed by: INTERNAL MEDICINE

## 2020-08-06 PROCEDURE — 94060 EVALUATION OF WHEEZING: CPT | Performed by: INTERNAL MEDICINE

## 2020-08-06 ASSESSMENT — PULMONARY FUNCTION TESTS
FEV1: 3.13
FEV1_PREDICTED: 3.24
FEV1/FVC_PERCENT_PREDICTED: 88
FVC: 4.07
FEV1/FVC_PERCENT_LLN: 63
FEV1/FVC_PREDICTED: 76
FEV1/FVC_PERCENT_CHANGE: 9
FEV1_PERCENT_PREDICTED: 96
FEV1/FVC_PERCENT_PREDICTED: 75
FEV1/FVC: 67
FEV1/FVC: 67
FEV1/FVC_PERCENT_PREDICTED: 88
FEV1/FVC_PERCENT_PREDICTED: 96
FEV1/FVC_PERCENT_PREDICTED: 97
FEV1/FVC: 73
FEV1_LLN: 2.71
FEV1/FVC_PERCENT_CHANGE: 300
FVC_PREDICTED: 4.32
FEV1_PERCENT_CHANGE: 15
FVC_PERCENT_PREDICTED: 99
FEV1: 2.71
FEV1_PERCENT_CHANGE: 5
FVC: 4.28
FEV1/FVC: 73.13
FVC_LLN: 3.61
FEV1_PERCENT_PREDICTED: 83
FVC_PERCENT_PREDICTED: 94

## 2020-08-06 ASSESSMENT — FIBROSIS 4 INDEX: FIB4 SCORE: 1.98

## 2020-08-06 NOTE — PROCEDURES
Technician: Lori Murillo RRT   Good patient effort & cooperation.  The results of this test meet the ATS/ERS standards for acceptability & reproducibility.  Test was performed on the Videostir Body Plethysmograph-Elite DX system.  Predicted equations for Spirometry are GLI-2012, ITS for lung volumes, and GLI-2017 for DLCO.  The DLCO was uncorrected for Hgb.  A bronchodilator of Ventolin HFA -2puffs via spacer administered.  DLCO performed during dilation period.    The FVC is 4.07 L or 94%, FEV1 is 2.71 L or 83%, FEV1/FVC: 67%.  Significant bronchodilator response with FEV1 3.13 L or 96%.  Total lung capacity: 116%.  DLCO: 93%.    Interpretation;   PFT's show mild obstructive ventilatory defect, with significant bronchodilator response.  Air trapping, consistent with obstructive airways disease.  Normal gas transfer.

## 2020-08-07 ENCOUNTER — APPOINTMENT (OUTPATIENT)
Dept: PULMONOLOGY | Facility: HOSPICE | Age: 70
End: 2020-08-07
Attending: INTERNAL MEDICINE
Payer: MEDICARE

## 2020-08-24 ENCOUNTER — TELEPHONE (OUTPATIENT)
Dept: CARDIOLOGY | Facility: MEDICAL CENTER | Age: 70
End: 2020-08-24

## 2020-08-24 NOTE — TELEPHONE ENCOUNTER
MONE CUELLAR (Key: [id number])  Repatha SureClick 140MG/ML auto-injectors     Form  [facility] Electronic PA Form  Created  2 days ago  Sent to Plan  1 minute ago  Determination  Wait for Questions  [facility] typically responds with questions in less than 15 minutes, but may take up to 24 hours.

## 2020-08-27 ENCOUNTER — TELEPHONE (OUTPATIENT)
Dept: CARDIOLOGY | Facility: MEDICAL CENTER | Age: 70
End: 2020-08-27

## 2020-08-27 NOTE — TELEPHONE ENCOUNTER
MONE CUELLAR (Key: [id number])   Repatha SureClick 140MG/ML auto-injectors   Form  [facility] Electronic PA Form   Created   5 days ago   Sent to Plan   3 days ago   Plan Response   3 days ago   Submit Clinical Questions   3 days ago   Determination   Unfavorable   1 day ago   Message from Plan  Your PA request has been denied. Additional information will be provided in the denial communication. (Message 1140) Your PA request has been denied. Additional information will be provided in the denial communication. (Message 1140)

## 2020-09-15 ENCOUNTER — OFFICE VISIT (OUTPATIENT)
Dept: PULMONOLOGY | Facility: HOSPICE | Age: 70
End: 2020-09-15
Payer: MEDICARE

## 2020-09-15 VITALS
WEIGHT: 189 LBS | OXYGEN SATURATION: 97 % | HEART RATE: 84 BPM | SYSTOLIC BLOOD PRESSURE: 120 MMHG | BODY MASS INDEX: 25.6 KG/M2 | RESPIRATION RATE: 16 BRPM | DIASTOLIC BLOOD PRESSURE: 60 MMHG | HEIGHT: 72 IN

## 2020-09-15 DIAGNOSIS — J45.30 MILD PERSISTENT ASTHMA WITHOUT COMPLICATION: ICD-10-CM

## 2020-09-15 DIAGNOSIS — K25.9 GASTRIC ULCER WITHOUT HEMORRHAGE OR PERFORATION, UNSPECIFIED CHRONICITY: ICD-10-CM

## 2020-09-15 PROCEDURE — 99214 OFFICE O/P EST MOD 30 MIN: CPT | Performed by: INTERNAL MEDICINE

## 2020-09-15 ASSESSMENT — FIBROSIS 4 INDEX: FIB4 SCORE: 1.98

## 2020-09-15 NOTE — PROGRESS NOTES
Chief Complaint   Patient presents with   • Shortness of Breath     Last Seen 07/23/20   • Results     PFT 08/06/20 , Chest CT 08/04/20 , Labs Missing     HPI: This patient is a 70 y.o. male who returns for shortness of breath.  Since May 2020 he has experienced dysphagia, abdominal bloating and shortness of breath.  He describes mild cough with productive sputum and wheezing.  He has experienced chest pain and was hospitalized for cardiac work-up, which was unremarkable.  COVID19 testing was negative.  Chest X-ray showed no acute process.  He has a known history of coronary artery disease with stenting in 2009 and routinely follows with cardiology.  He underwent recent EGD with Dr. Dela Cruz and was diagnosed with a gastric ulcer.  He also required esophageal dilation.  His PPI therapy was doubled and he is scheduled for follow-up EGD in 3 months.  Chest CAT scan showed hyperinflation with 4-5 mm pulmonary nodules in the right middle and lower lobes.  Mild apical emphysema commented on by radiology.  Pulmonary function testing showed mild obstructive ventilatory defect with FEV1 improved from 83 to 96% following albuterol, FEV1/FVC: 67%.  He has been using albuterol inhaler with subjective benefit.  He has a 30-pack-year history of tobacco use, quit in 1998.    Past Medical History:   Diagnosis Date   • Acute myocardial infarction of other anterior wall, episode of care unspecified 2009; 2014   • Anxiety state, unspecified     • CAD (coronary artery disease) September 2009    PCI/stent (Promus 3.0 x 15mm) to the LAD.   • Cataract     left eye   • Chickenpox    • Diabetes (HCC)     on oral meds   • Glaucoma    • Heart murmur    • High cholesterol    • Hyperlipidemia    • Mumps    • Pain    • S/P coronary artery stent placement     • Sleep apnea    • Snoring    • Unspecified essential hypertension         Social History     Socioeconomic History   • Marital status:      Spouse name: Not on file   • Number of  children: Not on file   • Years of education: Not on file   • Highest education level: Not on file   Occupational History   • Not on file   Social Needs   • Financial resource strain: Not on file   • Food insecurity     Worry: Not on file     Inability: Not on file   • Transportation needs     Medical: Not on file     Non-medical: Not on file   Tobacco Use   • Smoking status: Former Smoker     Packs/day: 1.00     Years: 30.00     Pack years: 30.00     Types: Cigarettes     Quit date: 1997     Years since quittin.0   • Smokeless tobacco: Never Used   Substance and Sexual Activity   • Alcohol use: No   • Drug use: No   • Sexual activity: Not on file   Lifestyle   • Physical activity     Days per week: Not on file     Minutes per session: Not on file   • Stress: Not on file   Relationships   • Social connections     Talks on phone: Not on file     Gets together: Not on file     Attends [employer] service: Not on file     Active member of club or organization: Not on file     Attends meetings of clubs or organizations: Not on file     Relationship status: Not on file   • Intimate partner violence     Fear of current or ex partner: Not on file     Emotionally abused: Not on file     Physically abused: Not on file     Forced sexual activity: Not on file   Other Topics Concern   • Not on file   Social History Narrative   • Not on file       Family History   Problem Relation Age of Onset   • Heart Disease Maternal Aunt    • Cancer Mother         colon       Current Outpatient Medications on File Prior to Visit   Medication Sig Dispense Refill   • nitroglycerin (NITROSTAT) 0.4 MG SL Tab PLACE 1 TAB UNDER TONGUE EVERY 5 MINS FOR UP TO 3 DOSES AS NEEDED FOR CHEST PAIN 25 Tab 5   • fluticasone (FLONASE) 50 MCG/ACT nasal spray Spray 1 Spray in nose every day.     • ezetimibe (ZETIA) 10 MG Tab Take 1 Tab by mouth every day. 30 Tab 9   • albuterol 108 (90 Base) MCG/ACT Aero Soln inhalation aerosol Inhale 1-2 Puffs by  mouth every four hours as needed for Shortness of Breath.     • amoxicillin-clavulanate (AUGMENTIN) 875-125 MG Tab Take 1 Tab by mouth 2 times a day. 10 day course     • metformin ER (GLUCOPHAGE XR) 500 MG TABLET SR 24 HR Take 500 mg by mouth every day.     • gabapentin (NEURONTIN) 100 MG Cap Take 200 mg by mouth every evening.     • pantoprazole (PROTONIX) 40 MG TBEC Take 40 mg by mouth 2 times a day.     • aspirin 81 MG tablet Take 81 mg by mouth every day.     • fenofibrate (TRICOR) 48 MG TABS Take 48 mg by mouth every day.     • Probiotic Product (PROBIOTIC DAILY PO) Take 1 Tab by mouth every day.       No current facility-administered medications on file prior to visit.        Allergies: Crestor [rosuvastatin calcium], Ciprofloxacin, Lisinopril, and Statins [hmg-coa-r inhibitors]    ROS:   Constitutional: Denies fevers, chills, night sweats, fatigue or weight loss  Eyes: Denies vision loss, pain, drainage, double vision  Ears, Nose, Throat: Denies earache, difficulty hearing, tinnitus, nasal congestion, hoarseness  Cardiovascular: Denies chest pain, tightness, palpitations, orthopnea or edema  Respiratory:As in HPI  Sleep: Denies daytime sleepiness, snoring, apneas, insomnia, morning headaches  GI: As in HPIGU: Denies frequent urination, hematuria, discharge or painful urination  Musculoskeletal: Denies back pain, painful joints, sore muscles  Neurological: Denies weakness or headaches  Skin: No rashes    /60 (BP Location: Left arm, Patient Position: Sitting, BP Cuff Size: Adult)   Pulse 84   Resp 16   Ht 1.829 m (6')   Wt 85.7 kg (189 lb)   SpO2 97%     Physical Exam:  Appearance: Well-nourished, well-developed, in no acute distress  HEENT: Normocephalic, atraumatic, white sclera, PERRLA, masked  Neck: No adenopathy or masses  Respiratory: no intercostal retractions or accessory muscle use  Lungs auscultation: Clear to auscultation bilaterally  Cardiovascular: Regular rate rhythm. No murmurs, rubs  or gallops.  No LE edema  Abdomen: soft, nondistended  Gait: Normal  Digits: No clubbing, cyanosis  Motor: No focal deficits  Orientation: Oriented to time, person and place    Diagnosis:  1. Mild persistent asthma without complication  Fluticasone Furoate-Vilanterol (BREO ELLIPTA) 100-25 MCG/INH AEROSOL POWDER, BREATH ACTIVATED   2. Gastric ulcer without hemorrhage or perforation, unspecified chronicity     3.     Pulmonary nodules      Plan:  The patient's pulmonary function testing confirm obstructive airways disease.  Recommend starting maintenance inhalers with Breo 100ug 1 puff QD.  Continue albuterol HFA as needed.  He has incidental finding of pulmonary micronodules, felt benign.  Annual follow-up chest CAT scan advised.  Follow-up with GI for gastric ulcer/GERD.  Return in about 4 months (around 1/15/2021).

## 2020-09-21 ENCOUNTER — TELEPHONE (OUTPATIENT)
Dept: PULMONOLOGY | Facility: HOSPICE | Age: 70
End: 2020-09-21

## 2020-09-21 DIAGNOSIS — J45.30 MILD PERSISTENT ASTHMA WITHOUT COMPLICATION: ICD-10-CM

## 2020-09-21 RX ORDER — BUDESONIDE AND FORMOTEROL FUMARATE DIHYDRATE 160; 4.5 UG/1; UG/1
2 AEROSOL RESPIRATORY (INHALATION) 2 TIMES DAILY
Qty: 1 EACH | Refills: 5 | Status: SHIPPED | OUTPATIENT
Start: 2020-09-21 | End: 2021-09-20

## 2020-09-21 NOTE — TELEPHONE ENCOUNTER
The patient called and said that the Breo Ellipta is 179.00 for 1 inhaler.  He wants to switch to generic Symbicort.  Please advise, thank you.

## 2020-10-14 ENCOUNTER — TELEPHONE (OUTPATIENT)
Dept: CARDIOLOGY | Facility: MEDICAL CENTER | Age: 70
End: 2020-10-14

## 2020-10-14 NOTE — TELEPHONE ENCOUNTER
"LUAN Rodriguez, Received [facility] report stating \" Would like a call back to discuss medication with RN\"    Patient will like a call back at [phone]      Thank you!  "

## 2020-10-14 NOTE — TELEPHONE ENCOUNTER
"I spoke to Darren.  He went off of Zetia due to muscle aches and some stomach issues \"that could be either from an ulcer or the Zetia.\"  If Dr. Amado has another idea (\"other than injectibles\") he would be willing to try them.  "

## 2020-12-14 ENCOUNTER — TELEPHONE (OUTPATIENT)
Dept: SLEEP MEDICINE | Facility: MEDICAL CENTER | Age: 70
End: 2020-12-14

## 2020-12-15 ENCOUNTER — OFFICE VISIT (OUTPATIENT)
Dept: SLEEP MEDICINE | Facility: MEDICAL CENTER | Age: 70
End: 2020-12-15
Payer: MEDICARE

## 2020-12-15 VITALS
DIASTOLIC BLOOD PRESSURE: 66 MMHG | OXYGEN SATURATION: 97 % | HEART RATE: 82 BPM | RESPIRATION RATE: 12 BRPM | BODY MASS INDEX: 25.45 KG/M2 | SYSTOLIC BLOOD PRESSURE: 124 MMHG | WEIGHT: 187.9 LBS | HEIGHT: 72 IN

## 2020-12-15 DIAGNOSIS — J45.30 MILD PERSISTENT ASTHMA WITHOUT COMPLICATION: ICD-10-CM

## 2020-12-15 DIAGNOSIS — R06.02 SOB (SHORTNESS OF BREATH): ICD-10-CM

## 2020-12-15 DIAGNOSIS — R91.8 PULMONARY NODULES: ICD-10-CM

## 2020-12-15 PROCEDURE — 99214 OFFICE O/P EST MOD 30 MIN: CPT | Performed by: INTERNAL MEDICINE

## 2020-12-15 RX ORDER — ALBUTEROL SULFATE 90 UG/1
1-2 AEROSOL, METERED RESPIRATORY (INHALATION) EVERY 4 HOURS PRN
Qty: 8.5 G | Refills: 2 | Status: SHIPPED | OUTPATIENT
Start: 2020-12-15 | End: 2022-05-31

## 2020-12-15 ASSESSMENT — FIBROSIS 4 INDEX: FIB4 SCORE: 1.98

## 2020-12-15 NOTE — TELEPHONE ENCOUNTER
Last Seen 09/15/20 Dr. Galan    Plan:  The patient's pulmonary function testing confirm obstructive airways disease.  Recommend starting maintenance inhalers with Breo 100ug 1 puff QD.  Continue albuterol HFA as needed.  He has incidental finding of pulmonary micronodules, felt benign.  Annual follow-up chest CAT scan advised.  Follow-up with GI for gastric ulcer/GERD.  Return in about 4 months (around 1/15/2021).    LABS MISSING , LVM INFORMING PATIENT LABS ARE MISSING AND IF THEY WERE COMPLETED AT A OUTSIDE LAB . INFORMED PT IF THEY ARE NOT HAVING PULM CONCERNS WE MAY RESCHEDULE.     [facility] SENT OFFERING VIDEO VISIT WITH NEW ADDRESS     [facility] TO DR. GALAN

## 2020-12-15 NOTE — PROGRESS NOTES
Chief Complaint   Patient presents with   • Follow-Up     Mild persistent asthma without complication        HPI: This patient is a 70 y.o. male returns for follow-up of asthma.  Last visit he was started on inhalers with Symbicort 160 mcg for cough, shortness of breath and wheezing.  Pulmonary function testing showed mild obstructive airways disease with significant bronchodilator response.  He notes subjective benefit with Symbicort inhaler use and has rarely required his albuterol rescue inhaler since then.  He describes mild nasal congestion and moodiness with inhaler use. Chest CAT scan showed hyperinflation with 4-5 mm pulmonary nodules in the right middle and lower lobes.      Chief Complaint   Patient presents with   • Follow-Up     Mild persistent asthma without complication            Past Medical History:   Diagnosis Date   • Acute myocardial infarction of other anterior wall, episode of care unspecified 2009; 2014   • Anxiety state, unspecified     • CAD (coronary artery disease) September 2009    PCI/stent (Promus 3.0 x 15mm) to the LAD.   • Cataract     left eye   • Chickenpox    • Diabetes (HCC)     on oral meds   • Glaucoma    • Heart murmur    • High cholesterol    • Hyperlipidemia    • Mumps    • Pain    • S/P coronary artery stent placement     • Sleep apnea    • Snoring    • Unspecified essential hypertension         Social History     Socioeconomic History   • Marital status:      Spouse name: Not on file   • Number of children: Not on file   • Years of education: Not on file   • Highest education level: Not on file   Occupational History   • Not on file   Social Needs   • Financial resource strain: Not on file   • Food insecurity     Worry: Not on file     Inability: Not on file   • Transportation needs     Medical: Not on file     Non-medical: Not on file   Tobacco Use   • Smoking status: Former Smoker     Packs/day: 1.00     Years: 30.00     Pack years: 30.00     Types: Cigarettes      Quit date: 1997     Years since quittin.3   • Smokeless tobacco: Never Used   Substance and Sexual Activity   • Alcohol use: No   • Drug use: No   • Sexual activity: Not on file   Lifestyle   • Physical activity     Days per week: Not on file     Minutes per session: Not on file   • Stress: Not on file   Relationships   • Social connections     Talks on phone: Not on file     Gets together: Not on file     Attends Taoist service: Not on file     Active member of club or organization: Not on file     Attends meetings of clubs or organizations: Not on file     Relationship status: Not on file   • Intimate partner violence     Fear of current or ex partner: Not on file     Emotionally abused: Not on file     Physically abused: Not on file     Forced sexual activity: Not on file   Other Topics Concern   • Not on file   Social History Narrative   • Not on file       Family History   Problem Relation Age of Onset   • Heart Disease Maternal Aunt    • Cancer Mother         colon       Current Outpatient Medications on File Prior to Visit   Medication Sig Dispense Refill   • budesonide-formoterol (SYMBICORT) 160-4.5 MCG/ACT Aerosol Inhale 2 Puffs by mouth 2 Times a Day. Use spacer. Rinse mouth after each use. 1 Each 5   • Fluticasone Furoate-Vilanterol (BREO ELLIPTA) 100-25 MCG/INH AEROSOL POWDER, BREATH ACTIVATED Inhale 1 Puff by mouth every day. Rinse mouth after use. 1 Each 5   • nitroglycerin (NITROSTAT) 0.4 MG SL Tab PLACE 1 TAB UNDER TONGUE EVERY 5 MINS FOR UP TO 3 DOSES AS NEEDED FOR CHEST PAIN 25 Tab 5   • fluticasone (FLONASE) 50 MCG/ACT nasal spray Spray 1 Spray in nose every day.     • metformin ER (GLUCOPHAGE XR) 500 MG TABLET SR 24 HR Take 500 mg by mouth every day.     • gabapentin (NEURONTIN) 100 MG Cap Take 200 mg by mouth every evening.     • pantoprazole (PROTONIX) 40 MG TBEC Take 40 mg by mouth 2 times a day.     • aspirin 81 MG tablet Take 81 mg by mouth every day.     • fenofibrate (TRICOR)  48 MG TABS Take 48 mg by mouth every day.     • Probiotic Product (PROBIOTIC DAILY PO) Take 1 Tab by mouth every day.     • amoxicillin-clavulanate (AUGMENTIN) 875-125 MG Tab Take 1 Tab by mouth 2 times a day. 10 day course       No current facility-administered medications on file prior to visit.        Allergies: Crestor [rosuvastatin calcium], Ciprofloxacin, Lisinopril, and Statins [hmg-coa-r inhibitors]    ROS:   Constitutional: Denies fevers, chills, night sweats, fatigue or weight loss  Eyes: Denies vision loss, pain, drainage, double vision  Ears, Nose, Throat: Denies earache, difficulty hearing, tinnitus, nasal congestion, hoarseness  Cardiovascular: Denies chest pain, tightness, palpitations, orthopnea or edema  Respiratory: Denies shortness of breath, cough, wheezing, hemoptysis  Sleep: Denies daytime sleepiness, snoring, apneas, insomnia, morning headaches  GI: +heartburn, denies dysphagia, nausea, abdominal pain, diarrhea or constipation  : Denies frequent urination, hematuria, discharge or painful urination  Musculoskeletal: Denies back pain, painful joints, sore muscles  Neurological: Denies weakness or headaches  Skin: No rashes    /66 (BP Location: Right arm, Patient Position: Sitting, BP Cuff Size: Adult)   Pulse 82   Resp 12   Ht 1.829 m (6')   Wt 85.2 kg (187 lb 14.4 oz)   SpO2 97%     Physical Exam:  Appearance: Well-nourished, well-developed, in no acute distress  HEENT: Normocephalic, atraumatic, white sclera, PERRLA, masked  Neck: No adenopathy or masses  Respiratory: no intercostal retractions or accessory muscle use  Lungs auscultation: Clear to auscultation bilaterally  Cardiovascular: Regular rate rhythm. No murmurs, rubs or gallops.  No LE edema  Abdomen: soft, nondistended  Gait: Normal  Digits: No clubbing, cyanosis  Motor: No focal deficits  Orientation: Oriented to time, person and place    Diagnosis:  1. SOB (shortness of breath)  Spirometry    SARS CoV-2 Ab, Total   2.  Mild persistent asthma without complication  albuterol 108 (90 Base) MCG/ACT Aero Soln inhalation aerosol   3. Pulmonary nodules         Plan:  See below  Return in about 6 months (around 6/15/2021) for with spirometry.          Past Medical History:   Diagnosis Date   • Acute myocardial infarction of other anterior wall, episode of care unspecified ;    • Anxiety state, unspecified     • CAD (coronary artery disease) 2009    PCI/stent (Promus 3.0 x 15mm) to the LAD.   • Cataract     left eye   • Chickenpox    • Diabetes (HCC)     on oral meds   • Glaucoma    • Heart murmur    • High cholesterol    • Hyperlipidemia    • Mumps    • Pain    • S/P coronary artery stent placement     • Sleep apnea    • Snoring    • Unspecified essential hypertension         Social History     Socioeconomic History   • Marital status:      Spouse name: Not on file   • Number of children: Not on file   • Years of education: Not on file   • Highest education level: Not on file   Occupational History   • Not on file   Social Needs   • Financial resource strain: Not on file   • Food insecurity     Worry: Not on file     Inability: Not on file   • Transportation needs     Medical: Not on file     Non-medical: Not on file   Tobacco Use   • Smoking status: Former Smoker     Packs/day: 1.00     Years: 30.00     Pack years: 30.00     Types: Cigarettes     Quit date: 1997     Years since quittin.3   • Smokeless tobacco: Never Used   Substance and Sexual Activity   • Alcohol use: No   • Drug use: No   • Sexual activity: Not on file   Lifestyle   • Physical activity     Days per week: Not on file     Minutes per session: Not on file   • Stress: Not on file   Relationships   • Social connections     Talks on phone: Not on file     Gets together: Not on file     Attends Mormonism service: Not on file     Active member of club or organization: Not on file     Attends meetings of clubs or organizations: Not on file      Relationship status: Not on file   • Intimate partner violence     Fear of current or ex partner: Not on file     Emotionally abused: Not on file     Physically abused: Not on file     Forced sexual activity: Not on file   Other Topics Concern   • Not on file   Social History Narrative   • Not on file       Family History   Problem Relation Age of Onset   • Heart Disease Maternal Aunt    • Cancer Mother         colon       Current Outpatient Medications on File Prior to Visit   Medication Sig Dispense Refill   • budesonide-formoterol (SYMBICORT) 160-4.5 MCG/ACT Aerosol Inhale 2 Puffs by mouth 2 Times a Day. Use spacer. Rinse mouth after each use. 1 Each 5   • Fluticasone Furoate-Vilanterol (BREO ELLIPTA) 100-25 MCG/INH AEROSOL POWDER, BREATH ACTIVATED Inhale 1 Puff by mouth every day. Rinse mouth after use. 1 Each 5   • nitroglycerin (NITROSTAT) 0.4 MG SL Tab PLACE 1 TAB UNDER TONGUE EVERY 5 MINS FOR UP TO 3 DOSES AS NEEDED FOR CHEST PAIN 25 Tab 5   • fluticasone (FLONASE) 50 MCG/ACT nasal spray Spray 1 Spray in nose every day.     • albuterol 108 (90 Base) MCG/ACT Aero Soln inhalation aerosol Inhale 1-2 Puffs by mouth every four hours as needed for Shortness of Breath.     • metformin ER (GLUCOPHAGE XR) 500 MG TABLET SR 24 HR Take 500 mg by mouth every day.     • gabapentin (NEURONTIN) 100 MG Cap Take 200 mg by mouth every evening.     • pantoprazole (PROTONIX) 40 MG TBEC Take 40 mg by mouth 2 times a day.     • aspirin 81 MG tablet Take 81 mg by mouth every day.     • fenofibrate (TRICOR) 48 MG TABS Take 48 mg by mouth every day.     • Probiotic Product (PROBIOTIC DAILY PO) Take 1 Tab by mouth every day.     • amoxicillin-clavulanate (AUGMENTIN) 875-125 MG Tab Take 1 Tab by mouth 2 times a day. 10 day course       No current facility-administered medications on file prior to visit.        Allergies: Crestor [rosuvastatin calcium], Ciprofloxacin, Lisinopril, and Statins [hmg-coa-r inhibitors]    ROS:    Constitutional: Denies fevers, chills, night sweats, fatigue or weight loss  Eyes: Denies vision loss, pain, drainage, double vision  Ears, Nose, Throat: Denies earache, difficulty hearing, tinnitus, nasal congestion, hoarseness  Cardiovascular: Denies chest pain, tightness, palpitations, orthopnea or edema  Respiratory: Denies shortness of breath, cough, wheezing, hemoptysis  Sleep: Denies daytime sleepiness, snoring, apneas, insomnia, morning headaches  GI: Denies heartburn, dysphagia, nausea, abdominal pain, diarrhea or constipation  : Denies frequent urination, hematuria, discharge or painful urination  Musculoskeletal: Denies back pain, painful joints, sore muscles  Neurological: Denies weakness or headaches  Skin: No rashes    /66 (BP Location: Right arm, Patient Position: Sitting, BP Cuff Size: Adult)   Pulse 82   Resp 12   Ht 1.829 m (6')   Wt 85.2 kg (187 lb 14.4 oz)   SpO2 97%     Diagnosis:  1. SOB (shortness of breath)  Spirometry   2. Mild persistent asthma without complication     3. Pulmonary nodules         Plan:  The patient's respiratory symptoms have improved significantly with bronchodilator therapy.    Continue Symbicort 160 mcg at 2 puffs twice daily with albuterol HFA as needed.  He has incidental finding of pulmonary micronodules, felt benign. Annual follow-up chest CAT scan advised i.e. August 2021.  Return in about 6 months (around 6/15/2021) for with spirometry.

## 2021-01-16 DIAGNOSIS — Z23 NEED FOR VACCINATION: ICD-10-CM

## 2021-03-04 ENCOUNTER — OFFICE VISIT (OUTPATIENT)
Dept: CARDIOLOGY | Facility: MEDICAL CENTER | Age: 71
End: 2021-03-04
Payer: MEDICARE

## 2021-03-04 VITALS
BODY MASS INDEX: 25.6 KG/M2 | SYSTOLIC BLOOD PRESSURE: 110 MMHG | HEART RATE: 90 BPM | DIASTOLIC BLOOD PRESSURE: 68 MMHG | WEIGHT: 189 LBS | OXYGEN SATURATION: 97 % | HEIGHT: 72 IN

## 2021-03-04 DIAGNOSIS — Z78.9 STATIN INTOLERANCE: ICD-10-CM

## 2021-03-04 DIAGNOSIS — I10 ESSENTIAL HYPERTENSION: ICD-10-CM

## 2021-03-04 DIAGNOSIS — Z95.5 STENTED CORONARY ARTERY: ICD-10-CM

## 2021-03-04 DIAGNOSIS — E78.00 HYPERCHOLESTEREMIA: ICD-10-CM

## 2021-03-04 PROCEDURE — 99213 OFFICE O/P EST LOW 20 MIN: CPT | Performed by: INTERNAL MEDICINE

## 2021-03-04 ASSESSMENT — FIBROSIS 4 INDEX: FIB4 SCORE: 1.98

## 2021-03-04 ASSESSMENT — ENCOUNTER SYMPTOMS
NERVOUS/ANXIOUS: 1
CONSTITUTIONAL NEGATIVE: 1
CARDIOVASCULAR NEGATIVE: 1
HEADACHES: 1
FEVER: 0
HEARTBURN: 1

## 2021-03-04 NOTE — PROGRESS NOTES
Chief Complaint   Patient presents with   • HTN (Controlled)   • Chest Pain       Subjective:   Darren Brunson is a 70 y.o. male who presents today for follow-up of coronary disease with stenting of his LAD in 2009 and stenting of his RCA in 2015.  Has had no angina.  The patient is intolerant to statins.  Recent lab off statin reveals LDL of 110.  He could not afford of PCSK9 inhibitors.  The patient has about to change his diet and is exercising more vigorously.  His most recent stress test was June, 2020.  Past Medical History:   Diagnosis Date   • Acute myocardial infarction of other anterior wall, episode of care unspecified 2009; 2014   • Anxiety state, unspecified     • CAD (coronary artery disease) September 2009    PCI/stent (Promus 3.0 x 15mm) to the LAD.   • Cataract     left eye   • Chickenpox    • Diabetes (HCC)     on oral meds   • Glaucoma    • Heart murmur    • High cholesterol    • Hyperlipidemia    • Mumps    • Pain    • S/P coronary artery stent placement     • Sleep apnea    • Snoring    • Unspecified essential hypertension       Past Surgical History:   Procedure Laterality Date   • SEPTAL RECONSTRUCTION N/A 10/23/2017    Procedure: SEPTAL RECONSTRUCTION;  Surgeon: CHARLI Sanchez M.D.;  Location: SURGERY SAME DAY [facility];  Service: Ent   • UVULOPHARYNGOPALATOPLASTY N/A 10/23/2017    Procedure: UVULOPHARYNGOPALATOPLASTY;  Surgeon: CHARLI Sanchez M.D.;  Location: SURGERY SAME DAY [facility];  Service: Ent   • BIOPSY GENERAL Left 10/23/2017    Procedure: BIOPSY GENERAL LEFT UPPER NECK;  Surgeon: CHARLI Sanchez M.D.;  Location: SURGERY SAME DAY [facility];  Service: Ent   • OTHER  November 2011    Sinus surgery   • INGUINAL HERNIA REPAIR BILATERAL     • OTHER CARDIAC SURGERY      cardiac cath with stent placement   • SINUSCOPE     • TONSILLECTOMY     • UMBILICAL HERNIA REPAIR       Family History   Problem Relation Age of Onset   • Heart Disease Maternal Aunt    •  Cancer Mother         colon     Social History     Socioeconomic History   • Marital status:      Spouse name: Not on file   • Number of children: Not on file   • Years of education: Not on file   • Highest education level: Not on file   Occupational History   • Not on file   Tobacco Use   • Smoking status: Former Smoker     Packs/day: 1.00     Years: 30.00     Pack years: 30.00     Types: Cigarettes     Quit date: 1997     Years since quittin.5   • Smokeless tobacco: Never Used   Substance and Sexual Activity   • Alcohol use: No   • Drug use: No   • Sexual activity: Not on file   Other Topics Concern   • Not on file   Social History Narrative   • Not on file     Social Determinants of Health     Financial Resource Strain:    • Difficulty of Paying Living Expenses:    Food Insecurity:    • Worried About Running Out of Food in the Last Year:    • Ran Out of Food in the Last Year:    Transportation Needs:    • Lack of Transportation (Medical):    • Lack of Transportation (Non-Medical):    Physical Activity:    • Days of Exercise per Week:    • Minutes of Exercise per Session:    Stress:    • Feeling of Stress :    Social Connections:    • Frequency of Communication with Friends and Family:    • Frequency of Social Gatherings with Friends and Family:    • Attends Jew Services:    • Active Member of Clubs or Organizations:    • Attends Club or Organization Meetings:    • Marital Status:    Intimate Partner Violence:    • Fear of Current or Ex-Partner:    • Emotionally Abused:    • Physically Abused:    • Sexually Abused:      Allergies   Allergen Reactions   • Crestor [Rosuvastatin Calcium] Unspecified     Muscle pain    • Ciprofloxacin      Tendon pain   • Lisinopril    • Statins [Hmg-Coa-R Inhibitors]      Outpatient Encounter Medications as of 3/4/2021   Medication Sig Dispense Refill   • Bempedoic Acid 180 MG Tab Take 180 mg by mouth every day. 30 tablet 6   • albuterol 108 (90 Base) MCG/ACT  Aero Soln inhalation aerosol Inhale 1-2 Puffs every four hours as needed for Shortness of Breath. 8.5 g 2   • budesonide-formoterol (SYMBICORT) 160-4.5 MCG/ACT Aerosol Inhale 2 Puffs by mouth 2 Times a Day. Use spacer. Rinse mouth after each use. 1 Each 5   • Fluticasone Furoate-Vilanterol (BREO ELLIPTA) 100-25 MCG/INH AEROSOL POWDER, BREATH ACTIVATED Inhale 1 Puff by mouth every day. Rinse mouth after use. 1 Each 5   • nitroglycerin (NITROSTAT) 0.4 MG SL Tab PLACE 1 TAB UNDER TONGUE EVERY 5 MINS FOR UP TO 3 DOSES AS NEEDED FOR CHEST PAIN 25 Tab 5   • fluticasone (FLONASE) 50 MCG/ACT nasal spray Spray 1 Spray in nose every day.     • metformin ER (GLUCOPHAGE XR) 500 MG TABLET SR 24 HR Take 500 mg by mouth every day.     • gabapentin (NEURONTIN) 100 MG Cap Take 200 mg by mouth every evening. 300mg daily     • pantoprazole (PROTONIX) 40 MG TBEC Take 40 mg by mouth 2 times a day.     • aspirin 81 MG tablet Take 81 mg by mouth every day.     • fenofibrate (TRICOR) 48 MG TABS Take 48 mg by mouth every day.     • Probiotic Product (PROBIOTIC DAILY PO) Take 1 Tab by mouth every day.     • amoxicillin-clavulanate (AUGMENTIN) 875-125 MG Tab Take 1 Tab by mouth 2 times a day. 10 day course       No facility-administered encounter medications on file as of 3/4/2021.     Review of Systems   Constitutional: Negative.  Negative for fever.   Respiratory:        History of sleep apnea.  Intolerant to BiPAP mask   Cardiovascular: Negative.    Gastrointestinal: Positive for heartburn.   Neurological: Positive for headaches.   Psychiatric/Behavioral: The patient is nervous/anxious.         Objective:   /68 (BP Location: Left arm, Patient Position: Sitting, BP Cuff Size: Adult)   Pulse 90   Ht 1.829 m (6')   Wt 85.7 kg (189 lb)   SpO2 97%   BMI 25.63 kg/m²     Physical Exam   Constitutional: He is oriented to person, place, and time. He appears well-nourished. No distress.   HENT:   Head: Normocephalic and atraumatic.    Eyes: Pupils are equal, round, and reactive to light. No scleral icterus.   Neck: No JVD present. No tracheal deviation present.   Cardiovascular: Normal rate and regular rhythm.   No murmur heard.  Pulmonary/Chest: Breath sounds normal. No respiratory distress. He has no wheezes.   Abdominal: Soft. Bowel sounds are normal. He exhibits no distension. There is no abdominal tenderness.   Musculoskeletal:         General: No edema.   Neurological: He is alert and oriented to person, place, and time.   Skin: Skin is warm and dry.   Psychiatric: His mood appears anxious.       Assessment:     1. Essential hypertension     2. Hypercholesteremia     3. Statin intolerance         Medical Decision Making:  Today's Assessment / Status / Plan:   Coronary disease: Status post non-STEMI with stenting of his LAD in 2009 and RCA stenting in 2015.  He has had no angina and is up-to-date on stress testing.    Hyperlipidemia: Patient is trying hard with diet but is still not at target.  He will be started on bempedoic acid 180 mg a day and check lab in 2 months.  Parenthetically he is trying to ezetimibe in the past and could not tolerate it.  We discussed starting him back on a PCSK9 inhibitor if he cannot get to target on the bempedoic acid.  Return in 6 months.

## 2021-04-07 ENCOUNTER — TELEPHONE (OUTPATIENT)
Dept: CARDIOLOGY | Facility: MEDICAL CENTER | Age: 71
End: 2021-04-07

## 2021-04-07 NOTE — TELEPHONE ENCOUNTER
He is approved of both the Nextetol and Repatha with the [facility].  He wants me to double check on which you want him on (I read your last OV note).    He would also like to know who he should go to for cardiology. He prefers female MD's.    To Dr. Amado

## 2021-04-07 NOTE — TELEPHONE ENCOUNTER
RS    Patient called and stated he needs to talk to you regarding his medication. Please call him at [phone].    Thank you,    Jovita SARGENT

## 2021-04-08 NOTE — TELEPHONE ENCOUNTER
Jasson Banks to call back and inform me if he would like me to fill the medication through [facility].

## 2021-04-08 NOTE — TELEPHONE ENCOUNTER
Matty Amado M.D.  You 19 hours ago (3:16 PM)     Nexetol 180 mg a day and then limp profile and CMPin 2 months           You  Matty Amado M.D. 19 hours ago (3:12 PM)     Which cholesterol agent?        Message text    Matty Amado M.D.  You 20 hours ago (2:59 PM)     How about Dr. Sotelo    Message text

## 2021-04-09 DIAGNOSIS — I10 ESSENTIAL HYPERTENSION: ICD-10-CM

## 2021-04-09 DIAGNOSIS — E78.00 HYPERCHOLESTEREMIA: ICD-10-CM

## 2021-04-09 NOTE — TELEPHONE ENCOUNTER
Darren will get back to us and let us know which medication the [facility] will pay for.  He is also considering Dr. Carrasco since he does procedures.

## 2021-05-25 NOTE — TELEPHONE ENCOUNTER
Pt called and is requesting a call back regarding medication questions and medical records for [facility] (gave pt medical record dept info). Please call pt back at [phone]    Thank you.

## 2021-05-27 NOTE — TELEPHONE ENCOUNTER
Darren says everything is under control and is getting records to the [facility].

## 2021-06-07 ENCOUNTER — TELEPHONE (OUTPATIENT)
Dept: SLEEP MEDICINE | Facility: MEDICAL CENTER | Age: 71
End: 2021-06-07

## 2021-06-07 DIAGNOSIS — J45.30 MILD PERSISTENT ASTHMA WITHOUT COMPLICATION: ICD-10-CM

## 2021-06-07 NOTE — TELEPHONE ENCOUNTER
Patient calling states he stopped Symbicort altogether the 4 days as it is causing HIGH ANXIETY and palpitations. Caller wants to know if Dr Galan wanted to prescribe a different inhaler. Patient last seen 12/15/20 and is not due back until 7/24/21.    Caller aware Dr Galan is out of the office this week but I will route the message to another provider to see if a different inhaler needs to be prescribed.

## 2021-06-08 NOTE — TELEPHONE ENCOUNTER
"Patient tried Breo sample and had no problems BUT even with insurance it was \"to costly\" and he could not afford to buy Breo inhaler. That is when he asked to try Symbicort.     [facility] is [facility] through [facility] to see if they can help with the cost of his inhalers BUT so far he is still jumping through the process. Updated information relayed to providers.  Did we want to Rx another inhaler to his [facility] on file.  "

## 2021-06-10 NOTE — TELEPHONE ENCOUNTER
/Patient agreeable. Sample pended for/ approval.    Have we ever prescribed this med? Yes.  If yes, what date?     Last OV: 12/15/20    Next OV: 7/28/21    DX: obstructive Airways Disease    Medications: Breo 100       Principal Discharge DX:	Viral upper respiratory tract infection Principal Discharge DX:	Nasal congestion  Secondary Diagnosis:	Lacrimal duct stenosis

## 2021-06-11 ENCOUNTER — TELEPHONE (OUTPATIENT)
Dept: SLEEP MEDICINE | Facility: MEDICAL CENTER | Age: 71
End: 2021-06-11

## 2021-06-23 ENCOUNTER — TELEPHONE (OUTPATIENT)
Dept: SLEEP MEDICINE | Facility: MEDICAL CENTER | Age: 71
End: 2021-06-23

## 2021-07-08 ENCOUNTER — APPOINTMENT (RX ONLY)
Dept: URBAN - METROPOLITAN AREA CLINIC 4 | Facility: CLINIC | Age: 71
Setting detail: DERMATOLOGY
End: 2021-07-08

## 2021-07-08 DIAGNOSIS — L82.0 INFLAMED SEBORRHEIC KERATOSIS: ICD-10-CM

## 2021-07-08 DIAGNOSIS — R21 RASH AND OTHER NONSPECIFIC SKIN ERUPTION: ICD-10-CM

## 2021-07-08 DIAGNOSIS — L57.0 ACTINIC KERATOSIS: ICD-10-CM

## 2021-07-08 DIAGNOSIS — L81.4 OTHER MELANIN HYPERPIGMENTATION: ICD-10-CM

## 2021-07-08 DIAGNOSIS — Z87.2 PERSONAL HISTORY OF DISEASES OF THE SKIN AND SUBCUTANEOUS TISSUE: ICD-10-CM

## 2021-07-08 DIAGNOSIS — D22 MELANOCYTIC NEVI: ICD-10-CM

## 2021-07-08 DIAGNOSIS — L82.1 OTHER SEBORRHEIC KERATOSIS: ICD-10-CM

## 2021-07-08 PROBLEM — D22.5 MELANOCYTIC NEVI OF TRUNK: Status: ACTIVE | Noted: 2021-07-08

## 2021-07-08 PROCEDURE — 99213 OFFICE O/P EST LOW 20 MIN: CPT | Mod: 25

## 2021-07-08 PROCEDURE — ? COUNSELING

## 2021-07-08 PROCEDURE — ? DIAGNOSIS COMMENT

## 2021-07-08 PROCEDURE — 17000 DESTRUCT PREMALG LESION: CPT | Mod: 59

## 2021-07-08 PROCEDURE — 17110 DESTRUCTION B9 LES UP TO 14: CPT

## 2021-07-08 PROCEDURE — 17003 DESTRUCT PREMALG LES 2-14: CPT | Mod: 59

## 2021-07-08 PROCEDURE — ? LIQUID NITROGEN

## 2021-07-08 ASSESSMENT — LOCATION SIMPLE DESCRIPTION DERM
LOCATION SIMPLE: RIGHT OCCIPITAL SCALP
LOCATION SIMPLE: LOWER BACK
LOCATION SIMPLE: LEFT UPPER BACK
LOCATION SIMPLE: LEFT BUTTOCK
LOCATION SIMPLE: RIGHT UPPER BACK
LOCATION SIMPLE: RIGHT LOWER BACK
LOCATION SIMPLE: LEFT EYEBROW
LOCATION SIMPLE: SCALP
LOCATION SIMPLE: LEFT LOWER BACK
LOCATION SIMPLE: RIGHT CLAVICULAR SKIN

## 2021-07-08 ASSESSMENT — LOCATION DETAILED DESCRIPTION DERM
LOCATION DETAILED: LEFT SUPERIOR MEDIAL UPPER BACK
LOCATION DETAILED: RIGHT INFERIOR MEDIAL MIDBACK
LOCATION DETAILED: LEFT BUTTOCK
LOCATION DETAILED: LEFT SUPERIOR LATERAL MIDBACK
LOCATION DETAILED: RIGHT CLAVICULAR SKIN
LOCATION DETAILED: LEFT SUPERIOR PARIETAL SCALP
LOCATION DETAILED: RIGHT SUPERIOR OCCIPITAL SCALP
LOCATION DETAILED: LEFT SUPERIOR UPPER BACK
LOCATION DETAILED: LEFT CENTRAL EYEBROW
LOCATION DETAILED: LEFT INFERIOR UPPER BACK
LOCATION DETAILED: SUPERIOR LUMBAR SPINE
LOCATION DETAILED: LEFT MEDIAL UPPER BACK
LOCATION DETAILED: RIGHT INFERIOR UPPER BACK

## 2021-07-08 ASSESSMENT — LOCATION ZONE DERM
LOCATION ZONE: SCALP
LOCATION ZONE: TRUNK
LOCATION ZONE: FACE

## 2021-07-08 NOTE — PROCEDURE: DIAGNOSIS COMMENT
Comment: Bx proven, [id number], & [id number]
Render Risk Assessment In Note?: no
Detail Level: Detailed

## 2021-07-08 NOTE — PROCEDURE: LIQUID NITROGEN
Post-Care Instructions: I reviewed with the patient in detail post-care instructions. Patient is to wear sunprotection, and avoid picking at any of the treated lesions. Pt may apply Vaseline to crusted or scabbing areas.
Render Post-Care Instructions In Note?: no
Consent: The patient's consent was obtained including but not limited to risks of crusting, scabbing, blistering, scarring, darker or lighter pigmentary change, recurrence, incomplete removal and infection.
Medical Necessity Information: It is in your best interest to select a reason for this procedure from the list below. All of these items fulfill various CMS LCD requirements except the new and changing color options.
Medical Necessity Clause: This procedure was medically necessary because the lesions that were treated were:
Detail Level: Detailed
Aperture Size (Optional): A
Render Post-Care Instructions In Note?: yes
Number Of Freeze-Thaw Cycles: 1 freeze-thaw cycle
Duration Of Freeze Thaw-Cycle (Seconds): 3

## 2021-07-28 ENCOUNTER — TELEPHONE (OUTPATIENT)
Dept: SLEEP MEDICINE | Facility: MEDICAL CENTER | Age: 71
End: 2021-07-28

## 2021-07-28 ENCOUNTER — SLEEP CENTER VISIT (OUTPATIENT)
Dept: SLEEP MEDICINE | Facility: MEDICAL CENTER | Age: 71
End: 2021-07-28
Payer: MEDICARE

## 2021-07-28 ENCOUNTER — NON-PROVIDER VISIT (OUTPATIENT)
Dept: SLEEP MEDICINE | Facility: MEDICAL CENTER | Age: 71
End: 2021-07-28
Payer: MEDICARE

## 2021-07-28 VITALS
HEIGHT: 72 IN | SYSTOLIC BLOOD PRESSURE: 132 MMHG | RESPIRATION RATE: 16 BRPM | DIASTOLIC BLOOD PRESSURE: 60 MMHG | BODY MASS INDEX: 24.24 KG/M2 | OXYGEN SATURATION: 96 % | HEART RATE: 71 BPM | WEIGHT: 179 LBS

## 2021-07-28 DIAGNOSIS — R91.8 PULMONARY NODULES: ICD-10-CM

## 2021-07-28 DIAGNOSIS — R06.02 SOB (SHORTNESS OF BREATH): ICD-10-CM

## 2021-07-28 DIAGNOSIS — J45.30 MILD PERSISTENT ASTHMA WITHOUT COMPLICATION: ICD-10-CM

## 2021-07-28 PROCEDURE — 94010 BREATHING CAPACITY TEST: CPT | Performed by: INTERNAL MEDICINE

## 2021-07-28 PROCEDURE — 99214 OFFICE O/P EST MOD 30 MIN: CPT | Mod: 25 | Performed by: INTERNAL MEDICINE

## 2021-07-28 RX ORDER — AZELASTINE 1 MG/ML
SPRAY, METERED NASAL PRN
Status: ON HOLD | COMMUNITY
Start: 2021-07-19 | End: 2023-08-24

## 2021-07-28 ASSESSMENT — PULMONARY FUNCTION TESTS
FEV1_PREDICTED: 92
FVC: 4.14
FEV1/FVC_PREDICTED: 74.66
FVC_PERCENT_PREDICTED: 93
FEV1/FVC_PERCENT_PREDICTED: 99
FEV1/FVC: 73
FVC_PREDICTED: 4.42
FEV1_PREDICTED: 3.3
FEV1: 3.04

## 2021-07-28 ASSESSMENT — FIBROSIS 4 INDEX: FIB4 SCORE: 2.01

## 2021-07-28 NOTE — PROGRESS NOTES
Chief Complaint   Patient presents with   • Follow-Up     [facility], last seen 12/15/20 by Dr. Galan for SOB,Mild persistent asthma without complication,Pulmonary nodules    • Results     lesley 21     HPI: This patient is a 71 y.o. male returns for follow-up of asthma. Was using Symbicort 160 mcg however had anxiety symptoms and switched Breo 100ug.  He is tolerating Breo well without adverse effects.  Notes improved shortness of breath, cough and wheezing with long-acting bronchodilators.  Denies YARA use.  Spirometry shows improved FEV1 to 3.04 L or 92%.  This is interpreted as normal spirometry.  Chest CAT scan showed hyperinflation with 4-5 mm pulmonary nodules in the right middle and lower lobes.  He is pending annual follow-up chest CT.      Past Medical History:   Diagnosis Date   • Acute myocardial infarction of other anterior wall, episode of care unspecified ;    • Anxiety state, unspecified     • CAD (coronary artery disease) 2009    PCI/stent (Promus 3.0 x 15mm) to the LAD.   • Cataract     left eye   • Chickenpox    • Diabetes (HCC)     on oral meds   • Glaucoma    • Heart murmur    • High cholesterol    • Hyperlipidemia    • Mumps    • Pain    • S/P coronary artery stent placement     • Sleep apnea    • Snoring    • Unspecified essential hypertension         Social History     Socioeconomic History   • Marital status:      Spouse name: Not on file   • Number of children: Not on file   • Years of education: Not on file   • Highest education level: Not on file   Occupational History   • Not on file   Tobacco Use   • Smoking status: Former Smoker     Packs/day: 1.00     Years: 30.00     Pack years: 30.00     Types: Cigarettes     Quit date: 1997     Years since quittin.9   • Smokeless tobacco: Never Used   Vaping Use   • Vaping Use: Never used   Substance and Sexual Activity   • Alcohol use: No   • Drug use: No   • Sexual activity: Not on file   Other Topics Concern   • Not  on file   Social History Narrative   • Not on file     Social Determinants of Health     Financial Resource Strain:    • Difficulty of Paying Living Expenses:    Food Insecurity:    • Worried About Running Out of Food in the Last Year:    • Ran Out of Food in the Last Year:    Transportation Needs:    • Lack of Transportation (Medical):    • Lack of Transportation (Non-Medical):    Physical Activity:    • Days of Exercise per Week:    • Minutes of Exercise per Session:    Stress:    • Feeling of Stress :    Social Connections:    • Frequency of Communication with Friends and Family:    • Frequency of Social Gatherings with Friends and Family:    • Attends Worship Services:    • Active Member of Clubs or Organizations:    • Attends Club or Organization Meetings:    • Marital Status:    Intimate Partner Violence:    • Fear of Current or Ex-Partner:    • Emotionally Abused:    • Physically Abused:    • Sexually Abused:        Family History   Problem Relation Age of Onset   • Heart Disease Maternal Aunt    • Cancer Mother         colon       Current Outpatient Medications on File Prior to Visit   Medication Sig Dispense Refill   • albuterol 108 (90 Base) MCG/ACT Aero Soln inhalation aerosol Inhale 1-2 Puffs every four hours as needed for Shortness of Breath. 8.5 g 2   • nitroglycerin (NITROSTAT) 0.4 MG SL Tab PLACE 1 TAB UNDER TONGUE EVERY 5 MINS FOR UP TO 3 DOSES AS NEEDED FOR CHEST PAIN 25 Tab 5   • fluticasone (FLONASE) 50 MCG/ACT nasal spray Spray 1 Spray in nose every day.     • metformin ER (GLUCOPHAGE XR) 500 MG TABLET SR 24 HR Take 500 mg by mouth every day.     • gabapentin (NEURONTIN) 100 MG Cap Take 200 mg by mouth every evening. 300mg daily     • pantoprazole (PROTONIX) 40 MG TBEC Take 40 mg by mouth 2 times a day.     • aspirin 81 MG tablet Take 81 mg by mouth every day.     • fenofibrate (TRICOR) 48 MG TABS Take 48 mg by mouth every day.     • Probiotic Product (PROBIOTIC DAILY PO) Take 1 Tab by mouth  every day.     • azelastine (ASTELIN) 137 MCG/SPRAY nasal spray      • Bempedoic Acid 180 MG Tab Take 180 mg by mouth every day. (Patient not taking: Reported on 7/28/2021) 30 tablet 6   • budesonide-formoterol (SYMBICORT) 160-4.5 MCG/ACT Aerosol Inhale 2 Puffs by mouth 2 Times a Day. Use spacer. Rinse mouth after each use. (Patient not taking: Reported on 7/28/2021) 1 Each 5     No current facility-administered medications on file prior to visit.       Allergies: Crestor [rosuvastatin calcium], Ciprofloxacin, Lisinopril, and Statins [hmg-coa-r inhibitors]    ROS:   Constitutional: Denies fevers, chills, night sweats, fatigue or weight loss  Eyes: Denies vision loss, pain, drainage, double vision  Ears, Nose, Throat: Denies earache, difficulty hearing, tinnitus, nasal congestion, hoarseness  Cardiovascular: Denies chest pain, tightness, palpitations, orthopnea or edema  Respiratory: As in HPI  Sleep: Denies daytime sleepiness, snoring, apneas, insomnia, morning headaches  GI: Denies heartburn, dysphagia, nausea, abdominal pain, diarrhea or constipation  : Denies frequent urination, hematuria, discharge or painful urination  Musculoskeletal: Denies back pain, painful joints, sore muscles  Neurological: Denies weakness or headaches  Skin: No rashes    /60 (BP Location: Right arm, Patient Position: Sitting, BP Cuff Size: Adult)   Pulse 71   Resp 16   Ht 1.829 m (6')   Wt 81.2 kg (179 lb)   SpO2 96%     Physical Exam:  Appearance: Well-nourished, well-developed, in no acute distress  HEENT: Normocephalic, atraumatic, white sclera, PERRLA, masked  Neck: No adenopathy or masses  Respiratory: no intercostal retractions or accessory muscle use  Lungs auscultation: Clear to auscultation bilaterally  Cardiovascular: Regular rate rhythm. No murmurs, rubs or gallops.  No LE edema  Abdomen: soft, nondistended  Gait: Normal  Digits: No clubbing, cyanosis  Motor: No focal deficits  Orientation: Oriented to time,  person and place    Diagnosis:  1. Mild persistent asthma without complication  Fluticasone Furoate-Vilanterol (BREO ELLIPTA) 100-25 MCG/INH AEROSOL POWDER, BREATH ACTIVATED   2. Pulmonary nodules         Plan:  The patient's asthma has stabilized on Breo 100ug inhaler.  Spirometry shows improved FEV1 at 3.04 L or 92%.  Continue with treatment.  He was provided Breo samples and patient assistance forms to address the cost.  Pulmonary micronodules are felt benign.  He is pending annual follow-up chest CAT scan August 2021.  Return for after CT scan.

## 2021-07-28 NOTE — PROCEDURES
Good patient effort & cooperation. The results of this test meet the ATS standards for acceptability and repeatability. Test was performed on the [employer]/D spirometry system. Predicted values were GLI-2012.    Interpretation:  Normal spirometry, FEV1 3.04 L or 92%.

## 2021-07-28 NOTE — TELEPHONE ENCOUNTER
PT did not  last sample.   Used sample from 6/10/2021 and given to him in clinic today.  [facility] assistance paperwork given to patient also.

## 2021-08-05 ENCOUNTER — TELEPHONE (OUTPATIENT)
Dept: SLEEP MEDICINE | Facility: MEDICAL CENTER | Age: 71
End: 2021-08-05

## 2021-08-05 DIAGNOSIS — R91.8 PULMONARY NODULES: ICD-10-CM

## 2021-08-05 NOTE — TELEPHONE ENCOUNTER
Patient has a CT order pending signature. Are you able to sign off? Patient is due for CT in August. Thank you!

## 2021-08-27 ENCOUNTER — TELEPHONE (OUTPATIENT)
Dept: CARDIOLOGY | Facility: MEDICAL CENTER | Age: 71
End: 2021-08-27

## 2021-08-27 NOTE — TELEPHONE ENCOUNTER
Spoke with pt in regards to lab work that was previously ordered by [facility]. Pt stated he has not gotten lab work done but did complete lab work ordered by another provider a couple months ago at [facility]. Pt has follow up appointment scheduled with [facility] on 9/1/21.     Called [facility] to request lab results. [facility] stated most recent labs were done 5/21 and are being sent to fax # [phone]. Records pending.

## 2021-08-31 NOTE — PROGRESS NOTES
Cardiology Follow up Consultation Note    Date of note:    09/01/21  Primary Care Provider: Kailee Pappas M.D.    Patient Name: Darren Brunson   YOB: 1950  MRN:              [id number]    Chief Complaint: Follow-up CAD and dyslipidemia    History of Present Illness: Mr. Darren Brunson is a 71 y.o. male whose current medical problems include CAD (status post PCI to LAD in 2009, PCI to RCA in 2015), statin intolerance, hypertension, dyslipidemia, and diabetes who is here for follow-up CAD and dyslipidemia.    The patient is a patient of Dr. Amado, last seen 3/4/2021.  He was doing well during the last visit.  He has tried statins as well as ezetimibe, and had severe reactions to both.  He was started on bempedoic acid with plan to repeat labs in 2 months.  However,  bempedoic acid is expensive, and [facility] is [facility].    The patient returns today for follow-up.  The patient reports feeling very well today.  He has been exercising at [employer] gym classes (he does Pilates, yoga, and core training) without any chest pain or shortness of breath.  He denies any orthopnea, PND, or leg swelling.  No palpitations.  No syncope or presyncopal episodes.    Cardiovascular Risk Factors:  1. Smoking status: Former smoker  2. Type II Diabetes Mellitus: On medication  3. Hypertension: Controlled without antihypertensives  4. Dyslipidemia: Working on [facility] of [facility] at [facility]/statin intolerance  5. Family history of early Coronary Artery Disease in a first degree relative (Male less than 55 years of age; Female less than 65 years of age): None  6.  Obesity and/or Metabolic Syndrome: BMI 25.09  7. Sedentary lifestyle: He does exercise classes at [facility] (pilates, core and balance, yoga), walk sometimes.     Review of Systems   Constitutional: Negative for fever, malaise/fatigue and night sweats.   Cardiovascular: Negative for chest pain, dyspnea on exertion, irregular  heartbeat, leg swelling, near-syncope, orthopnea, palpitations, paroxysmal nocturnal dyspnea and syncope.   Respiratory: Negative for cough, shortness of breath and wheezing.    Gastrointestinal: Negative for abdominal pain, diarrhea, nausea and vomiting.   Neurological: Negative for dizziness, focal weakness and weakness.     All other systems reviewed and are negative.         Current Outpatient Medications   Medication Sig Dispense Refill   • Bempedoic Acid 180 MG Tab Take 180 mg by mouth every day. 30 Tablet 6   • azelastine (ASTELIN) 137 MCG/SPRAY nasal spray      • albuterol 108 (90 Base) MCG/ACT Aero Soln inhalation aerosol Inhale 1-2 Puffs every four hours as needed for Shortness of Breath. 8.5 g 2   • budesonide-formoterol (SYMBICORT) 160-4.5 MCG/ACT Aerosol Inhale 2 Puffs by mouth 2 Times a Day. Use spacer. Rinse mouth after each use. 1 Each 5   • nitroglycerin (NITROSTAT) 0.4 MG SL Tab PLACE 1 TAB UNDER TONGUE EVERY 5 MINS FOR UP TO 3 DOSES AS NEEDED FOR CHEST PAIN 25 Tab 5   • fluticasone (FLONASE) 50 MCG/ACT nasal spray Spray 1 Spray in nose every day.     • metformin ER (GLUCOPHAGE XR) 500 MG TABLET SR 24 HR Take 500 mg by mouth every day.     • gabapentin (NEURONTIN) 100 MG Cap Take 200 mg by mouth every evening. 300mg daily     • pantoprazole (PROTONIX) 40 MG TBEC Take 40 mg by mouth 2 times a day.     • aspirin 81 MG tablet Take 81 mg by mouth every day.     • fenofibrate (TRICOR) 48 MG TABS Take 48 mg by mouth every day.     • Probiotic Product (PROBIOTIC DAILY PO) Take 1 Tab by mouth every day.       No current facility-administered medications for this visit.         Allergies   Allergen Reactions   • Crestor [Rosuvastatin Calcium] Unspecified     Muscle pain    • Ciprofloxacin      Tendon pain   • Lisinopril    • Statins [Hmg-Coa-R Inhibitors]          Physical Exam:  Ambulatory Vitals  /66 (BP Location: Left arm, Patient Position: Sitting, BP Cuff Size: Adult)   Pulse 90   Ht 1.829 m  (6')   Wt 83.9 kg (185 lb)   SpO2 93%    Oxygen Therapy:  Pulse Oximetry: 93 %  BP Readings from Last 4 Encounters:   09/01/21 112/66   07/28/21 132/60   03/04/21 110/68   12/15/20 124/66       Weight/BMI: Body mass index is 25.09 kg/m².  Wt Readings from Last 4 Encounters:   09/01/21 83.9 kg (185 lb)   07/28/21 81.2 kg (179 lb)   03/04/21 85.7 kg (189 lb)   12/15/20 85.2 kg (187 lb 14.4 oz)       General: Well appearing and in no apparent distress  Eyes: nl conjunctiva, no icteric sclera  ENT: wearing a mask, normal external appearance of ears  Neck: no visible JVP,  no carotid bruits  Lungs: normal respiratory effort, CTAB  Heart: RRR, no murmurs, no rubs or gallops,  no edema bilateral lower extremities. No LV/RV heave on cardiac palpatation. + bilateral radial pulses.  + bilateral dp pulses.   Abdomen: soft, non tender, non distended, no masses, normal bowel sounds.  No HSM.  Extremities/MSK: no clubbing, no cyanosis  Neurological: No focal sensory deficits  Psychiatric: Appropriate affect, A/O x 3, intact judgement and insight  Skin: Warm extremities      Lab Data Review:  Lab Results   Component Value Date/Time    CHOLSTRLTOT 190 06/27/2020 12:47 AM     (H) 06/27/2020 12:47 AM    HDL 47 06/27/2020 12:47 AM    TRIGLYCERIDE 116 06/27/2020 12:47 AM       Lab Results   Component Value Date/Time    SODIUM 137 06/27/2020 12:47 AM    POTASSIUM 3.3 (L) 06/27/2020 12:47 AM    CHLORIDE 100 06/27/2020 12:47 AM    CO2 24 06/27/2020 12:47 AM    GLUCOSE 113 (H) 06/27/2020 12:47 AM    BUN 17 06/27/2020 12:47 AM    CREATININE 0.96 06/27/2020 12:47 AM     Lab Results   Component Value Date/Time    ALKPHOSPHAT 44 06/26/2020 01:50 PM    ASTSGOT 26 06/26/2020 01:50 PM    ALTSGPT 25 06/26/2020 01:50 PM    TBILIRUBIN 0.3 06/26/2020 01:50 PM      Lab Results   Component Value Date/Time    WBC 4.1 (L) 06/27/2020 12:47 AM     No results found for: HBA1C    Still may May 2021  Total cholesterol 184 HDL 51 triglyceride 95 LDL  113  Creatinine 0.89  Sodium 140 potassium 4.2 bili 0.6 AST 20 ALT 19 alk phos 37  WBC 4.1 Hemoglobin 14.9 Platelet 168  Hemoglobin a1c 5.6    Cardiac Imaging and Procedures Review:    EKG dated 6/28/2020: My personal interpretation is sinus rhythm    Echo dated 6/26/2020:   Prior echocardiogram 3/20/2018.  Technically difficult study.  Preserved left ventricular systolic function.  Left ventricular ejection fraction is difficult to assess.      Nuclear Perfusion Imaging (6/30/2020):   NUCLEAR IMAGING INTERPRETATION    No reversible defects that would indicate ischemia.    Matched defect in the inferior apex is either artifactual or due to a small    inferior wall infarct.    Normal left ventricular size, ejection fraction, and wall motion.    ECG INTERPRETATION    Normal ECG portion of a treadmill nuclear stress test.    Average exercise tolerance for age.    Normal BP response to exercise.    Sinus rhythm.    No ischemic ECG changes.    No chest pain during stress.    Ernandez treadmill score 6, low risk (assume no prior CAD).       Assessment & Plan     1. Statin intolerance  REFERRAL TO LIPID CLINIC   2. Atherosclerosis of native coronary artery of native heart without angina pectoris     3. Essential hypertension     4. Stented coronary artery           Shared Medical Decision Making:    CAD  Status post PCI to LAD in 2009, PCI to RCA in 2015.  Recent nuclear stress test in June 2020 without any reversible ischemia.  Patient is very active without any cardiac symptoms.  -Continue aspirin 81mg daily  -Cannot tolerate statin or ezetimibe.  [facility]mp[facility]ic [facility].  I will also refer him to lipid clinic as below for consideration of PCSK9 inhibitors.   -Continue exercise as well as healthy diet, as patient is already doing    Dyslipidemia  Statin intolerance  The patient also has intolerance to ezetimibe  -Refer to lipid clinic for consideration of PCSK9 inhibitors    Hypertension  BP  well controlled this visit  -Well controlled without anithypertensive.     All of the patient's excellent questions were answered to the best of my knowledge and to his satisfaction.  It was a pleasure seeing Mr. Darren Brunson in my clinic today. Return in about 6 months (around 3/1/2022). Patient is aware to call the cardiology clinic with any questions or concerns.      Tim Sotelo MD  [facility] [place] [facility], [address]  [address] [address]  Phone: [phone]  Fax: [phone]

## 2021-09-01 ENCOUNTER — OFFICE VISIT (OUTPATIENT)
Dept: CARDIOLOGY | Facility: MEDICAL CENTER | Age: 71
End: 2021-09-01
Payer: MEDICARE

## 2021-09-01 VITALS
BODY MASS INDEX: 25.06 KG/M2 | SYSTOLIC BLOOD PRESSURE: 112 MMHG | OXYGEN SATURATION: 93 % | WEIGHT: 185 LBS | DIASTOLIC BLOOD PRESSURE: 66 MMHG | HEART RATE: 90 BPM | HEIGHT: 72 IN

## 2021-09-01 DIAGNOSIS — Z95.5 STENTED CORONARY ARTERY: ICD-10-CM

## 2021-09-01 DIAGNOSIS — Z78.9 STATIN INTOLERANCE: ICD-10-CM

## 2021-09-01 DIAGNOSIS — I10 ESSENTIAL HYPERTENSION: ICD-10-CM

## 2021-09-01 DIAGNOSIS — I25.10 ATHEROSCLEROSIS OF NATIVE CORONARY ARTERY OF NATIVE HEART WITHOUT ANGINA PECTORIS: ICD-10-CM

## 2021-09-01 PROCEDURE — 99214 OFFICE O/P EST MOD 30 MIN: CPT | Performed by: STUDENT IN AN ORGANIZED HEALTH CARE EDUCATION/TRAINING PROGRAM

## 2021-09-01 ASSESSMENT — ENCOUNTER SYMPTOMS
WHEEZING: 0
ORTHOPNEA: 0
DYSPNEA ON EXERTION: 0
WEAKNESS: 0
PALPITATIONS: 0
DIZZINESS: 0
IRREGULAR HEARTBEAT: 0
DIARRHEA: 0
PND: 0
ABDOMINAL PAIN: 0
NIGHT SWEATS: 0
FEVER: 0
VOMITING: 0
FOCAL WEAKNESS: 0
SYNCOPE: 0
COUGH: 0
NAUSEA: 0
SHORTNESS OF BREATH: 0
NEAR-SYNCOPE: 0

## 2021-09-01 ASSESSMENT — FIBROSIS 4 INDEX: FIB4 SCORE: 2.01

## 2021-09-20 DIAGNOSIS — J45.30 MILD PERSISTENT ASTHMA WITHOUT COMPLICATION: ICD-10-CM

## 2021-09-20 RX ORDER — BUDESONIDE AND FORMOTEROL FUMARATE DIHYDRATE 160; 4.5 UG/1; UG/1
AEROSOL RESPIRATORY (INHALATION)
Qty: 1 EACH | Refills: 5 | Status: SHIPPED | OUTPATIENT
Start: 2021-09-20 | End: 2022-11-29

## 2021-09-20 NOTE — TELEPHONE ENCOUNTER
Have we ever prescribed this med? Yes.  If yes, what date? 09/21/2020    Last OV: 07/28/2021 - Dr. Galan    Next OV: 01/04/2022 - Dr. Vazquez    DX: Asthma    Medications: Symbicort

## 2021-11-22 NOTE — TELEPHONE ENCOUNTER
Completed form and requested records faxed to # on form  Completed form and fax confirmation scanned into media tab   
I reviewed with [facility] referral is ready for schedule. Per pt request  pulmonary pt was contacted scheduled to see Dr. Galan and placed on a wait list.   
Pt called \[facility]\"" has requested from current providers to send a pulmonary referral. Pt \[facility]\"" will like to establish as Pulmonary not sleep. I informed the patient I will review with referrals to see if referral placed is appropriate.   referral placed by Dr. Amado 6/30/2020   
<--- Click to Launch ICDx for PreOp, PostOp and Procedure

## 2022-01-03 ENCOUNTER — HOSPITAL ENCOUNTER (OUTPATIENT)
Dept: RADIOLOGY | Facility: MEDICAL CENTER | Age: 72
End: 2022-01-03
Payer: MEDICARE

## 2022-01-04 ENCOUNTER — OFFICE VISIT (OUTPATIENT)
Dept: SLEEP MEDICINE | Facility: MEDICAL CENTER | Age: 72
End: 2022-01-04
Payer: MEDICARE

## 2022-01-04 VITALS
HEART RATE: 80 BPM | BODY MASS INDEX: 24.65 KG/M2 | WEIGHT: 182 LBS | SYSTOLIC BLOOD PRESSURE: 128 MMHG | TEMPERATURE: 97.7 F | OXYGEN SATURATION: 97 % | HEIGHT: 72 IN | RESPIRATION RATE: 16 BRPM | DIASTOLIC BLOOD PRESSURE: 76 MMHG

## 2022-01-04 DIAGNOSIS — R91.8 PULMONARY NODULES: ICD-10-CM

## 2022-01-04 DIAGNOSIS — Z87.891 FORMER SMOKER: ICD-10-CM

## 2022-01-04 DIAGNOSIS — J45.30 MILD PERSISTENT ASTHMA WITHOUT COMPLICATION: ICD-10-CM

## 2022-01-04 DIAGNOSIS — G47.33 OSA (OBSTRUCTIVE SLEEP APNEA): ICD-10-CM

## 2022-01-04 PROCEDURE — 99214 OFFICE O/P EST MOD 30 MIN: CPT | Performed by: INTERNAL MEDICINE

## 2022-01-04 ASSESSMENT — ENCOUNTER SYMPTOMS
SPEECH CHANGE: 0
FOCAL WEAKNESS: 0
SORE THROAT: 0
DEPRESSION: 0
EYE PAIN: 0
SHORTNESS OF BREATH: 0
EYE REDNESS: 0
CONSTIPATION: 0
SPUTUM PRODUCTION: 0
WHEEZING: 0
BACK PAIN: 0
PALPITATIONS: 0
VOMITING: 0
DIARRHEA: 0
HEMOPTYSIS: 0
DIZZINESS: 0
NAUSEA: 0
PND: 0
EYE DISCHARGE: 0
WEIGHT LOSS: 0
COUGH: 0
NECK PAIN: 0
BLURRED VISION: 0
CHILLS: 0
TREMORS: 0
DIAPHORESIS: 0
CLAUDICATION: 0
HEARTBURN: 0
FALLS: 0
DOUBLE VISION: 0
STRIDOR: 0
ABDOMINAL PAIN: 0
HEADACHES: 0
PHOTOPHOBIA: 0
SINUS PAIN: 0
MYALGIAS: 0
FEVER: 0
WEAKNESS: 0
ORTHOPNEA: 0

## 2022-01-04 ASSESSMENT — FIBROSIS 4 INDEX: FIB4 SCORE: 2.01

## 2022-01-04 NOTE — PROGRESS NOTES
Chief Complaint   Patient presents with   • Shortness of Breath     last seen 7/28/21 Dr. Galan    • Results     [facility] Ct Chest Thorax 8/18/21          HPI: This patient is a 71 y.o. male whom is followed in our clinic for asthma  last seen by Dr. Galan on 7/28/21. The pt also has a dx of ANTIONE for which he underwent UPPP in the past.  He is a former tobacco smoker with 30-pack-year history and quit in 1997.  CT chest from August 2020 showed subcentimeter pulmonary nodules and repeat CT chest from August 2021 showed stable pulmonary nodules largest of 5 mm in size without significant adenopathy.  With regards to asthma, patient's symptoms are mild and controlled with Symbicort 160 which she uses as needed due to side effects from bronchodilator.  He was not able to afford Breo which was offered as an alternative in the past.  Symptoms have been well controlled without exacerbations with as needed Symbicort and this minimizes his side effects.  He is up-to-date on vaccines.  Biometry from July showed no airflow obstruction with normal FEV1.  No acute complaints today.    Past Medical History:   Diagnosis Date   • Acute myocardial infarction of other anterior wall, episode of care unspecified 2009; 2014   • Anxiety state, unspecified     • CAD (coronary artery disease) September 2009    PCI/stent (Promus 3.0 x 15mm) to the LAD.   • Cataract     left eye   • Chickenpox    • Diabetes (HCC)     on oral meds   • Glaucoma    • Heart murmur    • High cholesterol    • Hyperlipidemia    • Mumps    • Pain    • S/P coronary artery stent placement     • Sleep apnea    • Snoring    • Unspecified essential hypertension         Social History     Socioeconomic History   • Marital status:      Spouse name: Not on file   • Number of children: Not on file   • Years of education: Not on file   • Highest education level: Not on file   Occupational History   • Not on file   Tobacco Use   • Smoking status: Former Smoker     Packs/day:  1.00     Years: 30.00     Pack years: 30.00     Types: Cigarettes     Quit date: 1997     Years since quittin.3   • Smokeless tobacco: Never Used   Vaping Use   • Vaping Use: Never used   Substance and Sexual Activity   • Alcohol use: No   • Drug use: No   • Sexual activity: Not on file   Other Topics Concern   • Not on file   Social History Narrative   • Not on file     Social Determinants of Health     Financial Resource Strain:    • Difficulty of Paying Living Expenses: Not on file   Food Insecurity:    • Worried About Running Out of Food in the Last Year: Not on file   • Ran Out of Food in the Last Year: Not on file   Transportation Needs:    • Lack of Transportation (Medical): Not on file   • Lack of Transportation (Non-Medical): Not on file   Physical Activity:    • Days of Exercise per Week: Not on file   • Minutes of Exercise per Session: Not on file   Stress:    • Feeling of Stress : Not on file   Social Connections:    • Frequency of Communication with Friends and Family: Not on file   • Frequency of Social Gatherings with Friends and Family: Not on file   • Attends Holiness Services: Not on file   • Active Member of Clubs or Organizations: Not on file   • Attends Club or Organization Meetings: Not on file   • Marital Status: Not on file   Intimate Partner Violence:    • Fear of Current or Ex-Partner: Not on file   • Emotionally Abused: Not on file   • Physically Abused: Not on file   • Sexually Abused: Not on file   Housing Stability:    • Unable to Pay for Housing in the Last Year: Not on file   • Number of Places Lived in the Last Year: Not on file   • Unstable Housing in the Last Year: Not on file       Family History   Problem Relation Age of Onset   • Heart Disease Maternal Aunt    • Cancer Mother         colon       Current Outpatient Medications on File Prior to Visit   Medication Sig Dispense Refill   • budesonide-formoterol (SYMBICORT) 160-4.5 MCG/ACT Aerosol INHALE 2 PUFFS BY MOUTH  TWICE A DAY. USE WITH SPACER AND RINSE MOUTH AFTER EACH USE 1 Each 5   • azelastine (ASTELIN) 137 MCG/SPRAY nasal spray      • albuterol 108 (90 Base) MCG/ACT Aero Soln inhalation aerosol Inhale 1-2 Puffs every four hours as needed for Shortness of Breath. 8.5 g 2   • nitroglycerin (NITROSTAT) 0.4 MG SL Tab PLACE 1 TAB UNDER TONGUE EVERY 5 MINS FOR UP TO 3 DOSES AS NEEDED FOR CHEST PAIN 25 Tab 5   • fluticasone (FLONASE) 50 MCG/ACT nasal spray Spray 1 Spray in nose every day.     • metformin ER (GLUCOPHAGE XR) 500 MG TABLET SR 24 HR Take 500 mg by mouth every day.     • gabapentin (NEURONTIN) 100 MG Cap Take 200 mg by mouth every evening. 300mg daily     • pantoprazole (PROTONIX) 40 MG TBEC Take 40 mg by mouth 2 times a day.     • aspirin 81 MG tablet Take 81 mg by mouth every day.     • fenofibrate (TRICOR) 48 MG TABS Take 48 mg by mouth every day.     • Probiotic Product (PROBIOTIC DAILY PO) Take 1 Tab by mouth every day.     • Bempedoic Acid 180 MG Tab Take 180 mg by mouth every day. 30 Tablet 6     No current facility-administered medications on file prior to visit.       Crestor [rosuvastatin calcium], Ciprofloxacin, Lisinopril, and Statins [hmg-coa-r inhibitors]      ROS:   Review of Systems   Constitutional: Negative for chills, diaphoresis, fever, malaise/fatigue and weight loss.   HENT: Negative for congestion, ear discharge, ear pain, hearing loss, nosebleeds, sinus pain, sore throat and tinnitus.    Eyes: Negative for blurred vision, double vision, photophobia, pain, discharge and redness.   Respiratory: Negative for cough, hemoptysis, sputum production, shortness of breath, wheezing and stridor.    Cardiovascular: Negative for chest pain, palpitations, orthopnea, claudication, leg swelling and PND.   Gastrointestinal: Negative for abdominal pain, constipation, diarrhea, heartburn, nausea and vomiting.   Genitourinary: Negative for dysuria and urgency.   Musculoskeletal: Negative for back pain, falls,  joint pain, myalgias and neck pain.   Skin: Negative for itching and rash.   Neurological: Negative for dizziness, tremors, speech change, focal weakness, weakness and headaches.   Endo/Heme/Allergies: Negative for environmental allergies.   Psychiatric/Behavioral: Negative for depression.       /76 (BP Location: Right arm, Patient Position: Sitting, BP Cuff Size: Adult)   Pulse 80   Temp 36.5 °C (97.7 °F) (Temporal)   Resp 16   Ht 1.829 m (6')   Wt 82.6 kg (182 lb)   SpO2 97%   Physical Exam  Vitals reviewed.   Constitutional:       General: He is not in acute distress.     Appearance: Normal appearance. He is normal weight.   HENT:      Head: Normocephalic and atraumatic.      Right Ear: External ear normal.      Left Ear: External ear normal.      Nose: Nose normal. No congestion.      Mouth/Throat:      Mouth: Mucous membranes are moist.      Pharynx: Oropharynx is clear. No oropharyngeal exudate.   Eyes:      General: No scleral icterus.     Extraocular Movements: Extraocular movements intact.      Conjunctiva/sclera: Conjunctivae normal.      Pupils: Pupils are equal, round, and reactive to light.   Cardiovascular:      Rate and Rhythm: Normal rate and regular rhythm.      Heart sounds: Normal heart sounds. No murmur heard.  No gallop.    Pulmonary:      Effort: Pulmonary effort is normal. No respiratory distress.      Breath sounds: Wheezing present. No rales.      Comments: Mild upper lobe expiratory wheeze  Abdominal:      General: There is no distension.      Palpations: Abdomen is soft.   Musculoskeletal:         General: Normal range of motion.      Cervical back: Normal range of motion and neck supple.      Right lower leg: No edema.      Left lower leg: No edema.   Skin:     General: Skin is warm and dry.      Findings: No rash.   Neurological:      Mental Status: He is alert and oriented to person, place, and time.      Cranial Nerves: No cranial nerve deficit.   Psychiatric:         Mood  and Affect: Mood normal.         Behavior: Behavior normal.         PFTs as reviewed by me personally: as per hPI    Imaging as reviewed by me personally:  As per HPI    Assessment:  1. Mild persistent asthma without complication  CT-CHEST (THORAX) W/O    Spirometry   2. Pulmonary nodules     3. Former smoker     4. ANTIONE (obstructive sleep apnea)         Plan:  1.  This is a chronic diagnosis mild to moderate in severity but symptoms are well controlled on as needed Symbicort which we will continue.  Update spirometry at follow-up.  2.  Subcentimeter pulmonary nodules in a patient with significant tobacco history although he has been tobacco free for period of 15 years.  At this point I would recommend a minimum of 2 years surveillance imaging up to 5 years.  Repeat CT chest in August.  3.  Patient tobacco free and has been so for greater than 20 years.  Encouraged ongoing abstinence.  4.  This was severe based on respiratory disturbance index from home sleep study in 2017 of 32 events per hour.  Patient reports resolution of excessive daytime sleepiness following UPPP  Return in about 8 months (around 9/4/2022) for CT chest, spirometry.

## 2022-05-31 DIAGNOSIS — J45.30 MILD PERSISTENT ASTHMA WITHOUT COMPLICATION: ICD-10-CM

## 2022-05-31 RX ORDER — ALBUTEROL SULFATE 90 UG/1
AEROSOL, METERED RESPIRATORY (INHALATION)
Qty: 8.5 EACH | Refills: 11 | Status: SHIPPED | OUTPATIENT
Start: 2022-05-31

## 2022-05-31 NOTE — TELEPHONE ENCOUNTER
Caller Name: Darren Brunson                 Call Back Number: [phone] (home)         Patient approves a detailed voicemail message: N\A    Have we ever prescribed this med? Yes.  If yes, what date? 12/15/2020    Last OV: 1/4/22 Dr. Vazquez     Next OV: 9/7/22 DR. Vazquez     DX: Mild persistent asthma without complication     Medications:  Current Outpatient Medications   Medication Sig Dispense Refill   • budesonide-formoterol (SYMBICORT) 160-4.5 MCG/ACT Aerosol INHALE 2 PUFFS BY MOUTH TWICE A DAY. USE WITH SPACER AND RINSE MOUTH AFTER EACH USE 1 Each 5   • Bempedoic Acid 180 MG Tab Take 180 mg by mouth every day. 30 Tablet 6   • azelastine (ASTELIN) 137 MCG/SPRAY nasal spray      • albuterol 108 (90 Base) MCG/ACT Aero Soln inhalation aerosol Inhale 1-2 Puffs every four hours as needed for Shortness of Breath. 8.5 g 2   • nitroglycerin (NITROSTAT) 0.4 MG SL Tab PLACE 1 TAB UNDER TONGUE EVERY 5 MINS FOR UP TO 3 DOSES AS NEEDED FOR CHEST PAIN 25 Tab 5   • fluticasone (FLONASE) 50 MCG/ACT nasal spray Spray 1 Spray in nose every day.     • metformin ER (GLUCOPHAGE XR) 500 MG TABLET SR 24 HR Take 500 mg by mouth every day.     • gabapentin (NEURONTIN) 100 MG Cap Take 200 mg by mouth every evening. 300mg daily     • pantoprazole (PROTONIX) 40 MG TBEC Take 40 mg by mouth 2 times a day.     • aspirin 81 MG tablet Take 81 mg by mouth every day.     • fenofibrate (TRICOR) 48 MG TABS Take 48 mg by mouth every day.     • Probiotic Product (PROBIOTIC DAILY PO) Take 1 Tab by mouth every day.       No current facility-administered medications for this visit.

## 2022-06-01 ENCOUNTER — HOSPITAL ENCOUNTER (EMERGENCY)
Facility: MEDICAL CENTER | Age: 72
End: 2022-06-01
Attending: EMERGENCY MEDICINE
Payer: MEDICARE

## 2022-06-01 ENCOUNTER — APPOINTMENT (OUTPATIENT)
Dept: RADIOLOGY | Facility: MEDICAL CENTER | Age: 72
End: 2022-06-01
Attending: EMERGENCY MEDICINE
Payer: MEDICARE

## 2022-06-01 VITALS
DIASTOLIC BLOOD PRESSURE: 84 MMHG | WEIGHT: 181.66 LBS | HEART RATE: 70 BPM | BODY MASS INDEX: 24.61 KG/M2 | HEIGHT: 72 IN | OXYGEN SATURATION: 96 % | SYSTOLIC BLOOD PRESSURE: 168 MMHG | RESPIRATION RATE: 18 BRPM | TEMPERATURE: 97.8 F

## 2022-06-01 DIAGNOSIS — T78.40XA ALLERGIC REACTION, INITIAL ENCOUNTER: ICD-10-CM

## 2022-06-01 LAB
ALBUMIN SERPL BCP-MCNC: 4.3 G/DL (ref 3.2–4.9)
ALBUMIN/GLOB SERPL: 1.9 G/DL
ALP SERPL-CCNC: 47 U/L (ref 30–99)
ALT SERPL-CCNC: 23 U/L (ref 2–50)
ANION GAP SERPL CALC-SCNC: 10 MMOL/L (ref 7–16)
AST SERPL-CCNC: 27 U/L (ref 12–45)
BASOPHILS # BLD AUTO: 0.3 % (ref 0–1.8)
BASOPHILS # BLD: 0.01 K/UL (ref 0–0.12)
BILIRUB SERPL-MCNC: 0.4 MG/DL (ref 0.1–1.5)
BUN SERPL-MCNC: 17 MG/DL (ref 8–22)
CALCIUM SERPL-MCNC: 9.2 MG/DL (ref 8.5–10.5)
CHLORIDE SERPL-SCNC: 99 MMOL/L (ref 96–112)
CO2 SERPL-SCNC: 25 MMOL/L (ref 20–33)
CREAT SERPL-MCNC: 0.9 MG/DL (ref 0.5–1.4)
EKG IMPRESSION: NORMAL
EKG IMPRESSION: NORMAL
EOSINOPHIL # BLD AUTO: 0.07 K/UL (ref 0–0.51)
EOSINOPHIL NFR BLD: 1.8 % (ref 0–6.9)
ERYTHROCYTE [DISTWIDTH] IN BLOOD BY AUTOMATED COUNT: 42.8 FL (ref 35.9–50)
GFR SERPLBLD CREATININE-BSD FMLA CKD-EPI: 90 ML/MIN/1.73 M 2
GLOBULIN SER CALC-MCNC: 2.3 G/DL (ref 1.9–3.5)
GLUCOSE SERPL-MCNC: 117 MG/DL (ref 65–99)
HCT VFR BLD AUTO: 43.7 % (ref 42–52)
HGB BLD-MCNC: 15.2 G/DL (ref 14–18)
IMM GRANULOCYTES # BLD AUTO: 0.01 K/UL (ref 0–0.11)
IMM GRANULOCYTES NFR BLD AUTO: 0.3 % (ref 0–0.9)
LYMPHOCYTES # BLD AUTO: 0.62 K/UL (ref 1–4.8)
LYMPHOCYTES NFR BLD: 15.9 % (ref 22–41)
MCH RBC QN AUTO: 31 PG (ref 27–33)
MCHC RBC AUTO-ENTMCNC: 34.8 G/DL (ref 33.7–35.3)
MCV RBC AUTO: 89 FL (ref 81.4–97.8)
MONOCYTES # BLD AUTO: 0.34 K/UL (ref 0–0.85)
MONOCYTES NFR BLD AUTO: 8.7 % (ref 0–13.4)
NEUTROPHILS # BLD AUTO: 2.85 K/UL (ref 1.82–7.42)
NEUTROPHILS NFR BLD: 73 % (ref 44–72)
NRBC # BLD AUTO: 0 K/UL
NRBC BLD-RTO: 0 /100 WBC
PLATELET # BLD AUTO: 210 K/UL (ref 164–446)
PMV BLD AUTO: 8.8 FL (ref 9–12.9)
POTASSIUM SERPL-SCNC: 4.2 MMOL/L (ref 3.6–5.5)
PROT SERPL-MCNC: 6.6 G/DL (ref 6–8.2)
RBC # BLD AUTO: 4.91 M/UL (ref 4.7–6.1)
SODIUM SERPL-SCNC: 134 MMOL/L (ref 135–145)
TROPONIN T SERPL-MCNC: 12 NG/L (ref 6–19)
TROPONIN T SERPL-MCNC: 14 NG/L (ref 6–19)
WBC # BLD AUTO: 3.9 K/UL (ref 4.8–10.8)

## 2022-06-01 PROCEDURE — 96374 THER/PROPH/DIAG INJ IV PUSH: CPT

## 2022-06-01 PROCEDURE — 85025 COMPLETE CBC W/AUTO DIFF WBC: CPT

## 2022-06-01 PROCEDURE — 71045 X-RAY EXAM CHEST 1 VIEW: CPT

## 2022-06-01 PROCEDURE — 84484 ASSAY OF TROPONIN QUANT: CPT

## 2022-06-01 PROCEDURE — 700111 HCHG RX REV CODE 636 W/ 250 OVERRIDE (IP): Performed by: EMERGENCY MEDICINE

## 2022-06-01 PROCEDURE — 93005 ELECTROCARDIOGRAM TRACING: CPT | Performed by: EMERGENCY MEDICINE

## 2022-06-01 PROCEDURE — 96375 TX/PRO/DX INJ NEW DRUG ADDON: CPT

## 2022-06-01 PROCEDURE — 99284 EMERGENCY DEPT VISIT MOD MDM: CPT

## 2022-06-01 PROCEDURE — 80053 COMPREHEN METABOLIC PANEL: CPT

## 2022-06-01 PROCEDURE — 36415 COLL VENOUS BLD VENIPUNCTURE: CPT

## 2022-06-01 PROCEDURE — 93005 ELECTROCARDIOGRAM TRACING: CPT

## 2022-06-01 RX ORDER — DEXAMETHASONE SODIUM PHOSPHATE 4 MG/ML
4 INJECTION, SOLUTION INTRA-ARTICULAR; INTRALESIONAL; INTRAMUSCULAR; INTRAVENOUS; SOFT TISSUE ONCE
Status: COMPLETED | OUTPATIENT
Start: 2022-06-01 | End: 2022-06-01

## 2022-06-01 RX ORDER — DIPHENHYDRAMINE HYDROCHLORIDE 50 MG/ML
25 INJECTION INTRAMUSCULAR; INTRAVENOUS ONCE
Status: COMPLETED | OUTPATIENT
Start: 2022-06-01 | End: 2022-06-01

## 2022-06-01 RX ADMIN — DIPHENHYDRAMINE HYDROCHLORIDE 25 MG: 50 INJECTION INTRAMUSCULAR; INTRAVENOUS at 11:34

## 2022-06-01 RX ADMIN — DEXAMETHASONE SODIUM PHOSPHATE 4 MG: 4 INJECTION, SOLUTION INTRA-ARTICULAR; INTRALESIONAL; INTRAMUSCULAR; INTRAVENOUS; SOFT TISSUE at 11:23

## 2022-06-01 ASSESSMENT — FIBROSIS 4 INDEX: FIB4 SCORE: 2.01

## 2022-06-01 NOTE — ED PROVIDER NOTES
ED Provider Note    CHIEF COMPLAINT  Chief Complaint   Patient presents with   • Heartburn     Patient has noted burning in his abdomen since yesterday   • Facial Swelling     At 8:30 am patient states that he started to notice swelling in his mouth and jaw. He is speaking clear full sentences. States he feels he has trouble swallowing. PMH 2 MIs with 3 stents       HPI  Darren Brunson is a 71 y.o. male who presents for evaluation of lower facial swelling, heartburn type symptoms but no chest pain.  The patient has a complex history including coronary artery disease.  He is currently not on any medications.  Specifically no history of ACE inhibitor use.  He does report having allergies    REVIEW OF SYSTEMS  See HPI for further details. All other systems are negative.     PAST MEDICAL HISTORY  Past Medical History:   Diagnosis Date   • CAD (coronary artery disease) 2009    PCI/stent (Promus 3.0 x 15mm) to the LAD.   • Acute myocardial infarction of other anterior wall, episode of care unspecified ;    • Anxiety state, unspecified     • Cataract     left eye   • Chickenpox    • Diabetes (HCC)     on oral meds   • Glaucoma    • Heart murmur    • High cholesterol    • Hyperlipidemia    • Mumps    • Pain    • S/P coronary artery stent placement     • Sleep apnea    • Snoring    • Unspecified essential hypertension         FAMILY HISTORY  [unfilled]    SOCIAL HISTORY  Social History     Socioeconomic History   • Marital status:    Tobacco Use   • Smoking status: Former Smoker     Packs/day: 1.00     Years: 30.00     Pack years: 30.00     Types: Cigarettes     Quit date: 1997     Years since quittin.7   • Smokeless tobacco: Never Used   Vaping Use   • Vaping Use: Never used   Substance and Sexual Activity   • Alcohol use: No   • Drug use: No       SURGICAL HISTORY  Past Surgical History:   Procedure Laterality Date   • SEPTAL RECONSTRUCTION N/A 10/23/2017    Procedure: SEPTAL  RECONSTRUCTION;  Surgeon: CHARLI Sanchez M.D.;  Location: SURGERY SAME DAY [facility];  Service: Ent   • UVULOPHARYNGOPALATOPLASTY N/A 10/23/2017    Procedure: UVULOPHARYNGOPALATOPLASTY;  Surgeon: CHARLI Sanchez M.D.;  Location: SURGERY SAME DAY [facility];  Service: Ent   • BIOPSY GENERAL Left 10/23/2017    Procedure: BIOPSY GENERAL LEFT UPPER NECK;  Surgeon: CHARLI Sanchez M.D.;  Location: SURGERY SAME DAY [facility];  Service: Ent   • OTHER  November 2011    Sinus surgery   • INGUINAL HERNIA REPAIR BILATERAL     • OTHER CARDIAC SURGERY      cardiac cath with stent placement   • SINUSCOPE     • TONSILLECTOMY     • UMBILICAL HERNIA REPAIR         CURRENT MEDICATIONS  Home Medications     Reviewed by Paula Lawrence R.N. (Registered Nurse) on 06/01/22 at 1017  Med List Status: Partial   Medication Last Dose Status   albuterol 108 (90 Base) MCG/ACT Aero Soln inhalation aerosol  Active   aspirin 81 MG tablet  Active   azelastine (ASTELIN) 137 MCG/SPRAY nasal spray  Active   Bempedoic Acid 180 MG Tab  Active   budesonide-formoterol (SYMBICORT) 160-4.5 MCG/ACT Aerosol  Active   fenofibrate (TRICOR) 48 MG TABS  Active   fluticasone (FLONASE) 50 MCG/ACT nasal spray  Active   gabapentin (NEURONTIN) 100 MG Cap  Active   metformin ER (GLUCOPHAGE XR) 500 MG TABLET SR 24 HR  Active   nitroglycerin (NITROSTAT) 0.4 MG SL Tab  Active   pantoprazole (PROTONIX) 40 MG TBEC  Active   Probiotic Product (PROBIOTIC DAILY PO)  Active                ALLERGIES  Allergies   Allergen Reactions   • Crestor [Rosuvastatin Calcium] Unspecified     Muscle pain    • Ciprofloxacin      Tendon pain   • Lisinopril    • Statins [Hmg-Coa-R Inhibitors]        PHYSICAL EXAM  VITAL SIGNS: BP (!) 187/93   Pulse 75   Temp 36.3 °C (97.3 °F) (Temporal)   Resp 18   Ht 1.829 m (6')   Wt 82.4 kg (181 lb 10.5 oz)   SpO2 97%   BMI 24.64 kg/m²       Constitutional: Well developed, Well nourished, No acute distress, Non-toxic appearance.    HENT: Normocephalic, Atraumatic, Bilateral external ears normal, Oropharynx moist, No oral exudates, Nose normal.   Eyes: PERRLA, EOMI, Conjunctiva normal, No discharge.   Neck: Normal range of motion, No tenderness, Supple, No stridor.   Lymphatic: No lymphadenopathy noted.   Cardiovascular: Normal heart rate, Normal rhythm, No murmurs, No rubs, No gallops.   Thorax & Lungs: Normal breath sounds, No respiratory distress, No wheezing, No chest tenderness.   Abdomen: Bowel sounds normal, Soft, No tenderness, No masses, No pulsatile masses.   Skin: Warm, Dry, No erythema, No rash.   Back: No tenderness, No CVA tenderness.   Genitalia: External genitalia appear normal, No masses or lesions. No discharge.   Rectal: Normal appearance, Normal sphincter tone. No external or internal lesions noted. Stool is normal color and heme negative.   Extremities: Intact distal pulses, No edema, No tenderness, No cyanosis, No clubbing.   Musculoskeletal: Good range of motion in all major joints. No tenderness to palpation or major deformities noted.   Neurologic: Alert & oriented x 3, Normal motor function, Normal sensory function, No focal deficits noted.   Psychiatric: Affect normal, Judgment normal, Mood normal.     EKG  ***    RADIOLOGY/PROCEDURES  ***    COURSE & MEDICAL DECISION MAKING  Pertinent Labs & Imaging studies reviewed. (See chart for details)  ***    FINAL IMPRESSION  1. ***  2.   3.         Electronically signed by: Sandro Monahan M.D., 6/1/2022 11:10 AM

## 2022-06-01 NOTE — ED PROVIDER NOTES
ED Provider Note    CHIEF COMPLAINT  Chief Complaint   Patient presents with   • Heartburn     Patient has noted burning in his abdomen since yesterday   • Facial Swelling     At 8:30 am patient states that he started to notice swelling in his mouth and jaw. He is speaking clear full sentences. States he feels he has trouble swallowing. PMH 2 MIs with 3 stents       HPI  Darren Brunson is a 71 y.o. male who presents for evaluation of lower facial swelling heartburn type symptoms including epigastric discomfort with burning in the chest.  The patient had a complex history including coronary artery disease.  He is currently off all of his heart medications.  Other than baby aspirin.  He is not on any ACE inhibitor or ARB agent.  He reports being exposed to some sagebrush recently and had nasal pruritus swelling around the face and now reported some subjective discomfort in the throat.  No report of any stridor or trouble breathing or swallowing.  He and his wife are concerned because his presentation for acute coronary syndrome other times never involved chest pain he had atypical symptoms every other time he had acute coronary syndrome.    REVIEW OF SYSTEMS  See HPI for further details.  No high fevers chills night sweats weight loss all other systems are negative.     PAST MEDICAL HISTORY  Past Medical History:   Diagnosis Date   • CAD (coronary artery disease) September 2009    PCI/stent (Promus 3.0 x 15mm) to the LAD.   • Acute myocardial infarction of other anterior wall, episode of care unspecified 2009; 2014   • Anxiety state, unspecified     • Cataract     left eye   • Chickenpox    • Diabetes (HCC)     on oral meds   • Glaucoma    • Heart murmur    • High cholesterol    • Hyperlipidemia    • Mumps    • Pain    • S/P coronary artery stent placement     • Sleep apnea    • Snoring    • Unspecified essential hypertension         FAMILY HISTORY  Noncontributory    SOCIAL HISTORY  Social History      Socioeconomic History   • Marital status:    Tobacco Use   • Smoking status: Former Smoker     Packs/day: 1.00     Years: 30.00     Pack years: 30.00     Types: Cigarettes     Quit date: 1997     Years since quittin.7   • Smokeless tobacco: Never Used   Vaping Use   • Vaping Use: Never used   Substance and Sexual Activity   • Alcohol use: No   • Drug use: No       SURGICAL HISTORY  Past Surgical History:   Procedure Laterality Date   • SEPTAL RECONSTRUCTION N/A 10/23/2017    Procedure: SEPTAL RECONSTRUCTION;  Surgeon: CHARLI Sanchez M.D.;  Location: SURGERY SAME DAY [facility] ORS;  Service: Ent   • UVULOPHARYNGOPALATOPLASTY N/A 10/23/2017    Procedure: UVULOPHARYNGOPALATOPLASTY;  Surgeon: CHARLI Sanchez M.D.;  Location: SURGERY SAME DAY [facility] ORS;  Service: Ent   • BIOPSY GENERAL Left 10/23/2017    Procedure: BIOPSY GENERAL LEFT UPPER NECK;  Surgeon: CHARLI Sanchez M.D.;  Location: SURGERY SAME DAY [facility] ORS;  Service: Ent   • OTHER  2011    Sinus surgery   • INGUINAL HERNIA REPAIR BILATERAL     • OTHER CARDIAC SURGERY      cardiac cath with stent placement   • SINUSCOPE     • TONSILLECTOMY     • UMBILICAL HERNIA REPAIR         CURRENT MEDICATIONS  Home Medications     Reviewed by Paula Lawrence R.N. (Registered Nurse) on 22 at 1017  Med List Status: Partial   Medication Last Dose Status   albuterol 108 (90 Base) MCG/ACT Aero Soln inhalation aerosol  Active   aspirin 81 MG tablet  Active   azelastine (ASTELIN) 137 MCG/SPRAY nasal spray  Active   Bempedoic Acid 180 MG Tab  Active   budesonide-formoterol (SYMBICORT) 160-4.5 MCG/ACT Aerosol  Active   fenofibrate (TRICOR) 48 MG TABS  Active   fluticasone (FLONASE) 50 MCG/ACT nasal spray  Active   gabapentin (NEURONTIN) 100 MG Cap  Active   metformin ER (GLUCOPHAGE XR) 500 MG TABLET SR 24 HR  Active   nitroglycerin (NITROSTAT) 0.4 MG SL Tab  Active   pantoprazole (PROTONIX) 40 MG TBEC  Active   Probiotic Product  (PROBIOTIC DAILY PO)  Active                ALLERGIES  Allergies   Allergen Reactions   • Crestor [Rosuvastatin Calcium] Unspecified     Muscle pain    • Ciprofloxacin      Tendon pain   • Lisinopril    • Statins [Hmg-Coa-R Inhibitors]        PHYSICAL EXAM  VITAL SIGNS: BP (!) 168/84   Pulse 70   Temp 36.3 °C (97.3 °F) (Temporal)   Resp 18   Ht 1.829 m (6')   Wt 82.4 kg (181 lb 10.5 oz)   SpO2 96%   BMI 24.64 kg/m²       Constitutional: Well developed, Well nourished, No acute distress, Non-toxic appearance.   HENT: Normocephalic, Atraumatic, Bilateral external ears normal, Oropharynx moist, No oral exudates, clear nasal discharge, swollen turbinates, posterior pharynx is erythematous without any exudates or abscess status.  He is already had a UPPP surgical procedure..   Eyes: PERRLA, EOMI, Conjunctiva normal, No discharge.   Neck: Normal range of motion, No tenderness, Supple, No stridor.   Cardiovascular: Normal heart rate, Normal rhythm, No murmurs, No rubs, No gallops.   Thorax & Lungs: Normal breath sounds, No respiratory distress, No wheezing, No chest tenderness.   Abdomen: Bowel sounds normal, Soft, No tenderness, No masses, No pulsatile masses.   Skin: Warm, Dry, No erythema, No rash.   Extremities: Intact distal pulses, No edema, No tenderness, No cyanosis, No clubbing.    Neurologic: Alert & oriented x 3, Normal motor function, Normal sensory function, No focal deficits noted.   Psychiatric: Anxious  Results for orders placed or performed during the hospital encounter of 06/01/22   CBC with Differential   Result Value Ref Range    WBC 3.9 (L) 4.8 - 10.8 K/uL    RBC 4.91 4.70 - 6.10 M/uL    Hemoglobin 15.2 14.0 - 18.0 g/dL    Hematocrit 43.7 42.0 - 52.0 %    MCV 89.0 81.4 - 97.8 fL    MCH 31.0 27.0 - 33.0 pg    MCHC 34.8 33.7 - 35.3 g/dL    RDW 42.8 35.9 - 50.0 fL    Platelet Count 210 164 - 446 K/uL    MPV 8.8 (L) 9.0 - 12.9 fL    Neutrophils-Polys 73.00 (H) 44.00 - 72.00 %    Lymphocytes 15.90  (L) 22.00 - 41.00 %    Monocytes 8.70 0.00 - 13.40 %    Eosinophils 1.80 0.00 - 6.90 %    Basophils 0.30 0.00 - 1.80 %    Immature Granulocytes 0.30 0.00 - 0.90 %    Nucleated RBC 0.00 /100 WBC    Neutrophils (Absolute) 2.85 1.82 - 7.42 K/uL    Lymphs (Absolute) 0.62 (L) 1.00 - 4.80 K/uL    Monos (Absolute) 0.34 0.00 - 0.85 K/uL    Eos (Absolute) 0.07 0.00 - 0.51 K/uL    Baso (Absolute) 0.01 0.00 - 0.12 K/uL    Immature Granulocytes (abs) 0.01 0.00 - 0.11 K/uL    NRBC (Absolute) 0.00 K/uL   Complete Metabolic Panel (CMP)   Result Value Ref Range    Sodium 134 (L) 135 - 145 mmol/L    Potassium 4.2 3.6 - 5.5 mmol/L    Chloride 99 96 - 112 mmol/L    Co2 25 20 - 33 mmol/L    Anion Gap 10.0 7.0 - 16.0    Glucose 117 (H) 65 - 99 mg/dL    Bun 17 8 - 22 mg/dL    Creatinine 0.90 0.50 - 1.40 mg/dL    Calcium 9.2 8.5 - 10.5 mg/dL    AST(SGOT) 27 12 - 45 U/L    ALT(SGPT) 23 2 - 50 U/L    Alkaline Phosphatase 47 30 - 99 U/L    Total Bilirubin 0.4 0.1 - 1.5 mg/dL    Albumin 4.3 3.2 - 4.9 g/dL    Total Protein 6.6 6.0 - 8.2 g/dL    Globulin 2.3 1.9 - 3.5 g/dL    A-G Ratio 1.9 g/dL   Troponin   Result Value Ref Range    Troponin T 14 6 - 19 ng/L   ESTIMATED GFR   Result Value Ref Range    GFR (CKD-EPI) 90 >60 mL/min/1.73 m 2   TROPONIN   Result Value Ref Range    Troponin T 12 6 - 19 ng/L   EKG   Result Value Ref Range    Report       [facility] Emergency Dept.    Test Date:  2022  Pt Name:    MONE CUELLAR              Department: ER  MRN:        [id number]                      Room:  Gender:     Male                         Technician: [id number]  :        1950                   Requested By:ER TRIAGE PROTOCOL  Order #:    [id number]                    Reading MD: BRANDON ESTEVES MD    Measurements  Intervals                                Axis  Rate:       78                           P:          64  ID:         [id number]                          QRS:        66  QRSD:       98                           T:           37  QT:         368  QTc:        420    Interpretive Statements  Sinus rhythm  Borderline low voltage, extremity leads  Compared to ECG 2020 20:56:32  No significant changes  Electronically Signed On 2022 11:15:55 PDT by BRANDON ESTEVES MD     EKG   Result Value Ref Range    Report       [facility] Emergency Dept.    Test Date:  2022  Pt Name:    MONE CUELLAR              Department: ER  MRN:        [id number]                      Room:        08  Gender:     Male                         Technician: [id number]  :        1950                   Requested By:BRANDON ESTEVES  Order #:    [id number]                    Reading MD:    Measurements  Intervals                                Axis  Rate:       70                           P:          47  OR:         188                          QRS:        34  QRSD:       90                           T:          25  QT:         392  QTc:        423    Interpretive Statements  SINUS RHYTHM  Compared to ECG 2022 10:20:12  No significant changes         EKG interpretation by me 12-lead EKG.  Sinus rhythm.  No acute ST segment elevation or depression no pathological T wave inversion no ectopy intervals and axis are normal.  When compared to old EKG no suggestion of arrhythmia or ischemia  DX-CHEST-PORTABLE (1 VIEW)   Final Result      No acute cardiopulmonary disease.          COURSE & MEDICAL DECISION MAKING  Pertinent Labs & Imaging studies reviewed. (See chart for details)  Patient presented here with symptoms that are more consistent with some allergic reaction.  He is not on any ACE inhibitor or ARB agent.  This could very well be a seasonal related allergy causing some upper airway edema and possibly some eosinophilic esophagitis.  He was treated with Decadron and Benadryl and observed for around 3 hours.  Symptoms substantially improved.  No indication for epinephrine therapy.  He had serial EKGs which never demonstrated  any ischemia and 2-hour delta troponins which were not elevated as well.  At this point in the work-up I feel this is unlikely related to a cardiopulmonary process but more of an allergic reaction.  I recommended he continue Benadryl and he can use over-the-counter Flonase both in the nostril and the back of the throat.  His blood pressure was moderately elevated but he has no chest pain or strokelike symptoms.  I recommended following up with his cardiologist for ongoing management    FINAL IMPRESSION  1.  Allergic reaction         Electronically signed by: Sandro Monahan M.D., 6/1/2022 1:19 PM

## 2022-06-01 NOTE — ED TRIAGE NOTES
Chief Complaint   Patient presents with   • Heartburn     Patient has noted burning in his abdomen since yesterday   • Facial Swelling     At 8:30 am patient states that he started to notice swelling in his mouth and jaw. He is speaking clear full sentences. States he feels he has trouble swallowing. PMH 2 MIs with 3 stents       Patient to triage ambulatory with a steady gait, AAOx4, Appropriate precautions in place.     Explained wait time and triage process. Placed back in lobby. Told to notify ED tech or RN of any changes, verbalized understanding.    BP (!) 165/95   Pulse 87   Temp 36.3 °C (97.3 °F) (Temporal)   Resp 18   Ht 1.829 m (6')   Wt 82.4 kg (181 lb 10.5 oz)   SpO2 97%   BMI 24.64 kg/m²

## 2022-07-13 ENCOUNTER — TELEPHONE (OUTPATIENT)
Dept: CARDIOLOGY | Facility: MEDICAL CENTER | Age: 72
End: 2022-07-13
Payer: MEDICARE

## 2022-07-13 DIAGNOSIS — I25.119 CORONARY ARTERY DISEASE INVOLVING NATIVE CORONARY ARTERY OF NATIVE HEART WITH ANGINA PECTORIS (HCC): ICD-10-CM

## 2022-07-13 RX ORDER — NITROGLYCERIN 0.4 MG/1
TABLET SUBLINGUAL
Qty: 25 TABLET | Refills: 2 | Status: ON HOLD | OUTPATIENT
Start: 2022-07-13 | End: 2023-08-24

## 2022-07-13 NOTE — TELEPHONE ENCOUNTER
HK-    Caller: Darren Brunson    Medication Name and Dosage: nitroglycerin (NITROSTAT) 0.4 MG SL Tab    Did patient contact pharmacy (If no, please call pharmacy first): Yes  Medication amount left: 0   Preferred Pharmacy: [facility] pharmacy [facility]  Other questions (Topic): n/a  Callback Number (Will only call for issues): [phone] (home)       Thank you    -Sushant FORBES

## 2022-08-11 ENCOUNTER — TELEPHONE (OUTPATIENT)
Dept: SLEEP MEDICINE | Facility: MEDICAL CENTER | Age: 72
End: 2022-08-11
Payer: MEDICARE

## 2022-08-11 NOTE — TELEPHONE ENCOUNTER
Phone Number Called: [phone] (home)       Call outcome: Spoke to patient regarding message below.    Message: I spoke to the patient informed order was faxed to [facility] [phone]. Went over date and time of appointments.

## 2022-08-11 NOTE — TELEPHONE ENCOUNTER
VOICEMAIL  1. Caller Name: patient                         Call Back Number: [phone] (home)       2. Message: pt would like to complete CT Chest at [facility]. Patient requesting we send order so he is able to complete there. Pt also mentioned scheduled for a PFT 9/7/22.     3. Patient approves office to leave a detailed voicemail/MyChart message: N\A

## 2022-08-16 ENCOUNTER — APPOINTMENT (RX ONLY)
Dept: URBAN - METROPOLITAN AREA CLINIC 4 | Facility: CLINIC | Age: 72
Setting detail: DERMATOLOGY
End: 2022-08-16

## 2022-08-16 DIAGNOSIS — L82.1 OTHER SEBORRHEIC KERATOSIS: ICD-10-CM

## 2022-08-16 DIAGNOSIS — D22 MELANOCYTIC NEVI: ICD-10-CM

## 2022-08-16 DIAGNOSIS — L81.4 OTHER MELANIN HYPERPIGMENTATION: ICD-10-CM

## 2022-08-16 DIAGNOSIS — L57.0 ACTINIC KERATOSIS: ICD-10-CM

## 2022-08-16 DIAGNOSIS — D18.0 HEMANGIOMA: ICD-10-CM

## 2022-08-16 DIAGNOSIS — Z87.2 PERSONAL HISTORY OF DISEASES OF THE SKIN AND SUBCUTANEOUS TISSUE: ICD-10-CM

## 2022-08-16 PROBLEM — D22.5 MELANOCYTIC NEVI OF TRUNK: Status: ACTIVE | Noted: 2022-08-16

## 2022-08-16 PROBLEM — D18.01 HEMANGIOMA OF SKIN AND SUBCUTANEOUS TISSUE: Status: ACTIVE | Noted: 2022-08-16

## 2022-08-16 PROCEDURE — 17000 DESTRUCT PREMALG LESION: CPT

## 2022-08-16 PROCEDURE — ? LIQUID NITROGEN

## 2022-08-16 PROCEDURE — ? COUNSELING

## 2022-08-16 PROCEDURE — 99213 OFFICE O/P EST LOW 20 MIN: CPT | Mod: 25

## 2022-08-16 PROCEDURE — 17003 DESTRUCT PREMALG LES 2-14: CPT

## 2022-08-16 ASSESSMENT — LOCATION SIMPLE DESCRIPTION DERM
LOCATION SIMPLE: RIGHT BUTTOCK
LOCATION SIMPLE: LEFT BUTTOCK
LOCATION SIMPLE: RIGHT LOWER BACK
LOCATION SIMPLE: RIGHT OCCIPITAL SCALP
LOCATION SIMPLE: LEFT UPPER BACK
LOCATION SIMPLE: LEFT FOREARM
LOCATION SIMPLE: RIGHT HAND
LOCATION SIMPLE: LEFT WRIST

## 2022-08-16 ASSESSMENT — LOCATION DETAILED DESCRIPTION DERM
LOCATION DETAILED: RIGHT SUPERIOR OCCIPITAL SCALP
LOCATION DETAILED: LEFT DISTAL DORSAL FOREARM
LOCATION DETAILED: LEFT DORSAL WRIST
LOCATION DETAILED: RIGHT BUTTOCK
LOCATION DETAILED: RIGHT RADIAL DORSAL HAND
LOCATION DETAILED: RIGHT SUPERIOR LATERAL LOWER BACK
LOCATION DETAILED: LEFT BUTTOCK
LOCATION DETAILED: LEFT INFERIOR UPPER BACK

## 2022-08-16 ASSESSMENT — LOCATION ZONE DERM
LOCATION ZONE: SCALP
LOCATION ZONE: HAND
LOCATION ZONE: ARM
LOCATION ZONE: TRUNK

## 2022-08-16 NOTE — PROCEDURE: LIQUID NITROGEN
Consent: The patient's consent was obtained including but not limited to risks of crusting, scabbing, blistering, scarring, darker or lighter pigmentary change, recurrence, incomplete removal and infection.
Aperture Size (Optional): A
Render Post-Care Instructions In Note?: yes
Post-Care Instructions: I reviewed with the patient in detail post-care instructions. Patient is to wear sunprotection, and avoid picking at any of the treated lesions. Pt may apply Vaseline to crusted or scabbing areas.
Number Of Freeze-Thaw Cycles: 1 freeze-thaw cycle
Detail Level: Detailed
Render Note In Bullet Format When Appropriate: No
Duration Of Freeze Thaw-Cycle (Seconds): 3

## 2022-08-26 ENCOUNTER — TELEPHONE (OUTPATIENT)
Dept: SLEEP MEDICINE | Facility: MEDICAL CENTER | Age: 72
End: 2022-08-26
Payer: MEDICARE

## 2022-08-26 NOTE — TELEPHONE ENCOUNTER
Received Chest CT Thorax completed with [facility] 8/25/22. Scanned in media. Requesting images to [facility].       Last seen 1/4/22 Dr. Vazquez   Next OV 9/7/22 Dr. Vazquez

## 2022-09-07 ENCOUNTER — NON-PROVIDER VISIT (OUTPATIENT)
Dept: SLEEP MEDICINE | Facility: MEDICAL CENTER | Age: 72
End: 2022-09-07
Attending: INTERNAL MEDICINE
Payer: MEDICARE

## 2022-09-07 ENCOUNTER — OFFICE VISIT (OUTPATIENT)
Dept: SLEEP MEDICINE | Facility: MEDICAL CENTER | Age: 72
End: 2022-09-07
Payer: MEDICARE

## 2022-09-07 ENCOUNTER — HOSPITAL ENCOUNTER (OUTPATIENT)
Dept: RADIOLOGY | Facility: MEDICAL CENTER | Age: 72
End: 2022-09-07

## 2022-09-07 VITALS
HEIGHT: 72 IN | BODY MASS INDEX: 24.11 KG/M2 | RESPIRATION RATE: 16 BRPM | DIASTOLIC BLOOD PRESSURE: 56 MMHG | HEART RATE: 79 BPM | SYSTOLIC BLOOD PRESSURE: 120 MMHG | WEIGHT: 178 LBS | OXYGEN SATURATION: 93 %

## 2022-09-07 DIAGNOSIS — J45.30 MILD PERSISTENT ASTHMA WITHOUT COMPLICATION: ICD-10-CM

## 2022-09-07 DIAGNOSIS — R91.8 PULMONARY NODULES: ICD-10-CM

## 2022-09-07 PROCEDURE — 94060 EVALUATION OF WHEEZING: CPT | Performed by: INTERNAL MEDICINE

## 2022-09-07 PROCEDURE — 99214 OFFICE O/P EST MOD 30 MIN: CPT | Mod: 25 | Performed by: INTERNAL MEDICINE

## 2022-09-07 RX ORDER — FEXOFENADINE HCL 180 MG/1
180 TABLET ORAL 2 TIMES DAILY
COMMUNITY

## 2022-09-07 ASSESSMENT — PULMONARY FUNCTION TESTS
FVC_PREDICTED: 4.38
FEV1: 2.92
FEV1/FVC: 72.1
FEV1/FVC: 72
FVC: 3.89
FEV1_PREDICTED: 85
FEV1/FVC_PREDICTED: 74.43
FEV1/FVC_PERCENT_CHANGE: 133
FEV1/FVC_PERCENT_PREDICTED: 97
FEV1_PREDICTED: 3.26
FVC: 4.05
FEV1: 2.79
FVC_PERCENT_PREDICTED: 88
FVC_PERCENT_PREDICTED: 92
FEV1_PERCENT_CHANGE: 3
FEV1/FVC_PERCENT_PREDICTED: 97
FEV1_PERCENT_CHANGE: 4
FEV1_PERCENT_PREDICTED: 89

## 2022-09-07 ASSESSMENT — ENCOUNTER SYMPTOMS
FALLS: 0
ABDOMINAL PAIN: 0
SINUS PAIN: 0
DIZZINESS: 0
BLURRED VISION: 0
WEAKNESS: 0
SORE THROAT: 0
COUGH: 0
NAUSEA: 0
ORTHOPNEA: 0
FEVER: 0
PHOTOPHOBIA: 0
BACK PAIN: 0
EYE REDNESS: 0
HEADACHES: 0
EYE PAIN: 0
EYE DISCHARGE: 0
STRIDOR: 0
DEPRESSION: 0
SPUTUM PRODUCTION: 0
HEMOPTYSIS: 0
DOUBLE VISION: 0
TREMORS: 0
CHILLS: 0
PALPITATIONS: 0
WHEEZING: 0
CLAUDICATION: 0
VOMITING: 0
MYALGIAS: 0
PND: 0
SHORTNESS OF BREATH: 0
HEARTBURN: 0
DIAPHORESIS: 0
FOCAL WEAKNESS: 0
CONSTIPATION: 0
WEIGHT LOSS: 0
DIARRHEA: 0
NECK PAIN: 0
SPEECH CHANGE: 0

## 2022-09-07 ASSESSMENT — FIBROSIS 4 INDEX: FIB4 SCORE: 1.98

## 2022-09-07 NOTE — PROGRESS NOTES
Chief Complaint   Patient presents with    Follow-Up     Last seen 22     Results     CT Chest Thorax [facility] 22, PFT, 6MW 22          HPI: This patient is a 72 y.o. male whom is followed in our clinic for mild intermittent asthma and pulmonary nodules last seen by me on 22.  The pt also has a dx of ANTIONE for which he underwent UPPP in the past.  He is a former tobacco smoker with 30-pack-year history and quit in .  CT chest from 2020 showed subcentimeter pulmonary nodules and repeat CT chest from 2021 showed stable pulmonary nodules largest of 5 mm in size without significant adenopathy.  With regards to asthma, patient's symptoms are mild and controlled with Symbicort 160 which he uses daily in the mornings on the days that he exercises but rarely needs more that than. CT chest from last month at [facility] showed stable pulmonary nodules. Spirometry today is stable with +BD response/normal FEV1.    Past Medical History:   Diagnosis Date    Acute myocardial infarction of other anterior wall, episode of care unspecified ;     Anxiety state, unspecified      CAD (coronary artery disease) 2009    PCI/stent (Promus 3.0 x 15mm) to the LAD.    Cataract     left eye    Chickenpox     Diabetes (HCC)     on oral meds    Glaucoma     Heart murmur     High cholesterol     Hyperlipidemia     Mumps     Pain     S/P coronary artery stent placement      Sleep apnea     Snoring     Unspecified essential hypertension         Social History     Socioeconomic History    Marital status:      Spouse name: Not on file    Number of children: Not on file    Years of education: Not on file    Highest education level: Not on file   Occupational History    Not on file   Tobacco Use    Smoking status: Former     Packs/day: 1.00     Years: 30.00     Pack years: 30.00     Types: Cigarettes     Quit date: 1997     Years since quittin.0    Smokeless tobacco: Never   Vaping Use    Vaping  Use: Never used   Substance and Sexual Activity    Alcohol use: No    Drug use: No    Sexual activity: Not on file   Other Topics Concern    Not on file   Social History Narrative    Not on file     Social Determinants of Health     Financial Resource Strain: Not on file   Food Insecurity: Not on file   Transportation Needs: Not on file   Physical Activity: Not on file   Stress: Not on file   Social Connections: Not on file   Intimate Partner Violence: Not on file   Housing Stability: Not on file       Family History   Problem Relation Age of Onset    Heart Disease Maternal Aunt     Cancer Mother         colon       Current Outpatient Medications on File Prior to Visit   Medication Sig Dispense Refill    fexofenadine (ALLEGRA ALLERGY) 180 MG tablet Take 180 mg by mouth every day.      Cholecalciferol (VITAMIN D3) 25 MCG (1000 UT) Cap Take  by mouth.      Multiple Vitamins-Minerals (PRESERVISION AREDS 2+MULTI VIT PO) Take  by mouth.      Menaquinone-7 (VITAMIN K2 PO) Take  by mouth.      nitroglycerin (NITROSTAT) 0.4 MG SL Tab PLACE 1 TAB UNDER TONGUE EVERY 5 MINS FOR UP TO 3 DOSES AS NEEDED FOR CHEST PAIN 25 Tablet 2    albuterol 108 (90 Base) MCG/ACT Aero Soln inhalation aerosol INHALE 1-2 PUFFS BY MOUTH EVERY FOUR HOURS AS NEEDED FOR SHORTNESS OF BREATH. 8.5 Each 11    budesonide-formoterol (SYMBICORT) 160-4.5 MCG/ACT Aerosol INHALE 2 PUFFS BY MOUTH TWICE A DAY. USE WITH SPACER AND RINSE MOUTH AFTER EACH USE 1 Each 5    Bempedoic Acid 180 MG Tab Take 180 mg by mouth every day. 30 Tablet 6    azelastine (ASTELIN) 137 MCG/SPRAY nasal spray       fluticasone (FLONASE) 50 MCG/ACT nasal spray Spray 1 Spray in nose every day.      metformin ER (GLUCOPHAGE XR) 500 MG TABLET SR 24 HR Take 500 mg by mouth every day.      gabapentin (NEURONTIN) 100 MG Cap Take 300 mg by mouth every evening. 300mg daily      pantoprazole (PROTONIX) 40 MG TBEC Take 40 mg by mouth 2 times a day.      aspirin 81 MG tablet Take 81 mg by mouth  every day.      fenofibrate (TRICOR) 48 MG TABS Take 48 mg by mouth every day.      Probiotic Product (PROBIOTIC DAILY PO) Take 1 Tab by mouth every day.       No current facility-administered medications on file prior to visit.       Crestor [rosuvastatin calcium], Ciprofloxacin, Lisinopril, and Statins [hmg-coa-r inhibitors]      ROS:   Review of Systems   Constitutional:  Negative for chills, diaphoresis, fever, malaise/fatigue and weight loss.   HENT:  Negative for congestion, ear discharge, ear pain, hearing loss, nosebleeds, sinus pain, sore throat and tinnitus.    Eyes:  Negative for blurred vision, double vision, photophobia, pain, discharge and redness.   Respiratory:  Negative for cough, hemoptysis, sputum production, shortness of breath, wheezing and stridor.    Cardiovascular:  Negative for chest pain, palpitations, orthopnea, claudication, leg swelling and PND.   Gastrointestinal:  Negative for abdominal pain, constipation, diarrhea, heartburn, nausea and vomiting.   Genitourinary:  Negative for dysuria and urgency.   Musculoskeletal:  Negative for back pain, falls, joint pain, myalgias and neck pain.   Skin:  Negative for itching and rash.   Neurological:  Negative for dizziness, tremors, speech change, focal weakness, weakness and headaches.   Endo/Heme/Allergies:  Negative for environmental allergies.   Psychiatric/Behavioral:  Negative for depression.      /56 (BP Location: Right arm, Patient Position: Sitting, BP Cuff Size: Adult)   Pulse 79   Resp 16   Ht 1.829 m (6')   Wt 80.7 kg (178 lb)   SpO2 93%   Physical Exam  Vitals reviewed.   Constitutional:       General: He is not in acute distress.     Appearance: Normal appearance. He is normal weight.   HENT:      Head: Normocephalic and atraumatic.      Right Ear: External ear normal.      Left Ear: External ear normal.      Nose: Nose normal. No congestion.      Mouth/Throat:      Mouth: Mucous membranes are moist.      Pharynx:  Oropharynx is clear. No oropharyngeal exudate.   Eyes:      General: No scleral icterus.     Extraocular Movements: Extraocular movements intact.      Conjunctiva/sclera: Conjunctivae normal.      Pupils: Pupils are equal, round, and reactive to light.   Cardiovascular:      Rate and Rhythm: Normal rate and regular rhythm.      Heart sounds: Normal heart sounds. No murmur heard.    No gallop.   Pulmonary:      Effort: Pulmonary effort is normal. No respiratory distress.      Breath sounds: Normal breath sounds. No wheezing or rales.   Abdominal:      General: There is no distension.      Palpations: Abdomen is soft.   Musculoskeletal:         General: Normal range of motion.      Cervical back: Normal range of motion and neck supple.      Right lower leg: No edema.      Left lower leg: No edema.   Skin:     General: Skin is warm and dry.      Findings: No rash.   Neurological:      Mental Status: He is alert and oriented to person, place, and time.      Cranial Nerves: No cranial nerve deficit.   Psychiatric:         Mood and Affect: Mood normal.         Behavior: Behavior normal.       PFTs as reviewed by me personally: as per hPI    Imaging as reviewed by me personally:  as per hPI    Assessment:  1. Mild persistent asthma without complication  Spirometry      2. Pulmonary nodules  CT-CHEST (THORAX) W/O          Plan:  Chronic, stable, mild and well controlled on prn symbicort. Continue this regimen. F/u one year with spirometry  Stable x2 years; will continue surveillance for one additional year  Return in about 1 year (around 9/7/2023) for spirometry at time of f/u, ct chest.

## 2022-09-07 NOTE — PROCEDURES
Good patient effort  cooperation. The results of this test meet the ATS standards for acceptability and repeatability. Test was performed on the [employer]D spirometry system. Predicted values were GLI-2012. A bronchodilator of Ventolin HFA- 2 puffs with a spacer was administered to the patient.    Interpretation;  Baseline spirometry shows normal airflows.  No significant bronchodilator response.  Normal spirometry.

## 2022-09-08 ENCOUNTER — HOSPITAL ENCOUNTER (OUTPATIENT)
Dept: RADIOLOGY | Facility: MEDICAL CENTER | Age: 72
End: 2022-09-08
Attending: INTERNAL MEDICINE
Payer: MEDICARE

## 2022-10-16 ASSESSMENT — ENCOUNTER SYMPTOMS
SHORTNESS OF BREATH: 0
SYNCOPE: 0
DIARRHEA: 0
NIGHT SWEATS: 0
NEAR-SYNCOPE: 0
DYSPNEA ON EXERTION: 0
PALPITATIONS: 0
WHEEZING: 0
ORTHOPNEA: 0
VOMITING: 0
WEAKNESS: 0
FOCAL WEAKNESS: 0
PND: 0
COUGH: 0
DIZZINESS: 0
NAUSEA: 0
FEVER: 0
IRREGULAR HEARTBEAT: 0
ABDOMINAL PAIN: 0

## 2022-10-16 NOTE — PROGRESS NOTES
Cardiology Follow up Consultation Note    Date of note:    10/17/22  Primary Care Provider: Kailee Pappas M.D.    Patient Name: Darren Brunson   YOB: 1950  MRN:              [id number]    Chief Complaint: Follow-up CAD and dyslipidemia    History of Present Illness: Mr. Darren Brunson is a 72 y.o. male whose current medical problems include CAD (status post PCI to LAD in 2009, PCI to RCA in 2015), statin intolerance, hypertension, dyslipidemia, and diabetes who is here for follow-up CAD and dyslipidemia.    The patient was last seen in my clinic on 09/01/21 (his initial visit with me, previously a patient of Dr. Amado, last seen 3/4/2021).  The patient was doing well during the last visit, and no changes were made to his medications.  He was referred to lipid clinic for further lipid management.    He has tried statins as well as ezetimibe, and had severe reactions to both.  He was started on bempedoic acid; however, [facility], and discontinued the medication.  He had not made an appointment with the lipid clinic as [facility] was not going to cover any medications and his lipid levels improved.     The patient presents today for follow-up.  The patient reports feeling well today.  He continues to exercise at [employer] (he does Pilates, yoga, and core training) and denies any chest pain or shortness of breath on exertion.  He denies any orthopnea, PND, or leg swelling.  No palpitations.  No syncope or presyncopal episodes.    Cardiovascular Risk Factors:  1. Smoking status: Former smoker  2. Type II Diabetes Mellitus: On medication  3. Hypertension: Controlled without antihypertensives  4. Dyslipidemia: On medication  Labs 7/5/2022 Total cholesterol 164 HDL 50  LDL 94  5. Family history of early Coronary Artery Disease in a first degree relative (Male less than 55 years of age; Female less than 65 years of age): None  6.  Obesity and/or Metabolic Syndrome: BMI 25.09  7.  Sedentary lifestyle: He does exercise classes at [facility] (pilates, core and balance, yoga), walk sometimes.     Review of Systems   Constitutional: Negative for fever, malaise/fatigue and night sweats.   Cardiovascular:  Negative for chest pain, dyspnea on exertion, irregular heartbeat, leg swelling, near-syncope, orthopnea, palpitations, paroxysmal nocturnal dyspnea and syncope.   Respiratory:  Negative for cough, shortness of breath and wheezing.    Gastrointestinal:  Negative for abdominal pain, diarrhea, nausea and vomiting.   Neurological:  Negative for dizziness, focal weakness and weakness.   All other systems reviewed and are negative.         Current Outpatient Medications   Medication Sig Dispense Refill    ALPRAZolam (XANAX) 0.25 MG Tab TAKE 1 TABLET (0.25 MG TOTAL) BY MOUTH NIGHTLY AS NEEDED FOR ANXIETY FOR UP TO 30 DAYS      Cholecalciferol (VITAMIN D3) 125 MCG (5000 UT) Cap Take 5,000 Units by mouth every day.      fluticasone (FLONASE) 50 MCG/ACT nasal spray Administer 2 Sprays into affected nostril(S) every day.      glucose blood (ONETOUCH ULTRA) strip 1 Each by Other route every day.      mupirocin (BACTROBAN) 2 % Ointment APPLY 1 APPLICATION TO AFFECTED AREA TWO TIMES DAILY, AS NEEDED, FOR SCAB ON RIGHT FOREARM      nystatin/triamcinolone (MYCOLOG) 665910-8.1 UNIT/GM-% Cream nystatin-triamcinolone 100,000 unit/g-0.1 % topical cream      pantoprazole (PROTONIX) 40 MG Tablet Delayed Response Take 1 Tablet by mouth 2 times a day.      aspirin (ASA) 81 MG Chew Tab chewable tablet aspirin   81 mg      fexofenadine (ALLEGRA) 180 MG tablet Take 180 mg by mouth every day.      Multiple Vitamins-Minerals (PRESERVISION AREDS 2+MULTI VIT PO) Take  by mouth.      Menaquinone-7 (VITAMIN K2 PO) Take  by mouth.      nitroglycerin (NITROSTAT) 0.4 MG SL Tab PLACE 1 TAB UNDER TONGUE EVERY 5 MINS FOR UP TO 3 DOSES AS NEEDED FOR CHEST PAIN 25 Tablet 2    albuterol 108 (90 Base) MCG/ACT Aero Soln inhalation aerosol  INHALE 1-2 PUFFS BY MOUTH EVERY FOUR HOURS AS NEEDED FOR SHORTNESS OF BREATH. 8.5 Each 11    budesonide-formoterol (SYMBICORT) 160-4.5 MCG/ACT Aerosol INHALE 2 PUFFS BY MOUTH TWICE A DAY. USE WITH SPACER AND RINSE MOUTH AFTER EACH USE 1 Each 5    azelastine (ASTELIN) 137 MCG/SPRAY nasal spray       metformin ER (GLUCOPHAGE XR) 500 MG TABLET SR 24 HR Take 500 mg by mouth every day.      gabapentin (NEURONTIN) 100 MG Cap Take 300 mg by mouth 3 times a day. 300mg daily      fenofibrate (TRICOR) 48 MG TABS Take 48 mg by mouth every day.      Probiotic Product (PROBIOTIC DAILY PO) Take 1 Tab by mouth every day.       No current facility-administered medications for this visit.         Allergies   Allergen Reactions    Ciprofloxacin      Tendon pain  Other reaction(s): shoulder pain, blurred vision, abdominal pain  Tendon pain    Crestor [Rosuvastatin Calcium] Unspecified     Muscle pain     Hmg-Coa-R Inhibitors      Other reaction(s): Unspecified  Muscle pain     Aspirin      High dose only - aspirin induced tinnitus    Lisinopril          Physical Exam:  Ambulatory Vitals  /62 (BP Location: Left arm, Patient Position: Sitting, BP Cuff Size: Adult)   Pulse 74   Resp 18   Ht 1.829 m (6')   Wt 82.1 kg (181 lb)   SpO2 96%    Oxygen Therapy:  Pulse Oximetry: 96 %  BP Readings from Last 4 Encounters:   10/17/22 124/62   09/07/22 120/56   06/01/22 (!) 168/84   01/04/22 128/76       Weight/BMI: Body mass index is 24.55 kg/m².  Wt Readings from Last 4 Encounters:   10/17/22 82.1 kg (181 lb)   09/07/22 80.7 kg (178 lb)   09/06/22 80.7 kg (178 lb)   06/01/22 82.4 kg (181 lb 10.5 oz)       General: Well appearing and in no apparent distress  Eyes: nl conjunctiva, no icteric sclera  ENT: wearing a mask, normal external appearance of ears  Neck: no visible JVP,  no carotid bruits  Lungs: normal respiratory effort, CTAB  Heart: RRR, no murmurs, no rubs or gallops,  no edema bilateral lower extremities. No LV/RV heave on  cardiac palpatation. + bilateral radial pulses.  + bilateral dp pulses.   Abdomen: soft, non tender, non distended, no masses, normal bowel sounds.  No HSM.  Extremities/MSK: no clubbing, no cyanosis  Neurological: No focal sensory deficits  Psychiatric: Appropriate affect, A/O x 3, intact judgement and insight  Skin: Warm extremities      Lab Data Review:  Lab Results   Component Value Date/Time    CHOLSTRLTOT 190 06/27/2020 12:47 AM     (H) 06/27/2020 12:47 AM    HDL 47 06/27/2020 12:47 AM    TRIGLYCERIDE 116 06/27/2020 12:47 AM       Lab Results   Component Value Date/Time    SODIUM 134 (L) 06/01/2022 10:31 AM    POTASSIUM 4.2 06/01/2022 10:31 AM    CHLORIDE 99 06/01/2022 10:31 AM    CO2 25 06/01/2022 10:31 AM    GLUCOSE 117 (H) 06/01/2022 10:31 AM    BUN 17 06/01/2022 10:31 AM    CREATININE 0.90 06/01/2022 10:31 AM     Lab Results   Component Value Date/Time    ALKPHOSPHAT 47 06/01/2022 10:31 AM    ASTSGOT 27 06/01/2022 10:31 AM    ALTSGPT 23 06/01/2022 10:31 AM    TBILIRUBIN 0.4 06/01/2022 10:31 AM      Lab Results   Component Value Date/Time    WBC 3.9 (L) 06/01/2022 10:31 AM     Lab Results   Component Value Date/Time    HBA1C 5.9 (H) 07/05/2022 07:51 AM    HBA1C 5.9 (H) 01/17/2022 08:02 AM       Still may May 2021  Total cholesterol 184 HDL 51 triglyceride 95   Creatinine 0.89  Sodium 140 potassium 4.2 bili 0.6 AST 20 ALT 19 alk phos 37  WBC 4.1 Hemoglobin 14.9 Platelet 168  Hemoglobin a1c 5.6    Cardiac Imaging and Procedures Review:    EKG dated 6/28/2020: My personal interpretation is sinus rhythm    Echo dated 6/26/2020:   Prior echocardiogram 3/20/2018.  Technically difficult study.  Preserved left ventricular systolic function.  Left ventricular ejection fraction is difficult to assess.      Nuclear Perfusion Imaging (6/30/2020):   NUCLEAR IMAGING INTERPRETATION    No reversible defects that would indicate ischemia.    Matched defect in the inferior apex is either artifactual or due to a  small    inferior wall infarct.    Normal left ventricular size, ejection fraction, and wall motion.    ECG INTERPRETATION    Normal ECG portion of a treadmill nuclear stress test.    Average exercise tolerance for age.    Normal BP response to exercise.    Sinus rhythm.    No ischemic ECG changes.    No chest pain during stress.    Ernandez treadmill score 6, low risk (assume no prior CAD).       Assessment & Plan     1. Coronary artery disease involving native coronary artery of native heart without angina pectoris        2. Statin intolerance        3. Dyslipidemia        4. Essential hypertension        5. Stented coronary artery                Shared Medical Decision Making:    CAD  Status post PCI to LAD in 2009, PCI to RCA in 2015.  Nuclear stress test in June 2020 without any reversible ischemia.  Patient is very active without any cardiac symptoms.  -Continue aspirin 81mg daily  -Cannot tolerate statin or ezetimibe.  Cannot afford bempedoic acid.  Continue fenofibrate.  Last lipid level within normal limits.  If lipids are elevated during the next check, will refer him back to lipid clinic.  -Continue exercise as well as healthy diet     Dyslipidemia  Statin intolerance  The patient also has intolerance to ezetimibe. The patient cannot afford bempedoic acid, and not [facility]. Recent lipid levels under good control  -Continue fenofibrate 48mg daily  -The patient was referred to lipid clinic but had not been evaluated due to [facility] not covering any medication. The patient checked his lipid profile recently and LDL <100.  If lipids are elevated during the next check, will refer him back to lipid clinic.    Hypertension  BP well controlled this visit  -Well controlled without anithypertensive.     All of the patient's excellent questions were answered to the best of my knowledge and to his satisfaction.  It was a pleasure seeing Mr. Darren Brunson in my clinic today. Return in about 6 months (around  4/17/2023). Patient is aware to call the cardiology clinic with any questions or concerns.      Tim Sotelo MD  [facility], [facility]  [address] [address]  Phone: [phone]  Fax: [phone]

## 2022-10-17 ENCOUNTER — OFFICE VISIT (OUTPATIENT)
Dept: CARDIOLOGY | Facility: MEDICAL CENTER | Age: 72
End: 2022-10-17
Payer: MEDICARE

## 2022-10-17 VITALS
RESPIRATION RATE: 18 BRPM | DIASTOLIC BLOOD PRESSURE: 62 MMHG | WEIGHT: 181 LBS | HEIGHT: 72 IN | SYSTOLIC BLOOD PRESSURE: 124 MMHG | OXYGEN SATURATION: 96 % | BODY MASS INDEX: 24.52 KG/M2 | HEART RATE: 74 BPM

## 2022-10-17 DIAGNOSIS — Z95.5 STENTED CORONARY ARTERY: ICD-10-CM

## 2022-10-17 DIAGNOSIS — I25.10 CORONARY ARTERY DISEASE INVOLVING NATIVE CORONARY ARTERY OF NATIVE HEART WITHOUT ANGINA PECTORIS: ICD-10-CM

## 2022-10-17 DIAGNOSIS — I10 ESSENTIAL HYPERTENSION: ICD-10-CM

## 2022-10-17 DIAGNOSIS — E78.5 DYSLIPIDEMIA: ICD-10-CM

## 2022-10-17 DIAGNOSIS — Z78.9 STATIN INTOLERANCE: ICD-10-CM

## 2022-10-17 PROCEDURE — 99214 OFFICE O/P EST MOD 30 MIN: CPT | Performed by: STUDENT IN AN ORGANIZED HEALTH CARE EDUCATION/TRAINING PROGRAM

## 2022-10-17 RX ORDER — BLOOD SUGAR DIAGNOSTIC
1 STRIP MISCELLANEOUS DAILY
COMMUNITY
Start: 2022-10-12

## 2022-10-17 RX ORDER — ASPIRIN 81 MG/1
81 TABLET, CHEWABLE ORAL DAILY
COMMUNITY

## 2022-10-17 RX ORDER — FLUCONAZOLE 150 MG/1
TABLET ORAL
COMMUNITY
End: 2022-10-17

## 2022-10-17 RX ORDER — SULFAMETHOXAZOLE AND TRIMETHOPRIM 800; 160 MG/1; MG/1
TABLET ORAL
COMMUNITY
End: 2022-10-17

## 2022-10-17 RX ORDER — ALPRAZOLAM 0.25 MG/1
TABLET ORAL
COMMUNITY
End: 2022-10-17

## 2022-10-17 RX ORDER — FLUTICASONE PROPIONATE 50 MCG
2 SPRAY, SUSPENSION (ML) NASAL DAILY
COMMUNITY
Start: 2022-08-15 | End: 2022-11-13

## 2022-10-17 RX ORDER — ALPRAZOLAM 0.25 MG/1
TABLET ORAL
Status: ON HOLD | COMMUNITY
Start: 2022-07-20 | End: 2023-08-24

## 2022-10-17 RX ORDER — SILDENAFIL CITRATE 20 MG/1
TABLET ORAL
COMMUNITY
End: 2022-10-17

## 2022-10-17 RX ORDER — PANTOPRAZOLE SODIUM 40 MG/1
1 TABLET, DELAYED RELEASE ORAL DAILY
COMMUNITY

## 2022-10-17 ASSESSMENT — FIBROSIS 4 INDEX: FIB4 SCORE: 1.98

## 2022-11-08 ENCOUNTER — PATIENT MESSAGE (OUTPATIENT)
Dept: HEALTH INFORMATION MANAGEMENT | Facility: OTHER | Age: 72
End: 2022-11-08

## 2022-11-28 DIAGNOSIS — J45.30 MILD PERSISTENT ASTHMA WITHOUT COMPLICATION: ICD-10-CM

## 2022-11-29 RX ORDER — BUDESONIDE AND FORMOTEROL FUMARATE DIHYDRATE 160; 4.5 UG/1; UG/1
AEROSOL RESPIRATORY (INHALATION)
Qty: 10.2 EACH | Refills: 5 | Status: SHIPPED | OUTPATIENT
Start: 2022-11-29 | End: 2024-02-21

## 2022-11-29 NOTE — TELEPHONE ENCOUNTER
Have we ever prescribed this med? Yes.  If yes, what date? 09/20/21    Last OV: 09/07/22 with Dr Vazquez    Next OV: No Pending appt.     DX: Mild persistent asthma without complication (J45.30)    Medications:   Requested Prescriptions     Pending Prescriptions Disp Refills    budesonide-formoterol (SYMBICORT) 160-4.5 MCG/ACT Aerosol [Pharmacy Med Name: BUDESONIDE-FORMOTEROL 160-4.5] 10.2 Each 5     Sig: INHALE 2 PUFFS BY MOUTH TWICE A DAY. USE WITH SPACER AND RINSE MOUTH AFTER EACH USE

## 2023-01-05 ENCOUNTER — APPOINTMENT (RX ONLY)
Dept: URBAN - METROPOLITAN AREA CLINIC 4 | Facility: CLINIC | Age: 73
Setting detail: DERMATOLOGY
End: 2023-01-05

## 2023-01-05 DIAGNOSIS — B00.1 HERPESVIRAL VESICULAR DERMATITIS: ICD-10-CM

## 2023-01-05 PROCEDURE — ? COUNSELING

## 2023-01-05 PROCEDURE — 99213 OFFICE O/P EST LOW 20 MIN: CPT

## 2023-01-05 PROCEDURE — ? ORDER TESTS

## 2023-01-05 PROCEDURE — ? PRESCRIPTION

## 2023-01-05 PROCEDURE — ? ADDITIONAL NOTES

## 2023-01-05 PROCEDURE — ? PHOTO-DOCUMENTATION

## 2023-01-05 RX ORDER — VALACYCLOVIR HYDROCHLORIDE 1 G/1
1 TABLET, FILM COATED ORAL
Qty: 8 | Refills: 1 | Status: ERX | COMMUNITY
Start: 2023-01-05

## 2023-01-05 RX ORDER — MUPIROCIN 20 MG/G
1 OINTMENT TOPICAL
Qty: 22 | Refills: 3 | Status: ERX | COMMUNITY
Start: 2023-01-05

## 2023-01-05 RX ADMIN — MUPIROCIN 1: 20 OINTMENT TOPICAL at 00:00

## 2023-01-05 RX ADMIN — VALACYCLOVIR HYDROCHLORIDE 1: 1 TABLET, FILM COATED ORAL at 00:00

## 2023-01-05 ASSESSMENT — LOCATION ZONE DERM: LOCATION ZONE: TRUNK

## 2023-01-05 ASSESSMENT — LOCATION SIMPLE DESCRIPTION DERM: LOCATION SIMPLE: LEFT LOWER BACK

## 2023-01-05 ASSESSMENT — LOCATION DETAILED DESCRIPTION DERM: LOCATION DETAILED: LEFT INFERIOR MEDIAL LOWER BACK

## 2023-01-05 NOTE — PROCEDURE: ADDITIONAL NOTES
Detail Level: Detailed
Additional Notes: Culture send to lab maty
Render Risk Assessment In Note?: no

## 2023-01-05 NOTE — PROCEDURE: ORDER TESTS
Billing Type: Third-Party Bill
Expected Date Of Service: 01/05/2023
Bill For Surgical Tray: no
Performing Laboratory: -[phone]

## 2023-01-14 ENCOUNTER — APPOINTMENT (OUTPATIENT)
Dept: RADIOLOGY | Facility: MEDICAL CENTER | Age: 73
End: 2023-01-14
Attending: EMERGENCY MEDICINE
Payer: MEDICARE

## 2023-01-14 ENCOUNTER — HOSPITAL ENCOUNTER (EMERGENCY)
Facility: MEDICAL CENTER | Age: 73
End: 2023-01-14
Attending: EMERGENCY MEDICINE
Payer: MEDICARE

## 2023-01-14 ENCOUNTER — APPOINTMENT (OUTPATIENT)
Dept: RADIOLOGY | Facility: MEDICAL CENTER | Age: 73
End: 2023-01-14
Payer: MEDICARE

## 2023-01-14 VITALS
SYSTOLIC BLOOD PRESSURE: 136 MMHG | HEART RATE: 84 BPM | HEIGHT: 72 IN | DIASTOLIC BLOOD PRESSURE: 65 MMHG | RESPIRATION RATE: 20 BRPM | OXYGEN SATURATION: 94 % | WEIGHT: 174.82 LBS | BODY MASS INDEX: 23.68 KG/M2 | TEMPERATURE: 98 F

## 2023-01-14 DIAGNOSIS — K85.00 IDIOPATHIC ACUTE PANCREATITIS WITHOUT INFECTION OR NECROSIS: ICD-10-CM

## 2023-01-14 DIAGNOSIS — Z87.19 HISTORY OF GASTRITIS: ICD-10-CM

## 2023-01-14 LAB
ALBUMIN SERPL BCP-MCNC: 3.8 G/DL (ref 3.2–4.9)
ALBUMIN/GLOB SERPL: 1.1 G/DL
ALP SERPL-CCNC: 64 U/L (ref 30–99)
ALT SERPL-CCNC: 22 U/L (ref 2–50)
ANION GAP SERPL CALC-SCNC: 12 MMOL/L (ref 7–16)
APPEARANCE UR: ABNORMAL
AST SERPL-CCNC: 25 U/L (ref 12–45)
BACTERIA #/AREA URNS HPF: NEGATIVE /HPF
BASOPHILS # BLD AUTO: 0.2 % (ref 0–1.8)
BASOPHILS # BLD: 0.02 K/UL (ref 0–0.12)
BILIRUB SERPL-MCNC: 0.7 MG/DL (ref 0.1–1.5)
BILIRUB UR QL STRIP.AUTO: ABNORMAL
BUN SERPL-MCNC: 14 MG/DL (ref 8–22)
CALCIUM ALBUM COR SERPL-MCNC: 9.5 MG/DL (ref 8.5–10.5)
CALCIUM SERPL-MCNC: 9.3 MG/DL (ref 8.5–10.5)
CHLORIDE SERPL-SCNC: 94 MMOL/L (ref 96–112)
CO2 SERPL-SCNC: 24 MMOL/L (ref 20–33)
COLOR UR: ABNORMAL
CREAT SERPL-MCNC: 0.95 MG/DL (ref 0.5–1.4)
EKG IMPRESSION: NORMAL
EOSINOPHIL # BLD AUTO: 0.12 K/UL (ref 0–0.51)
EOSINOPHIL NFR BLD: 1.2 % (ref 0–6.9)
EPI CELLS #/AREA URNS HPF: ABNORMAL /HPF
ERYTHROCYTE [DISTWIDTH] IN BLOOD BY AUTOMATED COUNT: 43.4 FL (ref 35.9–50)
GFR SERPLBLD CREATININE-BSD FMLA CKD-EPI: 84 ML/MIN/1.73 M 2
GLOBULIN SER CALC-MCNC: 3.4 G/DL (ref 1.9–3.5)
GLUCOSE SERPL-MCNC: 129 MG/DL (ref 65–99)
GLUCOSE UR STRIP.AUTO-MCNC: NEGATIVE MG/DL
HCT VFR BLD AUTO: 41.6 % (ref 42–52)
HGB BLD-MCNC: 14.7 G/DL (ref 14–18)
HYALINE CASTS #/AREA URNS LPF: ABNORMAL /LPF
IMM GRANULOCYTES # BLD AUTO: 0.04 K/UL (ref 0–0.11)
IMM GRANULOCYTES NFR BLD AUTO: 0.4 % (ref 0–0.9)
KETONES UR STRIP.AUTO-MCNC: ABNORMAL MG/DL
LEUKOCYTE ESTERASE UR QL STRIP.AUTO: ABNORMAL
LIPASE SERPL-CCNC: 24 U/L (ref 11–82)
LYMPHOCYTES # BLD AUTO: 0.52 K/UL (ref 1–4.8)
LYMPHOCYTES NFR BLD: 5 % (ref 22–41)
MCH RBC QN AUTO: 31.3 PG (ref 27–33)
MCHC RBC AUTO-ENTMCNC: 35.3 G/DL (ref 33.7–35.3)
MCV RBC AUTO: 88.5 FL (ref 81.4–97.8)
MICRO URNS: ABNORMAL
MONOCYTES # BLD AUTO: 0.92 K/UL (ref 0–0.85)
MONOCYTES NFR BLD AUTO: 8.9 % (ref 0–13.4)
MUCOUS THREADS #/AREA URNS HPF: ABNORMAL /HPF
NEUTROPHILS # BLD AUTO: 8.69 K/UL (ref 1.82–7.42)
NEUTROPHILS NFR BLD: 84.3 % (ref 44–72)
NITRITE UR QL STRIP.AUTO: NEGATIVE
NRBC # BLD AUTO: 0 K/UL
NRBC BLD-RTO: 0 /100 WBC
PH UR STRIP.AUTO: 5.5 [PH] (ref 5–8)
PLATELET # BLD AUTO: 255 K/UL (ref 164–446)
PMV BLD AUTO: 8.7 FL (ref 9–12.9)
POTASSIUM SERPL-SCNC: 4.5 MMOL/L (ref 3.6–5.5)
PROT SERPL-MCNC: 7.2 G/DL (ref 6–8.2)
PROT UR QL STRIP: 30 MG/DL
RBC # BLD AUTO: 4.7 M/UL (ref 4.7–6.1)
RBC # URNS HPF: ABNORMAL /HPF
RBC UR QL AUTO: ABNORMAL
RENAL EPI CELLS #/AREA URNS HPF: ABNORMAL /HPF
SODIUM SERPL-SCNC: 130 MMOL/L (ref 135–145)
SP GR UR STRIP.AUTO: 1.02
TROPONIN T SERPL-MCNC: 15 NG/L (ref 6–19)
UROBILINOGEN UR STRIP.AUTO-MCNC: 1 MG/DL
WBC # BLD AUTO: 10.3 K/UL (ref 4.8–10.8)
WBC #/AREA URNS HPF: ABNORMAL /HPF

## 2023-01-14 PROCEDURE — 71045 X-RAY EXAM CHEST 1 VIEW: CPT

## 2023-01-14 PROCEDURE — 84484 ASSAY OF TROPONIN QUANT: CPT

## 2023-01-14 PROCEDURE — 74177 CT ABD & PELVIS W/CONTRAST: CPT

## 2023-01-14 PROCEDURE — 80053 COMPREHEN METABOLIC PANEL: CPT

## 2023-01-14 PROCEDURE — 93005 ELECTROCARDIOGRAM TRACING: CPT

## 2023-01-14 PROCEDURE — 700111 HCHG RX REV CODE 636 W/ 250 OVERRIDE (IP): Performed by: EMERGENCY MEDICINE

## 2023-01-14 PROCEDURE — 81001 URINALYSIS AUTO W/SCOPE: CPT

## 2023-01-14 PROCEDURE — 700117 HCHG RX CONTRAST REV CODE 255: Performed by: EMERGENCY MEDICINE

## 2023-01-14 PROCEDURE — 99285 EMERGENCY DEPT VISIT HI MDM: CPT

## 2023-01-14 PROCEDURE — 85025 COMPLETE CBC W/AUTO DIFF WBC: CPT

## 2023-01-14 PROCEDURE — A9270 NON-COVERED ITEM OR SERVICE: HCPCS | Performed by: EMERGENCY MEDICINE

## 2023-01-14 PROCEDURE — 93005 ELECTROCARDIOGRAM TRACING: CPT | Performed by: EMERGENCY MEDICINE

## 2023-01-14 PROCEDURE — 700102 HCHG RX REV CODE 250 W/ 637 OVERRIDE(OP): Performed by: EMERGENCY MEDICINE

## 2023-01-14 PROCEDURE — 36415 COLL VENOUS BLD VENIPUNCTURE: CPT

## 2023-01-14 PROCEDURE — 96374 THER/PROPH/DIAG INJ IV PUSH: CPT

## 2023-01-14 PROCEDURE — 83690 ASSAY OF LIPASE: CPT

## 2023-01-14 RX ORDER — OXYCODONE HYDROCHLORIDE AND ACETAMINOPHEN 5; 325 MG/1; MG/1
1 TABLET ORAL EVERY 4 HOURS PRN
Qty: 15 TABLET | Refills: 0 | Status: SHIPPED | OUTPATIENT
Start: 2023-01-14 | End: 2023-01-17

## 2023-01-14 RX ORDER — ONDANSETRON 2 MG/ML
4 INJECTION INTRAMUSCULAR; INTRAVENOUS ONCE
Status: COMPLETED | OUTPATIENT
Start: 2023-01-14 | End: 2023-01-14

## 2023-01-14 RX ORDER — FAMOTIDINE 40 MG/1
40 TABLET, FILM COATED ORAL DAILY
Qty: 30 TABLET | Refills: 0 | Status: SHIPPED | OUTPATIENT
Start: 2023-01-14 | End: 2023-02-13

## 2023-01-14 RX ORDER — ONDANSETRON 4 MG/1
4 TABLET, ORALLY DISINTEGRATING ORAL EVERY 6 HOURS PRN
Qty: 10 TABLET | Refills: 0 | Status: SHIPPED | OUTPATIENT
Start: 2023-01-14 | End: 2023-06-06

## 2023-01-14 RX ADMIN — ONDANSETRON HYDROCHLORIDE 4 MG: 2 SOLUTION INTRAMUSCULAR; INTRAVENOUS at 15:49

## 2023-01-14 RX ADMIN — LIDOCAINE HYDROCHLORIDE 15 ML: 20 SOLUTION OROPHARYNGEAL at 15:43

## 2023-01-14 RX ADMIN — IOHEXOL 96 ML: 350 INJECTION, SOLUTION INTRAVENOUS at 17:24

## 2023-01-14 ASSESSMENT — FIBROSIS 4 INDEX: FIB4 SCORE: 1.98

## 2023-01-14 NOTE — ED NOTES
PT walked back with no assistance needed. Changed into a gown and put on the monitor.  UA was collected and sent to lab at this time.

## 2023-01-14 NOTE — ED TRIAGE NOTES
"Chief Complaint   Patient presents with    Abdominal Pain     Generalized abd pain since Wednesday. No appetite. -N/V.     Shortness of Breath     Pt having increased work of breathing. Pt says \"my chest feels full\". +fevers.     Chest Pressure     Hx MI 2015. +Left arm weakness.      Pt amb to triage with steady gait. Protocols ordered. Pt educated to inform staff of any changes.     /76   Pulse 94   Temp 36.2 °C (97.1 °F) (Temporal)   Resp 16   Ht 1.829 m (6')   Wt 79.3 kg (174 lb 13.2 oz)   SpO2 98%   BMI 23.71 kg/m²     "

## 2023-01-14 NOTE — ED PROVIDER NOTES
"ED Provider Note    Primary care provider: Kailee Pappas M.D.  Means of arrival: Walk-in  History obtained from: Patient    CHIEF COMPLAINT  Chief Complaint   Patient presents with    Abdominal Pain     Generalized abd pain since Wednesday. No appetite. -N/V.     Shortness of Breath     Pt having increased work of breathing. Pt says \"my chest feels full\". +fevers.     Chest Pressure     Hx MI 2015. +Left arm weakness.      Seen at 3:16 PM.   HPI  Darren Brunson is a 72 y.o. male who presents to the Emergency Department epigastric, left upper quadrant and bilateral upper chest pain for the past 72 hours.  Pain is worse with eating.  The patient has had some decreased appetite as result of this.  He does not feel that it is worse with exertion though he has not felt like doing any exercise since the onset of the pain.  He describes having a flulike illness a few weeks ago and made a full recovery.    He denies any fevers, T-max was 100.0 at home.  Denies any headache, nausea, vomiting, diarrhea though he does state his bowel movements are \"off \".  Denies any dysuria.  Denies any cough though as above he did have a cough a few weeks ago and made a full recovery.  He is concerned about cardiac issues as he reports a history of an MI with 2 stents.  He also did some research on the Internet and is concerned he might have diverticulitis.  He reports a history of erosive gastritis and has been taking Protonix 40 mg twice daily, he feels that this might be an issue as well.    REVIEW OF SYSTEMS  See HPI.     PAST MEDICAL HISTORY   has a past medical history of Acute myocardial infarction of other anterior wall, episode of care unspecified (2009; 2014), Anxiety state, unspecified ( ), CAD (coronary artery disease) (September 2009), Cataract, Chickenpox, Diabetes (HCC), Glaucoma, Heart murmur, High cholesterol, Hyperlipidemia, Mumps, Pain, S/P coronary artery stent placement ( ), Sleep apnea, Snoring, and " Unspecified essential hypertension ( ).    SURGICAL HISTORY   has a past surgical history that includes other (2011); tonsillectomy; sinuscope; other cardiac surgery; umbilical hernia repair; inguinal hernia repair bilateral; septal reconstruction (N/A, 10/23/2017); uvulopharyngopalatoplasty (N/A, 10/23/2017); and biopsy general (Left, 10/23/2017).    SOCIAL HISTORY  Social History     Tobacco Use    Smoking status: Former     Packs/day: 1.00     Years: 30.00     Pack years: 30.00     Types: Cigarettes     Quit date: 1997     Years since quittin.3    Smokeless tobacco: Never   Vaping Use    Vaping Use: Never used   Substance Use Topics    Alcohol use: No    Drug use: No      Social History     Substance and Sexual Activity   Drug Use No       FAMILY HISTORY  Family History   Problem Relation Age of Onset    Heart Disease Maternal Aunt     Cancer Mother         colon       CURRENT MEDICATIONS  Reviewed.  See Encounter Summary.     ALLERGIES  Allergies   Allergen Reactions    Ciprofloxacin      Tendon pain  Other reaction(s): shoulder pain, blurred vision, abdominal pain  Tendon pain    Crestor [Rosuvastatin Calcium] Unspecified     Muscle pain     Hmg-Coa-R Inhibitors      Other reaction(s): Unspecified  Muscle pain     Aspirin      High dose only - aspirin induced tinnitus    Lisinopril        PHYSICAL EXAM  VITAL SIGNS: /65   Pulse 84   Temp 36.7 °C (98 °F) (Temporal)   Resp 20   Ht 1.829 m (6')   Wt 79.3 kg (174 lb 13.2 oz)   SpO2 94%   BMI 23.71 kg/m²   Constitutional: Awake, alert in no apparent distress.  HENT: Normocephalic, Bilateral external ears normal. Nose normal.   Eyes: Conjunctiva normal, non-icteric, EOMI.    Thorax & Lungs: Easy unlabored respirations, Clear to ascultation bilaterally.  Cardiovascular: Regular rate, Regular rhythm, No murmurs, rubs or gallops. Bilateral pulses symmetrical.   Abdomen:  Soft, tenderness in the epigastrium, left upper quadrant,  nondistended, normal active bowel sounds.   :    Skin: Visualized skin is  Dry, No erythema, No rash.   Musculoskeletal:   No cyanosis, clubbing or edema. No leg asymmetry.   Neurologic: Alert, Grossly non-focal.   Psychiatric: Mildly anxious.  Lymphatic:      EKG   12 lead Interpreted by me  Rhythm:  Normal sinus rhythm   Rate: 92  Axis: normal  Ectopy: none  Conduction: normal  ST Segments: no acute change  T Waves: no acute change  Clinical Impression: Normal EKG without acute changes     RADIOLOGY  CT-ABDOMEN-PELVIS WITH   Final Result      1.  Findings in keeping with acute pancreatitis involving the pancreatic tail.   2.  Small right inguinal hernia containing fat and the appendix.   3.  Atherosclerosis.      DX-CHEST-PORTABLE (1 VIEW)   Final Result         1. No acute cardiopulmonary abnormalities are identified.            COURSE & MEDICAL DECISION MAKING  Pertinent Labs & Imaging studies reviewed. (See chart for details)    Differential diagnoses include but are not limited to: Given the reproducible component, worsening with eating, prior history of gastritis this is the most likely diagnosis.  Other considerations less likely would be pancreatitis, ACS, transverse colon diverticulitis, malignancy    3:16 PM - Medical record reviewed, no recent ER visits.  Nursing notes reviewed, patient seen and examined at bedside.    6 PM lengthy discussion had at bedside about the test results, pancreatitis etc.    Decision Making:  This is a pleasant 72 y.o. year old male who presents with epigastric and left upper quadrant abdominal pain, differential as above.  The patient did have some improvement with GI cocktail, though this possibly was placebo effect.  Metabolic work-up unremarkable, no leukocytosis or leftward shift.  Troponin not elevated, EKG without acute ischemic changes.  Lipase is normal at 24.    The patient did have some pyuria but no bacteriuria and no lower urinary tract symptoms, likely this is  due to BPH.    CT does not show any evidence of diverticulitis or malignancy, interestingly though there was evidence of pancreatitis on the pancreatic tail which is in the distribution of the patient's pain.  For despite the normal laboratory evaluation he does have clinical evidence of acute pancreatitis.  Buena score is low.  He does not require any inpatient admission.  Plan to add an H2 blocker in case there is some concomitant gastritis, recommend avoidance of NSAIDs due to his prior history of erosive gastritis.  Short course of opioids will be prescribed for the pain, nausea medications in addition.    In prescribing controlled substances to this patient, I certify that I have obtained and reviewed the medical history of Darren Brunson. I have also made a good nichole effort to obtain applicable records from other providers who have treated the patient and records did not demonstrate any increased risk of substance abuse that would prevent me from prescribing controlled substances.     I have conducted a physical exam and documented it. I have reviewed Mr. Brunson’s prescription history as maintained by the [place] Prescription Monitoring Program.     I have assessed the patient’s risk for abuse, dependency, and addiction using the validated Opioid Risk Tool available at https://www.mdcalc.com/utshrq-ktfr-rwnr-ort-narcotic-abuse.     Given the above, I believe the benefits of controlled substance therapy outweigh the risks. The reasons for prescribing controlled substances include non-narcotic, oral analgesic alternatives have been inadequate for pain control. Accordingly, I have discussed the risk and benefits, treatment plan, and alternative therapies with the patient.         Heart Score: Low      Discharge Medications:  Discharge Medication List as of 1/14/2023  6:59 PM        START taking these medications    Details   ondansetron (ZOFRAN ODT) 4 MG TABLET DISPERSIBLE Take 1 Tablet by mouth  every 6 hours as needed for Nausea/Vomiting., Disp-10 Tablet, R-0, Normal      oxyCODONE-acetaminophen (PERCOCET) 5-325 MG Tab Take 1 Tablet by mouth every four hours as needed for Moderate Pain or Severe Pain for up to 3 days., Disp-15 Tablet, R-0, Normal      famotidine (PEPCID) 40 MG Tab Take 1 Tablet by mouth every day for 30 days., Disp-30 Tablet, R-0, Normal             The patient was discharged home (see d/c instructions) was told to return immediately for any signs or symptoms listed, or any worsening at all.  The patient verbally agreed to the discharge precautions and follow-up plan which is documented in EPIC.        FINAL IMPRESSION  1. Idiopathic acute pancreatitis without infection or necrosis    2. History of gastritis

## 2023-01-17 ENCOUNTER — APPOINTMENT (RX ONLY)
Dept: URBAN - METROPOLITAN AREA CLINIC 4 | Facility: CLINIC | Age: 73
Setting detail: DERMATOLOGY
End: 2023-01-17

## 2023-01-17 DIAGNOSIS — B00.1 HERPESVIRAL VESICULAR DERMATITIS: ICD-10-CM | Status: RESOLVING

## 2023-01-17 PROCEDURE — ? PRESCRIPTION

## 2023-01-17 PROCEDURE — ? PHOTO-DOCUMENTATION

## 2023-01-17 PROCEDURE — 99213 OFFICE O/P EST LOW 20 MIN: CPT

## 2023-01-17 PROCEDURE — ? COUNSELING

## 2023-01-17 PROCEDURE — ? ADDITIONAL NOTES

## 2023-01-17 RX ORDER — FAMCICLOVIR 250 MG/1
1 TABLET, FILM COATED ORAL TID
Qty: 21 | Refills: 11 | Status: ERX | COMMUNITY
Start: 2023-01-17

## 2023-01-17 RX ADMIN — FAMCICLOVIR 1: 250 TABLET, FILM COATED ORAL at 00:00

## 2023-01-17 ASSESSMENT — LOCATION DETAILED DESCRIPTION DERM: LOCATION DETAILED: LEFT INFERIOR MEDIAL LOWER BACK

## 2023-01-17 ASSESSMENT — LOCATION SIMPLE DESCRIPTION DERM: LOCATION SIMPLE: LEFT LOWER BACK

## 2023-01-17 ASSESSMENT — LOCATION ZONE DERM: LOCATION ZONE: TRUNK

## 2023-01-17 NOTE — PROCEDURE: ADDITIONAL NOTES
Detail Level: Detailed
Additional Notes: Patient didn’t took the course of Valtrex. Pt only using mupirocin 2 % topical ointment  that help calming discomfort on area. Valtrex caused headache.
Render Risk Assessment In Note?: no

## 2023-02-17 ENCOUNTER — PRE-ADMISSION TESTING (OUTPATIENT)
Dept: ADMISSIONS | Facility: MEDICAL CENTER | Age: 73
End: 2023-02-17
Attending: INTERNAL MEDICINE
Payer: MEDICARE

## 2023-02-17 RX ORDER — FLUTICASONE PROPIONATE 50 MCG
2 SPRAY, SUSPENSION (ML) NASAL PRN
COMMUNITY
Start: 2022-11-30

## 2023-02-17 NOTE — PREPROCEDURE INSTRUCTIONS
"Preadmit appointment: \" Preparing for your Procedure information\" sheet reviewed with patient with verbal and written instructions- emailed. Patient instructed to continue prescribed medications through the day before surgery, instructed to take the following medications the day of surgery per anesthesia protocol: Albuterol inhaler if needed, Symbicort, Gabapentin, Nitroglycerin if needed, Ondansetron if needed, Pantoprazole  Verbal and written instructions on covid symptoms to watch for given to patient and patient instructed to call MD if any symptoms develop prior to surgery/procedure. METS >4.  Pt denies any cardiac symptoms, he states had chest pain 8 years ago but cardiac issues ruled out. Pt denies issues with anesthesia  "

## 2023-02-20 ENCOUNTER — HOSPITAL ENCOUNTER (OUTPATIENT)
Facility: MEDICAL CENTER | Age: 73
End: 2023-02-20
Attending: INTERNAL MEDICINE | Admitting: INTERNAL MEDICINE
Payer: MEDICARE

## 2023-02-20 ENCOUNTER — ANESTHESIA EVENT (OUTPATIENT)
Dept: SURGERY | Facility: MEDICAL CENTER | Age: 73
End: 2023-02-20
Payer: MEDICARE

## 2023-02-20 ENCOUNTER — ANESTHESIA (OUTPATIENT)
Dept: SURGERY | Facility: MEDICAL CENTER | Age: 73
End: 2023-02-20
Payer: MEDICARE

## 2023-02-20 VITALS
HEIGHT: 72 IN | BODY MASS INDEX: 22.99 KG/M2 | DIASTOLIC BLOOD PRESSURE: 92 MMHG | HEART RATE: 95 BPM | WEIGHT: 169.75 LBS | RESPIRATION RATE: 16 BRPM | SYSTOLIC BLOOD PRESSURE: 153 MMHG | OXYGEN SATURATION: 95 % | TEMPERATURE: 97.3 F

## 2023-02-20 LAB — PATHOLOGY CONSULT NOTE: NORMAL

## 2023-02-20 PROCEDURE — 88305 TISSUE EXAM BY PATHOLOGIST: CPT

## 2023-02-20 PROCEDURE — 99100 ANES PT EXTEME AGE<1 YR&>70: CPT | Performed by: ANESTHESIOLOGY

## 2023-02-20 PROCEDURE — 160046 HCHG PACU - 1ST 60 MINS PHASE II: Performed by: INTERNAL MEDICINE

## 2023-02-20 PROCEDURE — 160025 RECOVERY II MINUTES (STATS): Performed by: INTERNAL MEDICINE

## 2023-02-20 PROCEDURE — 160208 HCHG ENDO MINUTES - EA ADDL 1 MIN LEVEL 4: Performed by: INTERNAL MEDICINE

## 2023-02-20 PROCEDURE — 700105 HCHG RX REV CODE 258: Performed by: INTERNAL MEDICINE

## 2023-02-20 PROCEDURE — 700111 HCHG RX REV CODE 636 W/ 250 OVERRIDE (IP): Performed by: ANESTHESIOLOGY

## 2023-02-20 PROCEDURE — 00731 ANES UPR GI NDSC PX NOS: CPT | Performed by: ANESTHESIOLOGY

## 2023-02-20 PROCEDURE — 160009 HCHG ANES TIME/MIN: Performed by: INTERNAL MEDICINE

## 2023-02-20 PROCEDURE — 160203 HCHG ENDO MINUTES - 1ST 30 MINS LEVEL 4: Performed by: INTERNAL MEDICINE

## 2023-02-20 PROCEDURE — 160035 HCHG PACU - 1ST 60 MINS PHASE I: Performed by: INTERNAL MEDICINE

## 2023-02-20 PROCEDURE — 160048 HCHG OR STATISTICAL LEVEL 1-5: Performed by: INTERNAL MEDICINE

## 2023-02-20 PROCEDURE — 88312 SPECIAL STAINS GROUP 1: CPT

## 2023-02-20 PROCEDURE — 700101 HCHG RX REV CODE 250: Performed by: ANESTHESIOLOGY

## 2023-02-20 PROCEDURE — 160002 HCHG RECOVERY MINUTES (STAT): Performed by: INTERNAL MEDICINE

## 2023-02-20 PROCEDURE — 88307 TISSUE EXAM BY PATHOLOGIST: CPT

## 2023-02-20 RX ORDER — LIDOCAINE HYDROCHLORIDE 20 MG/ML
INJECTION, SOLUTION EPIDURAL; INFILTRATION; INTRACAUDAL; PERINEURAL PRN
Status: DISCONTINUED | OUTPATIENT
Start: 2023-02-20 | End: 2023-02-20 | Stop reason: SURG

## 2023-02-20 RX ORDER — DIPHENHYDRAMINE HYDROCHLORIDE 50 MG/ML
12.5 INJECTION INTRAMUSCULAR; INTRAVENOUS
Status: CANCELLED | OUTPATIENT
Start: 2023-02-20

## 2023-02-20 RX ORDER — HALOPERIDOL 5 MG/ML
1 INJECTION INTRAMUSCULAR
Status: CANCELLED | OUTPATIENT
Start: 2023-02-20

## 2023-02-20 RX ORDER — ONDANSETRON 2 MG/ML
4 INJECTION INTRAMUSCULAR; INTRAVENOUS
Status: CANCELLED | OUTPATIENT
Start: 2023-02-20

## 2023-02-20 RX ORDER — SODIUM CHLORIDE, SODIUM LACTATE, POTASSIUM CHLORIDE, CALCIUM CHLORIDE 600; 310; 30; 20 MG/100ML; MG/100ML; MG/100ML; MG/100ML
INJECTION, SOLUTION INTRAVENOUS CONTINUOUS
Status: CANCELLED | OUTPATIENT
Start: 2023-02-20

## 2023-02-20 RX ORDER — SODIUM CHLORIDE, SODIUM LACTATE, POTASSIUM CHLORIDE, CALCIUM CHLORIDE 600; 310; 30; 20 MG/100ML; MG/100ML; MG/100ML; MG/100ML
INJECTION, SOLUTION INTRAVENOUS CONTINUOUS
Status: DISCONTINUED | OUTPATIENT
Start: 2023-02-20 | End: 2023-02-20 | Stop reason: HOSPADM

## 2023-02-20 RX ORDER — CEFAZOLIN SODIUM 1 G/3ML
INJECTION, POWDER, FOR SOLUTION INTRAMUSCULAR; INTRAVENOUS PRN
Status: DISCONTINUED | OUTPATIENT
Start: 2023-02-20 | End: 2023-02-20 | Stop reason: SURG

## 2023-02-20 RX ORDER — ROCURONIUM BROMIDE 10 MG/ML
INJECTION, SOLUTION INTRAVENOUS PRN
Status: DISCONTINUED | OUTPATIENT
Start: 2023-02-20 | End: 2023-02-20 | Stop reason: SURG

## 2023-02-20 RX ADMIN — SUGAMMADEX 200 MG: 100 INJECTION, SOLUTION INTRAVENOUS at 07:42

## 2023-02-20 RX ADMIN — ROCURONIUM BROMIDE 40 MG: 10 INJECTION, SOLUTION INTRAVENOUS at 07:12

## 2023-02-20 RX ADMIN — CEFAZOLIN 2 G: 330 INJECTION, POWDER, FOR SOLUTION INTRAMUSCULAR; INTRAVENOUS at 07:00

## 2023-02-20 RX ADMIN — PROPOFOL 150 MG: 10 INJECTION, EMULSION INTRAVENOUS at 07:12

## 2023-02-20 RX ADMIN — LIDOCAINE HYDROCHLORIDE 100 MG: 20 INJECTION, SOLUTION EPIDURAL; INFILTRATION; INTRACAUDAL at 07:12

## 2023-02-20 RX ADMIN — SODIUM CHLORIDE, POTASSIUM CHLORIDE, SODIUM LACTATE AND CALCIUM CHLORIDE: 600; 310; 30; 20 INJECTION, SOLUTION INTRAVENOUS at 07:00

## 2023-02-20 ASSESSMENT — PAIN DESCRIPTION - PAIN TYPE: TYPE: ACUTE PAIN

## 2023-02-20 ASSESSMENT — FIBROSIS 4 INDEX: FIB4 SCORE: 1.55

## 2023-02-20 ASSESSMENT — PAIN SCALES - GENERAL: PAIN_LEVEL: 0

## 2023-02-20 NOTE — OR NURSING
0748 Arrived to PACU. Report received from anesthesia/OR circulator. Patient care assumed.     0748 Sleeping, respirations spontaneous and non-labored.      0822 Patient met criteria for transfer to stage II via gurney with CNA assist. Patient states good pain control. Denies n/v, tolerating po well. Report called to: Belkis SILVA.

## 2023-02-20 NOTE — ANESTHESIA POSTPROCEDURE EVALUATION
Patient: Darren Brunson    Procedure Summary     Date: 02/20/23 Room / Location:  ENDOSCOPIC ULTRASOUND ROOM / SURGERY [facility]    Anesthesia Start: 0700 Anesthesia Stop: 0750    Procedures:       UPPER RADIAL ENDOSCOPIC ULTRASOUND PANCREATITIS, TAIL OF PANCREATIC MASS - ENDOSCOPY POSSIBLE FINE NEEDLE ASPIRATION WITH ANESTHESIA      EGD, WITH ENDOSCOPIC US      EGD, WITH ASPIRATION BIOPSY Diagnosis:       Pancreatic mass      (Pancreatic mass [562005])    Surgeons: Reymundo Loya M.D. Responsible Provider: Mark Barr M.D.    Anesthesia Type: general ASA Status: 3          Final Anesthesia Type: general  Last vitals  BP   Blood Pressure : (!) 158/85    Temp   36.2 °C (97.2 °F)    Pulse   93   Resp   18    SpO2   100 %      Anesthesia Post Evaluation    Patient location during evaluation: PACU  Patient participation: complete - patient participated  Level of consciousness: awake and alert  Pain score: 0    Airway patency: patent  Anesthetic complications: no  Cardiovascular status: hemodynamically stable  Respiratory status: acceptable  Hydration status: euvolemic    PONV: none          There were no known notable events for this encounter.     Nurse Pain Score: 0 (NPRS)

## 2023-02-20 NOTE — ANESTHESIA PROCEDURE NOTES
Airway    Date/Time: 2/20/2023 7:13 AM  Performed by: Mark Barr M.D.  Authorized by: Mark Barr M.D.     Location:  OR  Urgency:  Elective  Indications for Airway Management:  Anesthesia      Spontaneous Ventilation: absent    Sedation Level:  Deep  Preoxygenated: Yes    Patient Position:  Sniffing  Final Airway Type:  Endotracheal airway  Final Endotracheal Airway:  ETT  Cuffed: Yes    Technique Used for Successful ETT Placement:  Video laryngoscopy    Insertion Site:  Oral  Blade Type:  Bandar  Laryngoscope Blade/Videolaryngoscope Blade Size:  3  ETT Size (mm):  8.0  Measured from:  Teeth  ETT to Teeth (cm):  22  Placement Verified by: auscultation and capnometry    Cormack-Lehane Classification:  Grade I - full view of glottis  Number of Attempts at Approach:  1

## 2023-02-20 NOTE — ANESTHESIA PREPROCEDURE EVALUATION
Case: [id number] Date/Time: 02/20/23 0645    Procedures:       UPPER RADIAL ENDOSCOPIC ULTRASOUND PANCREATITIS, TAIL OF PANCREATIC MASS - ENDOSCOPY POSSIBLE FINE NEEDLE ASPIRATION WITH ANESTHESIA      EGD, WITH ENDOSCOPIC US      EGD, WITH ASPIRATION BIOPSY    Anesthesia type: MAC    Pre-op diagnosis: ABNORMAL CT SCAN    Location:  ENDOSCOPIC ULTRASOUND ROOM / SURGERY [facility]    Surgeons: Reymundo Loya M.D.          Relevant Problems   ANESTHESIA   (positive) Obstructive sleep apnea syndrome      CARDIAC   (positive) Acute myocardial infarction of other anterior wall, episode of care unspecified   (positive) Coronary atherosclerosis of native coronary artery   (positive) Essential hypertension   (positive) Stented coronary artery      ENDO   (positive) Type 2 diabetes mellitus without complication, without long-term current use of insulin (HCC)       Physical Exam    Airway   Mallampati: II  TM distance: >3 FB  Neck ROM: full       Cardiovascular - normal exam  Rhythm: regular  Rate: normal  (-) murmur     Dental - normal exam           Pulmonary - normal exam  Breath sounds clear to auscultation     Abdominal    Neurological - normal exam                 Anesthesia Plan    ASA 3   ASA physical status 3 criteria: CAD/stents (> 3 months) and MI or angina - history (> 3 months)    Plan - general       Airway plan will be ETT          Induction: intravenous    Postoperative Plan: Postoperative administration of opioids is intended.    Pertinent diagnostic labs and testing reviewed    Informed Consent:    Anesthetic plan and risks discussed with patient.    Use of blood products discussed with: patient whom consented to blood products.

## 2023-02-20 NOTE — OR NURSING
0822: Patient arrived from PACU via gurney.  No s/s bleeding or discomfort noted.     Sedation/Resp Status: Alert.   Respirations spontaneous and non-labored.    0840: Patient education completed, family denies further questions.     0845: DC'd to care of family post uneventful stay in PACU 2.

## 2023-02-20 NOTE — OR SURGEON
EGD and EUS operative note    PreOp Diagnosis: Abnormal imaging with tail of pancreas mass      PostOp Diagnosis: Same      Procedure(s):  UPPER RADIAL ENDOSCOPIC ULTRASOUND PANCREATITIS, TAIL OF PANCREATIC MASS - ENDOSCOPY POSSIBLE FINE NEEDLE ASPIRATION WITH ANESTHESIA - Wound Class: Clean Contaminated  EGD, WITH ENDOSCOPIC US - Wound Class: Clean Contaminated  EGD, WITH ASPIRATION BIOPSY - Wound Class: Clean Contaminated    Surgeon(s):  Reymundo Loya M.D.    Anesthesiologist/Type of Anesthesia:  Anesthesiologist: Mark Barr M.D./MAC    Surgical Staff:  Circulator: Tereso Benitez R.N.  Endoscopy Technician: Lauren Emery    Specimens removed if any:  ID Type Source Tests Collected by Time Destination   A : antrum bx Tissue Gastric PATHOLOGY SPECIMEN Reymundo Loya M.D. 2/20/2023  7:37 AM    B : core - tail of pancreas cystic mass Tissue Pancreas PATHOLOGY SPECIMEN Reymundo Loya M.D. 2/20/2023  7:37 AM        Dr. Loya  [facility]  [place]  [phone]    EGD with: Biopsy    EUS with: Fine-needle aspiration    Indication: History of pancreatitis with tail of pancreas mass seen on repeat imaging    Sedation: GA (Dr. Barr)    Findings:   EGD    Esophagus     Unremarkable mucosa    Stomach     Proximal mucosa unremarkable     Mild gastric antrum erythema biopsied for H. pylori    Duodenum     Unremarkable mucosa     EUS    Celiac axis     No adenopathy    Pancreas body     Unremarkable parenchyma throughout    Tail of pancreas irregularity     Subtle solid and cystic hypoechoic region      17.3 mm x 15.7 mm     Benign appearance     FNA 25-gauge acquire needle    Pancreatic duct     Normal course and caliber through to the tail    Gallbladder     Anechoic     No filling defects     Normal wall thickness    Ampulla     Unremarkable    CBD     Normal course and caliber     No filling defects    Head of pancreas     Normal dorsal/ventral  transition     Unremarkable parenchyma    Plan:  Follow-up pathology    Repeat MRI with dedicated pancreatic protocol in 3 months      Procedure detail:    Prior to procedure, informed consent was obtained.  Risks, benefits, alternatives, including but not limited to risk of bleeding, infection, perforation, adverse reaction to sedating medicine, failure to identify pathology, pancreatitis and death were explained to the patient who accepted all risks.    Patient was prepped in the left lateral position after intubation and sedation was provided by anesthesia.    EGD  Scope tip of the Olympus flexible gastroscope was passed in the proximal esophagus.  Proximal middle and distal thirds of the esophagus were well visualized and were unremarkable.  The Z-line was regular.  The stomach was entered.  Gastric pool was suctioned and air was insufflated.  Scope was retroflexed view the body fundus and cardia then straightened to view the antrum and incisura.  The proximal mucosa was unremarkable.  In the antrum there was mild erythema of unclear significance.  Biopsies were taken for H. pylori.  The pylorus was patent.  Duodenal bulb, sweep, second and third portion were well visualized and were unremarkable.  The gastroscope was withdrawn and we proceeded with endoscopic ultrasound.    EUS  The flexible radial echoendoscope was passed in the proximal stomach.  Imaging of the celiac axis was unremarkable without adenopathy.  Imaging of the pancreatic body and tail demonstrated a very normal-appearing gland with a normal salt-and-pepper appearance.  The pancreatic duct had a normal course and caliber.  On close examination of the tail of the pancreas there was a subtle area that was mildly hypoechoic compared to surrounding tissue and had the cystic changes possibly representing the area in question.  No other lesions were seen throughout the pancreas.  The scope was advanced into the antrum and the gallbladder was  visualized.  This had a normal wall thickness, was distended with anechoic fluid and no filling defects.  The scope was advanced to the duodenum the biliary ampulla was endosonographically unremarkable.  The common bile duct had a normal course and caliber.  The head of the pancreas had a normal dorsal ventral transition and unremarkable parenchyma.  The radial echoendoscope was withdrawn and we proceeded with fine-needle aspiration.  The flexible linear echoendoscope was passed into the stomach and once again the pancreatic body and tail were closely examined.  The subtle region in the tail of the pancreas was once again identified and a 25-gauge Fuzmo acquire needle was used for single pass with multiple throws.  Once this was complete, the procedure was deemed complete.  The scope was withdrawn.  Air and liquid resection.  Patient tolerated the procedure well and was sent to recovery without immediate complications.      2/20/2023 7:43 AM Reymundo Loya M.D.

## 2023-02-20 NOTE — ANESTHESIA TIME REPORT
Anesthesia Start and Stop Event Times     Date Time Event    2/20/2023 0649 Ready for Procedure     0700 Anesthesia Start     0750 Anesthesia Stop        Responsible Staff  02/20/23    Name Role Begin End    Mark Barr M.D. Anesth 0700 0750        Overtime Reason:  no overtime (within assigned shift)    Comments:

## 2023-02-20 NOTE — DISCHARGE INSTRUCTIONS
ENDOSCOPY HOME CARE INSTRUCTIONS    GASTROSCOPY OR ERCP    1. Don't eat or drink anything for about an hour after the test. You can then resume your regular diet.  2. Don't drive or drink alcohol for 24 hours. The medication you received will make you too drowsy.  3. Don't take any coffee, tea, or aspirin products until after you see your doctor. These can harm the lining of your stomach.  4. If you begin to vomit bloody material, or develop black or bloody stools, call your doctor as soon as possible.  5. If you have any neck, chest, abdominal pain or temp of 100 degrees, call your doctor.  6. Plan: Follow-up pathology             Repeat MRI with dedicated pancreatic protocol in 3 months    Findings:                              EGD                         Esophagus                                     Unremarkable mucosa                         Stomach                                     Proximal mucosa unremarkable                                     Mild gastric antrum erythema biopsied for H. pylori                         Duodenum                                     Unremarkable mucosa                EUS                         Celiac axis                                     No adenopathy                         Pancreas body                                     Unremarkable parenchyma throughout                         Tail of pancreas irregularity                                     Subtle solid and cystic hypoechoic region                                                 17.3 mm x 15.7 mm                                     Benign appearance                                     FNA 25-gauge acquire needle                         Pancreatic duct                                     Normal course and caliber through to the tail                         Gallbladder                                     Anechoic                                     No filling defects                                     Normal wall  thickness                         Ampulla                                     Unremarkable                         CBD                                     Normal course and caliber                                     No filling defects                         Head of pancreas                                     Normal dorsal/ventral transition                                     Unremarkable parenchyma     Plan:              Follow-up pathology                       Repeat MRI with dedicated pancreatic protocol in 3 months     You should call 911 if you develop problems with breathing or chest pain.  If any questions arise, call your doctor. If your doctor is not available, please feel free to call [phone]. You can also call the HEALTH HOTLINE open 24 hours/day, 7 days/week and speak to a nurse at [phone], or toll free [phone].

## 2023-05-11 ASSESSMENT — ENCOUNTER SYMPTOMS
ABDOMINAL PAIN: 0
VOMITING: 0
SHORTNESS OF BREATH: 0
PALPITATIONS: 0
PND: 0
FOCAL WEAKNESS: 0
DIZZINESS: 0
NEAR-SYNCOPE: 0
NAUSEA: 0
ORTHOPNEA: 0
IRREGULAR HEARTBEAT: 0
SYNCOPE: 0
DIARRHEA: 0
FEVER: 0
COUGH: 0
WHEEZING: 0
DYSPNEA ON EXERTION: 0
NIGHT SWEATS: 0
WEAKNESS: 0

## 2023-05-12 ENCOUNTER — OFFICE VISIT (OUTPATIENT)
Dept: CARDIOLOGY | Facility: MEDICAL CENTER | Age: 73
End: 2023-05-12
Payer: MEDICARE

## 2023-05-12 ENCOUNTER — PATIENT MESSAGE (OUTPATIENT)
Dept: CARDIOLOGY | Facility: MEDICAL CENTER | Age: 73
End: 2023-05-12

## 2023-05-12 VITALS
HEART RATE: 74 BPM | BODY MASS INDEX: 23.3 KG/M2 | OXYGEN SATURATION: 98 % | RESPIRATION RATE: 16 BRPM | HEIGHT: 72 IN | DIASTOLIC BLOOD PRESSURE: 78 MMHG | WEIGHT: 172 LBS | SYSTOLIC BLOOD PRESSURE: 128 MMHG

## 2023-05-12 DIAGNOSIS — I10 ESSENTIAL HYPERTENSION: ICD-10-CM

## 2023-05-12 DIAGNOSIS — E78.5 DYSLIPIDEMIA: ICD-10-CM

## 2023-05-12 DIAGNOSIS — Z95.5 STENTED CORONARY ARTERY: ICD-10-CM

## 2023-05-12 DIAGNOSIS — I25.10 CORONARY ARTERY DISEASE INVOLVING NATIVE CORONARY ARTERY OF NATIVE HEART WITHOUT ANGINA PECTORIS: ICD-10-CM

## 2023-05-12 DIAGNOSIS — Z78.9 STATIN INTOLERANCE: ICD-10-CM

## 2023-05-12 PROCEDURE — 3074F SYST BP LT 130 MM HG: CPT | Performed by: STUDENT IN AN ORGANIZED HEALTH CARE EDUCATION/TRAINING PROGRAM

## 2023-05-12 PROCEDURE — 99214 OFFICE O/P EST MOD 30 MIN: CPT | Performed by: STUDENT IN AN ORGANIZED HEALTH CARE EDUCATION/TRAINING PROGRAM

## 2023-05-12 PROCEDURE — 3078F DIAST BP <80 MM HG: CPT | Performed by: STUDENT IN AN ORGANIZED HEALTH CARE EDUCATION/TRAINING PROGRAM

## 2023-05-12 RX ORDER — DOXYCYCLINE HYCLATE 100 MG/1
100 CAPSULE ORAL 2 TIMES DAILY
COMMUNITY
Start: 2023-04-25 | End: 2023-06-06

## 2023-05-12 RX ORDER — FLUTICASONE PROPIONATE 93 UG/1
SPRAY, METERED NASAL
COMMUNITY
Start: 2023-04-28 | End: 2023-06-06

## 2023-05-12 RX ORDER — MONTELUKAST SODIUM 10 MG/1
10 TABLET ORAL
COMMUNITY
Start: 2023-05-05 | End: 2023-06-06

## 2023-05-12 ASSESSMENT — FIBROSIS 4 INDEX: FIB4 SCORE: 1.55

## 2023-05-12 NOTE — PROGRESS NOTES
Cardiology Follow up Consultation Note    Date of note:    05/12/23  Primary Care Provider: Kailee Pappas M.D.    Patient Name: Darren Brunson   YOB: 1950  MRN:              [id number]    Chief Complaint: Follow-up CAD and dyslipidemia    History of Present Illness: Mr. Darren Brunson is a 72 y.o. male whose current medical problems include CAD (status post PCI to LAD in 2009, PCI to RCA in 2015), statin intolerance, hypertension, dyslipidemia, diabetes, and asthma who is here for follow-up CAD and dyslipidemia.    The patient was last seen in my clinic on 10/17/22.  He was doing well during the last visit, and no changes were made to his medications.    Previously, he was referred to lipid clinic for further lipid management.    He has tried statins as well as ezetimibe, and had severe reactions to both.  He was started on bempedoic acid; however, [facility], and discontinued the medication.  He had not made an appointment with the lipid clinic as [facility] was not going to cover any medications and his lipid levels improved.     The patient presents today for follow-up.  The patient reports feeling well today in terms of cardiac symptoms.  He did have pancreatitis, and was hospitalized in February 2023.  He also has been having a lot of allergies, otherwise doing well.  He denies any chest pain or shortness of breath with exertion.  He denies any orthopnea, PND, or leg swelling.  No palpitations.  No syncope or presyncopal episodes.    Cardiovascular Risk Factors:  1. Smoking status: Former smoker  2. Type II Diabetes Mellitus: On medication  3. Hypertension: Controlled without antihypertensives  4. Dyslipidemia: On medication  Labs 7/5/2022 Total cholesterol 164 HDL 50  LDL 94  5. Family history of early Coronary Artery Disease in a first degree relative (Male less than 55 years of age; Female less than 65 years of age): None  6.  Obesity and/or Metabolic Syndrome: BMI  25.09  7. Sedentary lifestyle: He does exercise classes at [facility] (pilates, core and balance, yoga), walk sometimes.     Review of Systems   Constitutional: Negative for fever, malaise/fatigue and night sweats.   Cardiovascular:  Negative for chest pain, dyspnea on exertion, irregular heartbeat, leg swelling, near-syncope, orthopnea, palpitations, paroxysmal nocturnal dyspnea and syncope.   Respiratory:  Negative for cough, shortness of breath and wheezing.    Gastrointestinal:  Negative for abdominal pain, diarrhea, nausea and vomiting.   Neurological:  Negative for dizziness, focal weakness and weakness.     All other systems reviewed and are negative.         Current Outpatient Medications   Medication Sig Dispense Refill    fluticasone (FLONASE) 50 MCG/ACT nasal spray 2 Sprays as needed.      ondansetron (ZOFRAN ODT) 4 MG TABLET DISPERSIBLE Take 1 Tablet by mouth every 6 hours as needed for Nausea/Vomiting. 10 Tablet 0    budesonide-formoterol (SYMBICORT) 160-4.5 MCG/ACT Aerosol INHALE 2 PUFFS BY MOUTH TWICE A DAY. USE WITH SPACER AND RINSE MOUTH AFTER EACH USE 10.2 Each 5    ALPRAZolam (XANAX) 0.25 MG Tab TAKE 1 TABLET (0.25 MG TOTAL) BY MOUTH NIGHTLY AS NEEDED FOR ANXIETY FOR UP TO 30 DAYS      Cholecalciferol (VITAMIN D3) 125 MCG (5000 UT) Cap Take 5,000 Units by mouth every day.      glucose blood (ONETOUCH ULTRA) strip 1 Each by Other route every day.      nystatin/triamcinolone (MYCOLOG) 480536-8.1 UNIT/GM-% Cream as needed.      pantoprazole (PROTONIX) 40 MG Tablet Delayed Response Take 1 Tablet by mouth 2 times a day.      aspirin (ASA) 81 MG Chew Tab chewable tablet every day.      fexofenadine (ALLEGRA) 180 MG tablet Take 180 mg by mouth as needed.      Multiple Vitamins-Minerals (PRESERVISION AREDS 2+MULTI VIT PO) Take  by mouth every day.      Menaquinone-7 (VITAMIN K2 PO) Take  by mouth every day.      nitroglycerin (NITROSTAT) 0.4 MG SL Tab PLACE 1 TAB UNDER TONGUE EVERY 5 MINS FOR UP TO 3 DOSES  AS NEEDED FOR CHEST PAIN 25 Tablet 2    albuterol 108 (90 Base) MCG/ACT Aero Soln inhalation aerosol INHALE 1-2 PUFFS BY MOUTH EVERY FOUR HOURS AS NEEDED FOR SHORTNESS OF BREATH. 8.5 Each 11    azelastine (ASTELIN) 137 MCG/SPRAY nasal spray as needed.      metformin ER (GLUCOPHAGE XR) 500 MG TABLET SR 24 HR Take 500 mg by mouth every evening.      gabapentin (NEURONTIN) 100 MG Cap Take 100 mg by mouth 3 times a day. 300mg daily      fenofibrate (TRICOR) 48 MG TABS Take 48 mg by mouth every evening.      Probiotic Product (PROBIOTIC DAILY PO) Take 1 Tab by mouth every day.      doxycycline (VIBRAMYCIN) 100 MG Cap Take 100 mg by mouth 2 times a day. For 7 Days      XHANCE 93 MCG/ACT Exhaler Suspension INSTILL 2 SPRAYS IN EACH NOSTRIL TWICE DAILY      montelukast (SINGULAIR) 10 MG Tab Take 10 mg by mouth at bedtime.       No current facility-administered medications for this visit.         Allergies   Allergen Reactions    Lisinopril Anaphylaxis     Lip and tongue swelling    Aspirin      High dose only - aspirin induced tinnitus    Ciprofloxacin      Tendon pain  Other reaction(s): shoulder pain, blurred vision, abdominal pain  Tendon pain    Crestor [Rosuvastatin Calcium] Unspecified     Muscle pain     Hmg-Coa-R Inhibitors      Other reaction(s): Unspecified  Muscle pain     Atorvastatin          Physical Exam:  Ambulatory Vitals  /78 (BP Location: Left arm, Patient Position: Sitting, BP Cuff Size: Adult)   Pulse 74   Resp 16   Ht 1.829 m (6')   Wt 78 kg (172 lb)   SpO2 98%    Oxygen Therapy:  Pulse Oximetry: 98 %  BP Readings from Last 4 Encounters:   05/12/23 128/78   02/20/23 (!) 153/92   01/14/23 136/65   10/17/22 124/62       Weight/BMI: Body mass index is 23.33 kg/m².  Wt Readings from Last 4 Encounters:   05/12/23 78 kg (172 lb)   02/20/23 77 kg (169 lb 12.1 oz)   01/14/23 79.3 kg (174 lb 13.2 oz)   10/17/22 82.1 kg (181 lb)       General: Well appearing and in no apparent distress  Eyes: nl  conjunctiva, no icteric sclera  ENT: wearing a mask, normal external appearance of ears  Neck: no visible JVP,  no carotid bruits  Lungs: normal respiratory effort, CTAB  Heart: RRR, no murmurs, no rubs or gallops,  no edema bilateral lower extremities. No LV/RV heave on cardiac palpatation. + bilateral radial pulses.  + bilateral dp pulses.   Abdomen: soft, non tender, non distended, no masses, normal bowel sounds.  No HSM.  Extremities/MSK: no clubbing, no cyanosis  Neurological: No focal sensory deficits  Psychiatric: Appropriate affect, A/O x 3, intact judgement and insight  Skin: Warm extremities      Lab Data Review:  Lab Results   Component Value Date/Time    CHOLSTRLTOT 190 06/27/2020 12:47 AM     (H) 06/27/2020 12:47 AM    HDL 47 06/27/2020 12:47 AM    TRIGLYCERIDE 116 06/27/2020 12:47 AM       Lab Results   Component Value Date/Time    SODIUM 130 (L) 01/14/2023 02:40 PM    POTASSIUM 4.5 01/14/2023 02:40 PM    CHLORIDE 94 (L) 01/14/2023 02:40 PM    CO2 24 01/14/2023 02:40 PM    GLUCOSE 129 (H) 01/14/2023 02:40 PM    BUN 14 01/14/2023 02:40 PM    CREATININE 0.95 01/14/2023 02:40 PM     Lab Results   Component Value Date/Time    ALKPHOSPHAT 64 01/14/2023 02:40 PM    ASTSGOT 25 01/14/2023 02:40 PM    ALTSGPT 22 01/14/2023 02:40 PM    TBILIRUBIN 0.7 01/14/2023 02:40 PM      Lab Results   Component Value Date/Time    WBC 10.3 01/14/2023 02:40 PM     Lab Results   Component Value Date/Time    HBA1C 6.0 (H) 01/11/2023 08:13 AM    HBA1C 5.9 (H) 07/05/2022 07:51 AM     Labs May 2021  Total cholesterol 184 HDL 51 triglyceride 95   Creatinine 0.89  Sodium 140 potassium 4.2 bili 0.6 AST 20 ALT 19 alk phos 37  WBC 4.1 Hemoglobin 14.9 Platelet 168  Hemoglobin a1c 5.6    Labs 7/2022  Total cholesterol 164 HDL 50 triglyceride 100 LDL 94    Labs 1/2023  Total cholesterol 180 HDL 52 triglyceride 95     Cardiac Imaging and Procedures Review:    EKG dated 6/28/2020: My personal interpretation is sinus  rhythm    Echo dated 6/26/2020:   Prior echocardiogram 3/20/2018.  Technically difficult study.  Preserved left ventricular systolic function.  Left ventricular ejection fraction is difficult to assess.      Nuclear Perfusion Imaging (6/30/2020):   NUCLEAR IMAGING INTERPRETATION    No reversible defects that would indicate ischemia.    Matched defect in the inferior apex is either artifactual or due to a small    inferior wall infarct.    Normal left ventricular size, ejection fraction, and wall motion.    ECG INTERPRETATION    Normal ECG portion of a treadmill nuclear stress test.    Average exercise tolerance for age.    Normal BP response to exercise.    Sinus rhythm.    No ischemic ECG changes.    No chest pain during stress.    Ernandez treadmill score 6, low risk (assume no prior CAD).       Assessment & Plan     1. Coronary artery disease involving native coronary artery of native heart without angina pectoris        2. Statin intolerance        3. Dyslipidemia        4. Essential hypertension        5. Stented coronary artery                Shared Medical Decision Making:    CAD  Status post PCI to LAD in 2009, PCI to RCA in 2015.  Nuclear stress test in June 2020 without any reversible ischemia.  Patient is very active without any cardiac symptoms.  -Continue aspirin 81mg daily  -Cannot tolerate statin or ezetimibe.  Cannot afford bempedoic acid.  Continue fenofibrate.  Last lipid level slightly elevated but it was prior to his pancreatitis episode.  Repeat labs pending next month.  -Continue exercise as well as healthy diet     Dyslipidemia  Statin intolerance  The patient also has intolerance to ezetimibe. The patient cannot afford bempedoic acid, and not covered [facility].   -Continue fenofibrate 48mg daily  -The patient was referred to lipid clinic but had not been evaluated due to [facility] not covering any medication. Most recent check was slightly elevated but it was prior to his pancreatitis. Repeat lipid  panel pending.     Hypertension  BP well controlled this visit  -Well controlled without anithypertensive.   -Counseled the patient to measure BP at home.  If home BP above 130/80, will plan to start antihypertensives, such as ACE/ARB.    All of the patient's excellent questions were answered to the best of my knowledge and to his satisfaction.  It was a pleasure seeing Mr. Darren Brunson in my clinic today. Return in about 6 months (around 11/12/2023). Patient is aware to call the cardiology clinic with any questions or concerns.      Tim Sotelo MD  [facility]  [facility], [address]  [address] [address]  Phone: [phone]  Fax: [phone]

## 2023-05-17 NOTE — PATIENT COMMUNICATION
Tim Sotelo M.D.  You 22 hours ago (2:28 PM)     OK to use with his heart condition.  Is he planning to use them long term?      MyChart response sent to pt.

## 2023-05-29 ENCOUNTER — HOSPITAL ENCOUNTER (EMERGENCY)
Facility: MEDICAL CENTER | Age: 73
End: 2023-05-29
Attending: EMERGENCY MEDICINE
Payer: MEDICARE

## 2023-05-29 VITALS
BODY MASS INDEX: 24.13 KG/M2 | RESPIRATION RATE: 16 BRPM | HEIGHT: 72 IN | SYSTOLIC BLOOD PRESSURE: 164 MMHG | TEMPERATURE: 97.6 F | OXYGEN SATURATION: 93 % | DIASTOLIC BLOOD PRESSURE: 92 MMHG | HEART RATE: 78 BPM | WEIGHT: 178.13 LBS

## 2023-05-29 DIAGNOSIS — K40.91 UNILATERAL RECURRENT INGUINAL HERNIA WITHOUT OBSTRUCTION OR GANGRENE: ICD-10-CM

## 2023-05-29 LAB
ALBUMIN SERPL BCP-MCNC: 4 G/DL (ref 3.2–4.9)
ALBUMIN/GLOB SERPL: 1.6 G/DL
ALP SERPL-CCNC: 57 U/L (ref 30–99)
ALT SERPL-CCNC: 29 U/L (ref 2–50)
ANION GAP SERPL CALC-SCNC: 13 MMOL/L (ref 7–16)
APPEARANCE UR: ABNORMAL
AST SERPL-CCNC: 30 U/L (ref 12–45)
BACTERIA #/AREA URNS HPF: NEGATIVE /HPF
BASOPHILS # BLD AUTO: 0.8 % (ref 0–1.8)
BASOPHILS # BLD: 0.03 K/UL (ref 0–0.12)
BILIRUB SERPL-MCNC: 0.4 MG/DL (ref 0.1–1.5)
BILIRUB UR QL STRIP.AUTO: NEGATIVE
BUN SERPL-MCNC: 16 MG/DL (ref 8–22)
CALCIUM ALBUM COR SERPL-MCNC: 9.2 MG/DL (ref 8.5–10.5)
CALCIUM SERPL-MCNC: 9.2 MG/DL (ref 8.5–10.5)
CHLORIDE SERPL-SCNC: 98 MMOL/L (ref 96–112)
CO2 SERPL-SCNC: 24 MMOL/L (ref 20–33)
COLOR UR: YELLOW
CREAT SERPL-MCNC: 0.81 MG/DL (ref 0.5–1.4)
EOSINOPHIL # BLD AUTO: 0.17 K/UL (ref 0–0.51)
EOSINOPHIL NFR BLD: 4.3 % (ref 0–6.9)
EPI CELLS #/AREA URNS HPF: NEGATIVE /HPF
ERYTHROCYTE [DISTWIDTH] IN BLOOD BY AUTOMATED COUNT: 42.6 FL (ref 35.9–50)
GFR SERPLBLD CREATININE-BSD FMLA CKD-EPI: 92 ML/MIN/1.73 M 2
GLOBULIN SER CALC-MCNC: 2.5 G/DL (ref 1.9–3.5)
GLUCOSE SERPL-MCNC: 116 MG/DL (ref 65–99)
GLUCOSE UR STRIP.AUTO-MCNC: NEGATIVE MG/DL
HCT VFR BLD AUTO: 41.2 % (ref 42–52)
HGB BLD-MCNC: 14.5 G/DL (ref 14–18)
HYALINE CASTS #/AREA URNS LPF: ABNORMAL /LPF
IMM GRANULOCYTES # BLD AUTO: 0 K/UL (ref 0–0.11)
IMM GRANULOCYTES NFR BLD AUTO: 0 % (ref 0–0.9)
KETONES UR STRIP.AUTO-MCNC: NEGATIVE MG/DL
LEUKOCYTE ESTERASE UR QL STRIP.AUTO: NEGATIVE
LIPASE SERPL-CCNC: 16 U/L (ref 11–82)
LYMPHOCYTES # BLD AUTO: 0.83 K/UL (ref 1–4.8)
LYMPHOCYTES NFR BLD: 21 % (ref 22–41)
MCH RBC QN AUTO: 30.9 PG (ref 27–33)
MCHC RBC AUTO-ENTMCNC: 35.2 G/DL (ref 32.3–36.5)
MCV RBC AUTO: 87.8 FL (ref 81.4–97.8)
MICRO URNS: ABNORMAL
MONOCYTES # BLD AUTO: 0.51 K/UL (ref 0–0.85)
MONOCYTES NFR BLD AUTO: 12.9 % (ref 0–13.4)
NEUTROPHILS # BLD AUTO: 2.41 K/UL (ref 1.82–7.42)
NEUTROPHILS NFR BLD: 61 % (ref 44–72)
NITRITE UR QL STRIP.AUTO: NEGATIVE
NRBC # BLD AUTO: 0 K/UL
NRBC BLD-RTO: 0 /100 WBC (ref 0–0.2)
PH UR STRIP.AUTO: 8 [PH] (ref 5–8)
PLATELET # BLD AUTO: 219 K/UL (ref 164–446)
PMV BLD AUTO: 8.9 FL (ref 9–12.9)
POTASSIUM SERPL-SCNC: 3.8 MMOL/L (ref 3.6–5.5)
PROT SERPL-MCNC: 6.5 G/DL (ref 6–8.2)
PROT UR QL STRIP: NEGATIVE MG/DL
RBC # BLD AUTO: 4.69 M/UL (ref 4.7–6.1)
RBC # URNS HPF: ABNORMAL /HPF
RBC UR QL AUTO: NEGATIVE
SODIUM SERPL-SCNC: 135 MMOL/L (ref 135–145)
SP GR UR STRIP.AUTO: 1.01
UROBILINOGEN UR STRIP.AUTO-MCNC: 0.2 MG/DL
WBC # BLD AUTO: 4 K/UL (ref 4.8–10.8)
WBC #/AREA URNS HPF: ABNORMAL /HPF

## 2023-05-29 PROCEDURE — 83690 ASSAY OF LIPASE: CPT

## 2023-05-29 PROCEDURE — 81001 URINALYSIS AUTO W/SCOPE: CPT

## 2023-05-29 PROCEDURE — 36415 COLL VENOUS BLD VENIPUNCTURE: CPT

## 2023-05-29 PROCEDURE — 80053 COMPREHEN METABOLIC PANEL: CPT

## 2023-05-29 PROCEDURE — 85025 COMPLETE CBC W/AUTO DIFF WBC: CPT

## 2023-05-29 PROCEDURE — 99284 EMERGENCY DEPT VISIT MOD MDM: CPT

## 2023-05-29 ASSESSMENT — FIBROSIS 4 INDEX: FIB4 SCORE: 1.55

## 2023-05-30 NOTE — ED TRIAGE NOTES
"Pt ambulated to triage with   Chief Complaint   Patient presents with    RLQ Pain     H/o hernia, reports that it \"popped out\" pt seen for pancreatitis.  A hour ago started to have pain.  Pt reports that 1/14 had Ct and \"hernia and appendix was intertwined\".        Protocol ordered.  Pt Informed regarding triage process and verbalized understanding to inform triage tech or RN for any changes in condition. Placed in lobby.     "

## 2023-05-30 NOTE — DISCHARGE INSTRUCTIONS
You were seen in the Emergency Department for hernia which reduced on arrival.    Labs were completed without significant acute abnormalities.    Please use 1,000mg of tylenol or 600mg of ibuprofen every 6 hours as needed for pain.    Please follow up with your primary care physician and general surgery as soon as possible for first evaluation of surgical repair.    Return to the Emergency Department with recurrent in 6 hernia that is not reducible, worsening abdominal pain, vomiting, fevers, or other concerns.

## 2023-05-30 NOTE — ED PROVIDER NOTES
"ED Provider Note    CHIEF COMPLAINT  Chief Complaint   Patient presents with    RLQ Pain     H/o hernia, reports that it \"popped out\" pt seen for pancreatitis.  A hour ago started to have pain.  Pt reports that 1/14 had Ct and \"hernia and appendix was intertwined\".      EXTERNAL RECORDS REVIEWED  Patient was seen here in January for abdominal pain. CT scan at that time showed a small inguinal hernia containing both fat as well as the appendix.    HPI/ROS  LIMITATION TO HISTORY   Select: : None  OUTSIDE HISTORIAN(S):  Wife    Darren Brunson is a 72 y.o. male who presents to the Emergency Department for evaluation of right lower quadrant abdominal pain onset earlier today. Patient states he has history of hernia and underwent surgery for hernia in 2015.  He has had recurrence of the right inguinal hernia for some time.  States earlier today, he was doing okay and was outside barbecuing, and when he walked into the house, he felt something abnormal in his abdomen. He then developed severe pain and swelling to the right inguinal area.  He had associated nausea however no vomiting.  He notes that on January, he had pancreatitis, and his CT at the time showed involvement of the appendix.  He states that since he has arrived to the emergency department the hernia has reduced and his pain has resolved.  No fevers or dysuria.  Patient has had normal bowel movements.    PAST MEDICAL HISTORY  Past Medical History:   Diagnosis Date    Acute myocardial infarction of other anterior wall, episode of care unspecified 2009; 2014    Anxiety state, unspecified      Asthma     Bowel habit changes     occasional constipation since diagnosis of pancreatitis 2/14/2023    CAD (coronary artery disease) 09/2009    PCI/stent (Promus 3.0 x 15mm) to the LAD.    Cataract     left eye    Chickenpox     Diabetes (HCC)     on oral meds    Erosive gastritis 2020    Gastric ulcer 2020    Glaucoma     Heart murmur     2/17/2023 patient not " "aware of having a murmur    High cholesterol     Hyperlipidemia     Mumps     Pain 01/14/2023    discomfort abdomen    S/P coronary artery stent placement      Sleep apnea 2017    resolved after surgery for sleep apnea    Snoring     Unspecified essential hypertension      history of, no longer an issue; occasional \"white coat syndrome\"        SURGICAL HISTORY  Past Surgical History:   Procedure Laterality Date    [place] UPPER GI ENDOSCOPY,DIAGNOSIS N/A 2/20/2023    Procedure: UPPER RADIAL ENDOSCOPIC ULTRASOUND PANCREATITIS, TAIL OF PANCREATIC MASS - ENDOSCOPY FINE NEEDLE ASPIRATION WITH ANESTHESIA;  Surgeon: Reymundo Loya M.D.;  Location: [facility];  Service: EUS    EGD W/ENDOSCOPIC ULTRASOUND N/A 2/20/2023    Procedure: EGD, WITH ENDOSCOPIC US;  Surgeon: Reymundo Loya M.D.;  Location: [facility];  Service: EUS    EGD WITH ASP/BX N/A 2/20/2023    Procedure: EGD, WITH ASPIRATION BIOPSY;  Surgeon: Reymundo Loya M.D.;  Location: [facility];  Service: EUS    SEPTAL RECONSTRUCTION N/A 10/23/2017    Procedure: SEPTAL RECONSTRUCTION;  Surgeon: CHARLI Sanchez M.D.;  Location: SURGERY SAME DAY [facility];  Service: Ent    UVULOPHARYNGOPALATOPLASTY N/A 10/23/2017    Procedure: UVULOPHARYNGOPALATOPLASTY;  Surgeon: CHARLI Sanchez M.D.;  Location: SURGERY SAME DAY [facility];  Service: Ent    BIOPSY GENERAL Left 10/23/2017    Procedure: BIOPSY GENERAL LEFT UPPER NECK;  Surgeon: CHARLI Sanchez M.D.;  Location: SURGERY SAME DAY [facility];  Service: Ent    INGUINAL HERNIA REPAIR BILATERAL  2015    STENT PLACEMENT  2015    SINUSOTOMIES  11/2011    Sinus surgery    OTHER CARDIAC SURGERY  2009    cardiac cath with stent placement    TONSILLECTOMY      UMBILICAL HERNIA REPAIR          FAMILY HISTORY  Family History   Problem Relation Age of Onset    Heart Disease Maternal Aunt     Cancer Mother         colon       SOCIAL HISTORY   reports that he quit smoking " about 25 years ago. His smoking use included cigarettes. He has a 30.00 pack-year smoking history. He has never used smokeless tobacco. He reports that he does not drink alcohol and does not use drugs.    CURRENT MEDICATIONS  Discharge Medication List as of 5/29/2023  7:39 PM        CONTINUE these medications which have NOT CHANGED    Details   doxycycline (VIBRAMYCIN) 100 MG Cap Take 100 mg by mouth 2 times a day. For 7 Days, Historical Med      XHANCE 93 MCG/ACT Exhaler Suspension INSTILL 2 SPRAYS IN EACH NOSTRIL TWICE DAILY, RADHA, Historical Med      montelukast (SINGULAIR) 10 MG Tab Take 10 mg by mouth at bedtime., Historical Med      fluticasone (FLONASE) 50 MCG/ACT nasal spray 2 Sprays as needed., Historical Med      ondansetron (ZOFRAN ODT) 4 MG TABLET DISPERSIBLE Take 1 Tablet by mouth every 6 hours as needed for Nausea/Vomiting., Disp-10 Tablet, R-0, Normal      budesonide-formoterol (SYMBICORT) 160-4.5 MCG/ACT Aerosol INHALE 2 PUFFS BY MOUTH TWICE A DAY. USE WITH SPACER AND RINSE MOUTH AFTER EACH USEDX Code Needed  .Disp-10.2 Each, R-5, Normal      ALPRAZolam (XANAX) 0.25 MG Tab TAKE 1 TABLET (0.25 MG TOTAL) BY MOUTH NIGHTLY AS NEEDED FOR ANXIETY FOR UP TO 30 DAYS, Historical Med      Cholecalciferol (VITAMIN D3) 125 MCG (5000 UT) Cap Take 5,000 Units by mouth every day., Historical Med      glucose blood (ONETOUCH ULTRA) strip 1 Each by Other route every day., Historical Med      nystatin/triamcinolone (MYCOLOG) 653389-5.1 UNIT/GM-% Cream as needed., Historical Med      pantoprazole (PROTONIX) 40 MG Tablet Delayed Response Take 1 Tablet by mouth 2 times a day., Historical Med      aspirin (ASA) 81 MG Chew Tab chewable tablet every day., Historical Med      fexofenadine (ALLEGRA) 180 MG tablet Take 180 mg by mouth as needed., Historical Med      Multiple Vitamins-Minerals (PRESERVISION AREDS 2+MULTI VIT PO) Take  by mouth every day., Historical Med      Menaquinone-7 (VITAMIN K2 PO) Take  by mouth every  day., Historical Med      nitroglycerin (NITROSTAT) 0.4 MG SL Tab PLACE 1 TAB UNDER TONGUE EVERY 5 MINS FOR UP TO 3 DOSES AS NEEDED FOR CHEST PAIN, Disp-25 Tablet, R-2, Normal      albuterol 108 (90 Base) MCG/ACT Aero Soln inhalation aerosol INHALE 1-2 PUFFS BY MOUTH EVERY FOUR HOURS AS NEEDED FOR SHORTNESS OF BREATH.DX Code Needed  .Disp-8.5 Each, R-11, Normal      azelastine (ASTELIN) 137 MCG/SPRAY nasal spray as needed., Historical Med      metformin ER (GLUCOPHAGE XR) 500 MG TABLET SR 24 HR Take 500 mg by mouth every evening., Historical Med      gabapentin (NEURONTIN) 100 MG Cap Take 100 mg by mouth 3 times a day. 300mg daily, Historical Med      fenofibrate (TRICOR) 48 MG TABS Take 48 mg by mouth every evening., Historical Med      Probiotic Product (PROBIOTIC DAILY PO) Take 1 Tab by mouth every day., Historical Med             ALLERGIES  Lisinopril, Aspirin, Ciprofloxacin, Crestor [rosuvastatin calcium], Hmg-coa-r inhibitors, and Atorvastatin    PHYSICAL EXAM  BP (!) 164/92   Pulse 78   Temp 36.4 °C (97.6 °F) (Temporal)   Resp 16   Ht 1.829 m (6')   Wt 80.8 kg (178 lb 2.1 oz)   SpO2 93%    Constitutional: Nontoxic appearing. Alert in no apparent distress.  HENT: Normocephalic, Atraumatic. Bilateral external ears normal. Nose normal.  Moist mucous membranes.  Oropharynx clear.  Eyes: Pupils are equal and reactive. Conjunctiva normal.   Neck: Supple, full range of motion  Heart: Regular rate and rhythm.  No murmurs.    Lungs: No respiratory distress, normal work of breathing. Lungs clear to auscultation bilaterally.  Abdomen Soft, no distention.  No tenderness to palpation. No significant tenderness in right lower quadrant.  : Normal external genitalia.  No scrotal swelling or discoloration.  No palpable hernia.  Musculoskeletal: Atraumatic. No obvious deformities noted.  No lower extremity edema.  Skin: Warm, Dry.  No erythema, No rash.   Neurologic: Alert and oriented x3. Moving all extremities  spontaneously without focal deficits.  Psychiatric: Affect normal, Mood normal, Appears appropriate and not intoxicated.     DIAGNOSTIC STUDIES / PROCEDURES    LABS  Labs Reviewed   CBC WITH DIFFERENTIAL - Abnormal; Notable for the following components:       Result Value    WBC 4.0 (*)     RBC 4.69 (*)     Hematocrit 41.2 (*)     MPV 8.9 (*)     Lymphocytes 21.00 (*)     Lymphs (Absolute) 0.83 (*)     All other components within normal limits   COMP METABOLIC PANEL - Abnormal; Notable for the following components:    Glucose 116 (*)     All other components within normal limits   URINALYSIS - Abnormal; Notable for the following components:    Character Hazy (*)     All other components within normal limits   URINE MICROSCOPIC (W/UA) - Abnormal; Notable for the following components:    WBC 0-2 (*)     RBC 0-2 (*)     All other components within normal limits   LIPASE   CORRECTED CALCIUM   ESTIMATED GFR          COURSE & MEDICAL DECISION MAKING  7:27 PM Patient seen and examined at bedside. Ordered for labs to evaluate.       ED Observation Status? No; Patient does not meet criteria for ED Observation.     INITIAL ASSESSMENT, COURSE AND PLAN  Care Narrative: Patient with history of known right inguinal hernia who presents with acute onset of pain and swelling which started just prior to arrival.  Since he has arrived in the emergency department, the hernia has reduced and his pain is resolved.  He is hypertensive with otherwise reassuring vital signs.  His exam is completely benign.  I have no concern for incarcerated or strangulated hernia at this time.  The hernia has involved appendix in the past however his exam is not consistent with acute appendicitis.  Labs are reassuring without leukocytosis, transaminitis, elevated lipase concerning for pancreatitis.  Patient will be discharged home with outpatient follow-up with general surgery.    ADDITIONAL PROBLEM LIST  Problem #1: Recurrent right inguinal hernia  -reduced at this time without concern for incarceration or strangulation, needs outpatient follow-up with general surgery for repair      DISPOSITION AND DISCUSSIONS  Escalation of care considered, and ultimately not performed:diagnostic imaging    Barriers to care at this time, including but not limited to:  None .     Decision tools and prescription drugs considered including, but not limited to: Pain Medications over-the-counter medication should be sufficient .        DISPOSITION:  Patient will be discharged home in stable condition.    FOLLOW UP:  [facility]  [address]  [facility] [id number] [phone]  Schedule an appointment as soon as possible for a visit       [facility], Emergency Dept  [address] [facility] [id number] [phone]    If symptoms worsen      OUTPATIENT MEDICATIONS:  Discharge Medication List as of 5/29/2023  7:39 PM           FINAL DIAGNOSIS  1. Unilateral recurrent inguinal hernia without obstruction or gangrene        The note accurately reflects work and decisions made by me.  Rosario Ricardo M.D.  5/30/2023  11:37 AM     Kyrie CHAND (Mack), am scribing for, and in the presence of, Rosario Ricardo M.D..    Electronically signed by: Kyrie Royal (Mack), 5/29/2023    Rosario CHAND M.D. personally performed the services described in this documentation, as scribed by Kyrie Royal in my presence, and it is both accurate and complete.

## 2023-05-30 NOTE — ED NOTES
Pt discharged home. Pt given discharge instructions. Pt verbalized understanding. All questions answered. Vital signs WNL upon discharge. Pt steady on feet upon discharge.

## 2023-05-30 NOTE — ED NOTES
"Patient ambulated back to yellow 58 without any issues. Wife at the bedside. Pt stating his hernia \"popped back in\"  "

## 2023-06-06 ENCOUNTER — OFFICE VISIT (OUTPATIENT)
Dept: SLEEP MEDICINE | Facility: MEDICAL CENTER | Age: 73
End: 2023-06-06
Attending: INTERNAL MEDICINE
Payer: MEDICARE

## 2023-06-06 VITALS
OXYGEN SATURATION: 98 % | BODY MASS INDEX: 23.3 KG/M2 | WEIGHT: 172 LBS | HEIGHT: 72 IN | SYSTOLIC BLOOD PRESSURE: 138 MMHG | HEART RATE: 68 BPM | DIASTOLIC BLOOD PRESSURE: 64 MMHG

## 2023-06-06 DIAGNOSIS — R06.09 DOE (DYSPNEA ON EXERTION): ICD-10-CM

## 2023-06-06 DIAGNOSIS — R91.8 PULMONARY NODULES: ICD-10-CM

## 2023-06-06 DIAGNOSIS — Z87.891 FORMER SMOKER: ICD-10-CM

## 2023-06-06 DIAGNOSIS — R51.9 NONINTRACTABLE EPISODIC HEADACHE, UNSPECIFIED HEADACHE TYPE: ICD-10-CM

## 2023-06-06 DIAGNOSIS — J45.30 MILD PERSISTENT ASTHMA WITHOUT COMPLICATION: ICD-10-CM

## 2023-06-06 DIAGNOSIS — G47.33 OSA (OBSTRUCTIVE SLEEP APNEA): ICD-10-CM

## 2023-06-06 PROCEDURE — 3078F DIAST BP <80 MM HG: CPT | Performed by: INTERNAL MEDICINE

## 2023-06-06 PROCEDURE — 99214 OFFICE O/P EST MOD 30 MIN: CPT | Performed by: INTERNAL MEDICINE

## 2023-06-06 PROCEDURE — 99213 OFFICE O/P EST LOW 20 MIN: CPT | Performed by: INTERNAL MEDICINE

## 2023-06-06 PROCEDURE — 3075F SYST BP GE 130 - 139MM HG: CPT | Performed by: INTERNAL MEDICINE

## 2023-06-06 RX ORDER — BUDESONIDE 0.5 MG/2ML
500 INHALANT ORAL 2 TIMES DAILY
Status: ON HOLD | COMMUNITY
End: 2023-08-24

## 2023-06-06 ASSESSMENT — ENCOUNTER SYMPTOMS
DIZZINESS: 0
STRIDOR: 0
PHOTOPHOBIA: 0
VOMITING: 0
SHORTNESS OF BREATH: 1
DIARRHEA: 0
MYALGIAS: 0
NAUSEA: 0
DOUBLE VISION: 0
TREMORS: 0
CHILLS: 0
ABDOMINAL PAIN: 0
WEIGHT LOSS: 0
ORTHOPNEA: 0
FEVER: 0
COUGH: 0
BLURRED VISION: 0
EYE PAIN: 0
FALLS: 0
FOCAL WEAKNESS: 0
NECK PAIN: 0
SINUS PAIN: 0
WEAKNESS: 0
SORE THROAT: 0
HEADACHES: 1
DIAPHORESIS: 0
PALPITATIONS: 0
HEMOPTYSIS: 0
CLAUDICATION: 0
SPUTUM PRODUCTION: 0
CONSTIPATION: 0
HEARTBURN: 0
WHEEZING: 0
PND: 0
DEPRESSION: 0
EYE REDNESS: 0
EYE DISCHARGE: 0
SPEECH CHANGE: 0
BACK PAIN: 0

## 2023-06-06 ASSESSMENT — FIBROSIS 4 INDEX: FIB4 SCORE: 1.88

## 2023-06-06 NOTE — PROGRESS NOTES
Chief Complaint   Patient presents with    Follow-Up     Last seen 9/7/22          HPI: This patient is a 72 y.o. male whom is followed in our clinic for asthma last seen by me on 9/7/22.   The pt also has a dx of ANTIONE for which he underwent UPPP in the past.  He is a former tobacco smoker with 30-pack-year history and quit in 1997.  CT chest from August 2020 showed subcentimeter pulmonary nodules and repeat CT chest from August 2021 showed stable pulmonary nodules largest of 5 mm in size without significant adenopathy. Hs last CT from 8/2022 at [facility] showed stable nodules.   With regards to asthma, patient's symptoms were preiously mild and controlled with Symbicort 160 which he was using daily in the mornings on the days that he exercises but rarely needing BID. His last spirometry from 9/2022 showed normal air flows with trend toward BD response. Since our last visit he was seen in ED in January with chest pain and CT was suggestive of pancreatitis but he was not admitted then underwent EGD in February. Pancreatic tail and antrum were biopsied with no malignancy. Today he is c/o headaches upon waking and a sense of pressure in his head and sinuses. He is followed by ENT and recently had his sinuses scoped, was told no issues with sinuses. He notices significant pressure when going from bending over to standing up and this tends to by associated with tachypnea but no cough or wheezing. He is using symbicort BID currently and having some dysphonia related to ICS. He denies sxs of excessive daytime sleepiness but notes HA is worse upon waking. He has f/u with ENT and a visit planned with neurology. No focal deficits.     Past Medical History:   Diagnosis Date    Acute myocardial infarction of other anterior wall, episode of care unspecified 2009; 2014    Anxiety state, unspecified      Asthma     Bowel habit changes     occasional constipation since diagnosis of pancreatitis 2/14/2023    CAD (coronary artery disease)  "2009    PCI/stent (Promus 3.0 x 15mm) to the LAD.    Cataract     left eye    Chickenpox     Cough     Diabetes (HCC)     on oral meds    Erosive gastritis     Gastric ulcer     Glaucoma     Heart murmur     2023 patient not aware of having a murmur    High cholesterol     Hyperlipidemia     Mumps     Pain 2023    discomfort abdomen    S/P coronary artery stent placement      Shortness of breath     Sleep apnea     resolved after surgery for sleep apnea    Snoring     Unspecified essential hypertension      history of, no longer an issue; occasional \"white coat syndrome\"       Social History     Socioeconomic History    Marital status:      Spouse name: Not on file    Number of children: Not on file    Years of education: Not on file    Highest education level: Not on file   Occupational History    Not on file   Tobacco Use    Smoking status: Former     Packs/day: 1.00     Years: 30.00     Pack years: 30.00     Types: Cigarettes     Quit date: 1997     Years since quittin.7    Smokeless tobacco: Never   Vaping Use    Vaping Use: Never used   Substance and Sexual Activity    Alcohol use: No    Drug use: No    Sexual activity: Not on file   Other Topics Concern    Not on file   Social History Narrative    Not on file     Social Determinants of Health     Financial Resource Strain: Not on file   Food Insecurity: Not on file   Transportation Needs: Not on file   Physical Activity: Not on file   Stress: Not on file   Social Connections: Not on file   Intimate Partner Violence: Not on file   Housing Stability: Not on file       Family History   Problem Relation Age of Onset    Heart Disease Maternal Aunt     Cancer Mother         colon       Current Outpatient Medications on File Prior to Visit   Medication Sig Dispense Refill    budesonide (PULMICORT) 0.5 MG/2ML Suspension Take 500 mcg by nebulization 2 times a day.      fluticasone (FLONASE) 50 MCG/ACT nasal spray 2 Sprays as " needed.      budesonide-formoterol (SYMBICORT) 160-4.5 MCG/ACT Aerosol INHALE 2 PUFFS BY MOUTH TWICE A DAY. USE WITH SPACER AND RINSE MOUTH AFTER EACH USE 10.2 Each 5    ALPRAZolam (XANAX) 0.25 MG Tab TAKE 1 TABLET (0.25 MG TOTAL) BY MOUTH NIGHTLY AS NEEDED FOR ANXIETY FOR UP TO 30 DAYS      Cholecalciferol (VITAMIN D3) 125 MCG (5000 UT) Cap Take 5,000 Units by mouth every day.      glucose blood (ONETOUCH ULTRA) strip 1 Each by Other route every day.      nystatin/triamcinolone (MYCOLOG) 212277-1.1 UNIT/GM-% Cream as needed.      pantoprazole (PROTONIX) 40 MG Tablet Delayed Response Take 1 Tablet by mouth 2 times a day.      aspirin (ASA) 81 MG Chew Tab chewable tablet every day.      fexofenadine (ALLEGRA) 180 MG tablet Take 180 mg by mouth as needed.      Multiple Vitamins-Minerals (PRESERVISION AREDS 2+MULTI VIT PO) Take  by mouth every day.      Menaquinone-7 (VITAMIN K2 PO) Take  by mouth every day.      nitroglycerin (NITROSTAT) 0.4 MG SL Tab PLACE 1 TAB UNDER TONGUE EVERY 5 MINS FOR UP TO 3 DOSES AS NEEDED FOR CHEST PAIN 25 Tablet 2    albuterol 108 (90 Base) MCG/ACT Aero Soln inhalation aerosol INHALE 1-2 PUFFS BY MOUTH EVERY FOUR HOURS AS NEEDED FOR SHORTNESS OF BREATH. 8.5 Each 11    azelastine (ASTELIN) 137 MCG/SPRAY nasal spray as needed.      metformin ER (GLUCOPHAGE XR) 500 MG TABLET SR 24 HR Take 500 mg by mouth every evening.      gabapentin (NEURONTIN) 100 MG Cap Take 100 mg by mouth 3 times a day. 300mg daily      fenofibrate (TRICOR) 48 MG TABS Take 48 mg by mouth every evening.      Probiotic Product (PROBIOTIC DAILY PO) Take 1 Tab by mouth every day.      doxycycline (VIBRAMYCIN) 100 MG Cap Take 100 mg by mouth 2 times a day. For 7 Days (Patient not taking: Reported on 6/6/2023)       No current facility-administered medications on file prior to visit.       Lisinopril, Aspirin, Ciprofloxacin, Crestor [rosuvastatin calcium], Hmg-coa-r inhibitors, and Atorvastatin      ROS:   Review of Systems    Constitutional:  Negative for chills, diaphoresis, fever, malaise/fatigue and weight loss.   HENT:  Negative for congestion, ear discharge, ear pain, hearing loss, nosebleeds, sinus pain, sore throat and tinnitus.    Eyes:  Negative for blurred vision, double vision, photophobia, pain, discharge and redness.   Respiratory:  Positive for shortness of breath. Negative for cough, hemoptysis, sputum production, wheezing and stridor.    Cardiovascular:  Negative for chest pain, palpitations, orthopnea, claudication, leg swelling and PND.   Gastrointestinal:  Negative for abdominal pain, constipation, diarrhea, heartburn, nausea and vomiting.   Genitourinary:  Negative for dysuria and urgency.   Musculoskeletal:  Negative for back pain, falls, joint pain, myalgias and neck pain.   Skin:  Negative for itching and rash.   Neurological:  Positive for headaches. Negative for dizziness, tremors, speech change, focal weakness and weakness.   Endo/Heme/Allergies:  Negative for environmental allergies.   Psychiatric/Behavioral:  Negative for depression.        /64 (BP Location: Right arm, Patient Position: Sitting, BP Cuff Size: Adult)   Pulse 68   Ht 1.829 m (6')   Wt 78 kg (172 lb)   SpO2 98%   Physical Exam  Vitals reviewed.   Constitutional:       General: He is not in acute distress.     Appearance: Normal appearance. He is normal weight.   HENT:      Head: Normocephalic and atraumatic.      Right Ear: External ear normal.      Left Ear: External ear normal.      Nose: Nose normal. No congestion.      Mouth/Throat:      Mouth: Mucous membranes are moist.      Pharynx: Oropharynx is clear. No oropharyngeal exudate.   Eyes:      General: No scleral icterus.     Extraocular Movements: Extraocular movements intact.      Conjunctiva/sclera: Conjunctivae normal.      Pupils: Pupils are equal, round, and reactive to light.   Cardiovascular:      Rate and Rhythm: Normal rate and regular rhythm.      Heart sounds: Normal  heart sounds. No murmur heard.     No gallop.   Pulmonary:      Effort: Pulmonary effort is normal. No respiratory distress.      Breath sounds: Normal breath sounds. No wheezing or rales.   Abdominal:      General: There is no distension.      Palpations: Abdomen is soft.   Musculoskeletal:         General: Normal range of motion.      Cervical back: Normal range of motion and neck supple.      Right lower leg: No edema.      Left lower leg: No edema.   Skin:     General: Skin is warm and dry.      Findings: No rash.   Neurological:      Mental Status: He is alert and oriented to person, place, and time.      Cranial Nerves: No cranial nerve deficit.   Psychiatric:         Mood and Affect: Mood normal.         Behavior: Behavior normal.       PFTs as reviewed by me personally: as per hPI    Imaging as reviewed by me personally:  as per HPI    Assessment:  1. Pulmonary nodules  CT-CHEST (THORAX) W/O    Overnight Oximetry      2. MENJIVAR (dyspnea on exertion)  PULMONARY FUNCTION TESTS -Test requested: Complete Pulmonary Function Test      3. Nonintractable episodic headache, unspecified headache type  Overnight Oximetry    PULMONARY FUNCTION TESTS -Test requested: Complete Pulmonary Function Test      4. Mild persistent asthma without complication        5. Former smoker        6. ANTIONE (obstructive sleep apnea)            Plan:  Stable; surveillance imaging scheduled for August  This is new and more dyspnea that does not sound c/w asthma. Normal exam today. I am not sure if his HA are sinus related vs. ? Untreated ANTIONE. Cannot correlate them clearly to any other pulmonary issue other than HA. We will update full PFT  Could be untreated ANTIONE given his hx. Will screen with OPO on RA  Chronic but well controlled and current sxs do not sound like asthma. I did encourage him to try YARA when he has acute SOB and also to decrease ICS to on puff BID to help with thrush; okay to increase to 2 puff BID if sxs worsen  Tobacco free  for >15 years  Was tx with UPPP in the past; screen for residual dz with OPO on RA  Return in about 4 months (around 10/6/2023) for CT chest, PFT in August, OPO within 30 days of today.

## 2023-06-23 ENCOUNTER — APPOINTMENT (OUTPATIENT)
Dept: ADMISSIONS | Facility: MEDICAL CENTER | Age: 73
End: 2023-06-23
Attending: SURGERY
Payer: MEDICARE

## 2023-06-29 ENCOUNTER — PRE-ADMISSION TESTING (OUTPATIENT)
Dept: ADMISSIONS | Facility: MEDICAL CENTER | Age: 73
End: 2023-06-29
Attending: SURGERY
Payer: MEDICARE

## 2023-07-25 ENCOUNTER — HOME STUDY (OUTPATIENT)
Dept: SLEEP MEDICINE | Facility: MEDICAL CENTER | Age: 73
End: 2023-07-25
Attending: INTERNAL MEDICINE
Payer: MEDICARE

## 2023-07-25 DIAGNOSIS — R91.8 PULMONARY NODULES: ICD-10-CM

## 2023-07-25 DIAGNOSIS — R51.9 NONINTRACTABLE EPISODIC HEADACHE, UNSPECIFIED HEADACHE TYPE: ICD-10-CM

## 2023-07-25 PROCEDURE — 94762 N-INVAS EAR/PLS OXIMTRY CONT: CPT | Performed by: INTERNAL MEDICINE

## 2023-07-28 NOTE — PROCEDURES
This is an overnight oximetry study performed on July 25, 2023 for duration of 7 hours and 42 minutes on room air.    Basal SPO2 was 91%.  Total time spent below saturation of 88% was 18 minutes.  For the most part saturations appear to be within normal limits without clustered desaturations.  Correlate clinically.

## 2023-07-31 ENCOUNTER — PRE-ADMISSION TESTING (OUTPATIENT)
Dept: ADMISSIONS | Facility: MEDICAL CENTER | Age: 73
End: 2023-07-31
Attending: SURGERY
Payer: MEDICARE

## 2023-07-31 DIAGNOSIS — Z01.812 PRE-OPERATIVE LABORATORY EXAMINATION: ICD-10-CM

## 2023-07-31 DIAGNOSIS — Z01.810 PRE-OPERATIVE CARDIOVASCULAR EXAMINATION: ICD-10-CM

## 2023-07-31 LAB
ANION GAP SERPL CALC-SCNC: 9 MMOL/L (ref 7–16)
BUN SERPL-MCNC: 17 MG/DL (ref 8–22)
CALCIUM SERPL-MCNC: 9.1 MG/DL (ref 8.5–10.5)
CHLORIDE SERPL-SCNC: 101 MMOL/L (ref 96–112)
CO2 SERPL-SCNC: 26 MMOL/L (ref 20–33)
CREAT SERPL-MCNC: 1.16 MG/DL (ref 0.5–1.4)
EKG IMPRESSION: NORMAL
EST. AVERAGE GLUCOSE BLD GHB EST-MCNC: 131 MG/DL
GFR SERPLBLD CREATININE-BSD FMLA CKD-EPI: 66 ML/MIN/1.73 M 2
GLUCOSE SERPL-MCNC: 102 MG/DL (ref 65–99)
HBA1C MFR BLD: 6.2 % (ref 4–5.6)
POTASSIUM SERPL-SCNC: 3.9 MMOL/L (ref 3.6–5.5)
SODIUM SERPL-SCNC: 136 MMOL/L (ref 135–145)

## 2023-07-31 PROCEDURE — 93005 ELECTROCARDIOGRAM TRACING: CPT

## 2023-07-31 PROCEDURE — 80048 BASIC METABOLIC PNL TOTAL CA: CPT

## 2023-07-31 PROCEDURE — 83036 HEMOGLOBIN GLYCOSYLATED A1C: CPT | Mod: GA

## 2023-07-31 PROCEDURE — 36415 COLL VENOUS BLD VENIPUNCTURE: CPT | Mod: GA

## 2023-07-31 PROCEDURE — 93010 ELECTROCARDIOGRAM REPORT: CPT | Performed by: INTERNAL MEDICINE

## 2023-08-08 ENCOUNTER — NON-PROVIDER VISIT (OUTPATIENT)
Dept: SLEEP MEDICINE | Facility: MEDICAL CENTER | Age: 73
End: 2023-08-08
Attending: INTERNAL MEDICINE
Payer: MEDICARE

## 2023-08-08 VITALS — BODY MASS INDEX: 23.98 KG/M2 | HEIGHT: 72 IN | WEIGHT: 177 LBS

## 2023-08-08 DIAGNOSIS — R06.09 DOE (DYSPNEA ON EXERTION): ICD-10-CM

## 2023-08-08 DIAGNOSIS — R51.9 NONINTRACTABLE EPISODIC HEADACHE, UNSPECIFIED HEADACHE TYPE: ICD-10-CM

## 2023-08-08 PROCEDURE — 94726 PLETHYSMOGRAPHY LUNG VOLUMES: CPT | Performed by: INTERNAL MEDICINE

## 2023-08-08 PROCEDURE — 94726 PLETHYSMOGRAPHY LUNG VOLUMES: CPT | Mod: 26 | Performed by: INTERNAL MEDICINE

## 2023-08-08 PROCEDURE — 94060 EVALUATION OF WHEEZING: CPT | Mod: 26 | Performed by: INTERNAL MEDICINE

## 2023-08-08 PROCEDURE — 94729 DIFFUSING CAPACITY: CPT | Mod: 26 | Performed by: INTERNAL MEDICINE

## 2023-08-08 PROCEDURE — 94060 EVALUATION OF WHEEZING: CPT | Performed by: INTERNAL MEDICINE

## 2023-08-08 PROCEDURE — 94729 DIFFUSING CAPACITY: CPT | Performed by: INTERNAL MEDICINE

## 2023-08-08 ASSESSMENT — PULMONARY FUNCTION TESTS
FEV1/FVC_PERCENT_PREDICTED: 75
FEV1_PERCENT_CHANGE: 4
FVC: 4.39
FEV1_PREDICTED: 3.13
FEV1/FVC: 69.93
FVC_PERCENT_PREDICTED: 104
FEV1_LLN: 2.62
FEV1/FVC_PERCENT_LLN: 63
FEV1/FVC: 69
FEV1: 3.04
FEV1/FVC: 69
FEV1/FVC_PERCENT_PREDICTED: 92
FEV1_PERCENT_PREDICTED: 102
FEV1/FVC_PERCENT_CHANGE: 125
FVC_PERCENT_PREDICTED: 109
FVC_LLN: 3.51
FEV1: 3.21
FEV1/FVC_PERCENT_PREDICTED: 94
FEV1_PERCENT_CHANGE: 5
FEV1_PERCENT_PREDICTED: 97
FEV1/FVC_PERCENT_CHANGE: 0
FVC: 4.59
FEV1/FVC_PERCENT_PREDICTED: 93
FVC_PREDICTED: 4.2
FEV1/FVC_PERCENT_PREDICTED: 93
FEV1/FVC: 70
FEV1/FVC_PREDICTED: 75

## 2023-08-08 ASSESSMENT — FIBROSIS 4 INDEX: FIB4 SCORE: 1.91

## 2023-08-08 NOTE — PROCEDURES
Technician: Lori Murillo RRT, CPFT  Good patient effort & cooperation.  The results of this test meet the ATS/ERS standards for acceptability & reproducibility.  Test was performed on the [employer] Body Plethysmograph-Elite DX system.  Predicted equations for Spirometry are GLI-2012, ITS for lung volumes, and GLI-2017 for DLCO.  The DLCO was uncorrected for Hgb.  A bronchodilator of Ventolin HFA -2puffs via spacer administered.  DLCO performed during dilation period.  Pulmonary function test  Date of study 8/8/2023  Ordering physician: Saira Vazquez  Interpreting physician: Kasia Argueta  Reason for study: MENJIVAR, headache    Results:  Spirometry:  FVC is 4.39 L which is 104% predicted without a significant change postbronchodilator  FEV1 is 3.04 L which is 97% predicted without a significant change postbronchodilator  FEV1/FVC is 70    Lung volumes:  TLC is 8.44 L which is 116% predicted  RV is 4.04 L which is 154% predicted    Diffusion capacity:  DLCO is 27.38 which is 106% predicted  DL/VA is 3.82 which is 107% predicted    Interpretation:  1.  Spirometry is normal  2.  There is no significant change postbronchodilator  3.  The flow-volume loop is consistent with the spirometry data  4.  Lung volumes demonstrate air trapping without hyperinflation  5.  Diffusion capacity is normal  6.  In comparison to the prior study from 9/7/2022 the FEV1 has improved from 2.79 L to 3.04 L.    I, Dr. Kasia Mason have interpreted and dictated the results of this study.    Kasia Mason MD RD  Pulmonary and Critical Care    Available on [facility]

## 2023-08-15 ENCOUNTER — APPOINTMENT (RX ONLY)
Dept: URBAN - METROPOLITAN AREA CLINIC 15 | Facility: CLINIC | Age: 73
Setting detail: DERMATOLOGY
End: 2023-08-15

## 2023-08-15 DIAGNOSIS — B00.1 HERPESVIRAL VESICULAR DERMATITIS: ICD-10-CM

## 2023-08-15 DIAGNOSIS — L82.1 OTHER SEBORRHEIC KERATOSIS: ICD-10-CM

## 2023-08-15 DIAGNOSIS — Z87.2 PERSONAL HISTORY OF DISEASES OF THE SKIN AND SUBCUTANEOUS TISSUE: ICD-10-CM

## 2023-08-15 DIAGNOSIS — D18.0 HEMANGIOMA: ICD-10-CM

## 2023-08-15 DIAGNOSIS — L81.4 OTHER MELANIN HYPERPIGMENTATION: ICD-10-CM

## 2023-08-15 DIAGNOSIS — D22 MELANOCYTIC NEVI: ICD-10-CM

## 2023-08-15 PROBLEM — D18.01 HEMANGIOMA OF SKIN AND SUBCUTANEOUS TISSUE: Status: ACTIVE | Noted: 2023-08-15

## 2023-08-15 PROBLEM — D48.5 NEOPLASM OF UNCERTAIN BEHAVIOR OF SKIN: Status: ACTIVE | Noted: 2023-08-15

## 2023-08-15 PROBLEM — D22.5 MELANOCYTIC NEVI OF TRUNK: Status: ACTIVE | Noted: 2023-08-15

## 2023-08-15 PROCEDURE — 99213 OFFICE O/P EST LOW 20 MIN: CPT | Mod: 25

## 2023-08-15 PROCEDURE — 11104 PUNCH BX SKIN SINGLE LESION: CPT

## 2023-08-15 PROCEDURE — ? BIOPSY BY PUNCH METHOD

## 2023-08-15 PROCEDURE — ? PRESCRIPTION

## 2023-08-15 PROCEDURE — ? COUNSELING

## 2023-08-15 RX ORDER — VALACYCLOVIR 500 MG/1
2 TABLET, FILM COATED ORAL QD
Qty: 60 | Refills: 2 | Status: ERX | COMMUNITY
Start: 2023-08-15

## 2023-08-15 RX ADMIN — VALACYCLOVIR 2: 500 TABLET, FILM COATED ORAL at 00:00

## 2023-08-15 ASSESSMENT — LOCATION SIMPLE DESCRIPTION DERM
LOCATION SIMPLE: RIGHT HAND
LOCATION SIMPLE: LEFT UPPER BACK
LOCATION SIMPLE: RIGHT LOWER BACK
LOCATION SIMPLE: LEFT BUTTOCK
LOCATION SIMPLE: RIGHT BUTTOCK

## 2023-08-15 ASSESSMENT — LOCATION DETAILED DESCRIPTION DERM
LOCATION DETAILED: LEFT BUTTOCK
LOCATION DETAILED: LEFT INFERIOR UPPER BACK
LOCATION DETAILED: RIGHT BUTTOCK
LOCATION DETAILED: RIGHT SUPERIOR LATERAL LOWER BACK
LOCATION DETAILED: RIGHT RADIAL DORSAL HAND

## 2023-08-15 ASSESSMENT — LOCATION ZONE DERM
LOCATION ZONE: TRUNK
LOCATION ZONE: HAND

## 2023-08-15 NOTE — PROCEDURE: BIOPSY BY PUNCH METHOD
Detail Level: Detailed
Was A Bandage Applied: Yes
Punch Size In Mm: 2
Size Of Lesion In Cm (Optional): 0
Depth Of Punch Biopsy: dermis
Biopsy Type: H and E
Anesthesia Type: 1% lidocaine with 1:100,000 epinephrine and a 1:10 solution of 8.4% sodium bicarbonate
Anesthesia Volume In Cc: 0.5
Hemostasis: Aluminum Chloride
Epidermal Sutures: none, closed by secondary intention
Wound Care: Bacitracin
Dressing: pressure dressing
Patient Will Remove Sutures At Home?: No
Lab: [id number]
Lab Facility: 
Billing Type: Third-Party Bill
Information: Selecting Yes will display possible errors in your note based on the variables you have selected. This validation is only offered as a suggestion for you. PLEASE NOTE THAT THE VALIDATION TEXT WILL BE REMOVED WHEN YOU FINALIZE YOUR NOTE. IF YOU WANT TO FAX A PRELIMINARY NOTE YOU WILL NEED TO TOGGLE THIS TO 'NO' IF YOU DO NOT WANT IT IN YOUR FAXED NOTE.

## 2023-08-22 ENCOUNTER — HOSPITAL ENCOUNTER (OUTPATIENT)
Dept: RADIOLOGY | Facility: MEDICAL CENTER | Age: 73
End: 2023-08-22
Attending: INTERNAL MEDICINE
Payer: MEDICARE

## 2023-08-24 ENCOUNTER — ANESTHESIA (OUTPATIENT)
Dept: SURGERY | Facility: MEDICAL CENTER | Age: 73
End: 2023-08-24
Payer: MEDICARE

## 2023-08-24 ENCOUNTER — ANESTHESIA EVENT (OUTPATIENT)
Dept: SURGERY | Facility: MEDICAL CENTER | Age: 73
End: 2023-08-24
Payer: MEDICARE

## 2023-08-24 ENCOUNTER — HOSPITAL ENCOUNTER (OUTPATIENT)
Facility: MEDICAL CENTER | Age: 73
End: 2023-08-24
Attending: SURGERY | Admitting: SURGERY
Payer: MEDICARE

## 2023-08-24 VITALS
BODY MASS INDEX: 23.83 KG/M2 | WEIGHT: 175.93 LBS | SYSTOLIC BLOOD PRESSURE: 147 MMHG | OXYGEN SATURATION: 96 % | TEMPERATURE: 96.8 F | RESPIRATION RATE: 16 BRPM | DIASTOLIC BLOOD PRESSURE: 69 MMHG | HEART RATE: 70 BPM | HEIGHT: 72 IN

## 2023-08-24 DIAGNOSIS — G89.18 POSTOPERATIVE PAIN: ICD-10-CM

## 2023-08-24 LAB — GLUCOSE BLD STRIP.AUTO-MCNC: 106 MG/DL (ref 65–99)

## 2023-08-24 PROCEDURE — A9270 NON-COVERED ITEM OR SERVICE: HCPCS | Performed by: ANESTHESIOLOGY

## 2023-08-24 PROCEDURE — 160002 HCHG RECOVERY MINUTES (STAT): Performed by: SURGERY

## 2023-08-24 PROCEDURE — 700101 HCHG RX REV CODE 250: Performed by: ANESTHESIOLOGY

## 2023-08-24 PROCEDURE — 160041 HCHG SURGERY MINUTES - EA ADDL 1 MIN LEVEL 4: Performed by: SURGERY

## 2023-08-24 PROCEDURE — 700111 HCHG RX REV CODE 636 W/ 250 OVERRIDE (IP): Mod: JZ | Performed by: ANESTHESIOLOGY

## 2023-08-24 PROCEDURE — 160029 HCHG SURGERY MINUTES - 1ST 30 MINS LEVEL 4: Performed by: SURGERY

## 2023-08-24 PROCEDURE — 160025 RECOVERY II MINUTES (STATS): Performed by: SURGERY

## 2023-08-24 PROCEDURE — 700105 HCHG RX REV CODE 258: Mod: JZ | Performed by: SURGERY

## 2023-08-24 PROCEDURE — C1781 MESH (IMPLANTABLE): HCPCS | Performed by: SURGERY

## 2023-08-24 PROCEDURE — 160048 HCHG OR STATISTICAL LEVEL 1-5: Performed by: SURGERY

## 2023-08-24 PROCEDURE — 160009 HCHG ANES TIME/MIN: Performed by: SURGERY

## 2023-08-24 PROCEDURE — 700102 HCHG RX REV CODE 250 W/ 637 OVERRIDE(OP): Performed by: ANESTHESIOLOGY

## 2023-08-24 PROCEDURE — 82962 GLUCOSE BLOOD TEST: CPT

## 2023-08-24 PROCEDURE — 160035 HCHG PACU - 1ST 60 MINS PHASE I: Performed by: SURGERY

## 2023-08-24 PROCEDURE — 502714 HCHG ROBOTIC SURGERY SERVICES: Performed by: SURGERY

## 2023-08-24 PROCEDURE — 160046 HCHG PACU - 1ST 60 MINS PHASE II: Performed by: SURGERY

## 2023-08-24 PROCEDURE — 700101 HCHG RX REV CODE 250: Performed by: SURGERY

## 2023-08-24 DEVICE — MESH PROGRIP LAPROSCOPIC SELF FIXATING (1/CA): Type: IMPLANTABLE DEVICE | Site: GROIN | Status: FUNCTIONAL

## 2023-08-24 RX ORDER — HYDROMORPHONE HYDROCHLORIDE 1 MG/ML
0.1 INJECTION, SOLUTION INTRAMUSCULAR; INTRAVENOUS; SUBCUTANEOUS
Status: DISCONTINUED | OUTPATIENT
Start: 2023-08-24 | End: 2023-08-24 | Stop reason: HOSPADM

## 2023-08-24 RX ORDER — LIDOCAINE HYDROCHLORIDE 20 MG/ML
INJECTION, SOLUTION EPIDURAL; INFILTRATION; INTRACAUDAL; PERINEURAL PRN
Status: DISCONTINUED | OUTPATIENT
Start: 2023-08-24 | End: 2023-08-24 | Stop reason: SURG

## 2023-08-24 RX ORDER — ONDANSETRON 2 MG/ML
4 INJECTION INTRAMUSCULAR; INTRAVENOUS
Status: DISCONTINUED | OUTPATIENT
Start: 2023-08-24 | End: 2023-08-24 | Stop reason: HOSPADM

## 2023-08-24 RX ORDER — ROCURONIUM BROMIDE 10 MG/ML
INJECTION, SOLUTION INTRAVENOUS PRN
Status: DISCONTINUED | OUTPATIENT
Start: 2023-08-24 | End: 2023-08-24 | Stop reason: SURG

## 2023-08-24 RX ORDER — ACETAMINOPHEN 325 MG/1
650 TABLET ORAL EVERY 6 HOURS
Qty: 60 TABLET | Refills: 0 | Status: SHIPPED | OUTPATIENT
Start: 2023-08-24

## 2023-08-24 RX ORDER — OXYCODONE HYDROCHLORIDE 5 MG/1
5 TABLET ORAL EVERY 4 HOURS PRN
Qty: 12 TABLET | Refills: 0 | Status: SHIPPED | OUTPATIENT
Start: 2023-08-24 | End: 2023-08-27

## 2023-08-24 RX ORDER — POLYETHYLENE GLYCOL 3350 17 G/17G
17 POWDER, FOR SOLUTION ORAL DAILY
Qty: 20 EACH | Refills: 0 | Status: SHIPPED | OUTPATIENT
Start: 2023-08-24 | End: 2024-02-27

## 2023-08-24 RX ORDER — OXYCODONE HCL 5 MG/5 ML
10 SOLUTION, ORAL ORAL
Status: COMPLETED | OUTPATIENT
Start: 2023-08-24 | End: 2023-08-24

## 2023-08-24 RX ORDER — CELECOXIB 200 MG/1
200 CAPSULE ORAL ONCE
Status: COMPLETED | OUTPATIENT
Start: 2023-08-24 | End: 2023-08-24

## 2023-08-24 RX ORDER — ACETAMINOPHEN 500 MG
1000 TABLET ORAL ONCE
Status: COMPLETED | OUTPATIENT
Start: 2023-08-24 | End: 2023-08-24

## 2023-08-24 RX ORDER — IBUPROFEN 600 MG/1
600 TABLET ORAL EVERY 6 HOURS
Qty: 45 TABLET | Refills: 0 | Status: SHIPPED | OUTPATIENT
Start: 2023-08-24

## 2023-08-24 RX ORDER — OXYCODONE HCL 5 MG/5 ML
5 SOLUTION, ORAL ORAL
Status: COMPLETED | OUTPATIENT
Start: 2023-08-24 | End: 2023-08-24

## 2023-08-24 RX ORDER — SODIUM CHLORIDE, SODIUM LACTATE, POTASSIUM CHLORIDE, CALCIUM CHLORIDE 600; 310; 30; 20 MG/100ML; MG/100ML; MG/100ML; MG/100ML
INJECTION, SOLUTION INTRAVENOUS CONTINUOUS
Status: DISCONTINUED | OUTPATIENT
Start: 2023-08-24 | End: 2023-08-24 | Stop reason: HOSPADM

## 2023-08-24 RX ORDER — PHENYLEPHRINE HCL IN 0.9% NACL 0.5 MG/5ML
SYRINGE (ML) INTRAVENOUS PRN
Status: DISCONTINUED | OUTPATIENT
Start: 2023-08-24 | End: 2023-08-24 | Stop reason: SURG

## 2023-08-24 RX ORDER — HYDROMORPHONE HYDROCHLORIDE 1 MG/ML
0.4 INJECTION, SOLUTION INTRAMUSCULAR; INTRAVENOUS; SUBCUTANEOUS
Status: DISCONTINUED | OUTPATIENT
Start: 2023-08-24 | End: 2023-08-24 | Stop reason: HOSPADM

## 2023-08-24 RX ORDER — ONDANSETRON 2 MG/ML
INJECTION INTRAMUSCULAR; INTRAVENOUS PRN
Status: DISCONTINUED | OUTPATIENT
Start: 2023-08-24 | End: 2023-08-24 | Stop reason: SURG

## 2023-08-24 RX ORDER — HALOPERIDOL 5 MG/ML
1 INJECTION INTRAMUSCULAR
Status: DISCONTINUED | OUTPATIENT
Start: 2023-08-24 | End: 2023-08-24 | Stop reason: HOSPADM

## 2023-08-24 RX ORDER — HYDROMORPHONE HYDROCHLORIDE 1 MG/ML
0.2 INJECTION, SOLUTION INTRAMUSCULAR; INTRAVENOUS; SUBCUTANEOUS
Status: DISCONTINUED | OUTPATIENT
Start: 2023-08-24 | End: 2023-08-24 | Stop reason: HOSPADM

## 2023-08-24 RX ORDER — BUPIVACAINE HYDROCHLORIDE AND EPINEPHRINE 5; 5 MG/ML; UG/ML
INJECTION, SOLUTION PERINEURAL
Status: DISCONTINUED | OUTPATIENT
Start: 2023-08-24 | End: 2023-08-24 | Stop reason: HOSPADM

## 2023-08-24 RX ORDER — CEFAZOLIN SODIUM 1 G/3ML
INJECTION, POWDER, FOR SOLUTION INTRAMUSCULAR; INTRAVENOUS PRN
Status: DISCONTINUED | OUTPATIENT
Start: 2023-08-24 | End: 2023-08-24 | Stop reason: SURG

## 2023-08-24 RX ORDER — DEXAMETHASONE SODIUM PHOSPHATE 4 MG/ML
INJECTION, SOLUTION INTRA-ARTICULAR; INTRALESIONAL; INTRAMUSCULAR; INTRAVENOUS; SOFT TISSUE PRN
Status: DISCONTINUED | OUTPATIENT
Start: 2023-08-24 | End: 2023-08-24 | Stop reason: SURG

## 2023-08-24 RX ADMIN — CEFAZOLIN 2 G: 1 INJECTION, POWDER, FOR SOLUTION INTRAMUSCULAR; INTRAVENOUS at 09:12

## 2023-08-24 RX ADMIN — LIDOCAINE HYDROCHLORIDE 0.5 ML: 10 INJECTION, SOLUTION EPIDURAL; INFILTRATION; INTRACAUDAL at 08:17

## 2023-08-24 RX ADMIN — FENTANYL CITRATE 50 MCG: 50 INJECTION, SOLUTION INTRAMUSCULAR; INTRAVENOUS at 10:15

## 2023-08-24 RX ADMIN — SODIUM CHLORIDE, POTASSIUM CHLORIDE, SODIUM LACTATE AND CALCIUM CHLORIDE: 600; 310; 30; 20 INJECTION, SOLUTION INTRAVENOUS at 08:32

## 2023-08-24 RX ADMIN — FENTANYL CITRATE 150 MCG: 50 INJECTION, SOLUTION INTRAMUSCULAR; INTRAVENOUS at 09:12

## 2023-08-24 RX ADMIN — Medication 200 MCG: at 09:17

## 2023-08-24 RX ADMIN — ONDANSETRON 4 MG: 2 INJECTION INTRAMUSCULAR; INTRAVENOUS at 09:37

## 2023-08-24 RX ADMIN — ROCURONIUM BROMIDE 50 MG: 50 INJECTION, SOLUTION INTRAVENOUS at 09:12

## 2023-08-24 RX ADMIN — DEXAMETHASONE SODIUM PHOSPHATE 8 MG: 4 INJECTION INTRA-ARTICULAR; INTRALESIONAL; INTRAMUSCULAR; INTRAVENOUS; SOFT TISSUE at 09:16

## 2023-08-24 RX ADMIN — CELECOXIB 200 MG: 200 CAPSULE ORAL at 08:16

## 2023-08-24 RX ADMIN — ACETAMINOPHEN 1000 MG: 500 TABLET, FILM COATED ORAL at 08:16

## 2023-08-24 RX ADMIN — FENTANYL CITRATE 50 MCG: 50 INJECTION, SOLUTION INTRAMUSCULAR; INTRAVENOUS at 10:08

## 2023-08-24 RX ADMIN — OXYCODONE HYDROCHLORIDE 10 MG: 5 SOLUTION ORAL at 10:08

## 2023-08-24 RX ADMIN — LIDOCAINE HYDROCHLORIDE 60 MG: 20 INJECTION, SOLUTION EPIDURAL; INFILTRATION; INTRACAUDAL at 09:12

## 2023-08-24 RX ADMIN — PROPOFOL 150 MG: 10 INJECTION, EMULSION INTRAVENOUS at 09:12

## 2023-08-24 RX ADMIN — SUGAMMADEX 200 MG: 100 INJECTION, SOLUTION INTRAVENOUS at 09:37

## 2023-08-24 ASSESSMENT — PAIN DESCRIPTION - PAIN TYPE
TYPE: SURGICAL PAIN

## 2023-08-24 ASSESSMENT — PAIN SCALES - GENERAL: PAIN_LEVEL: 3

## 2023-08-24 ASSESSMENT — FIBROSIS 4 INDEX: FIB4 SCORE: 1.91

## 2023-08-24 NOTE — ANESTHESIA POSTPROCEDURE EVALUATION
Patient: Darren Brunson    Procedure Summary     Date: 08/24/23 Room / Location: [place] [address] / SURGERY [facility]    Anesthesia Start: 0906 Anesthesia Stop: 0948    Procedure: ROBOT ASSISTED LAPAROSCOPIC RIGHT INGUINAL HERNIA REPAIR (Right: Groin) Diagnosis: (RECURRENT RIGHT INGUINAL HERNIA)    Surgeons: Morris Mcfarland M.D. Responsible Provider: Jose Sams M.D.    Anesthesia Type: general ASA Status: 3          Final Anesthesia Type: general  Last vitals  BP   Blood Pressure : (!) 147/69    Temp   36 °C (96.8 °F)    Pulse   70   Resp   16    SpO2   96 %      Anesthesia Post Evaluation    Patient location during evaluation: PACU  Patient participation: complete - patient participated  Level of consciousness: awake and alert  Pain score: 3    Airway patency: patent  Anesthetic complications: no  Cardiovascular status: hemodynamically stable  Respiratory status: acceptable  Hydration status: euvolemic    PONV: none          There were no known notable events for this encounter.     Nurse Pain Score: 3 (NPRS)

## 2023-08-24 NOTE — OR NURSING
1049 pt transported to phase II via [facility]. Pt is alert, awake and oriented.   Pt able to tolerate ambulation up to chair from [facility]. Pt pain scale of 3/10. 3 stabs sites with dermabond. Pt denies nausea or vomiting episodes. Call light within reach. Personal belongings back to patient.   1130 Pt discharged home in good and stable condition. Reviewed all discharge instructions and answered any questions. IV discontinued. Escorted downstairs via  at 1130.

## 2023-08-24 NOTE — OR NURSING
Pt arrived via gurney from OR w/ RN and anesthesia. VSS. Report received. Orders reviewed and initiated. Aicha roche c/d/I.

## 2023-08-24 NOTE — ANESTHESIA PREPROCEDURE EVALUATION
Case: [id number] Date/Time: 08/24/23 0830    Procedure: ROBOT ASSISTED LAPAROSCOPIC RIGHT INGUINAL HERNIA REPAIR POSSIBLE BILATERAL INGUINAL HERNIA REPAIR (Right: Groin)    Anesthesia type: General    Pre-op diagnosis: RECURRENT RIGHT INGUINAL HERNIA    Location: [facility] OR 15 / SURGERY [facility]    Surgeons: Morris Mcfarland M.D.          Relevant Problems   ANESTHESIA   (positive) Obstructive sleep apnea syndrome      CARDIAC   (positive) Acute myocardial infarction of other anterior wall, episode of care unspecified   (positive) Coronary atherosclerosis of native coronary artery   (positive) Essential hypertension   (positive) Stented coronary artery      ENDO   (positive) Type 2 diabetes mellitus without complication, without long-term current use of insulin (HCC)   hx/o difficult intubation    Physical Exam    Airway   Mallampati: III  TM distance: >3 FB  Neck ROM: full       Cardiovascular - normal exam  Rhythm: regular  Rate: normal  (-) murmur     Dental - normal exam           Pulmonary - normal exam  Breath sounds clear to auscultation     Abdominal    Neurological - normal exam               Anesthesia Plan    ASA 3   ASA physical status 3 criteria: CAD/stents (> 3 months)    Plan - general       Airway plan will be ETT          Induction: intravenous    Postoperative Plan: Postoperative administration of opioids is intended.    Pertinent diagnostic labs and testing reviewed    Informed Consent:    Anesthetic plan and risks discussed with patient.

## 2023-08-24 NOTE — DISCHARGE INSTRUCTIONS
HOME CARE INSTRUCTIONS    ACTIVITY: Rest and take it easy for the first 24 hours.  A responsible adult is recommended to remain with you during that time.  It is normal to feel sleepy.  We encourage you to not do anything that requires balance, judgment or coordination.    FOR 24 HOURS DO NOT:  Drive, operate machinery or run household appliances.  Drink beer or alcoholic beverages.  Make important decisions or sign legal documents.    SPECIAL INSTRUCTIONS: Inguinal Discharge Instructions:    ACTIVITIES: Upon discharge from the hospital, the day of surgery it is requested that you do no significant physical activity and limit mental activities, as you have had sedation. The day after surgery, you may resume activities of daily living, but for four weeks, it is recommended that you do no strenuous activities or heavy lifting (greater than 15 pounds).     DRIVING: You may drive whenever you are off pain medications and are able to perform the activities needed to drive, i.e. turning, bending, twisting, etc.     WOUND: It is not unusual for patients to experience swelling and even bruising at the hernia repair site. With inguinal hernias, sometimes the bruising and swelling may extend on to the penis or into the scrotum of male patients. This will resolve over the next few days.     ICE: please use ice on the wound to decrease the swelling for the first 24 hours and then discontinue.     BATHING: The dressing can be removed 48 hours after surgery and the wound can then be wetted in a shower as normal, but avoid submersion in water (tub bath) for at least 2 weeks.    PAIN MEDICATION: You will be given a prescription for pain medication at discharge. Please take these as directed. It is important to remember not to take medications on an empty stomach as this may cause nausea.     BOWEL FUNCTION: After hernia repair, it is not uncommon for patients to experience constipation. This is due to decreasing activity levels as  well as pain medications. You may wish to use a stool softener beginning immediately after surgery, and you may or may not need to use a laxative (Milk of Magnesia, Ex-lax; Senokot, etc.) as well.            CALL IF YOU HAVE: Drainage or fluid from incision that may be foul smelling,  increased tenderness or soreness at the wound or the wound edges are no  longer together,redness or swelling at the incision site. Please do not hesitate to  call with any other questions.     APPOINTMENT: Contact our office at [phone] for a follow-up appointment in 1 week following your procedure.     If you have any additional questions, please do not hesitate to call the office.      DIET: To avoid nausea, slowly advance diet as tolerated, avoiding spicy or greasy foods for the first day.  Add more substantial food to your diet according to your physician's instructions.  Babies can be fed formula or breast milk as soon as they are hungry.  INCREASE FLUIDS AND FIBER TO AVOID CONSTIPATION.    SURGICAL DRESSING/BATHING: ok to shower, no submerging to water for 7 days     MEDICATIONS: Resume taking daily medication.  Take prescribed pain medication with food.  If no medication is prescribed, you may take non-aspirin pain medication if needed.  PAIN MEDICATION CAN BE VERY CONSTIPATING.  Take a stool softener or laxative such as senokot, pericolace, or milk of magnesia if needed.    Prescription given for oxycodone.  Last pain medication given at 1008.    A follow-up appointment should be arranged with your doctor in 1-2 weeks; call to schedule.    You should CALL YOUR PHYSICIAN if you develop:  Fever greater than 101 degrees F.  Pain not relieved by medication, or persistent nausea or vomiting.  Excessive bleeding (blood soaking through dressing) or unexpected drainage from the wound.  Extreme redness or swelling around the incision site, drainage of pus or foul smelling drainage.  Inability to urinate or empty your bladder within  8 hours.  Problems with breathing or chest pain.    You should call 911 if you develop problems with breathing or chest pain.  If you are unable to contact your doctor or surgical center, you should go to the nearest emergency room or urgent care center.  Physician's telephone #:  [phone]    MILD FLU-LIKE SYMPTOMS ARE NORMAL.  YOU MAY EXPERIENCE GENERALIZED MUSCLE ACHES, THROAT IRRITATION, HEADACHE AND/OR SOME NAUSEA.    If any questions arise, call your doctor.  If your doctor is not available, please feel free to call the Surgical Center at [phone].  The Center is open Monday through Friday from 7AM to 7PM.      A registered nurse may call you a few days after your surgery to see how you are doing after your procedure.    You may also receive a survey in the mail within the next two weeks and we ask that you take a few moments to complete the survey and return it to us.  Our goal is to provide you with very good care and we value your comments.     Depression / Suicide Risk    As you are discharged from this [facility] facility, it is important to learn how to keep safe from harming yourself.    Recognize the warning signs:  Abrupt changes in personality, positive or negative- including increase in energy   Giving away possessions  Change in eating patterns- significant weight changes-  positive or negative  Change in sleeping patterns- unable to sleep or sleeping all the time   Unwillingness or inability to communicate  Depression  Unusual sadness, discouragement and loneliness  Talk of wanting to die  Neglect of personal appearance   Rebelliousness- reckless behavior  Withdrawal from people/activities they love  Confusion- inability to concentrate     If you or a loved one observes any of these behaviors or has concerns about self-harm, here's what you can do:  Talk about it- your feelings and reasons for harming yourself  Remove any means that you might use to hurt yourself (examples: pills,  rope, extension cords, firearm)  Get professional help from the community (Mental Health, Substance Abuse, psychological counseling)  Do not be alone:Call your Safe Contact- someone whom you trust who will be there for you.  Call your local CRISIS HOTLINE 734-9354 or 299-791-1705  Call your local Children's Mobile Crisis Response Team [place] (834) 643-9409 or www.WiOffer  Call the toll free National Suicide Prevention Hotlines   National Suicide Prevention Lifeline 518-659-MSZI (4832)  [facility] Line Network 800-SUICIDE (806-0928)    I acknowledge receipt and understanding of these Home Care instructions.

## 2023-08-24 NOTE — OP REPORT
Operative Report    Date: 8/24/2023    Surgeon: Morris Mcfarland M.D.     Assistant: ISMAEL Jackson    Pre-operative Diagnosis: right recurrent Inguinal Hernia    Post-operative Diagnosis: Same     Procedure: Robotic right Inguinal Hernia Repair with Mesh    ASA Classification: III.    Indications: This is a 73 y.o. male who presented with symptoms of right Inguinal Hernia. Here for repair    The indications for a surgical assistant in this surgery were indicated due to complexity of the procedure. Their role included aiding in incision, retraction, holding devices including camera for laparoscopic procedure, and closure of the wound.      Findings: right indirect and femoral hernia    Wound Classification: Class I, I, Clean..    Procedure in detail: The patient was seen and examined in the preoperative holding area.  The risks benefits and alternatives of the procedure were discussed with the patient who wished to proceed with the procedure as described.  The patient was transferred to the operating room placed in supine position and all pressure points were properly padded.  General endotracheal anesthesia was induced and preoperative antibiotics were given per SCIP protocol.  Patient's abdomen was prepped with ChloraPrep and draped in the normal sterile fashion.  A timeout was performed confirming correct patient, correct procedure, and that all necessary equipment was in the room.      We began the procedure by performing a periumbilical Optiview approach.  The area for our camera port was identified and infused with local anesthetic, local anesthetic was subsequently infused over all port sites.  The skin was sharply incised and the 5 mm port was used to gain access to the abdomen.  Pneumoperitoneum was then achieved and maintained at 15 mmHg carbon dioxide throughout the entirety of the case.  Under direct visualization a right and left working port were placed with the 8 mm robotic port.  We then  exchanged our 5 mm port for an 8 mm robotic port. the robot was then docked.  We began by identifying an area approximately 8 cm from the inguinal hernia and the peritoneum was sharply incised.  Using combination of blunt, electrocautery, and sharp dissection we were able to dissect the peritoneal flap down to the inguinal canal.  Careful dissection was completed medially to identify the pubic symphysis and carried this down to the obturator canal.  The corona mortise was identified and preserved.  We then continued dissection laterally until we encountered the psoas muscle ensuring that we maintain the iliopubic tract against the abdominal wall. An indirect hernia sac was identified.  Careful dissection was used to free the indirect inguinal hernia sac ensuring that the gonadal structures were carefully identified and preserved throughout the dissection of the hernia sac.  We carried our dissection down until the peritoneum was well below our area of mesh placement.  Any identified cord lipoma was carefully dissected free and reduced into the preperitoneal space..     The 15 x 10 pro- hernia mesh was selected and introduced.  This was then laid into the dissected peritoneal flap ensuring good coverage medially down the pubic symphysis over the midline covering the  space as well as good overlap over the inguinal canal anatomy.  The peritoneal flap was manipulated to ensure that the mesh did not move.    The peritoneal flap was then closed with a 2-0 strata fix suture.  The perineal flap was then carefully inspected and any identified rents were closed carefully using the same 2-0 strata fix suture.  The robot was then undocked and the ports were carefully removed.  Skin was then closed with 4-0 Monocryl in a subcuticular fashion and Dermabond was placed over the wounds.    The patient was awakened from general anesthetic, and was taken to the recovery room in stable condition.    Sponge and needle  counts were correct at the end of the case.     Specimen: none    EBL: 10mL    Dispo: stable, extubated, to PACU    Morris Mcfarland M.D.  [facility]  [phone]

## 2023-08-24 NOTE — ANESTHESIA TIME REPORT
Anesthesia Start and Stop Event Times     Date Time Event    8/24/2023 0857 Ready for Procedure     0906 Anesthesia Start     0948 Anesthesia Stop        Responsible Staff  08/24/23    Name Role Begin End    Jose Sams M.D. Anesth 0906 0948        Overtime Reason:  no overtime (within assigned shift)    Comments:

## 2023-08-24 NOTE — OR NURSING
Report called to receiving RICARDO Benavidez. Pt taken via amber to phase II. VSS. No distress. Glasses in chart.

## 2023-08-24 NOTE — ANESTHESIA PROCEDURE NOTES
Airway    Date/Time: 8/24/2023 9:13 AM    Performed by: Jose Sams M.D.  Authorized by: Jose Sams M.D.    Location:  OR  Urgency:  Elective  Indications for Airway Management:  Anesthesia      Spontaneous Ventilation: absent    Sedation Level:  Deep  Preoxygenated: Yes    Patient Position:  Sniffing  Mask Difficulty Assessment:  2 - vent by mask + OA or adjuvant +/- NMBA  Final Airway Type:  Endotracheal airway  Final Endotracheal Airway:  ETT  Cuffed: Yes    Technique Used for Successful ETT Placement:  Video laryngoscopy  Devices/Methods Used in Placement:  Intubating stylet    Insertion Site:  Oral  Blade Type:  Glide  Laryngoscope Blade/Videolaryngoscope Blade Size:  3  ETT Size (mm):  7.5  Measured from:  Teeth  ETT to Teeth (cm):  25  Placement Verified by: auscultation and capnometry    Cormack-Lehane Classification:  Grade IIa - partial view of glottis (glidescope)  Number of Attempts at Approach:  1

## 2023-08-24 NOTE — ED NOTES
Med Rec complete per patient   Allergies reviewed  No oral antibiotics in the last 30 days  Preferred pharmacy: [facility] on [address]

## 2023-10-23 ENCOUNTER — APPOINTMENT (RX ONLY)
Dept: URBAN - METROPOLITAN AREA CLINIC 15 | Facility: CLINIC | Age: 73
Setting detail: DERMATOLOGY
End: 2023-10-23

## 2023-10-23 DIAGNOSIS — D69.2 OTHER NONTHROMBOCYTOPENIC PURPURA: ICD-10-CM

## 2023-10-23 DIAGNOSIS — L29.8 OTHER PRURITUS: ICD-10-CM | Status: INADEQUATELY CONTROLLED

## 2023-10-23 DIAGNOSIS — L29.89 OTHER PRURITUS: ICD-10-CM | Status: INADEQUATELY CONTROLLED

## 2023-10-23 PROCEDURE — ? PRESCRIPTION

## 2023-10-23 PROCEDURE — 99214 OFFICE O/P EST MOD 30 MIN: CPT

## 2023-10-23 PROCEDURE — ? COUNSELING

## 2023-10-23 PROCEDURE — ? ADDITIONAL NOTES

## 2023-10-23 RX ORDER — CLOBETASOL PROPIONATE 0.05 G/100ML
1 SHAMPOO TOPICAL
Qty: 118 | Refills: 0 | Status: ERX | COMMUNITY
Start: 2023-10-23

## 2023-10-23 RX ADMIN — CLOBETASOL PROPIONATE 1: 0.05 SHAMPOO TOPICAL at 00:00

## 2023-10-23 ASSESSMENT — LOCATION ZONE DERM
LOCATION ZONE: SCALP
LOCATION ZONE: ARM

## 2023-10-23 ASSESSMENT — LOCATION DETAILED DESCRIPTION DERM
LOCATION DETAILED: LEFT DISTAL DORSAL FOREARM
LOCATION DETAILED: RIGHT MEDIAL FRONTAL SCALP
LOCATION DETAILED: RIGHT DISTAL DORSAL FOREARM
LOCATION DETAILED: LEFT VENTRAL DISTAL FOREARM

## 2023-10-23 ASSESSMENT — LOCATION SIMPLE DESCRIPTION DERM
LOCATION SIMPLE: RIGHT FOREARM
LOCATION SIMPLE: LEFT FOREARM
LOCATION SIMPLE: RIGHT SCALP

## 2023-10-23 NOTE — PROCEDURE: ADDITIONAL NOTES
Additional Notes: Patient has gotten blood work done with normal results.
Detail Level: Detailed
Render Risk Assessment In Note?: no

## 2023-10-30 ENCOUNTER — APPOINTMENT (OUTPATIENT)
Dept: SLEEP MEDICINE | Facility: MEDICAL CENTER | Age: 73
End: 2023-10-30
Attending: INTERNAL MEDICINE
Payer: MEDICARE

## 2023-11-27 ENCOUNTER — APPOINTMENT (OUTPATIENT)
Dept: CARDIOLOGY | Facility: MEDICAL CENTER | Age: 73
End: 2023-11-27
Attending: STUDENT IN AN ORGANIZED HEALTH CARE EDUCATION/TRAINING PROGRAM
Payer: MEDICARE

## 2023-11-29 ENCOUNTER — PATIENT MESSAGE (OUTPATIENT)
Dept: HEALTH INFORMATION MANAGEMENT | Facility: OTHER | Age: 73
End: 2023-11-29

## 2023-12-06 ENCOUNTER — OFFICE VISIT (OUTPATIENT)
Dept: SLEEP MEDICINE | Facility: MEDICAL CENTER | Age: 73
End: 2023-12-06
Attending: INTERNAL MEDICINE
Payer: MEDICARE

## 2023-12-06 VITALS
HEART RATE: 84 BPM | DIASTOLIC BLOOD PRESSURE: 64 MMHG | OXYGEN SATURATION: 98 % | SYSTOLIC BLOOD PRESSURE: 124 MMHG | HEIGHT: 72 IN | WEIGHT: 174 LBS | BODY MASS INDEX: 23.57 KG/M2

## 2023-12-06 DIAGNOSIS — R51.9 FREQUENT HEADACHES: ICD-10-CM

## 2023-12-06 DIAGNOSIS — R91.8 PULMONARY NODULES: ICD-10-CM

## 2023-12-06 DIAGNOSIS — T78.40XS ALLERGY, SEQUELA: ICD-10-CM

## 2023-12-06 DIAGNOSIS — Z87.891 FORMER SMOKER: ICD-10-CM

## 2023-12-06 DIAGNOSIS — J45.20 MILD INTERMITTENT ASTHMA WITHOUT COMPLICATION: ICD-10-CM

## 2023-12-06 DIAGNOSIS — G47.33 OSA (OBSTRUCTIVE SLEEP APNEA): ICD-10-CM

## 2023-12-06 PROCEDURE — 3074F SYST BP LT 130 MM HG: CPT | Performed by: INTERNAL MEDICINE

## 2023-12-06 PROCEDURE — 99212 OFFICE O/P EST SF 10 MIN: CPT | Performed by: INTERNAL MEDICINE

## 2023-12-06 PROCEDURE — 3078F DIAST BP <80 MM HG: CPT | Performed by: INTERNAL MEDICINE

## 2023-12-06 PROCEDURE — 99214 OFFICE O/P EST MOD 30 MIN: CPT | Performed by: INTERNAL MEDICINE

## 2023-12-06 ASSESSMENT — ENCOUNTER SYMPTOMS
HEADACHES: 0
WEIGHT LOSS: 0
HEMOPTYSIS: 0
EYE PAIN: 0
STRIDOR: 0
DIAPHORESIS: 0
DIZZINESS: 0
CLAUDICATION: 0
PND: 0
ABDOMINAL PAIN: 0
FOCAL WEAKNESS: 0
PALPITATIONS: 0
DOUBLE VISION: 0
NECK PAIN: 0
HEARTBURN: 0
EYE REDNESS: 0
FALLS: 0
SORE THROAT: 0
DIARRHEA: 0
CONSTIPATION: 0
BACK PAIN: 0
SPEECH CHANGE: 0
DEPRESSION: 0
TREMORS: 0
PHOTOPHOBIA: 0
FEVER: 0
VOMITING: 0
EYE DISCHARGE: 0
MYALGIAS: 0
SHORTNESS OF BREATH: 0
COUGH: 0
CHILLS: 0
WHEEZING: 0
SINUS PAIN: 0
ORTHOPNEA: 0
WEAKNESS: 0
NAUSEA: 0
SPUTUM PRODUCTION: 0
BLURRED VISION: 0

## 2023-12-06 ASSESSMENT — PATIENT HEALTH QUESTIONNAIRE - PHQ9: CLINICAL INTERPRETATION OF PHQ2 SCORE: 0

## 2023-12-06 ASSESSMENT — FIBROSIS 4 INDEX: FIB4 SCORE: 1.91

## 2023-12-06 NOTE — PROGRESS NOTES
Chief Complaint   Patient presents with    Follow-Up     LAST SEEN 6/6/23    Results     PFT 8/8/23  OPO 7/25/23  CT CHEST 8/22/23           HPI: This patient is a 73 y.o. male whom is followed in our clinic for asthma/mild obstructive lung disease last seen by me on 6/6/23. The pt also has a dx of ANTIONE for which he underwent UPPP in the past.  He is a former tobacco smoker with 30-pack-year history and quit in 1997.  CT chest from August 2020 showed subcentimeter pulmonary nodules and repeat CT chest from August 2021 showed stable pulmonary nodules largest of 5 mm in size without significant adenopathy. Hs last CT from 8/2023 showed no nodules. With regards to asthma, patient's symptoms were preiously mild and controlled with Symbicort 160 which he was using daily in the mornings on the days that he exercises but rarely needing BID. Spirometry from 9/2022 showed normal air flows with trend toward BD response.  He was experiencing hoarseness of the voice last visit so we reduced symbicort to one puff BID and he has not had any worsening respiratory sxs even going some days w/o. His main concern last visit was HA upon waking and a sense of pressure in his head and sinuses. He has seen ENT who ordered HST which showed severe ANTIONE based on AHI of 35 however the pt spent <10 minutes below a saturation of 88% with basal SpO2 of 94% and based on OPO ordered by me 7/25/23 he had similar mild desaturation but not a pattern of clustered desaturations. He denies excessive daytime sleepiness, does not suffer from HTN or AF. He does have a hx of CAD s/p PCI and is followed by cardiology, not currently BB or ACE-I. He has seen allergy as his HA have a fairly regular seasonal nature to them in spring and fall. Most recent PFT from August is normal other than mild air trapping.     Past Medical History:   Diagnosis Date    Acute myocardial infarction of other anterior wall, episode of care unspecified 2009; 2014    Anxiety state,  unspecified      Arthritis     foot, ankle, jaw right side    Asthma     Bronchitis     CAD (coronary artery disease) 2009    PCI/stent (Promus 3.0 x 15mm) to the LAD.    Cataract     left eye no surgery    Chickenpox     Cough     Diabetes (HCC)     on oral meds    Erosive gastritis     Gastric ulcer     Glaucoma     Heart murmur     2023 patient not aware of having a murmur    High cholesterol     Hyperlipidemia     Mumps     Pain 2023    discomfort abdomen    Psychiatric problem     S/P coronary artery stent placement      Shortness of breath     Sleep apnea     resolved after surgery for sleep apnea    Snoring        Social History     Socioeconomic History    Marital status:      Spouse name: Not on file    Number of children: Not on file    Years of education: Not on file    Highest education level: Not on file   Occupational History    Not on file   Tobacco Use    Smoking status: Former     Current packs/day: 0.00     Average packs/day: 1 pack/day for 30.0 years (30.0 ttl pk-yrs)     Types: Cigarettes     Start date: 1967     Quit date: 1997     Years since quittin.2    Smokeless tobacco: Never   Vaping Use    Vaping Use: Never used   Substance and Sexual Activity    Alcohol use: No    Drug use: No    Sexual activity: Not on file   Other Topics Concern    Not on file   Social History Narrative    Not on file     Social Determinants of Health     Financial Resource Strain: Not on file   Food Insecurity: Not on file   Transportation Needs: Not on file   Physical Activity: Not on file   Stress: Not on file   Social Connections: Not on file   Intimate Partner Violence: Not on file   Housing Stability: Not on file       Family History   Problem Relation Age of Onset    Heart Disease Maternal Aunt     Cancer Mother         colon       Current Outpatient Medications on File Prior to Visit   Medication Sig Dispense Refill    fluticasone (FLONASE) 50 MCG/ACT nasal spray  Administer 2 Sprays into affected nostril(S) as needed.      budesonide-formoterol (SYMBICORT) 160-4.5 MCG/ACT Aerosol INHALE 2 PUFFS BY MOUTH TWICE A DAY. USE WITH SPACER AND RINSE MOUTH AFTER EACH USE 10.2 Each 5    albuterol 108 (90 Base) MCG/ACT Aero Soln inhalation aerosol INHALE 1-2 PUFFS BY MOUTH EVERY FOUR HOURS AS NEEDED FOR SHORTNESS OF BREATH. 8.5 Each 11    acetaminophen (TYLENOL) 325 MG Tab Take 2 Tablets by mouth every 6 hours. Alternate w/ ibuprofen to take a medication every 3 hrs, take scheduled for 3 days post-op then PRN 60 Tablet 0    ibuprofen (MOTRIN) 600 MG Tab Take 1 Tablet by mouth every 6 hours. Alternate w/ tylenol to take a medication every 3 hrs, take scheduled for 3 days post-op then PRN 45 Tablet 0    polyethylene glycol/lytes (MIRALAX) 17 g Pack Take 1 Packet by mouth every day. While taking narcotics, hold if greater then 3 BMs a day 20 Each 0    Cholecalciferol (VITAMIN D3) 125 MCG (5000 UT) Cap Take 5,000 Units by mouth every day.      glucose blood (ONETOUCH ULTRA) strip 1 Each by Other route every day.      pantoprazole (PROTONIX) 40 MG Tablet Delayed Response Take 1 Tablet by mouth every day.      aspirin (ASA) 81 MG Chew Tab chewable tablet Chew 81 mg every day.      fexofenadine (ALLEGRA) 180 MG tablet Take 180 mg by mouth 2 times a day.      Multiple Vitamins-Minerals (PRESERVISION AREDS 2+MULTI VIT PO) Take 1 Tablet by mouth 2 times a day.      Menaquinone-7 (VITAMIN K2 PO) Take  by mouth every day.      metformin ER (GLUCOPHAGE XR) 500 MG TABLET SR 24 HR Take 500 mg by mouth every evening.      gabapentin (NEURONTIN) 100 MG Cap Take 100-200 mg by mouth 2 times a day. 100 mg every AM and 200 mg every night NIGHT      fenofibrate (TRICOR) 48 MG TABS Take 48 mg by mouth every evening.      Probiotic Product (PROBIOTIC DAILY PO) Take 1 Tab by mouth every day.       No current facility-administered medications on file prior to visit.       Lisinopril, Aspirin, Ciprofloxacin,  Crestor [rosuvastatin calcium], Hmg-coa-r inhibitors, Atorvastatin, Singulair, and Topamax [topiramate]      ROS:   Review of Systems   Constitutional:  Negative for chills, diaphoresis, fever, malaise/fatigue and weight loss.   HENT:  Negative for congestion, ear discharge, ear pain, hearing loss, nosebleeds, sinus pain, sore throat and tinnitus.    Eyes:  Negative for blurred vision, double vision, photophobia, pain, discharge and redness.   Respiratory:  Negative for cough, hemoptysis, sputum production, shortness of breath, wheezing and stridor.    Cardiovascular:  Negative for chest pain, palpitations, orthopnea, claudication, leg swelling and PND.   Gastrointestinal:  Negative for abdominal pain, constipation, diarrhea, heartburn, nausea and vomiting.   Genitourinary:  Negative for dysuria and urgency.   Musculoskeletal:  Negative for back pain, falls, joint pain, myalgias and neck pain.   Skin:  Negative for itching and rash.   Neurological:  Negative for dizziness, tremors, speech change, focal weakness, weakness and headaches.   Endo/Heme/Allergies:  Negative for environmental allergies.   Psychiatric/Behavioral:  Negative for depression.        /64 (BP Location: Right arm, Patient Position: Sitting, BP Cuff Size: Adult)   Pulse 84   Ht 1.829 m (6')   Wt 78.9 kg (174 lb)   SpO2 98%   Physical Exam  Vitals reviewed.   Constitutional:       General: He is not in acute distress.     Appearance: Normal appearance. He is normal weight.   HENT:      Head: Normocephalic and atraumatic.      Right Ear: External ear normal.      Left Ear: External ear normal.      Nose: Nose normal. No congestion.      Mouth/Throat:      Mouth: Mucous membranes are moist.      Pharynx: Oropharynx is clear.   Eyes:      General: No scleral icterus.     Extraocular Movements: Extraocular movements intact.      Conjunctiva/sclera: Conjunctivae normal.      Pupils: Pupils are equal, round, and reactive to light.    Cardiovascular:      Rate and Rhythm: Normal rate and regular rhythm.      Heart sounds: Normal heart sounds. No murmur heard.     No gallop.   Pulmonary:      Effort: Pulmonary effort is normal. No respiratory distress.      Breath sounds: Normal breath sounds. No wheezing or rales.   Abdominal:      General: There is no distension.      Palpations: Abdomen is soft.   Musculoskeletal:         General: Normal range of motion.      Cervical back: Normal range of motion and neck supple.      Right lower leg: No edema.      Left lower leg: No edema.   Skin:     General: Skin is warm and dry.      Findings: No rash.   Neurological:      Mental Status: He is alert and oriented to person, place, and time.      Cranial Nerves: No cranial nerve deficit.   Psychiatric:         Mood and Affect: Mood normal.         Behavior: Behavior normal.         PFTs as reviewed by me personally: as per hpI    Imaging as reviewed by me personally:  as per hPI    Assessment:  1. Allergy, sequela        2. Frequent headaches        3. Mild intermittent asthma without complication        4. ANTIONE (obstructive sleep apnea)        5. Pulmonary nodules        6. Former smoker            Plan:  Given seasonal nature to his HA, I suspect allergy in which case if he is a candidate for IT therapy, he may benefit significantly, not just from an allergy standpoint but also from ANTIONE standpoint as I suspect his ANTIONE sxs are more correlated to allergic rhinosinusitis.  See above. I do not think the pt has HA related directly to ANTIONE. I suspect his sleep apnea is strongly correlated to his sinus issues which are seasonal and tx of sinuses/allergies will likely reduce both AHI and HA  Chronic, mild, stable on prn ICS/LABA.   The phenotype of ANTIONE w/o hypoxemia in the literature is not generally associated with significant comorbidities and the pt does not have underlying AF or HTN. This phenotype has been associated with daytime sleepiness which pt denies.  There appears to be medium benefit of CPAP in this pt population however as per above, I would recommend addressing allergy/sinus issues and consider retesting in the future.   Resolved  Tobacco free for >15 years  Return in about 6 months (around 6/6/2024) for asthma .

## 2024-02-20 ENCOUNTER — APPOINTMENT (RX ONLY)
Dept: URBAN - METROPOLITAN AREA CLINIC 15 | Facility: CLINIC | Age: 74
Setting detail: DERMATOLOGY
End: 2024-02-20

## 2024-02-20 DIAGNOSIS — L82.1 OTHER SEBORRHEIC KERATOSIS: ICD-10-CM

## 2024-02-20 DIAGNOSIS — Z87.2 PERSONAL HISTORY OF DISEASES OF THE SKIN AND SUBCUTANEOUS TISSUE: ICD-10-CM

## 2024-02-20 DIAGNOSIS — L57.0 ACTINIC KERATOSIS: ICD-10-CM

## 2024-02-20 DIAGNOSIS — L81.4 OTHER MELANIN HYPERPIGMENTATION: ICD-10-CM

## 2024-02-20 DIAGNOSIS — B00.1 HERPESVIRAL VESICULAR DERMATITIS: ICD-10-CM

## 2024-02-20 DIAGNOSIS — D22 MELANOCYTIC NEVI: ICD-10-CM

## 2024-02-20 DIAGNOSIS — D18.0 HEMANGIOMA: ICD-10-CM

## 2024-02-20 PROBLEM — D22.5 MELANOCYTIC NEVI OF TRUNK: Status: ACTIVE | Noted: 2024-02-20

## 2024-02-20 PROBLEM — D18.01 HEMANGIOMA OF SKIN AND SUBCUTANEOUS TISSUE: Status: ACTIVE | Noted: 2024-02-20

## 2024-02-20 PROCEDURE — 17000 DESTRUCT PREMALG LESION: CPT

## 2024-02-20 PROCEDURE — ? LIQUID NITROGEN

## 2024-02-20 PROCEDURE — ? PRESCRIPTION

## 2024-02-20 PROCEDURE — 99213 OFFICE O/P EST LOW 20 MIN: CPT | Mod: 25

## 2024-02-20 PROCEDURE — ? COUNSELING

## 2024-02-20 RX ORDER — VALACYCLOVIR 500 MG/1
2 TABLET, FILM COATED ORAL QD
Qty: 60 | Refills: 3 | Status: ERX

## 2024-02-20 ASSESSMENT — LOCATION SIMPLE DESCRIPTION DERM
LOCATION SIMPLE: RIGHT HAND
LOCATION SIMPLE: LEFT BUTTOCK
LOCATION SIMPLE: RIGHT LOWER BACK
LOCATION SIMPLE: LEFT UPPER BACK
LOCATION SIMPLE: RIGHT BUTTOCK
LOCATION SIMPLE: POSTERIOR SCALP

## 2024-02-20 ASSESSMENT — LOCATION DETAILED DESCRIPTION DERM
LOCATION DETAILED: RIGHT SUPERIOR LATERAL LOWER BACK
LOCATION DETAILED: POSTERIOR MID-PARIETAL SCALP
LOCATION DETAILED: LEFT BUTTOCK
LOCATION DETAILED: LEFT INFERIOR UPPER BACK
LOCATION DETAILED: RIGHT BUTTOCK
LOCATION DETAILED: RIGHT RADIAL DORSAL HAND

## 2024-02-20 ASSESSMENT — LOCATION ZONE DERM
LOCATION ZONE: HAND
LOCATION ZONE: SCALP
LOCATION ZONE: TRUNK

## 2024-02-20 NOTE — PROCEDURE: LIQUID NITROGEN
Consent: The patient's consent was obtained including but not limited to risks of crusting, scabbing, blistering, scarring, darker or lighter pigmentary change, recurrence, incomplete removal and infection.
Render Post-Care Instructions In Note?: yes
Post-Care Instructions: I reviewed with the patient in detail post-care instructions. Patient is to wear sunprotection, and avoid picking at any of the treated lesions. Pt may apply Vaseline to crusted or scabbing areas.
Number Of Freeze-Thaw Cycles: 1 freeze-thaw cycle
Render Note In Bullet Format When Appropriate: No
Duration Of Freeze Thaw-Cycle (Seconds): 3
Aperture Size (Optional): A
Detail Level: Detailed

## 2024-02-21 DIAGNOSIS — J45.30 MILD PERSISTENT ASTHMA WITHOUT COMPLICATION: ICD-10-CM

## 2024-02-21 RX ORDER — BUDESONIDE AND FORMOTEROL FUMARATE 160; 4.5 UG/1; UG/1
AEROSOL, METERED RESPIRATORY (INHALATION)
Qty: 10.3 EACH | Refills: 5 | Status: SHIPPED | OUTPATIENT
Start: 2024-02-21

## 2024-02-21 NOTE — TELEPHONE ENCOUNTER
Have we ever prescribed this med? Yes.  If yes, what date? 11/29/22    Last OV: 12/6/23 Dr. Vazquez    Next OV: 6/6/24 Dr. Vazquez    DX: Mild persistent asthma without complication [J45.30]     Medications: budesonide-formoterol (SYMBICORT) 160-4.5 MCG/ACT Aerosol

## 2024-02-27 ENCOUNTER — OFFICE VISIT (OUTPATIENT)
Dept: CARDIOLOGY | Facility: MEDICAL CENTER | Age: 74
End: 2024-02-27
Attending: NURSE PRACTITIONER
Payer: MEDICARE

## 2024-02-27 VITALS
HEIGHT: 72 IN | WEIGHT: 180 LBS | DIASTOLIC BLOOD PRESSURE: 60 MMHG | SYSTOLIC BLOOD PRESSURE: 122 MMHG | BODY MASS INDEX: 24.38 KG/M2 | HEART RATE: 72 BPM | RESPIRATION RATE: 16 BRPM | OXYGEN SATURATION: 98 %

## 2024-02-27 DIAGNOSIS — Z78.9 STATIN INTOLERANCE: ICD-10-CM

## 2024-02-27 DIAGNOSIS — I25.10 CORONARY ARTERY DISEASE INVOLVING NATIVE CORONARY ARTERY OF NATIVE HEART WITHOUT ANGINA PECTORIS: ICD-10-CM

## 2024-02-27 DIAGNOSIS — I10 ESSENTIAL HYPERTENSION: ICD-10-CM

## 2024-02-27 DIAGNOSIS — R42 DIZZINESS: ICD-10-CM

## 2024-02-27 DIAGNOSIS — Z95.5 STENTED CORONARY ARTERY: ICD-10-CM

## 2024-02-27 DIAGNOSIS — E78.5 DYSLIPIDEMIA: ICD-10-CM

## 2024-02-27 LAB — EKG IMPRESSION: NORMAL

## 2024-02-27 PROCEDURE — 3078F DIAST BP <80 MM HG: CPT | Performed by: NURSE PRACTITIONER

## 2024-02-27 PROCEDURE — 3074F SYST BP LT 130 MM HG: CPT | Performed by: NURSE PRACTITIONER

## 2024-02-27 PROCEDURE — 99214 OFFICE O/P EST MOD 30 MIN: CPT | Performed by: NURSE PRACTITIONER

## 2024-02-27 PROCEDURE — 99213 OFFICE O/P EST LOW 20 MIN: CPT | Performed by: NURSE PRACTITIONER

## 2024-02-27 PROCEDURE — 93010 ELECTROCARDIOGRAM REPORT: CPT | Performed by: INTERNAL MEDICINE

## 2024-02-27 PROCEDURE — 93005 ELECTROCARDIOGRAM TRACING: CPT | Performed by: NURSE PRACTITIONER

## 2024-02-27 RX ORDER — TAMSULOSIN HYDROCHLORIDE 0.4 MG/1
0.4 CAPSULE ORAL
COMMUNITY

## 2024-02-27 RX ORDER — BUDESONIDE AND FORMOTEROL FUMARATE DIHYDRATE 160; 4.5 UG/1; UG/1
2 AEROSOL RESPIRATORY (INHALATION) 2 TIMES DAILY
COMMUNITY

## 2024-02-27 ASSESSMENT — ENCOUNTER SYMPTOMS
SHORTNESS OF BREATH: 0
MYALGIAS: 0
DIZZINESS: 1
FEVER: 0
PND: 0
ORTHOPNEA: 0
COUGH: 0
PALPITATIONS: 0
ABDOMINAL PAIN: 0
CLAUDICATION: 0

## 2024-02-27 ASSESSMENT — FIBROSIS 4 INDEX: FIB4 SCORE: 1.91

## 2024-02-27 NOTE — PROGRESS NOTES
Chief Complaint   Patient presents with    Coronary Artery Disease     F/V Dx: Coronary artery disease involving native coronary artery of native heart without angina pectoris    Hypertension       Subjective     Darren Brunson is a 73 y.o. male who presents today for follow up on his CAD.    Patient of Dr. Sotelo.  Patient was last seen in clinic on 5/12/2023.  During that visit, patient was recommended or pending a lipid panel.    Patient comes in the office today reporting that he has been having episodes of dizziness/lightheadedness when doing yoga and with some of his other activities.    He has not checked his blood pressure, blood sugars.  He also is working with ENT about possible sleep apnea.  He mentions he had a previous uvuloplasty in 2016 and is dealing with allergies.    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  Patient mentions neurologist is not planning on doing any procedures and is currently monitoring this.    He denies chest pain, palpitations, orthopnea, PND, edema or shortness of breath.  He mentions he had some headaches, but that has improved.    Patient is active doing yoga, core strengthening and stretching.  He reports [facility] has recently reopened and he plans on getting back in and starting a walking routine.    Patient states he does not think he is adequately hydrating.    Additionally, patient has the following medical problems:     -CAD with previous heart attack with stents to LAD in 2009 and RCA in 2015    -Dyslipidemia with statin intolerance and intolerant to ezetimibe.  Currently on fenofibrate    -Hypertension    Past Medical History:   Diagnosis Date    Acute myocardial infarction of other anterior wall, episode of care unspecified 2009; 2014    Anxiety state, unspecified      Arthritis     foot, ankle, jaw right side    Asthma     Bronchitis     CAD (coronary artery disease) 09/2009    PCI/stent (Promus 3.0 x 15mm) to the LAD.     Cataract     left eye no surgery    Chickenpox     Cough     Diabetes (HCC)     on oral meds    Erosive gastritis 2020    Gastric ulcer 2020    Glaucoma     Heart murmur     2/17/2023 patient not aware of having a murmur    High cholesterol     Hyperlipidemia     Mumps     Pain 01/14/2023    discomfort abdomen    Psychiatric problem     S/P coronary artery stent placement      Shortness of breath     Sleep apnea 2017    resolved after surgery for sleep apnea    Snoring      Past Surgical History:   Procedure Laterality Date    INGUINAL HERNIA REPAIR ROBOTIC XI Right 8/24/2023    Procedure: ROBOT ASSISTED LAPAROSCOPIC RIGHT INGUINAL HERNIA REPAIR;  Surgeon: Morris Mcfarland M.D.;  Location: [facility];  Service: Gen Robotic    ND UPPER GI ENDOSCOPY,DIAGNOSIS N/A 2/20/2023    Procedure: UPPER RADIAL ENDOSCOPIC ULTRASOUND PANCREATITIS, TAIL OF PANCREATIC MASS - ENDOSCOPY FINE NEEDLE ASPIRATION WITH ANESTHESIA;  Surgeon: Reymundo Loya M.D.;  Location: [facility];  Service: EUS    EGD W/ENDOSCOPIC ULTRASOUND N/A 2/20/2023    Procedure: EGD, WITH ENDOSCOPIC US;  Surgeon: Reymundo Loya M.D.;  Location: [facility];  Service: EUS    EGD WITH ASP/BX N/A 2/20/2023    Procedure: EGD, WITH ASPIRATION BIOPSY;  Surgeon: Reymundo Loya M.D.;  Location: [facility];  Service: EUS    SEPTAL RECONSTRUCTION N/A 10/23/2017    Procedure: SEPTAL RECONSTRUCTION;  Surgeon: CHARLI Sanchez M.D.;  Location: SURGERY SAME DAY [facility];  Service: Ent    UVULOPHARYNGOPALATOPLASTY N/A 10/23/2017    Procedure: UVULOPHARYNGOPALATOPLASTY;  Surgeon: CHARLI Sanchez M.D.;  Location: SURGERY SAME DAY [facility];  Service: Ent    BIOPSY GENERAL Left 10/23/2017    Procedure: BIOPSY GENERAL LEFT UPPER NECK;  Surgeon: CHARLI Sanchez M.D.;  Location: SURGERY SAME DAY [facility];  Service: Ent    INGUINAL HERNIA REPAIR BILATERAL  2015    STENT PLACEMENT  2015    SINUSOTOMIES   2011    Sinus surgery    OTHER CARDIAC SURGERY      cardiac cath with stent placement    TONSILLECTOMY      UMBILICAL HERNIA REPAIR       Family History   Problem Relation Age of Onset    Heart Disease Maternal Aunt     Cancer Mother         colon     Social History     Socioeconomic History    Marital status:      Spouse name: Not on file    Number of children: Not on file    Years of education: Not on file    Highest education level: Not on file   Occupational History    Not on file   Tobacco Use    Smoking status: Former     Current packs/day: 0.00     Average packs/day: 1 pack/day for 30.0 years (30.0 ttl pk-yrs)     Types: Cigarettes     Start date: 1967     Quit date: 1997     Years since quittin.5    Smokeless tobacco: Never   Vaping Use    Vaping Use: Never used   Substance and Sexual Activity    Alcohol use: No    Drug use: No    Sexual activity: Not on file   Other Topics Concern    Not on file   Social History Narrative    Not on file     Social Determinants of Health     Financial Resource Strain: Not on file   Food Insecurity: Not on file   Transportation Needs: Not on file   Physical Activity: Not on file   Stress: Not on file   Social Connections: Not on file   Intimate Partner Violence: Not on file   Housing Stability: Not on file     Allergies   Allergen Reactions    Lisinopril Anaphylaxis     Lip and tongue swelling    Aspirin      High dose only - aspirin induced tinnitus    Ciprofloxacin      Tendon pain  Other reaction(s): shoulder pain, blurred vision, abdominal pain  Tendon pain    Crestor [Rosuvastatin Calcium] Unspecified     Muscle pain     Hmg-Coa-R Inhibitors      Other reaction(s): Unspecified  Muscle pain     Atorvastatin     Singulair Unspecified     Pain, anxiety    Topamax [Topiramate]      Tension, headache     Outpatient Encounter Medications as of 2024   Medication Sig Dispense Refill    tamsulosin (FLOMAX) 0.4 MG capsule Take 0.4 mg by mouth   hour after breakfast.      budesonide-formoterol (SYMBICORT) 160-4.5 MCG/ACT Aerosol Inhale 2 Puffs 2 times a day.      BREYNA 160-4.5 MCG/ACT Aerosol INHALE 2 PUFFS BY MOUTH TWICE A DAY. USE WITH SPACER AND RINSE MOUTH AFTER EACH USE 10.3 Each 5    acetaminophen (TYLENOL) 325 MG Tab Take 2 Tablets by mouth every 6 hours. Alternate w/ ibuprofen to take a medication every 3 hrs, take scheduled for 3 days post-op then PRN 60 Tablet 0    ibuprofen (MOTRIN) 600 MG Tab Take 1 Tablet by mouth every 6 hours. Alternate w/ tylenol to take a medication every 3 hrs, take scheduled for 3 days post-op then PRN 45 Tablet 0    fluticasone (FLONASE) 50 MCG/ACT nasal spray Administer 2 Sprays into affected nostril(S) as needed.      Cholecalciferol (VITAMIN D3) 125 MCG (5000 UT) Cap Take 5,000 Units by mouth every day.      glucose blood (ONETOUCH ULTRA) strip 1 Each by Other route every day.      pantoprazole (PROTONIX) 40 MG Tablet Delayed Response Take 1 Tablet by mouth every day.      aspirin (ASA) 81 MG Chew Tab chewable tablet Chew 81 mg every day.      fexofenadine (ALLEGRA) 180 MG tablet Take 180 mg by mouth 2 times a day.      Multiple Vitamins-Minerals (PRESERVISION AREDS 2+MULTI VIT PO) Take 1 Tablet by mouth 2 times a day.      Menaquinone-7 (VITAMIN K2 PO) Take  by mouth every day.      albuterol 108 (90 Base) MCG/ACT Aero Soln inhalation aerosol INHALE 1-2 PUFFS BY MOUTH EVERY FOUR HOURS AS NEEDED FOR SHORTNESS OF BREATH. 8.5 Each 11    metformin ER (GLUCOPHAGE XR) 500 MG TABLET SR 24 HR Take 500 mg by mouth every evening.      gabapentin (NEURONTIN) 100 MG Cap Take 100-200 mg by mouth 2 times a day. 100 mg every AM and 200 mg every night NIGHT      fenofibrate (TRICOR) 48 MG TABS Take 48 mg by mouth every evening.      Probiotic Product (PROBIOTIC DAILY PO) Take 1 Tab by mouth every day.      [DISCONTINUED] polyethylene glycol/lytes (MIRALAX) 17 g Pack Take 1 Packet by mouth every day. While taking narcotics, hold if  greater then 3 BMs a day (Patient not taking: Reported on 2/27/2024) 20 Each 0     No facility-administered encounter medications on file as of 2/27/2024.     Review of Systems   Constitutional:  Negative for fever and malaise/fatigue.   Respiratory:  Negative for cough and shortness of breath.    Cardiovascular:  Negative for chest pain, palpitations, orthopnea, claudication, leg swelling and PND.   Gastrointestinal:  Negative for abdominal pain.   Musculoskeletal:  Negative for myalgias.   Neurological:  Positive for dizziness.   All other systems reviewed and are negative.             Objective     /60 (BP Location: Left arm, Patient Position: Sitting, BP Cuff Size: Adult)   Pulse 72   Resp 16   Ht 1.829 m (6')   Wt 81.6 kg (180 lb)   SpO2 98%   BMI 24.41 kg/m²     Physical Exam  Vitals reviewed.   Constitutional:       Appearance: He is well-developed.   HENT:      Head: Normocephalic and atraumatic.   Eyes:      Pupils: Pupils are equal, round, and reactive to light.   Neck:      Vascular: No JVD.   Cardiovascular:      Rate and Rhythm: Normal rate and regular rhythm.      Heart sounds: Normal heart sounds.   Pulmonary:      Effort: Pulmonary effort is normal. No respiratory distress.      Breath sounds: Normal breath sounds. No wheezing or rales.   Abdominal:      General: Bowel sounds are normal.      Palpations: Abdomen is soft.   Musculoskeletal:         General: Normal range of motion.      Cervical back: Normal range of motion and neck supple.      Right lower leg: No edema.      Left lower leg: No edema.   Skin:     General: Skin is warm and dry.   Neurological:      General: No focal deficit present.      Mental Status: He is alert and oriented to person, place, and time.   Psychiatric:         Behavior: Behavior normal.       Lab Results   Component Value Date/Time    CHOLSTRLTOT 190 06/27/2020 12:47 AM     (H) 06/27/2020 12:47 AM    HDL 47 06/27/2020 12:47 AM    TRIGLYCERIDE 116  06/27/2020 12:47 AM       Lab Results   Component Value Date/Time    SODIUM 136 07/31/2023 10:18 AM    POTASSIUM 3.9 07/31/2023 10:18 AM    CHLORIDE 101 07/31/2023 10:18 AM    CO2 26 07/31/2023 10:18 AM    GLUCOSE 102 (H) 07/31/2023 10:18 AM    BUN 17 07/31/2023 10:18 AM    CREATININE 1.16 07/31/2023 10:18 AM     Lab Results   Component Value Date/Time    ALKPHOSPHAT 57 05/29/2023 06:40 PM    ASTSGOT 30 05/29/2023 06:40 PM    ALTSGPT 29 05/29/2023 06:40 PM    TBILIRUBIN 0.4 05/29/2023 06:40 PM      Additional and recent lab testing in media/Care Everywhere    Transthoracic Echo Report 3/20/2018  No prior study is available for comparison.   Normal left ventricular systolic function.  Left ventricular ejection fraction is visually estimated to be 70%.  Aortic sclerosis without stenosis.  Unable to estimate pulmonary artery pressure due to an inadequate tricuspid regurgitant jet.       Myocardial Perfusion Report 3/20/2018   NUCLEAR IMAGING INTERPRETATION    No evidence of significant jeopardized viable myocardium or prior myocardial infarction.    Normal left ventricular size, ejection fraction, and wall motion.    ECG INTERPRETATION    Negative stress ECG for ischemia.     Transthoracic Echo Report 6/26/2020  Prior echocardiogram 3/20/2018.  Technically difficult study.  Preserved left ventricular systolic function.  Left ventricular ejection fraction is difficult to assess.     Myocardial Perfusion Report 6/27/2020   NUCLEAR IMAGING INTERPRETATION    No reversible defects that would indicate ischemia.    Matched defect in the inferior apex is either artifactual or due to a small inferior wall infarct.    Normal left ventricular size, ejection fraction, and wall motion.    ECG INTERPRETATION    Normal ECG portion of a treadmill nuclear stress test.    Average exercise tolerance for age.    Normal BP response to exercise.    Sinus rhythm.    No ischemic ECG changes.    No chest pain during stress.    [facility] treadmill  score 6, low risk (assume no prior CAD).          Assessment & Plan     1. Dizziness  EKG      2. Statin intolerance  Referral to Vascular Medicine      3. Coronary artery disease involving native coronary artery of native heart without angina pectoris  Referral to Vascular Medicine      4. Dyslipidemia  Referral to Vascular Medicine      5. Stented coronary artery  Referral to Vascular Medicine      6. Essential hypertension            Medical Decision Making: Today's Assessment/Status/Plan:        Dizziness/lightheadedness:  -EKG today shows sinus rhythm 61  -Reviewed recent lab testing, no indication of dizziness related to abnormal labs  -Discussed that patient symptoms could be related to position changes, recommend patient to change positions slowly especially during yoga  -Recommend patient to monitor blood pressure and blood sugars if possible when he is having symptoms  -Encourage hydration  -He will also follow-up with his ENT regarding recurrence of sleep apnea  -He can reach out on MyChart if questions or if his blood pressures are low/high when he is dizzy    CAD, history of stent to LAD in 2009, stent to RCA in 2015:  -Recent  on 1/31/2024  -LDL goal <70  -Discussed trying referral to vascular medicine/lipid clinic and patient agreeable.  Referral placed  -Patient intolerant to statins and ezetimibe, previously not able to afford bempedoic acid  -Continue aspirin 81 mg daily  -Continue fenofibrate 48 mg nightly    Dyslipidemia:  -Statin intolerance and intolerant to ezetimibe  -Recommendations per above for now  -Referral to vascular medicine/lipid clinic for assistance    Hypertension: Stable  -Controlled without being on antihypertensive medications  -He will monitor blood pressure and let us know if there is any abnormalities    FU in clinic in 6 months to establish with interventional cardiologist, Dr. Estrada. Sooner if needed.    Patient verbalizes understanding and agrees with the plan  of care.     PLEASE NOTE: This Note was created using voice recognition Software. I have made every reasonable attempt to correct obvious errors, but I expect that there are errors of grammar and possibly content that I did not discover before finalizing the note

## 2024-03-01 ENCOUNTER — TELEPHONE (OUTPATIENT)
Dept: HEALTH INFORMATION MANAGEMENT | Facility: OTHER | Age: 74
End: 2024-03-01
Payer: MEDICARE

## 2024-03-08 NOTE — PROCEDURE: MIPS QUALITY
Quality 226: Preventive Care And Screening: Tobacco Use: Screening And Cessation Intervention: Patient screened for tobacco use and is an ex/non-smoker
Detail Level: Detailed
6
Quality 130: Documentation Of Current Medications In The Medical Record: Current Medications Documented

## 2024-03-22 ENCOUNTER — TELEPHONE (OUTPATIENT)
Dept: VASCULAR LAB | Facility: MEDICAL CENTER | Age: 74
End: 2024-03-22

## 2024-03-22 ENCOUNTER — NON-PROVIDER VISIT (OUTPATIENT)
Dept: VASCULAR LAB | Facility: MEDICAL CENTER | Age: 74
End: 2024-03-22
Attending: INTERNAL MEDICINE
Payer: MEDICARE

## 2024-03-22 DIAGNOSIS — I25.10 ATHEROSCLEROSIS OF NATIVE CORONARY ARTERY OF NATIVE HEART WITHOUT ANGINA PECTORIS: ICD-10-CM

## 2024-03-22 PROCEDURE — 99213 OFFICE O/P EST LOW 20 MIN: CPT

## 2024-03-22 NOTE — PROGRESS NOTES
[facility] - Initial Visit  Date of Service: 03/22/24    Darren Brunson has been referred for evaluation and management of dyslipidemia    Referral Source: Lala Helton    Subjective    HPI  History of ASCVD: CAD, history of stent to LAD in 2009, stent to RCA in 2015   Other Established (non-atherosclerotic) Vascular Disease, if Present: DM2 - treated  Age at Initial Diagnosis of Dyslipidemia: 58    Current Prescription Lipid Lowering Medications - including dose:   Statin: None  Non-Statin: Fenofibrate 48 mg daily  Current Lipid Lowering and Related Supplements:   None  Any Current Side Effects Potentially Related to Lipid Lowering therapy?   No  Current Adherence to Lipid Lowering Therapies   Complete  Previously Attempted Interventions for Lipids - including outcome  Statin: Atorvastatin, rosuvastatin, pitavastatin, lovastatin   Outcome:  muscle pain  Non-Statin: Zetia 10 mg daily (muscle pain/stomach upset),    Outcome:  see above  Any Previous History of Statin Intolerance?   Yes, Details: see above  Baseline Lipids Prior to Treatment:    Latest Reference Range & Units 06/27/20 00:47   Cholesterol,Tot 100 - 199 mg/dL 190   Triglycerides 0 - 149 mg/dL 116   HDL >=40 mg/dL 47   LDL <100 mg/dL 120 (H)   (H): Data is abnormally high  Other Pertinent History: none  History of other CV risk factors: none    PAST MEDICAL HISTORY:  has a past medical history of Acute myocardial infarction of other anterior wall, episode of care unspecified (2009; 2014), Anxiety state, unspecified ( ), Arthritis, Asthma, Bronchitis, CAD (coronary artery disease) (09/2009), Cataract, Chickenpox, Cough, Diabetes (HCC), Erosive gastritis (2020), Gastric ulcer (2020), Glaucoma, Heart murmur, High cholesterol, Hyperlipidemia, Mumps, Pain (01/14/2023), Psychiatric problem, S/P coronary artery stent placement ( ), Shortness of breath, Sleep apnea (2017), and Snoring.    He has no past medical history of Painful  breathing, Sputum production, or Wheezing.    PAST SURGICAL HISTORY:  has a past surgical history that includes sinusotomies (11/2011); tonsillectomy; other cardiac surgery (2009); umbilical hernia repair; inguinal hernia repair bilateral (2015); septal reconstruction (N/A, 10/23/2017); uvulopharyngopalatoplasty (N/A, 10/23/2017); biopsy general (Left, 10/23/2017); stent placement (2015); pr upper gi endoscopy,diagnosis (N/A, 2/20/2023); egd w/endoscopic ultrasound (N/A, 2/20/2023); egd with asp/bx (N/A, 2/20/2023); and inguinal hernia repair robotic xi (Right, 8/24/2023).    CURRENT MEDICATIONS:   Current Outpatient Medications:     tamsulosin, 0.4 mg, Oral, AFTER BREAKFAST    budesonide-formoterol, 2 Puff, Inhalation, BID    Breyna, INHALE 2 PUFFS BY MOUTH TWICE A DAY. USE WITH SPACER AND RINSE MOUTH AFTER EACH USE    acetaminophen, 650 mg, Oral, Q6HRS    ibuprofen, 600 mg, Oral, Q6HRS    fluticasone, 2 Spray, Nasal, PRN    vitamin D3, 5,000 Units, Oral, DAILY    OneTouch Ultra, 1 Each, Other, DAILY    pantoprazole, 1 Tablet, Oral, DAILY    aspirin, 81 mg, Oral, DAILY    fexofenadine, 180 mg, Oral, BID    Multiple Vitamins-Minerals (PRESERVISION AREDS 2+MULTI VIT PO), 1 Tablet, Oral, BID    Menaquinone-7 (VITAMIN K2 PO), Take  by mouth every day.    albuterol, INHALE 1-2 PUFFS BY MOUTH EVERY FOUR HOURS AS NEEDED FOR SHORTNESS OF BREATH.    metFORMIN ER, 500 mg, Oral, Q EVENING    gabapentin, 100-200 mg, Oral, BID    fenofibrate, 48 mg, Oral, Q EVENING    Probiotic Product (PROBIOTIC DAILY PO), 1 Tablet, Oral, DAILY    ALLERGIES: Lisinopril, Aspirin, Ciprofloxacin, Crestor [rosuvastatin calcium], Hmg-coa-r inhibitors, Atorvastatin, Singulair, and Topamax [topiramate]    FAMILY HISTORY: Unknown paternal side, no known issues    SOCIAL HISTORY   Social History     Tobacco Use   Smoking Status Former    Current packs/day: 0.00    Average packs/day: 1 pack/day for 30.0 years (30.0 ttl pk-yrs)    Types: Cigarettes     Start date: 1967    Quit date: 1997    Years since quittin.5   Smokeless Tobacco Never     Change in weight: Stable  Exercise habits: moderate regular exercise program - 3-4x's per week plans to increase walking  Diet: low fat - reduced fat dairy, limits red meat focus on chicken.         Objective      There were no vitals filed for this visit.   Physical Exam    DATA REVIEW:  Most Recent Lipid Panel:        Other Pertinent Blood Work:   Lab Results   Component Value Date    SODIUM 136 2023    POTASSIUM 3.9 2023    CHLORIDE 101 2023    CO2 26 2023    ANION 9.0 2023    GLUCOSE 102 (H) 2023    BUN 17 2023    CREATININE 1.16 2023    CALCIUM 9.1 2023    ASTSGOT 30 2023    ALTSGPT 29 2023    ALKPHOSPHAT 57 2023    TBILIRUBIN 0.4 2023    ALBUMIN 4.0 2023    AGRATIO 1.6 2023       Other: none    Recent Imaging Studies:    None since last visit        ASSESSMENT AND PLAN  Patient Type, check all that apply:   Secondary Prevention  Established Atherosclerotic Cardiovascular Disease (ASCVD)  Yes, Details: CAD, history of stent to LAD in , stent to RCA in   Other Established (non-atherosclerotic) Vascular Disease, if Present:  HTN  Evidence of Heterozygous Familial Hypercholesterolemia (FH):   No  ACC/AHA Indication for Statin Therapy, morris all that apply:  Established ASCVD: Indication for High intensity statin   Calculated Risk for ASCVD, if applicable    N/A  Other Significant Risk Markers, if any, morris all that apply   None  LipoA and ApoB ordered  Goal LDL-C and nonHDL-C based on Clinic Protocol  LDL-C:   <70 mg/dL  Lifestyle Recommendations From Today’s Visit:    Continue current diet and exercise programs  Statin Therapy Recommendations from Today’s Visit:   Patient with previous history of statin intolerance - unwilling to try at this time  Non-Statin Medications Recommendations from Today’s Visit:   Start  Nexletol 180 mg daily  Continue Fenofibrate 48 mg daily  Indication for PCSK9 Inhibitor, if applicable:  Not currently indicated  Supplements Recommended at this visit:   None   Recommendations for Other Cardiovascular Risk Factors, morris all that apply:   Diabetes/Impaired Fasting Glucose- managed by PCP and well controlled    Other Issues:   from results from 1/31/24 from [facility] labs. Will upload copy into media. TG well controlled on fenofibrate. Will initiate bempedoic acid - if unable to control LDL will consider PCSK9i at next visit.     Studies Ordered at Todays Visit: None  Blood Work Ordered At Today’s visit: lipid, lipoa, apoB   Follow-Up: 6 weeks    Denise Jamison, PharmD    CC:  KAVYA Ojeda Ruth, A.P.R.N.                                                   Critical access hospital Pharmacotherapy Program Consent      Name    Darren Brunson    MRN number: [id number]    the following are guidelines for participation in the Critical access hospital Pharmacotherapy Program.     I, ____Darren Brunson_____, understand and voluntarily agree to participate in the Critical access hospital Pharmacotherapy Program and to have services provided to me by pharmacists working in collaboration with my provider.    I understand the pharmacist within the Critical access hospital Pharmacotherapy Program may initiate, modify or discontinue my medications, order appropriate testing and appointments, perform exams, monitor treatment, and make clinical evaluations and decisions pursuant to a collaborative practice agreement with my provider.  I understand the pharmacist within the Critical access hospital Pharmacotherapy Program is not a physician, osteopathic physician, advanced practice registered nurse or physician assistant and may not diagnose.  I will take all my medications as instructed and not change the way I take it without first talking to my provider or a pharmacist within the Critical access hospital Pharmacotherapy Program.  I understand  that if I am late to my appointment I may not be able to be seen by a pharmacist at that time and will have to reschedule my appointment.  During appointment with pharmacist I understand that pharmacist has the right not to answer questions or perform services outside the pharmacist’s scope of practice.  By signing below, I provide informed consent for the pharmacist to provide these services and for my participation in the [facility] Pharmacotherapy Program.      Darren Brunson           [id number]          03/22/24  Patient Name                   MRN number  Date     ____Obtained verbal consent from Darren Brunson____  Patient Signature

## 2024-03-22 NOTE — TELEPHONE ENCOUNTER
Spoke to patient in regarding NP appointment with  Dr. Sales.    Patient has seen a Vascular Provider before?  No    Any recent testing (labs or imaging) outside of [facility]?  Yes other: goes to [facility] got labs done in 1/2024    Were any records requested?  No    New patient packet sent via [employer] or mail?  Yes  Correct appointment type (New/Established/virtual)? Yes  Referral attached to appointment (renewals and New patients only)? Yes

## 2024-03-22 NOTE — TELEPHONE ENCOUNTER
Received Renewal PA request via [employer]  for NEXLETOL 180MG TABLETS. (Quantity:90, Day Supply:90)     Insurance: [employer]   Member ID:  [id number]  BIN: [id number]  PCN: [id number]  Group: [id number]     Ran Test claim via [place] & medication Rejects stating prior authorization is required.    JENNIFER Oquendo, PhT  Vascular Pharmacy Liaison (Rx Coordinator)  P: [phone]  3/22/2024 12:04 PM

## 2024-03-25 NOTE — TELEPHONE ENCOUNTER
Prior Authorization for Nexletol 180MG tablets has been approved for a quantity of 90 , day supply 90    Insurance-[facility]     Prior Authorization Reference#[id number]    Dates in effect, from 03/25/24 through 03/25/27    Co-pay: $447.81/90T/90D                  $155.48/30T/30D    Pharmacy and phone number   [id number]   [address] [address]  Phone: [phone]  Fax: [phone]    Is patient eligible to fill with [facility]? Yes    FA-Screen for FA    Next Steps:  Routing approval to liaison(s) and sending to outreach

## 2024-03-25 NOTE — TELEPHONE ENCOUNTER
Prior Authorization for Nexletol 180MG tablets   (Quantity: 90, Days: 90) has been submitted via Cover My Meds: Key ([id number] )    Insurance: Stella    Will follow up in 24-48 business hours.

## 2024-03-26 ENCOUNTER — OFFICE VISIT (OUTPATIENT)
Dept: VASCULAR LAB | Facility: MEDICAL CENTER | Age: 74
End: 2024-03-26
Attending: FAMILY MEDICINE
Payer: MEDICARE

## 2024-03-26 VITALS
BODY MASS INDEX: 24.24 KG/M2 | DIASTOLIC BLOOD PRESSURE: 77 MMHG | SYSTOLIC BLOOD PRESSURE: 133 MMHG | WEIGHT: 179 LBS | HEART RATE: 83 BPM | HEIGHT: 72 IN

## 2024-03-26 DIAGNOSIS — Z95.5 STENTED CORONARY ARTERY: ICD-10-CM

## 2024-03-26 DIAGNOSIS — E78.2 MIXED HYPERLIPIDEMIA DUE TO TYPE 2 DIABETES MELLITUS (HCC): ICD-10-CM

## 2024-03-26 DIAGNOSIS — I70.0 AORTIC ATHEROSCLEROSIS (HCC): ICD-10-CM

## 2024-03-26 DIAGNOSIS — I65.01: Chronic | ICD-10-CM

## 2024-03-26 DIAGNOSIS — E11.69 MIXED HYPERLIPIDEMIA DUE TO TYPE 2 DIABETES MELLITUS (HCC): ICD-10-CM

## 2024-03-26 DIAGNOSIS — I25.2 HISTORY OF ANTERIOR WALL MYOCARDIAL INFARCTION: ICD-10-CM

## 2024-03-26 DIAGNOSIS — R55 SYNCOPE, UNSPECIFIED SYNCOPE TYPE: ICD-10-CM

## 2024-03-26 DIAGNOSIS — Z78.9 STATIN INTOLERANCE: ICD-10-CM

## 2024-03-26 DIAGNOSIS — I25.10 ATHEROSCLEROSIS OF NATIVE CORONARY ARTERY OF NATIVE HEART WITHOUT ANGINA PECTORIS: ICD-10-CM

## 2024-03-26 DIAGNOSIS — I10 PRIMARY HYPERTENSION: ICD-10-CM

## 2024-03-26 DIAGNOSIS — E11.69 TYPE 2 DIABETES MELLITUS WITH OTHER SPECIFIED COMPLICATION, WITHOUT LONG-TERM CURRENT USE OF INSULIN (HCC): ICD-10-CM

## 2024-03-26 DIAGNOSIS — G47.33 OBSTRUCTIVE SLEEP APNEA SYNDROME: ICD-10-CM

## 2024-03-26 DIAGNOSIS — R42 DYSEQUILIBRIUM: ICD-10-CM

## 2024-03-26 PROBLEM — N48.1 BALANITIS: Status: ACTIVE | Noted: 2021-10-26

## 2024-03-26 PROBLEM — R10.2 PELVIC AND PERINEAL PAIN: Status: ACTIVE | Noted: 2018-04-03

## 2024-03-26 PROBLEM — K29.40 CHRONIC EROSIVE GASTRITIS: Status: ACTIVE | Noted: 2021-02-04

## 2024-03-26 PROBLEM — N41.1 PROSTATITIS, CHRONIC: Status: ACTIVE | Noted: 2023-08-13

## 2024-03-26 PROCEDURE — 99214 OFFICE O/P EST MOD 30 MIN: CPT | Performed by: FAMILY MEDICINE

## 2024-03-26 PROCEDURE — 99212 OFFICE O/P EST SF 10 MIN: CPT

## 2024-03-26 PROCEDURE — 3075F SYST BP GE 130 - 139MM HG: CPT | Performed by: FAMILY MEDICINE

## 2024-03-26 PROCEDURE — 3078F DIAST BP <80 MM HG: CPT | Performed by: FAMILY MEDICINE

## 2024-03-26 RX ORDER — OLOPATADINE HYDROCHLORIDE 665 UG/1
SPRAY NASAL
COMMUNITY
Start: 2024-03-11

## 2024-03-26 RX ORDER — AMOXICILLIN 875 MG/1
875 TABLET, COATED ORAL 2 TIMES DAILY
COMMUNITY
Start: 2024-02-26 | End: 2024-03-04

## 2024-03-26 RX ORDER — VALACYCLOVIR HYDROCHLORIDE 500 MG/1
1000 TABLET, FILM COATED ORAL
COMMUNITY

## 2024-03-26 RX ORDER — BUDESONIDE 0.5 MG/2ML
INHALANT ORAL
COMMUNITY
Start: 2024-02-26

## 2024-03-26 RX ORDER — PREDNISONE 10 MG/1
10 TABLET ORAL
COMMUNITY
Start: 2024-03-08

## 2024-03-26 ASSESSMENT — ENCOUNTER SYMPTOMS
PND: 0
FEVER: 0
EYE PAIN: 0
CHILLS: 0
FOCAL WEAKNESS: 0
SENSORY CHANGE: 0
SPUTUM PRODUCTION: 0
ORTHOPNEA: 0
COUGH: 0
HEMOPTYSIS: 0
BLURRED VISION: 0
CLAUDICATION: 0
TREMORS: 0
WEAKNESS: 0
SHORTNESS OF BREATH: 0
DOUBLE VISION: 0
NAUSEA: 0
HEADACHES: 0
LOSS OF CONSCIOUSNESS: 0
WHEEZING: 0
SEIZURES: 0
DIZZINESS: 1
ABDOMINAL PAIN: 0
PALPITATIONS: 0
VOMITING: 0
TINGLING: 0
SPEECH CHANGE: 0

## 2024-03-26 ASSESSMENT — FIBROSIS 4 INDEX: FIB4 SCORE: 1.91

## 2024-03-26 NOTE — PROGRESS NOTES
FAMILY LIPID CLINIC - INITIAL VISIT   03/26/24     Darren Brunson has been referred for evaluation and management of dyslipidemia  Referral Source: Lala Camejo A.P.R.N.   Est 3/2024    Subjective   Initial visit hx:  seen by cardiology APRN 2/27/24 for ongoing CAD mgmt, noted to have uncontrolled HLD and prior statin  and zetia intol.  Here to review tx options.  Seen by pharm lipid clinic and started on nexletol (3/22/24) - pending initiation.    Pending ENT eval for repeat inspire and revision of prior surgery for known ANTIONE.   Current concern  regards position dysequilibrium when leaning forward then arising up while doing yoga.  Progressive over last 1 yr.  Has had presyncope w/o overt syncope or falls.  Denies baseline vertigo or associated HA or other focal neuro s/s including visual changes or diplopia.   Seen by Dr. Smith for evaluation.    No other findings per neurology.  ENT reports no issues from ENT standpoint.     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     LIFESTYLE MGMT  Change in weight: Stable  Body mass index is 24.28 kg/m².  Wt Readings from Last 3 Encounters:   03/26/24 81.2 kg (179 lb)   02/27/24 81.6 kg (180 lb)   12/06/23 78.9 kg (174 lb)      Exercise habits:  active yoga, core training  - very active   Dietary patterns: Medi style   Etoh: see sochx  Reported barriers to care: none     MEDICATION MGMT  Current Prescription Lipid Lowering Medications - including dose:   Statin: None  Non-Statin: nexletol (has not started), fenofibrate 48mg (started yrs ago, no hx of TG >500 or  HTG-mediated acute pancreatitis reported, unclear why renal-dosed)  Current Lipid Lowering and Related Supplements: None  Adverse drug reactions/side effects? no  Adherence? yes    PERTINENT HLD PMHX  Age at Initial  Diagnosis of Dyslipidemia: 50s    Baseline Lipids Prior to Treatment:     History of ASCVD: History of prior MI >12 months ago  (2 episodes)  # CAD, s/p MI with PCI LAD , RCA  - currently no sx, seeing  cardiology     Other Established (non-atherosclerotic) Vascular Disease, if Present: None  Secondary causes of dyslipidemia:  Endocrine/Hypothyroidism:  none reported   Liver disease: none reported   Renal disease/nephrotic syndrome:  none reported  Dietary-induced (ketogenic, lean mass hyper-responder)? no  Medications: None  Previously Attempted Interventions for Lipids - including outcome  Statin: None      Outcome: N/A  Non-Statin: None   Outcome: N/A  Any Previous History of Statin Intolerance? As noted above     ANTITHROMBOTIC THERAPY: Yes, Details: ASA 81mg , no hx of bleeding  HTN: stable, good adherence/tolerance of meds, Home BP los/70s     Patient Active Problem List   Diagnosis    Acute myocardial infarction of other anterior wall, episode of care unspecified    Anxiety state    Coronary atherosclerosis of native coronary artery    Hypercholesteremia    Primary hypertension    Chest pain    Statin intolerance    ACE inhibitor-aggravated angioedema    Disease of nasal sinus    Obstructive sleep apnea syndrome    Type 2 diabetes mellitus without complication, without long-term current use of insulin (HCC)    Hypokalemia    Stented coronary artery    Postoperative pain    Solitary pulmonary nodule    Prostatitis, chronic    Pelvic and perineal pain    Irritable bowel syndrome    History of anterior wall myocardial infarction    Chronic erosive gastritis    Benign prostatic hyperplasia    Balanitis    Occlusion of vertebral artery, right    Aortic atherosclerosis (HCC)    Mixed hyperlipidemia due to type 2 diabetes mellitus (HCC)      has a past surgical history that includes sinusotomies (2011); tonsillectomy; other cardiac surgery (); umbilical hernia repair; inguinal hernia repair  bilateral (2015); septal reconstruction (N/A, 10/23/2017); uvulopharyngopalatoplasty (N/A, 10/23/2017); biopsy general (Left, 10/23/2017); stent placement (2015); pr upper gi endoscopy,diagnosis (N/A, 2/20/2023); egd w/endoscopic ultrasound (N/A, 2/20/2023); egd with asp/bx (N/A, 2/20/2023); and inguinal hernia repair robotic xi (Right, 8/24/2023).  Current Outpatient Medications on File Prior to Visit   Medication Sig Dispense Refill    budesonide (PULMICORT) 0.5 MG/2ML Suspension INHALE 1 VIAL VIA NEBULIZER ONCE DAILY AS DIRECTED *J32.0      Olopatadine HCl 0.6 % Solution INSTILL 2 SPRAY INTO EACH NOSTRIL ONCE DAILY      predniSONE (DELTASONE) 10 MG Tab Take 10 mg by mouth every day.      valACYclovir (VALTREX) 500 MG Tab Take 1,000 mg by mouth every day.      Bempedoic Acid 180 MG Tab Take 180 mg by mouth every day. 90 Tablet 3    tamsulosin (FLOMAX) 0.4 MG capsule Take 0.4 mg by mouth 1/2 hour after breakfast.      budesonide-formoterol (SYMBICORT) 160-4.5 MCG/ACT Aerosol Inhale 2 Puffs 2 times a day.      BREYNA 160-4.5 MCG/ACT Aerosol INHALE 2 PUFFS BY MOUTH TWICE A DAY. USE WITH SPACER AND RINSE MOUTH AFTER EACH USE 10.3 Each 5    acetaminophen (TYLENOL) 325 MG Tab Take 2 Tablets by mouth every 6 hours. Alternate w/ ibuprofen to take a medication every 3 hrs, take scheduled for 3 days post-op then PRN 60 Tablet 0    ibuprofen (MOTRIN) 600 MG Tab Take 1 Tablet by mouth every 6 hours. Alternate w/ tylenol to take a medication every 3 hrs, take scheduled for 3 days post-op then PRN 45 Tablet 0    fluticasone (FLONASE) 50 MCG/ACT nasal spray Administer 2 Sprays into affected nostril(S) as needed.      Cholecalciferol (VITAMIN D3) 125 MCG (5000 UT) Cap Take 5,000 Units by mouth every day.      glucose blood (ONETOUCH ULTRA) strip 1 Each by Other route every day.      pantoprazole (PROTONIX) 40 MG Tablet Delayed Response Take 1 Tablet by mouth every day.      aspirin (ASA) 81 MG Chew Tab chewable tablet Chew 81 mg  every day.      fexofenadine (ALLEGRA) 180 MG tablet Take 180 mg by mouth 2 times a day.      Multiple Vitamins-Minerals (PRESERVISION AREDS 2+MULTI VIT PO) Take 1 Tablet by mouth 2 times a day.      Menaquinone-7 (VITAMIN K2 PO) Take  by mouth every day.      albuterol 108 (90 Base) MCG/ACT Aero Soln inhalation aerosol INHALE 1-2 PUFFS BY MOUTH EVERY FOUR HOURS AS NEEDED FOR SHORTNESS OF BREATH. 8.5 Each 11    metformin ER (GLUCOPHAGE XR) 500 MG TABLET SR 24 HR Take 500 mg by mouth every evening.      gabapentin (NEURONTIN) 100 MG Cap Take 100-200 mg by mouth 2 times a day. 100 mg every AM and 200 mg every night NIGHT      Probiotic Product (PROBIOTIC DAILY PO) Take 1 Tab by mouth every day.       No current facility-administered medications on file prior to visit.      Allergies   Allergen Reactions    Lisinopril Anaphylaxis     Lip and tongue swelling    Aspirin      High dose only - aspirin induced tinnitus    Ciprofloxacin      Tendon pain  Other reaction(s): shoulder pain, blurred vision, abdominal pain  Tendon pain    Crestor [Rosuvastatin Calcium] Unspecified     Muscle pain     Hmg-Coa-R Inhibitors      Other reaction(s): Unspecified  Muscle pain     Atorvastatin     Ezetimibe     Singulair Unspecified     Pain, anxiety    Topamax [Topiramate]      Tension, headache        Family History   Problem Relation Age of Onset    Heart Disease Maternal Aunt     Cancer Mother         colon     Social History     Tobacco Use    Smoking status: Former     Current packs/day: 0.00     Average packs/day: 1 pack/day for 30.0 years (30.0 ttl pk-yrs)     Types: Cigarettes     Start date: 1967     Quit date: 1997     Years since quittin.5    Smokeless tobacco: Never   Vaping Use    Vaping Use: Never used   Substance Use Topics    Alcohol use: No    Drug use: No     Review of Systems   Constitutional:  Negative for chills, fever and malaise/fatigue.   Eyes:  Negative for blurred vision, double vision and pain.    Respiratory:  Negative for cough, hemoptysis, sputum production, shortness of breath and wheezing.    Cardiovascular:  Negative for chest pain, palpitations, orthopnea, claudication, leg swelling and PND.   Gastrointestinal:  Negative for abdominal pain, nausea and vomiting.   Neurological:  Positive for dizziness. Negative for tingling, tremors, sensory change, speech change, focal weakness, seizures, loss of consciousness, weakness and headaches.         Objective    Vitals:    03/26/24 0928 03/26/24 0932   BP: 139/78 133/77   BP Location: Left arm Left arm   Patient Position: Sitting Sitting   BP Cuff Size: Adult Adult   Pulse: 81 83   Weight: 81.2 kg (179 lb)    Height: 1.829 m (6')       BMI Readings from Last 1 Encounters:   03/26/24 24.28 kg/m²      BP Readings from Last 5 Encounters:   03/26/24 133/77   02/27/24 122/60   12/06/23 124/64   08/24/23 (!) 147/69   06/06/23 138/64     Physical Exam  Constitutional:       Appearance: Normal appearance. He is well-developed.   HENT:      Head: Normocephalic and atraumatic.   Eyes:      Conjunctiva/sclera: Conjunctivae normal.   Neck:      Thyroid: No thyromegaly.      Vascular: No JVD.   Cardiovascular:      Rate and Rhythm: Normal rate and regular rhythm.      Pulses: Normal pulses.           Radial pulses are 2+ on the right side and 2+ on the left side.        Dorsalis pedis pulses are 2+ on the right side and 2+ on the left side.        Posterior tibial pulses are 2+ on the right side and 2+ on the left side.      Heart sounds: Normal heart sounds. No murmur heard.     No friction rub. No gallop.   Pulmonary:      Effort: Pulmonary effort is normal. No respiratory distress.      Breath sounds: Normal breath sounds.   Musculoskeletal:      Cervical back: Normal range of motion and neck supple.   Lymphadenopathy:      Cervical: No cervical adenopathy.   Skin:     General: Skin is warm and dry.   Neurological:      General: No focal deficit present.      Mental  "Status: He is alert and oriented to person, place, and time.      Cranial Nerves: No cranial nerve deficit.      Coordination: Coordination normal.      Gait: Gait normal.       DATA REVIEW:  Most Recent Lipid Panel:     [facility] 2024       TSH wnl       Lab Results   Component Value Date/Time    CHOLSTRLTOT 190 2020 12:47 AM     (H) 2020 12:47 AM    HDL 47 2020 12:47 AM    TRIGLYCERIDE 116 2020 12:47 AM     Lab Results   Component Value Date/Time     (H) 2020 12:47 AM    LDL 98 2018 06:33 AM         No results found for: \"LIPOPROTA\"   No results found for: \"APOB\"    Other Pertinent Blood Work:   Lab Results   Component Value Date    SODIUM 136 2023    POTASSIUM 3.9 2023    CHLORIDE 101 2023    CO2 26 2023    ANION 9.0 2023    GLUCOSE 102 (H) 2023    BUN 17 2023    CREATININE 1.16 2023    CALCIUM 9.1 2023    ASTSGOT 30 2023    ALTSGPT 29 2023    ALKPHOSPHAT 57 2023    TBILIRUBIN 0.4 2023    ALBUMIN 4.0 2023    AGRATIO 1.6 2023     VASCULAR IMAGING:  Results for orders placed or performed in visit on 24   EKG   Result Value Ref Range    Report       [facility]    Test Date:  2024  Pt Name:    MONE CUELLAR              [facility]  MRN:        [id number]                      Room:  Gender:     Male                         Technician: JUVENTINO  :        [id number]                   Requested By:GLORIA CALLE  Order #:    [id number]                    Reading MD: Too Dunbar MD    Measurements  Intervals                                Axis  Rate:       61                           P:          71  MI:         180                          QRS:        71  QRSD:       99                           T:          62  QT:         416  QTc:        419    Interpretive Statements  Sinus rhythm  Electronically Signed On 2024 11:47:26 PST by Too " MD Manjinder       MPI 2020   NUCLEAR IMAGING INTERPRETATION    No reversible defects that would indicate ischemia.    Matched defect in the inferior apex is either artifactual or due to a small    inferior wall infarct.    Normal left ventricular size, ejection fraction, and wall motion.    ECG INTERPRETATION    Normal ECG portion of a treadmill nuclear stress test.    Average exercise tolerance for age.    Normal BP response to exercise.    Sinus rhythm.    No ischemic ECG changes.    No chest pain during stress.    Ernandez treadmill score 6, low risk (assume no prior CAD).     CT a/p with 1/2023  Vasculature: Atherosclerosis.   IMPRESSION:   1.  Findings in keeping with acute pancreatitis involving the pancreatic tail.  2.  Small right inguinal hernia containing fat and the appendix.  3.  Atherosclerosis.    MR brain 8/2023       CTA head/neck 9/2023 ([facility])          ASSESSMENT AND PLAN  1. Syncope, unspecified syncope type  Cardiac Event Monitor    EC-ECHOCARDIOGRAM COMPLETE W/O CONT      2. Aortic atherosclerosis (HCC)  CRP HIGH SENSITIVE (CARDIAC)      3. Mixed hyperlipidemia due to type 2 diabetes mellitus ([facility])  APOLIPOPROTEIN B      4. Type 2 diabetes mellitus with other specified complication, without long-term current use of insulin ([facility])        5. Atherosclerosis of native coronary artery of native heart without angina pectoris  Comp Metabolic Panel    CRP HIGH SENSITIVE (CARDIAC)      6. History of anterior wall myocardial infarction        7. Occlusion of vertebral artery, right  US-CAROTID DOPPLER BILAT      8. Dysequilibrium  US-CAROTID DOPPLER BILAT      9. Primary hypertension  Comp Metabolic Panel    CRP HIGH SENSITIVE (CARDIAC)    Lipid Profile    Lipoprotein (a)    MICROALBUMIN CREAT RATIO URINE      10. Obstructive sleep apnea syndrome        11. Stented coronary artery  CRP HIGH SENSITIVE (CARDIAC)    Lipid Profile    Lipoprotein (a)      12. Statin intolerance          Patient Type, check all that  apply: Secondary Prevention and Type 2 Diabetes Mellitus, very high risk, polyvascular     Established Atherosclerotic Cardiovascular Disease (ASCVD): yes    1) CAD, s/p MI with PCI LAD 2009, RCA 2015 - stable, no sx   2020 MPI - stable  - continue med mgmt, ongoing care with cardiology     2) Non-aneurysmal aortic atherosclerosis (AS) - CT, no symptoms or prior known associated clinical manifestations  - general indicator of systemic AS with higher potential AS in other vascular beds  Plan:  - no further imaging surveillance, continue med mgmt      Other Established (non-atherosclerotic) Vascular Disease, if Present:     1) new progressive presyncope vs dysequilibrium   Unlikely vasc etiology based upon prior normal MR brain and CTA neck however no functional duplex scanning to date, and risk for retrograde and/or steal phenomenon is plausible and worth evaluating further.  Lower prob in light of no other overt steal or posterior circ syndrome  - possible cardiogenic etiology in light of prior MIs and ischemic CM   - no recent cardiac monitoring, no hx of arrhythmias reported, donna no AF/AFL  Plan:   - recommend f/u with cardiology   - set up echo and 14d zio patch   - check carotid duplex to eval for steal dynamics in light of prior R vert art occlusion on MRI and CTA in 2023    Evidence of genetic dyslipidemia (eg. Heterozygous Familial Hypercholesterolemia (HeFH)): No   FH genotyping: NO    Secondary causes/contributors: N\A    ACC/AHA Indication for Statin Therapy, morris all that apply:  Secondary Prevention - Very high risk ASCVD - 2 major events or 1 event with other high-risk conditions    Calculated Risk for ASCVD, if applicable    The ASCVD Risk score (Yola DK, et al., 2019) failed to calculate., N/A    Other Significant Risk Markers, if any, morris all that apply   Other: update hsCRP and lp(a) in light of multiple CVD events     Risk-enhancers: N/A    Goal LDL-C and nonHDL-C based on Clinic Protocol  LDL-C  <55   At goal? No, 1/2024    LIFESTYLE INTERVENTIONS:    SMOKING: Stages of Change: Maintenance (sustained change >6mo)    reports that he quit smoking about 26 years ago. His smoking use included cigarettes. He started smoking about 56 years ago. He has a 30 pack-year smoking history. He has never used smokeless tobacco.   - continued complete avoidance of all tobacco products   PHYSICAL ACTIVITY: increase cardio to >150min/wk mod-intensity, or as much as tolerated  WEIGHT MANAGEMENT AND NUTRITION: Mediterranean style dietary approach , Plant-based style dietary approach - increase or substitute nuts, beans, proteinaceous complex grains (eg. quinoa), and seeds in lieu of animal proteins 1 to 2 times weekly, and Provide specific dietary approach handouts      LIPID LOWERING MEDICATION MANAGEMENT:     Statin Therapy Recommendations from Today’s Visit:   COMPLETE INTOLERANCE - failed 2+ statins including rosuva, atorva  - no further rechallenges     Non-Statin Medications Recommendations from Today’s Visit:   ZETIA: failed due ADRs   NEXLETOL:  pending start - currently PAR pending, will have pharm coordinator contact him; unlikely to achieve LDL reduction goals   BAS: limited benefit in this pt, not indicated   OMEGA-3 FAs: if TG >150 in light of DM, ASCVD - for 25% CVD risks reduction   FIBRATE:  fenofibrate 48mg not indicated in light of normal gfr, no hx of severe HTG - no CV benefit as monotherapy or with statin, recommended to stop and monitor TG response, much better benefit with vascepa   PCSK9 mAb: strong candidate, will determine at next visit   LEQVIO: strong candidate     Indication for PCSK9 Inhibitor strategy, if applicable: Not currently indicated  - will move to these agents based upon LDL-C response and lp(a) levels in light of secondary prevention     Supplements Recommended at this visit: None     RECOMMENDATIONS FOR OTHER CV RISK FACTORS:    1) BLOOD PRESSURE MANAGEMENT:  ACC/AHA (2017) goal  <130/80  Home BP at goal:  yes  Office BP at goal:  no, mild NELSY   24h ABPM: not ordered to date  RDN candidate? NO  Plan:   Monitoring:   - start/continue home BP monitoring, reviewed correct technique, provide BP log and instructions  - order 24h ABPM:  NO  - monitor lytes/gfr routinely   Medications:  - no current meds   - prior class indications are RASi, then consider BB in light of CAD hx     2) GLYCEMIC STATUS: Diabetic, T2 with mixed HLD, CAD  Goal A1c < 7  Lab Results   Component Value Date    HBA1C 6.1 (H) 01/31/2024    HBA1C 6.2 (H) 07/31/2023      Lab Results   Component Value Date/Time    MALBCRT NOTE 01/31/2024 07:40 AM    MICROALBUR <0.2 01/31/2024 07:40 AM     Plan:  - continue current medication plan   - recommmend for routine care with PCP (or endocrine) to include regular A1c monitoring, annual albumin/creatinine ratio (ACR), annual diabetic retinopathy screening, foot exams, annual flu vaccine, and updates to pneumonia vaccines as appropriate      ANTITHROMBOTIC THERAPY yes, ASA 81mg daily , if worsening tinnitus, then transition to plavix     OTHER none     Studies Ordered at Todays Visit: echo, carotid, 14d zio    Blood Work Ordered At Today’s visit: as noted in assessment  Follow-Up: 2 months    Kyrie Sales M.D.  JAIRO, Board-certified clinical lipidologist   [facility]   [facility]   [phone]     CC:

## 2024-03-26 NOTE — TELEPHONE ENCOUNTER
Received EMR that Pt would like to seek help for financial assistance due to his high copay.     Called Pt @ [phone] and left a detailed message.     JENNIFER Oquendo, PhT  Vascular Pharmacy Liaison (Rx Coordinator)  P: [phone]  3/26/2024 12:00 PM

## 2024-03-27 NOTE — TELEPHONE ENCOUNTER
Called Pt @ [phone] and left a detailed message     JENNIFER Oquendo, PhT  Vascular Pharmacy Liaison (Rx Coordinator)  P: [phone]  3/27/2024 10:25 AM

## 2024-05-09 ENCOUNTER — TELEPHONE (OUTPATIENT)
Dept: CARDIOLOGY | Facility: MEDICAL CENTER | Age: 74
End: 2024-05-09
Payer: MEDICARE

## 2024-05-09 NOTE — TELEPHONE ENCOUNTER
[facility] MED    Caller: Darren Brunson    Topic/issue: MEDICAL ADVICE    Darren has pending imaging orders for an ECHO and US. He would like for these orders to be faxed over to the following:    [facility] [place]  P: [phone]  F: [phone]    Please advise.    Thank you,  Se MARIE    Callback Number: [phone]

## 2024-05-10 ENCOUNTER — NON-PROVIDER VISIT (OUTPATIENT)
Dept: CARDIOLOGY | Facility: MEDICAL CENTER | Age: 74
End: 2024-05-10
Attending: FAMILY MEDICINE
Payer: MEDICARE

## 2024-05-10 DIAGNOSIS — R55 SYNCOPE, UNSPECIFIED SYNCOPE TYPE: ICD-10-CM

## 2024-05-10 NOTE — TELEPHONE ENCOUNTER
Faxed order for Echo and US to [facility].  (Fx#[phone])    Fx confirmation in media.    Raquel Vera, Med Ass't  [facility]  Ph. [phone]  Fx. [phone]

## 2024-05-10 NOTE — PROGRESS NOTES
Patient enrolled in the 14 day Zio Holter monitor per MARLEY Eli.  In clinic hook up, monitor s/n [id number]. Pending EOS.

## 2024-05-13 ENCOUNTER — TELEPHONE (OUTPATIENT)
Dept: VASCULAR LAB | Facility: MEDICAL CENTER | Age: 74
End: 2024-05-13
Payer: MEDICARE

## 2024-05-13 NOTE — TELEPHONE ENCOUNTER
Called and left message.    We received a response that [facility] won't be doing the Echo (see media).  Advised that patient can schedule [facility] to complete echo. Or we can send the order to a different facility.    Raquel Vera, Med Ass't  [facility]  Ph. [phone]  Fx. [phone]

## 2024-05-19 ENCOUNTER — TELEPHONE (OUTPATIENT)
Dept: VASCULAR LAB | Facility: MEDICAL CENTER | Age: 74
End: 2024-05-19
Payer: MEDICARE

## 2024-05-19 NOTE — TELEPHONE ENCOUNTER
Received a voicemail that pt reports continuous side effects with Nexletol. Per Pt, he states that he only took atleast 2 tablets per 10 days each time, and he reports that he doesn't like the side effects.     Notified and updated provider/clinical staff.     Staci De La Rosa, JENNIFER, PhT  Vascular Pharmacy Liaison (Rx Coordinator)  P: [phone]  5/19/2024 4:49 PM

## 2024-05-20 ENCOUNTER — TELEPHONE (OUTPATIENT)
Dept: MEDICAL GROUP | Facility: PHYSICIAN GROUP | Age: 74
End: 2024-05-20
Payer: MEDICARE

## 2024-05-20 NOTE — TELEPHONE ENCOUNTER
[facility]    Left VM with regarding S/E with nexletol.  Pt has an apt with our clinic in 1 week and we could discuss then or he can call our clinic.    Kyrie Blas, NagaD

## 2024-05-28 ENCOUNTER — OFFICE VISIT (OUTPATIENT)
Dept: VASCULAR LAB | Facility: MEDICAL CENTER | Age: 74
End: 2024-05-28
Attending: FAMILY MEDICINE
Payer: MEDICARE

## 2024-05-28 VITALS
WEIGHT: 173 LBS | DIASTOLIC BLOOD PRESSURE: 70 MMHG | HEIGHT: 72 IN | BODY MASS INDEX: 23.43 KG/M2 | HEART RATE: 101 BPM | SYSTOLIC BLOOD PRESSURE: 105 MMHG

## 2024-05-28 DIAGNOSIS — Z78.9 STATIN INTOLERANCE: ICD-10-CM

## 2024-05-28 DIAGNOSIS — E11.69 MIXED HYPERLIPIDEMIA DUE TO TYPE 2 DIABETES MELLITUS (HCC): ICD-10-CM

## 2024-05-28 DIAGNOSIS — I25.10 ATHEROSCLEROSIS OF NATIVE CORONARY ARTERY OF NATIVE HEART WITHOUT ANGINA PECTORIS: ICD-10-CM

## 2024-05-28 DIAGNOSIS — E78.2 MIXED HYPERLIPIDEMIA DUE TO TYPE 2 DIABETES MELLITUS (HCC): ICD-10-CM

## 2024-05-28 PROCEDURE — 3078F DIAST BP <80 MM HG: CPT | Performed by: FAMILY MEDICINE

## 2024-05-28 PROCEDURE — 3074F SYST BP LT 130 MM HG: CPT | Performed by: FAMILY MEDICINE

## 2024-05-28 PROCEDURE — 99214 OFFICE O/P EST MOD 30 MIN: CPT | Performed by: FAMILY MEDICINE

## 2024-05-28 ASSESSMENT — ENCOUNTER SYMPTOMS
SENSORY CHANGE: 0
SPEECH CHANGE: 0
CLAUDICATION: 0
VOMITING: 0
WEAKNESS: 0
EYE PAIN: 0
LOSS OF CONSCIOUSNESS: 0
ORTHOPNEA: 0
SHORTNESS OF BREATH: 0
FOCAL WEAKNESS: 0
COUGH: 0
DOUBLE VISION: 0
ABDOMINAL PAIN: 0
PALPITATIONS: 0
HEADACHES: 0
TINGLING: 0
DIZZINESS: 1
WHEEZING: 0
SPUTUM PRODUCTION: 0
NAUSEA: 0
CHILLS: 0
SEIZURES: 0
FEVER: 0
HEMOPTYSIS: 0
BLURRED VISION: 0
PND: 0
TREMORS: 0

## 2024-05-28 ASSESSMENT — FIBROSIS 4 INDEX: FIB4 SCORE: 1.91

## 2024-05-28 NOTE — PROGRESS NOTES
[facility] - follow-up  05/28/24     Darren Brunson has been referred for management of dyslipidemia  Referral Source: Lala Camejo A.P.R.N.   Est 3/2024    Subjective     Interval hx/concerns: last seen 3/2024, after starting nexletol had abd pain, toe pain, HA, myalgia.  Had started after 11 days and improved after cessation and then restart and had return of sx.    Completed zio patch and still pending results.    No echo, carotid duplex yet - schedule in 6/2024  Pending dental appliance evaluation for ongoing ANTIONE       LIFESTYLE MGMT  Change in weight: Stable  Body mass index is 23.46 kg/m².  Wt Readings from Last 3 Encounters:   05/28/24 78.5 kg (173 lb)   03/26/24 81.2 kg (179 lb)   02/27/24 81.6 kg (180 lb)      Exercise habits:  active yoga, core training  - very active   Dietary patterns: Medi style   Etoh: see sochx  Reported barriers to care: none     MEDICATION MGMT  Current Prescription Lipid Lowering Medications - including dose:   Statin: None  Non-Statin: nexletol (has not started), fenofibrate 48mg (started yrs ago, no hx of TG >500 or  HTG-mediated acute pancreatitis reported, unclear why renal-dosed)  Current Lipid Lowering and Related Supplements: None  Adverse drug reactions/side effects? no  Adherence? yes    PERTINENT HLD PMHX  Initial visit hx:  seen by cardiology APRN 2/27/24 for ongoing CAD mgmt, noted to have uncontrolled HLD and prior statin  and zetia intol.  Here to review tx options.  Seen by pharm lipid clinic and started on nexletol (3/22/24) - pending initiation.    Pending ENT eval for repeat inspire and revision of prior surgery for known ANTIONE.   Current concern  regards position dysequilibrium when leaning forward then arising up while doing yoga.  Progressive over last 1 yr.  Has had presyncope w/o overt syncope or falls.  Denies baseline vertigo or associated HA or other focal neuro s/s including visual changes or diplopia.   Seen by Dr. Smith for evaluation.     No other findings per neurology.  ENT reports no issues from ENT standpoint.     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   Age at Initial Diagnosis of Dyslipidemia: 50s    Baseline Lipids Prior to Treatment:     History of ASCVD: History of prior MI >12 months ago  (2 episodes)  # CAD, s/p MI with PCI LAD , RCA  - currently no sx, seeing  cardiology     Other Established (non-atherosclerotic) Vascular Disease, if Present: None  Secondary causes of dyslipidemia: none   Previously Attempted Interventions for Lipids - including outcome  Statin: None      Outcome: N/A  Non-Statin: None   Outcome: N/A  Any Previous History of Statin Intolerance? As noted above     ANTITHROMBOTIC THERAPY: Yes, Details: ASA 81mg , no hx of bleeding  HTN: stable, good adherence/tolerance of meds, Home BP los/70s     Current Outpatient Medications on File Prior to Visit   Medication Sig Dispense Refill    budesonide (PULMICORT) 0.5 MG/2ML Suspension INHALE 1 VIAL VIA NEBULIZER ONCE DAILY AS DIRECTED *J32.0      Olopatadine HCl 0.6 % Solution INSTILL 2 SPRAY INTO EACH NOSTRIL ONCE DAILY      predniSONE (DELTASONE) 10 MG Tab Take 10 mg by mouth every day.      valACYclovir (VALTREX) 500 MG Tab Take 1,000 mg by mouth every day.      tamsulosin (FLOMAX) 0.4 MG capsule Take 0.4 mg by mouth 1/2 hour after breakfast.      budesonide-formoterol (SYMBICORT) 160-4.5 MCG/ACT Aerosol Inhale 2 Puffs 2 times a day.      BREYNA 160-4.5 MCG/ACT Aerosol INHALE 2 PUFFS BY MOUTH TWICE A DAY. USE WITH SPACER AND RINSE MOUTH AFTER EACH USE 10.3 Each 5    acetaminophen (TYLENOL) 325 MG Tab Take 2 Tablets by mouth every 6 hours. Alternate w/ ibuprofen to take a medication every 3 hrs, take scheduled for 3 days post-op then PRN 60 Tablet 0    ibuprofen  (MOTRIN) 600 MG Tab Take 1 Tablet by mouth every 6 hours. Alternate w/ tylenol to take a medication every 3 hrs, take scheduled for 3 days post-op then PRN 45 Tablet 0    fluticasone (FLONASE) 50 MCG/ACT nasal spray Administer 2 Sprays into affected nostril(S) as needed.      Cholecalciferol (VITAMIN D3) 125 MCG (5000 UT) Cap Take 5,000 Units by mouth every day.      glucose blood (ONETOUCH ULTRA) strip 1 Each by Other route every day.      pantoprazole (PROTONIX) 40 MG Tablet Delayed Response Take 1 Tablet by mouth every day.      aspirin (ASA) 81 MG Chew Tab chewable tablet Chew 81 mg every day.      fexofenadine (ALLEGRA) 180 MG tablet Take 180 mg by mouth 2 times a day.      Multiple Vitamins-Minerals (PRESERVISION AREDS 2+MULTI VIT PO) Take 1 Tablet by mouth 2 times a day.      Menaquinone-7 (VITAMIN K2 PO) Take  by mouth every day.      albuterol 108 (90 Base) MCG/ACT Aero Soln inhalation aerosol INHALE 1-2 PUFFS BY MOUTH EVERY FOUR HOURS AS NEEDED FOR SHORTNESS OF BREATH. 8.5 Each 11    metformin ER (GLUCOPHAGE XR) 500 MG TABLET SR 24 HR Take 500 mg by mouth every evening.      gabapentin (NEURONTIN) 100 MG Cap Take 100-200 mg by mouth 2 times a day. 100 mg every AM and 200 mg every night NIGHT      Probiotic Product (PROBIOTIC DAILY PO) Take 1 Tab by mouth every day.       No current facility-administered medications on file prior to visit.      Allergies   Allergen Reactions    Lisinopril Anaphylaxis     Lip and tongue swelling    Aspirin      High dose only - aspirin induced tinnitus    Bempedoic Acid      Myalgia, HA, abd pain    Other Reaction(s): Joint pain, stomach upset    Ciprofloxacin      Tendon pain  Other reaction(s): shoulder pain, blurred vision, abdominal pain  Tendon pain    Crestor [Rosuvastatin Calcium] Unspecified     Muscle pain     Hmg-Coa-R Inhibitors      Other reaction(s): Unspecified  Muscle pain     Atorvastatin     Ezetimibe     Singulair Unspecified     Pain, anxiety    Topamax  [Topiramate]      Tension, headache      Social History     Tobacco Use    Smoking status: Former     Current packs/day: 0.00     Average packs/day: 1 pack/day for 30.0 years (30.0 ttl pk-yrs)     Types: Cigarettes     Start date: 1967     Quit date: 1997     Years since quittin.7    Smokeless tobacco: Never   Vaping Use    Vaping status: Never Used   Substance Use Topics    Alcohol use: No    Drug use: No     Review of Systems   Constitutional:  Negative for chills, fever and malaise/fatigue.   Eyes:  Negative for blurred vision, double vision and pain.   Respiratory:  Negative for cough, hemoptysis, sputum production, shortness of breath and wheezing.    Cardiovascular:  Negative for chest pain, palpitations, orthopnea, claudication, leg swelling and PND.   Gastrointestinal:  Negative for abdominal pain, nausea and vomiting.   Neurological:  Positive for dizziness. Negative for tingling, tremors, sensory change, speech change, focal weakness, seizures, loss of consciousness, weakness and headaches.       Objective    Vitals:    24 1043 24 1046   BP: 133/68 105/70   BP Location: Left arm Left arm   Patient Position: Sitting Sitting   BP Cuff Size: Adult Adult   Pulse: 78 (!) 101   Weight: 78.5 kg (173 lb)    Height: 1.829 m (6')       BMI Readings from Last 1 Encounters:   24 23.46 kg/m²      BP Readings from Last 5 Encounters:   24 105/70   24 133/77   24 122/60   23 124/64   23 (!) 147/69     Physical Exam  Constitutional:       Appearance: Normal appearance. He is well-developed.   HENT:      Head: Normocephalic and atraumatic.   Eyes:      Conjunctiva/sclera: Conjunctivae normal.   Neck:      Thyroid: No thyromegaly.      Vascular: No JVD.   Cardiovascular:      Rate and Rhythm: Normal rate and regular rhythm.      Pulses: Normal pulses.           Radial pulses are 2+ on the right side and 2+ on the left side.        Dorsalis pedis pulses are 2+ on  "the right side and 2+ on the left side.        Posterior tibial pulses are 2+ on the right side and 2+ on the left side.      Heart sounds: Normal heart sounds. No murmur heard.     No friction rub. No gallop.   Pulmonary:      Effort: Pulmonary effort is normal. No respiratory distress.      Breath sounds: Normal breath sounds.   Musculoskeletal:      Cervical back: Normal range of motion and neck supple.   Lymphadenopathy:      Cervical: No cervical adenopathy.   Skin:     General: Skin is warm and dry.   Neurological:      General: No focal deficit present.      Mental Status: He is alert and oriented to person, place, and time.      Cranial Nerves: No cranial nerve deficit.      Coordination: Coordination normal.      Gait: Gait normal.       DATA REVIEW:  Most Recent Lipid Panel:     [facility] 1/2024       TSH wnl       Lab Results   Component Value Date/Time    CHOLSTRLTOT 190 06/27/2020 12:47 AM     (H) 06/27/2020 12:47 AM    HDL 47 06/27/2020 12:47 AM    TRIGLYCERIDE 116 06/27/2020 12:47 AM     Lab Results   Component Value Date/Time     (H) 06/27/2020 12:47 AM    LDL 98 03/20/2018 06:33 AM         No results found for: \"LIPOPROTA\"   No results found for: \"APOB\"   No results found for: \"CRPHIGHSEN\"    Other Pertinent Blood Work:   Lab Results   Component Value Date    SODIUM 136 07/31/2023    POTASSIUM 3.9 07/31/2023    CHLORIDE 101 07/31/2023    CO2 26 07/31/2023    ANION 9.0 07/31/2023    GLUCOSE 102 (H) 07/31/2023    BUN 17 07/31/2023    CREATININE 1.16 07/31/2023    CALCIUM 9.1 07/31/2023    ASTSGOT 30 05/29/2023    ALTSGPT 29 05/29/2023    ALKPHOSPHAT 57 05/29/2023    TBILIRUBIN 0.4 05/29/2023    ALBUMIN 4.0 05/29/2023    AGRATIO 1.6 05/29/2023     VASCULAR IMAGING    MPI 2020   NUCLEAR IMAGING INTERPRETATION    No reversible defects that would indicate ischemia.    Matched defect in the inferior apex is either artifactual or due to a small    inferior wall infarct.    Normal left " ventricular size, ejection fraction, and wall motion.    ECG INTERPRETATION    Normal ECG portion of a treadmill nuclear stress test.    Average exercise tolerance for age.    Normal BP response to exercise.    Sinus rhythm.    No ischemic ECG changes.    No chest pain during stress.    Ernandez treadmill score 6, low risk (assume no prior CAD).     CT a/p with 1/2023  Vasculature: Atherosclerosis.   IMPRESSION:   1.  Findings in keeping with acute pancreatitis involving the pancreatic tail.  2.  Small right inguinal hernia containing fat and the appendix.  3.  Atherosclerosis.    MR brain 8/2023       CTA head/neck 9/2023 ([facility])          ASSESSMENT AND PLAN  1. Mixed hyperlipidemia due to type 2 diabetes mellitus (HCC)  Evolocumab (REPATHA) 140 MG/ML Solution Auto-injector SubQ injection pen      2. Atherosclerosis of native coronary artery of native heart without angina pectoris  Evolocumab (REPATHA) 140 MG/ML Solution Auto-injector SubQ injection pen      3. Statin intolerance  Evolocumab (REPATHA) 140 MG/ML Solution Auto-injector SubQ injection pen          Patient Type, check all that apply: Secondary Prevention and Type 2 Diabetes Mellitus, very high risk, polyvascular     Established Atherosclerotic Cardiovascular Disease (ASCVD): yes    1) CAD, s/p MI with PCI LAD 2009, RCA 2015 - stable, no sx   2020 MPI - stable  - continue med mgmt, ongoing care with cardiology     2) Non-aneurysmal aortic atherosclerosis (AS) - CT, no symptoms or prior known associated clinical manifestations  - general indicator of systemic AS with higher potential AS in other vascular beds  Plan:  - no further imaging surveillance, continue med mgmt      Other Established (non-atherosclerotic) Vascular Disease, if Present:     1) new progressive presyncope vs dysequilibrium   Unlikely vasc etiology based upon prior normal MR brain and CTA neck however no functional duplex scanning to date, and risk for retrograde and/or steal phenomenon is  plausible and worth evaluating further.  Lower prob in light of no other overt steal or posterior circ syndrome  - possible cardiogenic etiology in light of prior MIs and ischemic CM   - no recent cardiac monitoring, no hx of arrhythmias reported, donna no AF/AFL  Plan:   - recommend f/u with cardiology   - set up echo and 14d zio patch   - check carotid duplex to eval for steal dynamics in light of prior R vert art occlusion on MRI and CTA in 2023    Evidence of genetic dyslipidemia: No   FH genotyping: NO    Secondary causes/contributors: N\A    ACC/AHA Indication for Statin Therapy, morris all that apply:  Secondary Prevention - Very high risk ASCVD - 2 major events or 1 event with other high-risk conditions    Calculated Risk for ASCVD, if applicable    The ASCVD Risk score (Yola DK, et al., 2019) failed to calculate., N/A    Other Significant Risk Markers  Other: update hsCRP and lp(a) in light of multiple CVD events     Risk-enhancers: N/A    Goal LDL-C and nonHDL-C based on Clinic Protocol  LDL-C <55   At goal? No, 1/2024    - failed multiple agents     LIFESTYLE INTERVENTIONS  TOBACCO: Stages of Change: Maintenance (sustained change >6mo)    reports that he quit smoking about 26 years ago. His smoking use included cigarettes. He started smoking about 56 years ago. He has a 30 pack-year smoking history. He has never used smokeless tobacco.   - continued complete avoidance of all tobacco products   PHYSICAL ACTIVITY: >150min/week of mod-intensity activity or as much as tolerated  NUTRITION: Mediterranean and reduce Na to 1500mg/day   ETOH: limit to 2 or less standard drinks/day   WT MGMT: maintain healthy weight    LIPID LOWERING MEDICATION MANAGEMENT:     Statin Therapy - COMPLETE INTOLERANCE - failed 2+ statins including rosuva, atorva  - no further rechallenges     Non-Statin Medications  ZETIA: failed due ADRs   NEXLETOL: failed due to ADRs   BAS: limited benefit in this pt, not indicated   OMEGA-3 FAs: if  TG >150 in light of DM, ASCVD - for 25% CVD risks reduction   FIBRATE: stopped fenofibrate at last visit, pending repeat HTG  PCSK9 mAb: start repatha 140mg Q2wks - reviewed cost, use, ADRs   LEQVIO: strong candidate     Indication for PCSK9 Inhibitor strategy: Not currently indicated  PSCK9i indicated per 2018 ACC/AHA guidelines in this patient with established ASCVD whose LDL-C remains above threshold of 70 mg/dl despite maximally tolerated statin therapy.    BLOOD PRESSURE MANAGEMENT:  ACC/AHA (2017) goal <130/80  Home BP at goal:  yes  Office BP at goal:  yes- WCE noted   24h ABPM: not ordered to date  RDN candidate? NO  Plan:   - continue home BP monitoring, reviewed correct technique, provide BP log and instructions  - order 24h ABPM:  NO  - monitor lytes/gfr routinely   Medications:  - no current meds   - prior class indications are RASi, then consider BB in light of CAD hx     GLYCEMIC STATUS: Diabetic, T2 with mixed HLD, CAD  Goal A1c < 7  Lab Results   Component Value Date    HBA1C 6.1 (H) 01/31/2024    HBA1C 6.2 (H) 07/31/2023      Lab Results   Component Value Date/Time    MALBCRT NOTE 01/31/2024 07:40 AM    MICROALBUR <0.2 01/31/2024 07:40 AM   Plan:  - continue current medication plan   - recommmend for routine care with PCP (or endocrine) to include regular A1c monitoring, annual albumin/creatinine ratio (ACR), annual diabetic retinopathy screening, foot exams, annual flu vaccine, and updates to pneumonia vaccines as appropriate      ANTITHROMBOTIC THERAPY yes, ASA 81mg daily , if worsening tinnitus, then transition to plavix     OTHER:    # ANTIONE - new onset despite UPPP - worsening sx  STOP-BANG = 5pts   Plan:  - pending oral appliance eval and is aware of Inspire     Studies Ordered at Todays Visit: echo, carotid - pending 6/2024  Blood Work Ordered At Today’s visit: as noted in assessment  Follow-Up:  3mo    Kyrie Sales M.D.  JAIRO, Board-certified clinical lipidologist   Vascular Medicine  [facility]   [facility]   [phone]

## 2024-05-29 ENCOUNTER — TELEPHONE (OUTPATIENT)
Dept: VASCULAR LAB | Facility: MEDICAL CENTER | Age: 74
End: 2024-05-29
Payer: MEDICARE

## 2024-05-30 PROCEDURE — RXMED WILLOW AMBULATORY MEDICATION CHARGE: Performed by: FAMILY MEDICINE

## 2024-05-30 NOTE — TELEPHONE ENCOUNTER
Received New start PA request via [employer]  for REPATHA SURECLICK 140MG/ML SOLUTION AUTO INJECTOR . (Quantity:6 mls, Day Supply:84)     Insurance: [employer]   Member ID:  [id number]  BIN:[id number]  PCN: [id number]  Group: [id number]     Ran Test claim via [place] & medication Rejects stating prior authorization is required.    JENNIFER Oquendo, PhT  Vascular Pharmacy Liaison (Rx Coordinator)  P: [phone]  5/29/2024 6:02 PM

## 2024-05-30 NOTE — TELEPHONE ENCOUNTER
Pt called back and confirmed his actual income. Per pt, he reports that they make atleat $95,000 combined with his wife. Updated pt's income information through the [employer] portal.    Onboarded the pt throughout the whole financial assistance process, and started his first fill delivery with [facility] pharmacy. Pt originally wanted to receive his medication through [facility], but informed pt that he can get it filled through [facility] as we are still contracted under his plan. Pt verbally understood and would like to set his medication mailed to him. Coordinated his first mail delivery on 6/3. Pt also opted in to receive a clinical [facility] consultation tomorrow, 5/31 at around 130pm to seek more information about the medication. Pt also opted in to receive initial supplies from the pharmacy as it is complimentary. Pt has no further questions/concerns at this time.     Updated pt's next refill call reminder on 8/12.     JENNIFER Oquendo, PhT  Vascular Pharmacy Liaison (Rx Coordinator)  P: [phone]  5/30/2024 11:58 AM

## 2024-05-30 NOTE — TELEPHONE ENCOUNTER
Prior Authorization for REPATHA SURECLICK 140MG/ML SOLUTION AUTO INJECTOR  (Quantity: 6 mls, Days: 84) has been submitted via Cover My Meds: Key ([id number])    Insurance: [employer]     Will follow up in 24-48 business hours.     JENNIFER Oquendo, PhT  Vascular Pharmacy Liaison (Rx Coordinator)  P: [phone]  5/29/2024 6:03 PM

## 2024-05-30 NOTE — TELEPHONE ENCOUNTER
Patient Phone Number: [phone]  Medication:  REPATHA SURECLICK 140MG/ML SOLUTION AUTO INJECTOR  (Quantity 6 mls, Day Supply 84)  Copay: $616.52  Household size:2  Annual Household Adjusted Gross Income: $80,000  Additional information/consent to FA: YES!     Called and spoke with Pt today in regards to the PA approval status for his Repatha. Informed pt that he has a high copay with his new medication and offered financial assistance to lower the cost. Pt verbally consented and would like to proceed with the assistance process. Obtained pt's income and SSS info.     JENNIFER Oquendo, PhT  Vascular Pharmacy Liaison (Rx Coordinator)  P: [phone]  5/30/2024 11:47 AM

## 2024-05-30 NOTE — TELEPHONE ENCOUNTER
PA for REPATHA SURECLICK 140MG/ML SOLUTION AUTO INJECTOR  has been approved for a quantity of 6 mls , day supply 84    PA reference number: [id number]  Insurance: [employer]   Effective dates: 05/29/2024-05/29/2025  Copay: $616.52/84DS ; $214.04/28DS     Is patient eligible to fill with [facility]? Yes    Next Steps: The patient's copay exceeds $5.00. Proceed with contacting patient to offer financial assistance.      JENNIFER Oquendo, PhT  Vascular Pharmacy Liaison (Rx Coordinator)  P: [phone]  5/30/2024 11:24 AM

## 2024-05-30 NOTE — TELEPHONE ENCOUNTER
Medication: REPATHA SURECLICK 140MG/ML SOLUTION AUTO INJECTOR   Type of Insurance: Government funded (Medicare/Medicare Advantage)  Type of Financial assistance requested Foundation  Source: [employer]   Source Phone #: [phone]  Outcome: APPROVED   Effective dates: 04/30/2024-04/29/2025  Details/Billing Information:   BIN: [id number]  PCN: [id number]   GRP: [id number]  ID: [id number]  MAX AMOUNT AWARD: $2500  DISEASE FUND NAME: HYPERCHOLESTEROLEMIA     Final Copay: $0    JENNIFER Oquendo, PhT  Vascular Pharmacy Liaison (Rx Coordinator)  P: [phone]  5/30/2024 11:49 AM

## 2024-05-31 ENCOUNTER — TELEPHONE (OUTPATIENT)
Dept: CARDIOLOGY | Facility: MEDICAL CENTER | Age: 74
End: 2024-05-31
Payer: MEDICARE

## 2024-05-31 NOTE — TELEPHONE ENCOUNTER
ALLISON ALVARADO to 's nurse, Lynette, on 5/31/2024  Established patient  Preliminary Findings:  3 runs of SVT, 104-182 with an avg rate of 137bpm  Sinus rhythm,  with an avg rate of 81bpm  72 patient events which corresponded to:  SR   SVE(s)

## 2024-06-03 ENCOUNTER — PHARMACY VISIT (OUTPATIENT)
Dept: PHARMACY | Facility: MEDICAL CENTER | Age: 74
End: 2024-06-03
Payer: MEDICARE

## 2024-06-03 ENCOUNTER — DOCUMENTATION (OUTPATIENT)
Dept: PHARMACY | Facility: MEDICAL CENTER | Age: 74
End: 2024-06-03
Payer: MEDICARE

## 2024-06-04 NOTE — PROGRESS NOTES
PHARMACIST PRE SCREEN - **New Onboarding**  Diagnosis:  Mixed hypercholesterolemia and hypertriglyceridemia [E78.2]      Drug & Non-Drug Allergies: EMR Reviewed. No concerning drug or non-drug allergies.                                                                                          Drug Therapy (name/formulation/dose/route of admin/frequency):  Repatha 140mg/mL solution in SureClick auto-injector, 1 pen injected under the skin once every 14 days    EMR Reviewed   - Dose Appropriateness (Y/N):  Y   - Renal or Hepatic Adjustments (Y/N):  N   - Any comorbidities, PMH, precautions, or contraindications that pose a medication safety concern? (Y/N):  N   - Any relevant lab work needed that affects the initial start date? (Y/N):      N   - Past Treatments:  Fenofibrate; bempedoic acid; atorvastatin; ezetimibe; Nexletol; rosuvastatin   - EMR Med List reviewed.  DDI’s:  No clinically significant DDIs   - Patient’s ability to self-administer medication:  No issues identified per EMR  List Goals of Therapy:    Achieve goal LDL levels (<  mg/dL) to reduce risk of cardiovascular events    Implement lifestyle modifications: diet, exercise, weight loss, and smoking cessation    PHARMACIST NEW START - Initial Assessment  Dx: Mixed hypercholesterolemia and hypertriglyceridemia E78.2  Primary or Secondary Prevention of clinical ASCVD: Secondary     Tx prescribed: Repatha 140mg/mL solution in SureClick auto-injector, injected under the skin once every 14 days    Administration: Repatha 140mg, inject 1 pen into rotating sites of top of thigh, abdomen or upper outer arm (if receiving help) every other week.    - Reviewed admin instructions as outlined in 's guide for use. Advised to refer to   the instruction leaflet included in every box of repatha and/or refer to the 's website for instructional video.     Remove the orange cap from pen tip; once uncapped the pen must be used immediately.  Stretch/pinch inj site to create a firm surface. Place exposed yellow needle shield to injection site at a 90 degree angle and  ensure the shield is covered by skin/slightly retracts. Press the grey start button until you hear a click, admin has started. Maintain pressure into body this entire time. Window will change from clear to solid yellow; admin complete after approx 15 sec, progress window has stopped and second click is heard.    Proper Handling/Disposal:    - Bring to room temp over 30 minutes but  do not shake, hold, or heat. Once brought to room temp not return to refrigeration    - Immediately after admin dispose in sharps container or other hard impenetrable container.     Storage/Excursion:     - Keep in fridge; protect from heat, light, and/or freezing    - Once brought to room temp do not return to refrigeration.     - Once brought to room temp do not return to refrigeration. Can be stored at room temp (max of 77 F) for up to 30 days.    Duration of therapy: until progression, toxicity, or no longer clinically beneficial  Adherence & Potential Barriers: Advised doses will fall on the same day every other week. Recommended use of a calendar to track dosing dates.     Missed dose mgmt: Inject as soon as you remember. If it has been 1 week or more since the missed dose, skip the missed dose and wait until next due date to inject.   List Common, Serious SE & Mitigation Reviewed:     Headache: APAP; adq hydration; IBU; adq sleep    Injection Site Reactions (swelling, bruising, pain, irritation): ice before/after admin; avoiding/limiting touching injection site after; inj site rotation    nasopharyngitis (cold symptoms ):  non-drowsy anti-histamines (continue taking allegra); throat lozenges  List Precautions Reviewed:     Hypersensitivity: Report to MD if any presentation/development of: hives; fever; joint pain; swelling of the tongue/face/lips; SOB/difficulty breathing.  List Current SE Reviewed (if  applicable): n/a patient has not started therapy    Mitigation/mgmt: n/a    S/Sx: none   Clinically Relevant, Abnormal Labs:     A1c: (1/31/24) 6.1*H    Lipid Panel: (1/31/24) Chol 181 *H HDL 61 TG 67    HeFH/HoFH: N  Wellness/Lifestyle Counseling: deferred**    Immunizations: deferred**  Med Rec/Updated drug list: EMR accurate, no medication list inaccuracies. Reviewed with patient/caregiver.  DI Check: No clinically significant DDIs  Common DI & Dietary Avoidance: Reviewed to not start any new meds/herbs/OTCs/supplements without speaking to clinic/pharmacist to check for DDIs.    Goals of Therapy:      Achieve goal LDL levels (<  mg/dL) to reduce risk of cardiovascular events    Implement lifestyle modifications: diet, exercise, weight loss, and smoking cessation  Patient has agreed/understands to goals of therapy during education/counseling  Additional: Had a pleasant talk with Darren to review new Repatha therapy. He was receptive and engaged through the call and had no further questions/concerns. Welcomes future calls from clinical pharmacist team and thankful to have the additional support.

## 2024-06-06 ENCOUNTER — OFFICE VISIT (OUTPATIENT)
Dept: SLEEP MEDICINE | Facility: MEDICAL CENTER | Age: 74
End: 2024-06-06
Attending: INTERNAL MEDICINE
Payer: MEDICARE

## 2024-06-06 VITALS
BODY MASS INDEX: 23.3 KG/M2 | DIASTOLIC BLOOD PRESSURE: 60 MMHG | SYSTOLIC BLOOD PRESSURE: 114 MMHG | HEIGHT: 72 IN | HEART RATE: 70 BPM | WEIGHT: 172 LBS | OXYGEN SATURATION: 97 %

## 2024-06-06 DIAGNOSIS — G47.33 OSA (OBSTRUCTIVE SLEEP APNEA): ICD-10-CM

## 2024-06-06 DIAGNOSIS — J45.20 MILD INTERMITTENT ASTHMA WITHOUT COMPLICATION: ICD-10-CM

## 2024-06-06 PROCEDURE — 3078F DIAST BP <80 MM HG: CPT | Performed by: INTERNAL MEDICINE

## 2024-06-06 PROCEDURE — 3074F SYST BP LT 130 MM HG: CPT | Performed by: INTERNAL MEDICINE

## 2024-06-06 PROCEDURE — 99214 OFFICE O/P EST MOD 30 MIN: CPT | Performed by: INTERNAL MEDICINE

## 2024-06-06 PROCEDURE — 99211 OFF/OP EST MAY X REQ PHY/QHP: CPT | Performed by: INTERNAL MEDICINE

## 2024-06-06 ASSESSMENT — ENCOUNTER SYMPTOMS
PALPITATIONS: 0
FALLS: 0
MYALGIAS: 0
SORE THROAT: 0
NAUSEA: 0
DIZZINESS: 0
WHEEZING: 0
COUGH: 0
CHILLS: 0
WEIGHT LOSS: 0
VOMITING: 0
DIARRHEA: 0
DEPRESSION: 0
SPUTUM PRODUCTION: 0
DIAPHORESIS: 0
EYE PAIN: 0
NECK PAIN: 0
ABDOMINAL PAIN: 0
EYE REDNESS: 0
BLURRED VISION: 0
PHOTOPHOBIA: 0
DOUBLE VISION: 0
FOCAL WEAKNESS: 0
STRIDOR: 0
CLAUDICATION: 0
SPEECH CHANGE: 0
BACK PAIN: 0
CONSTIPATION: 0
ORTHOPNEA: 0
EYE DISCHARGE: 0
SHORTNESS OF BREATH: 0
SINUS PAIN: 0
PND: 0
FEVER: 0
HEMOPTYSIS: 0
WEAKNESS: 0
HEARTBURN: 0
HEADACHES: 0
TREMORS: 0

## 2024-06-06 ASSESSMENT — PATIENT HEALTH QUESTIONNAIRE - PHQ9: CLINICAL INTERPRETATION OF PHQ2 SCORE: 0

## 2024-06-06 ASSESSMENT — FIBROSIS 4 INDEX: FIB4 SCORE: 1.91

## 2024-06-07 NOTE — PROGRESS NOTES
Chief Complaint   Patient presents with    Follow-Up     LAST SEEN 12/6/23         HPI: This patient is a 73 y.o. male whom is followed in our clinic for asthma/mild obstructive lung disease last seen by me on 12/6/2023.  The pt also has a dx of ANTIONE for which he underwent UPPP in the past.  He is a former tobacco smoker with 30-pack-year history and quit in 1997.  CT chest from August 2020 showed subcentimeter pulmonary nodules and repeat CT chest from August 2021 showed stable pulmonary nodules largest of 5 mm in size without significant adenopathy. Hs last CT from 8/2023 showed no nodules. With regards to asthma, patient's symptoms were preiously mild and controlled with Symbicort 160 which he was using daily in the mornings on the days that he exercises but rarely needing BID. Spirometry from 9/2022 showed normal air flows with trend toward BD response.  He is currently using 1 puff twice daily.  Prior to our last clinic visit he had home sleep study done to evaluate for morning headaches which showed severe ANTIONE with an AHI of 35 however he spent less than 10 minutes with saturation below 88%.  No history of uncontrolled hypertension or atrial fibrillation but does have a history of coronary artery disease status post PCI and is followed by cardiology.  He has seen allergy.  PFT from 8/23 was normal other than mild air trapping.  From an asthma standpoint, symptoms have remained well-controlled but he is reporting significant fatigue therefore is trying to address sleep apnea.  He does not think he could tolerate CPAP therapy therefore has seen ENT and is in the process of trialing oral device that he does not want to try inspire.    Past Medical History:   Diagnosis Date    Acute myocardial infarction of other anterior wall, episode of care unspecified 2009; 2014    Anxiety state, unspecified      Arthritis     foot, ankle, jaw right side    Asthma     Bronchitis     CAD (coronary artery disease) 09/2009     PCI/stent (Promus 3.0 x 15mm) to the LAD.    Cataract     left eye no surgery    Chickenpox     Cough     Diabetes (HCC)     on oral meds    Erosive gastritis     Gastric ulcer     Glaucoma     Heart murmur     2023 patient not aware of having a murmur    High cholesterol     Hyperlipidemia     Mumps     Pain 2023    discomfort abdomen    Psychiatric problem     S/P coronary artery stent placement      Shortness of breath     Sleep apnea     resolved after surgery for sleep apnea    Snoring        Social History     Socioeconomic History    Marital status:      Spouse name: Not on file    Number of children: Not on file    Years of education: Not on file    Highest education level: Not on file   Occupational History    Not on file   Tobacco Use    Smoking status: Former     Current packs/day: 0.00     Average packs/day: 1 pack/day for 30.0 years (30.0 ttl pk-yrs)     Types: Cigarettes     Start date: 1967     Quit date: 1997     Years since quittin.7    Smokeless tobacco: Never   Vaping Use    Vaping status: Never Used   Substance and Sexual Activity    Alcohol use: No    Drug use: No    Sexual activity: Not on file   Other Topics Concern    Not on file   Social History Narrative    Not on file     Social Determinants of Health     Financial Resource Strain: Not on file   Food Insecurity: Not on file   Transportation Needs: Not on file   Physical Activity: Not on file   Stress: Not on file   Social Connections: Not on file   Intimate Partner Violence: Not on file   Housing Stability: Not on file       Family History   Problem Relation Age of Onset    Heart Disease Maternal Aunt     Cancer Mother         colon       Current Outpatient Medications on File Prior to Visit   Medication Sig Dispense Refill    Evolocumab (REPATHA) 140 MG/ML Solution Auto-injector SubQ injection pen Inject 1 mL under the skin every 14 days. To lower cholesterol and heart disease risk 6 Each 3     budesonide (PULMICORT) 0.5 MG/2ML Suspension INHALE 1 VIAL VIA NEBULIZER ONCE DAILY AS DIRECTED *J32.0      Olopatadine HCl 0.6 % Solution INSTILL 2 SPRAY INTO EACH NOSTRIL ONCE DAILY      budesonide-formoterol (SYMBICORT) 160-4.5 MCG/ACT Aerosol Inhale 2 Puffs 2 times a day.      fluticasone (FLONASE) 50 MCG/ACT nasal spray Administer 2 Sprays into affected nostril(S) as needed.      albuterol 108 (90 Base) MCG/ACT Aero Soln inhalation aerosol INHALE 1-2 PUFFS BY MOUTH EVERY FOUR HOURS AS NEEDED FOR SHORTNESS OF BREATH. 8.5 Each 11    predniSONE (DELTASONE) 10 MG Tab Take 10 mg by mouth every day.      valACYclovir (VALTREX) 500 MG Tab Take 1,000 mg by mouth every day.      tamsulosin (FLOMAX) 0.4 MG capsule Take 0.4 mg by mouth 1/2 hour after breakfast.      BREYNA 160-4.5 MCG/ACT Aerosol INHALE 2 PUFFS BY MOUTH TWICE A DAY. USE WITH SPACER AND RINSE MOUTH AFTER EACH USE (Patient not taking: Reported on 6/6/2024) 10.3 Each 5    acetaminophen (TYLENOL) 325 MG Tab Take 2 Tablets by mouth every 6 hours. Alternate w/ ibuprofen to take a medication every 3 hrs, take scheduled for 3 days post-op then PRN 60 Tablet 0    ibuprofen (MOTRIN) 600 MG Tab Take 1 Tablet by mouth every 6 hours. Alternate w/ tylenol to take a medication every 3 hrs, take scheduled for 3 days post-op then PRN 45 Tablet 0    Cholecalciferol (VITAMIN D3) 125 MCG (5000 UT) Cap Take 5,000 Units by mouth every day.      glucose blood (ONETOUCH ULTRA) strip 1 Each by Other route every day.      pantoprazole (PROTONIX) 40 MG Tablet Delayed Response Take 1 Tablet by mouth every day.      aspirin (ASA) 81 MG Chew Tab chewable tablet Chew 81 mg every day.      fexofenadine (ALLEGRA) 180 MG tablet Take 180 mg by mouth 2 times a day.      Multiple Vitamins-Minerals (PRESERVISION AREDS 2+MULTI VIT PO) Take 1 Tablet by mouth 2 times a day.      Menaquinone-7 (VITAMIN K2 PO) Take  by mouth every day.      metformin ER (GLUCOPHAGE XR) 500 MG TABLET SR 24 HR Take  500 mg by mouth every evening.      gabapentin (NEURONTIN) 100 MG Cap Take 100-200 mg by mouth 2 times a day. 100 mg every AM and 200 mg every night NIGHT      Probiotic Product (PROBIOTIC DAILY PO) Take 1 Tab by mouth every day.       No current facility-administered medications on file prior to visit.       Lisinopril, Aspirin, Bempedoic acid, Ciprofloxacin, Crestor [rosuvastatin calcium], Hmg-coa-r inhibitors, Atorvastatin, Ezetimibe, Singulair, and Topamax [topiramate]      ROS:   Review of Systems   Constitutional:  Positive for malaise/fatigue. Negative for chills, diaphoresis, fever and weight loss.   HENT:  Negative for congestion, ear discharge, ear pain, hearing loss, nosebleeds, sinus pain, sore throat and tinnitus.    Eyes:  Negative for blurred vision, double vision, photophobia, pain, discharge and redness.   Respiratory:  Negative for cough, hemoptysis, sputum production, shortness of breath, wheezing and stridor.    Cardiovascular:  Negative for chest pain, palpitations, orthopnea, claudication, leg swelling and PND.   Gastrointestinal:  Negative for abdominal pain, constipation, diarrhea, heartburn, nausea and vomiting.   Genitourinary:  Negative for dysuria and urgency.   Musculoskeletal:  Negative for back pain, falls, joint pain, myalgias and neck pain.   Skin:  Negative for itching and rash.   Neurological:  Negative for dizziness, tremors, speech change, focal weakness, weakness and headaches.   Endo/Heme/Allergies:  Negative for environmental allergies.   Psychiatric/Behavioral:  Negative for depression.        /60 (BP Location: Right arm, Patient Position: Sitting, BP Cuff Size: Adult)   Pulse 70   Ht 1.829 m (6')   Wt 78 kg (172 lb)   SpO2 97%   Physical Exam  Vitals reviewed.   Constitutional:       General: He is not in acute distress.     Appearance: Normal appearance. He is normal weight.   HENT:      Head: Normocephalic and atraumatic.      Right Ear: External ear normal.       Left Ear: External ear normal.      Nose: Nose normal. No congestion.      Mouth/Throat:      Mouth: Mucous membranes are moist.      Pharynx: Oropharynx is clear. No oropharyngeal exudate.   Eyes:      General: No scleral icterus.     Extraocular Movements: Extraocular movements intact.      Conjunctiva/sclera: Conjunctivae normal.      Pupils: Pupils are equal, round, and reactive to light.   Cardiovascular:      Rate and Rhythm: Normal rate and regular rhythm.      Heart sounds: Normal heart sounds. No murmur heard.     No gallop.   Pulmonary:      Effort: Pulmonary effort is normal. No respiratory distress.      Breath sounds: Normal breath sounds. No wheezing or rales.   Abdominal:      General: There is no distension.      Palpations: Abdomen is soft.   Musculoskeletal:         General: Normal range of motion.      Cervical back: Normal range of motion and neck supple.      Right lower leg: No edema.      Left lower leg: No edema.   Skin:     General: Skin is warm and dry.      Findings: No rash.   Neurological:      Mental Status: He is alert and oriented to person, place, and time.      Cranial Nerves: No cranial nerve deficit.   Psychiatric:         Mood and Affect: Mood normal.         Behavior: Behavior normal.         PFTs as reviewed by me personally:    Imagaing as reviewed by me personally:      Assessment:  1. Mild intermittent asthma without complication        2. ANTIONE (obstructive sleep apnea)            Plan:  , Mild and currently well-controlled with Symbicort which she can use both for maintenance and rescue which we discussed in clinic today.  Severe but patient is unable to tolerate CPAP therapy and not interested in inspire.  He plans to attempt oral device to at least get AHI down to as low as possible.  Return in about 6 months (around 12/6/2024).

## 2024-06-12 ENCOUNTER — DOCUMENTATION (OUTPATIENT)
Dept: PHARMACY | Facility: MEDICAL CENTER | Age: 74
End: 2024-06-12
Payer: MEDICARE

## 2024-06-13 NOTE — PROGRESS NOTES
PHARMACIST FOLLOW UP - First Cycle   Dx: Mixed hypercholesterolemia and hypertriglyceridemia E78.2  Primary or Secondary Prevention of clinical ASCVD: Secondary     Txt review: Repatha 140mg/mL solution in SureClick auto-injector, injected under the skin once every 14 days    Administration: Repatha 140mg, injected 1 pen under the skin on 6/5/24. Denies any difficulty using (has prior experience with injections, is a former anesthesia   Adherence: Informed me his next dose will be due on 6/19 and will be following an every other Wednesday schedule moving forward.    Missed dose mgmt: n/a   Current SE + Mitigation/Mgmt:    Upset stomach: shared it lasted a day and resolved without any mitigation.   List Changes to Allergies, Diagnoses: none   New cardio events since we last spoke: none  Wellness/Lifestyle Counseling:    Immunizations: recommended the following     ? Annual flu    ? COVID boosters    ? RSV    ? Tdap booster (last completed 2011)    Diet: shared he follows a healthy diet and limits fried/processed foods routinely. They often cook at home and incorporate lean meats (e.g. chicken, turkey) and limits his red meat intake. Darren's wife is all about consuming vegetables and fruits so he, as a result, also consumes these often.     Exercise: Routine activity 4 days a week which can include yoga, core and balance. Did share he's trying to increase the amount he walks and is a work in progress.     Smoking: cessation review n/a; former smoker, continues to refrain from use  Med Rec/Updated drug list: No changes since last assessment, reviewed with patient/caregiver.  Additional: Had a brief but pleasant talk with Darren who shared things are going well with his Repatha so far. Shared the device was easy to use and feels overall well. Informed him if his provider asked him for repeat labs to complete them after he completes his 3rd injection of Repatha to appropriately assess efficacy. He happily shared he has  other friends on Repatha so he has a good support network about what to anticipate while on therapy. No questions/concerns at this time and thanked me for the call; agreeable to routine calls from clinical pharmacist team throughout the year.

## 2024-06-25 ENCOUNTER — DOCUMENTATION (OUTPATIENT)
Dept: PHARMACY | Facility: MEDICAL CENTER | Age: 74
End: 2024-06-25
Payer: MEDICARE

## 2024-06-25 NOTE — PROGRESS NOTES
===CLINICAL INTERVENTION===  Darren has questions regarding some GI symptoms he's been having since starting Repatha. With first dose he had slight stomach upset/discomfort. With the second dose on Wednesday 6/19 the following day his stomach upset reoccurred and by Friday it started to worsen and still ongoing at the time of our call. He describes abdominal pain within the abdominal muscles and a sour taste in his mouth. In the mornings upon drinking a glass of water will feel the pain. Started using Rolaids on Sunday and has remained on pantoprazole 40mg by mouth twice a day. Reports Rolaids provide relief, however is short lived. Denies any changes to diet/intake or medications since our last assessment. I had advised he can try over the counter famotidine and take 1 tab by mouth twice a day in addition to his protonix and tums for additional acid suppression. Reviewed GI symptoms are rare with Repatha use but is listed as a side effect in the prescribing info (gastroenteritis: 3% to 6%); with his medical history could be at greater risk at baseline. Encouraged him to reach out to his gastroenterologist for further evaluation to which he was agreeable and will consider starting famotidine.

## 2024-06-28 ENCOUNTER — APPOINTMENT (OUTPATIENT)
Dept: CARDIOLOGY | Facility: MEDICAL CENTER | Age: 74
End: 2024-06-28
Attending: FAMILY MEDICINE
Payer: MEDICARE

## 2024-06-28 ENCOUNTER — APPOINTMENT (OUTPATIENT)
Dept: RADIOLOGY | Facility: MEDICAL CENTER | Age: 74
End: 2024-06-28
Attending: FAMILY MEDICINE
Payer: MEDICARE

## 2024-07-01 ENCOUNTER — APPOINTMENT (OUTPATIENT)
Dept: CARDIOLOGY | Facility: MEDICAL CENTER | Age: 74
End: 2024-07-01
Attending: FAMILY MEDICINE
Payer: MEDICARE

## 2024-07-01 DIAGNOSIS — R55 SYNCOPE, UNSPECIFIED SYNCOPE TYPE: ICD-10-CM

## 2024-07-01 LAB
LV EJECT FRACT  99904: 55
LV EJECT FRACT MOD 2C 99903: 59.22
LV EJECT FRACT MOD 4C 99902: 51.12
LV EJECT FRACT MOD BP 99901: 52.82

## 2024-07-01 PROCEDURE — 93306 TTE W/DOPPLER COMPLETE: CPT | Mod: 26 | Performed by: INTERNAL MEDICINE

## 2024-07-01 PROCEDURE — 93306 TTE W/DOPPLER COMPLETE: CPT

## 2024-07-16 ENCOUNTER — HOSPITAL ENCOUNTER (OUTPATIENT)
Dept: RADIOLOGY | Facility: MEDICAL CENTER | Age: 74
End: 2024-07-16
Attending: FAMILY MEDICINE
Payer: MEDICARE

## 2024-07-16 DIAGNOSIS — R42 DYSEQUILIBRIUM: ICD-10-CM

## 2024-07-16 DIAGNOSIS — I65.01: Chronic | ICD-10-CM

## 2024-07-16 PROCEDURE — 93880 EXTRACRANIAL BILAT STUDY: CPT

## 2024-07-16 PROCEDURE — 93880 EXTRACRANIAL BILAT STUDY: CPT | Mod: 26 | Performed by: INTERNAL MEDICINE

## 2024-08-05 ENCOUNTER — TELEPHONE (OUTPATIENT)
Dept: VASCULAR LAB | Facility: MEDICAL CENTER | Age: 74
End: 2024-08-05
Payer: MEDICARE

## 2024-08-05 NOTE — TELEPHONE ENCOUNTER
Established patient  Chart prep for upcoming appointment.    Any pending/incomplete orders from last visit? Yes Labs  Was patient called and reminded to complete pending orders? Yes  Were any records requested?  No    Referral up to date? Yes  Referral attached to appointment (renewals and New patients only)? N/A (established with up-to-date referral)  Virtual appointment? No

## 2024-08-08 LAB — HBA1C MFR BLD: 6.1 % (ref ?–5.8)

## 2024-08-13 ENCOUNTER — OFFICE VISIT (OUTPATIENT)
Dept: VASCULAR LAB | Facility: MEDICAL CENTER | Age: 74
End: 2024-08-13
Attending: FAMILY MEDICINE
Payer: MEDICARE

## 2024-08-13 ENCOUNTER — PATIENT MESSAGE (OUTPATIENT)
Dept: VASCULAR LAB | Facility: MEDICAL CENTER | Age: 74
End: 2024-08-13

## 2024-08-13 VITALS
DIASTOLIC BLOOD PRESSURE: 75 MMHG | SYSTOLIC BLOOD PRESSURE: 127 MMHG | HEART RATE: 74 BPM | HEIGHT: 72 IN | WEIGHT: 169 LBS | BODY MASS INDEX: 22.89 KG/M2

## 2024-08-13 DIAGNOSIS — I25.2 HISTORY OF ANTERIOR WALL MYOCARDIAL INFARCTION: ICD-10-CM

## 2024-08-13 DIAGNOSIS — Z95.5 STENTED CORONARY ARTERY: ICD-10-CM

## 2024-08-13 DIAGNOSIS — E11.59 TYPE 2 DIABETES MELLITUS WITH OTHER CIRCULATORY COMPLICATION, WITHOUT LONG-TERM CURRENT USE OF INSULIN (HCC): ICD-10-CM

## 2024-08-13 DIAGNOSIS — I70.0 AORTIC ATHEROSCLEROSIS (HCC): ICD-10-CM

## 2024-08-13 DIAGNOSIS — E78.2 MIXED HYPERLIPIDEMIA DUE TO TYPE 2 DIABETES MELLITUS (HCC): ICD-10-CM

## 2024-08-13 DIAGNOSIS — E11.69 MIXED HYPERLIPIDEMIA DUE TO TYPE 2 DIABETES MELLITUS (HCC): ICD-10-CM

## 2024-08-13 DIAGNOSIS — I65.23 MILD ATHEROSCLEROSIS OF CAROTID ARTERY, BILATERAL: ICD-10-CM

## 2024-08-13 DIAGNOSIS — Z78.9 STATIN INTOLERANCE: ICD-10-CM

## 2024-08-13 DIAGNOSIS — I10 PRIMARY HYPERTENSION: ICD-10-CM

## 2024-08-13 DIAGNOSIS — I25.10 ATHEROSCLEROSIS OF NATIVE CORONARY ARTERY OF NATIVE HEART WITHOUT ANGINA PECTORIS: ICD-10-CM

## 2024-08-13 DIAGNOSIS — I65.01: ICD-10-CM

## 2024-08-13 PROBLEM — K44.9 DIAPHRAGMATIC HERNIA WITHOUT OBSTRUCTION OR GANGRENE: Status: ACTIVE | Noted: 2024-07-12

## 2024-08-13 PROBLEM — R13.19 ESOPHAGEAL DYSPHAGIA: Status: ACTIVE | Noted: 2024-08-13

## 2024-08-13 PROCEDURE — 3074F SYST BP LT 130 MM HG: CPT | Performed by: FAMILY MEDICINE

## 2024-08-13 PROCEDURE — G2211 COMPLEX E/M VISIT ADD ON: HCPCS | Performed by: FAMILY MEDICINE

## 2024-08-13 PROCEDURE — 99214 OFFICE O/P EST MOD 30 MIN: CPT | Performed by: FAMILY MEDICINE

## 2024-08-13 PROCEDURE — 3078F DIAST BP <80 MM HG: CPT | Performed by: FAMILY MEDICINE

## 2024-08-13 PROCEDURE — 99212 OFFICE O/P EST SF 10 MIN: CPT

## 2024-08-13 RX ORDER — SILDENAFIL CITRATE 20 MG/1
TABLET ORAL
COMMUNITY

## 2024-08-13 RX ORDER — POLYETHYLENE GLYCOL 3350 17 G/17G
POWDER, FOR SOLUTION ORAL
COMMUNITY

## 2024-08-13 ASSESSMENT — ENCOUNTER SYMPTOMS
HEADACHES: 0
DIZZINESS: 1
HEMOPTYSIS: 0
EYE PAIN: 0
PND: 0
FOCAL WEAKNESS: 0
COUGH: 0
SHORTNESS OF BREATH: 0
FEVER: 0
TREMORS: 0
NAUSEA: 0
LOSS OF CONSCIOUSNESS: 0
PALPITATIONS: 0
BLURRED VISION: 0
VOMITING: 0
CHILLS: 0
SENSORY CHANGE: 0
WHEEZING: 0
SPUTUM PRODUCTION: 0
SEIZURES: 0
DOUBLE VISION: 0
CLAUDICATION: 0
ORTHOPNEA: 0
WEAKNESS: 0
ABDOMINAL PAIN: 0
TINGLING: 0
SPEECH CHANGE: 0

## 2024-08-13 ASSESSMENT — FIBROSIS 4 INDEX: FIB4 SCORE: 1.93

## 2024-08-13 NOTE — PROGRESS NOTES
FOLLOW-[facility] FAMILY LIPID CLINIC VISIT   08/13/24     Darren Brunson has been referred for management of dyslipidemia  Referral Source: Lala Camejo A.P.R.N. , Est 3/2024    Subjective     Interval hx/concerns: last seen 5/2024, noted GI distress after starting repatha for 1-2 d after 1st,  2nd injection - 3-4 days severe symptoms of distress.  Has been holding since.   Had echo and carotid duplex.    Seen by (Dr. Mosqueda at [facility]), had gastritis, bx taken - pending results  No other new sx. Labs done at [facility] - still lp(a), apoB    ANTIONE - pending appliance - pending 8/20 to initiate      LIFESTYLE MGMT  Change in weight: Stable  Body mass index is 22.92 kg/m².  Wt Readings from Last 3 Encounters:   08/13/24 76.7 kg (169 lb)   07/29/24 77.1 kg (170 lb)   06/06/24 78 kg (172 lb)      Exercise habits:  active yoga, core training  - very active   Dietary patterns: Medi style   Etoh: see sochx  Reported barriers to care: none     MEDICATION MGMT  Current Prescription Lipid Lowering Medications - including dose:   Statin: None  Non-Statin: repatha (stopped), fenofibrate 48mg (started yrs ago, no hx of TG >500 or  HTG-mediated acute pancreatitis reported, unclear why renal-dosed)  Current Lipid Lowering and Related Supplements: None  Adverse drug reactions/side effects? Yes - GI distress with repath   Adherence? yes    PERTINENT HLD PMHX  Initial visit hx:  seen by cardiology APRN 2/27/24 for ongoing CAD mgmt, noted to have uncontrolled HLD and prior statin  and zetia intol.  Here to review tx options.  Seen by pharm lipid clinic and started on nexletol (3/22/24) - pending initiation.    Pending ENT eval for repeat inspire and revision of prior surgery for known ANTIONE.   Current concern  regards position dysequilibrium when leaning forward then arising up while doing yoga.  Progressive over last 1 yr.  Has had presyncope w/o overt syncope or falls.  Denies baseline vertigo or associated HA or other focal neuro s/s  including visual changes or diplopia.   Seen by Dr. Smith for evaluation.    No other findings per neurology.  ENT reports no issues from ENT standpoint.     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   Age at Initial Diagnosis of Dyslipidemia: 50s    Baseline Lipids Prior to Treatment:     History of ASCVD: History of prior MI >12 months ago  (2 episodes)  # CAD, s/p MI with PCI LAD , RCA  - currently no sx, seeing  cardiology     Other Established (non-atherosclerotic) Vascular Disease, if Present: None  Secondary causes of dyslipidemia: none   Previously Attempted Interventions for Lipids - including outcome  Statin: None      Outcome: N/A  Non-Statin: None   Outcome: N/A  Any Previous History of Statin Intolerance? As noted above     ANTITHROMBOTIC THERAPY: Yes, Details: ASA 81mg , no hx of bleeding  HTN: stable, good adherence/tolerance of meds, Home BP los/70s     Current Outpatient Medications on File Prior to Visit   Medication Sig Dispense Refill    sildenafil (REVATIO) 20 MG tablet TAKE 1-5 TAB ORALLY BY MOUTH 45 MINUTE PRIOR TO SEXUAL ENCOUNTER ON EMPTY STOMACH      polyethylene glycol 3350 ([facility] PURELAX) 17 GM/SCOOP Powder TAKE 1 PACKET BY MOUTH EVERY DAY. WHILE TAKING NARCOTICS, HOLD IF GREATER THEN 3 BMS A DAY      Evolocumab (REPATHA) 140 MG/ML Solution Auto-injector SubQ injection pen Inject 1 mL under the skin every 14 days. To lower cholesterol and heart disease risk 6 Each 3    budesonide (PULMICORT) 0.5 MG/2ML Suspension INHALE 1 VIAL VIA NEBULIZER ONCE DAILY AS DIRECTED *J32.0      Olopatadine HCl 0.6 % Solution INSTILL 2 SPRAY INTO EACH NOSTRIL ONCE DAILY      predniSONE (DELTASONE) 10 MG Tab Take 10 mg by mouth every day.      valACYclovir (VALTREX) 500 MG Tab Take 1,000 mg by mouth every day.       tamsulosin (FLOMAX) 0.4 MG capsule Take 0.4 mg by mouth 1/2 hour after breakfast.      budesonide-formoterol (SYMBICORT) 160-4.5 MCG/ACT Aerosol Inhale 2 Puffs 2 times a day.      BREYNA 160-4.5 MCG/ACT Aerosol INHALE 2 PUFFS BY MOUTH TWICE A DAY. USE WITH SPACER AND RINSE MOUTH AFTER EACH USE 10.3 Each 5    acetaminophen (TYLENOL) 325 MG Tab Take 2 Tablets by mouth every 6 hours. Alternate w/ ibuprofen to take a medication every 3 hrs, take scheduled for 3 days post-op then PRN 60 Tablet 0    ibuprofen (MOTRIN) 600 MG Tab Take 1 Tablet by mouth every 6 hours. Alternate w/ tylenol to take a medication every 3 hrs, take scheduled for 3 days post-op then PRN 45 Tablet 0    fluticasone (FLONASE) 50 MCG/ACT nasal spray Administer 2 Sprays into affected nostril(S) as needed.      Cholecalciferol (VITAMIN D3) 125 MCG (5000 UT) Cap Take 5,000 Units by mouth every day.      glucose blood (ONETOUCH ULTRA) strip 1 Each by Other route every day.      pantoprazole (PROTONIX) 40 MG Tablet Delayed Response Take 1 Tablet by mouth every day.      aspirin (ASA) 81 MG Chew Tab chewable tablet Chew 81 mg every day.      fexofenadine (ALLEGRA) 180 MG tablet Take 180 mg by mouth 2 times a day.      Multiple Vitamins-Minerals (PRESERVISION AREDS 2+MULTI VIT PO) Take 1 Tablet by mouth 2 times a day.      Menaquinone-7 (VITAMIN K2 PO) Take  by mouth every day.      albuterol 108 (90 Base) MCG/ACT Aero Soln inhalation aerosol INHALE 1-2 PUFFS BY MOUTH EVERY FOUR HOURS AS NEEDED FOR SHORTNESS OF BREATH. 8.5 Each 11    metformin ER (GLUCOPHAGE XR) 500 MG TABLET SR 24 HR Take 500 mg by mouth every evening.      gabapentin (NEURONTIN) 100 MG Cap Take 100-200 mg by mouth 2 times a day. 100 mg every AM and 200 mg every night NIGHT      Probiotic Product (PROBIOTIC DAILY PO) Take 1 Tab by mouth every day.       No current facility-administered medications on file prior to visit.      Allergies   Allergen Reactions    Lisinopril Anaphylaxis      Lip and tongue swelling    Aspirin      High dose only - aspirin induced tinnitus    Bempedoic Acid      Myalgia, HA, abd pain    Other Reaction(s): Joint pain, stomach upset    Ciprofloxacin      Tendon pain  Other reaction(s): shoulder pain, blurred vision, abdominal pain  Tendon pain    Crestor [Rosuvastatin Calcium] Unspecified     Muscle pain     Hmg-Coa-R Inhibitors      Other reaction(s): Unspecified  Muscle pain     Atorvastatin     Ezetimibe     Repatha [Evolocumab]      GI distress, abdominal pain with 1st/2nd injection    Singulair Unspecified     Pain, anxiety    Topamax [Topiramate]      Tension, headache      Social History     Tobacco Use    Smoking status: Former     Current packs/day: 0.00     Average packs/day: 1 pack/day for 30.0 years (30.0 ttl pk-yrs)     Types: Cigarettes     Start date: 1967     Quit date: 1997     Years since quittin.9    Smokeless tobacco: Never   Vaping Use    Vaping status: Never Used   Substance Use Topics    Alcohol use: No    Drug use: No     Review of Systems   Constitutional:  Negative for chills, fever and malaise/fatigue.   Eyes:  Negative for blurred vision, double vision and pain.   Respiratory:  Negative for cough, hemoptysis, sputum production, shortness of breath and wheezing.    Cardiovascular:  Negative for chest pain, palpitations, orthopnea, claudication, leg swelling and PND.   Gastrointestinal:  Negative for abdominal pain, nausea and vomiting.   Neurological:  Positive for dizziness. Negative for tingling, tremors, sensory change, speech change, focal weakness, seizures, loss of consciousness, weakness and headaches.       Objective    Vitals:    24 1007 24 1015   BP: 136/71 127/75   BP Location: Left arm Left arm   Patient Position: Sitting Sitting   BP Cuff Size: Adult Adult   Pulse: 79 74   Weight: 76.7 kg (169 lb)    Height: 1.829 m (6')       BP Readings from Last 5 Encounters:   24 127/75   24 114/60    05/28/24 105/70   03/26/24 133/77   02/27/24 122/60     BMI Readings from Last 1 Encounters:   08/13/24 22.92 kg/m²      Wt Readings from Last 3 Encounters:   08/13/24 76.7 kg (169 lb)   07/29/24 77.1 kg (170 lb)   06/06/24 78 kg (172 lb)      Physical Exam  Constitutional:       Appearance: Normal appearance. He is well-developed.   HENT:      Head: Normocephalic and atraumatic.   Eyes:      Conjunctiva/sclera: Conjunctivae normal.   Neck:      Thyroid: No thyromegaly.      Vascular: No JVD.   Cardiovascular:      Rate and Rhythm: Normal rate and regular rhythm.      Pulses: Normal pulses.           Radial pulses are 2+ on the right side and 2+ on the left side.        Dorsalis pedis pulses are 2+ on the right side and 2+ on the left side.        Posterior tibial pulses are 2+ on the right side and 2+ on the left side.      Heart sounds: Normal heart sounds. No murmur heard.     No friction rub. No gallop.   Pulmonary:      Effort: Pulmonary effort is normal. No respiratory distress.      Breath sounds: Normal breath sounds.   Musculoskeletal:      Cervical back: Normal range of motion and neck supple.   Lymphadenopathy:      Cervical: No cervical adenopathy.   Skin:     General: Skin is warm and dry.   Neurological:      General: No focal deficit present.      Mental Status: He is alert and oriented to person, place, and time.      Cranial Nerves: No cranial nerve deficit.      Coordination: Coordination normal.      Gait: Gait normal.       DATA REVIEW:  Most Recent Lipid Panel:     [facility] 8/10/24   C 175 HDL 51    Lp(a), apoB pending status at time of visit   CMP: gluc 102, Cr 0.71  UACR wnl   CRP (run as regular and not hsCRP)  <3.0       [facility] 1/2024       TSH wnl       Lab Results   Component Value Date/Time    CHOLSTRLTOT 190 06/27/2020 12:47 AM     (H) 06/27/2020 12:47 AM    HDL 47 06/27/2020 12:47 AM    TRIGLYCERIDE 116 06/27/2020 12:47 AM     Lab Results   Component Value  "Date/Time     (H) 06/27/2020 12:47 AM    LDL 98 03/20/2018 06:33 AM         No results found for: \"LIPOPROTA\"   No results found for: \"APOB\"   No results found for: \"CRPHIGHSEN\"    Other Pertinent Blood Work:   Lab Results   Component Value Date    SODIUM 136 07/31/2023    POTASSIUM 3.9 07/31/2023    CHLORIDE 101 07/31/2023    CO2 26 07/31/2023    ANION 9.0 07/31/2023    GLUCOSE 102 (H) 07/31/2023    BUN 17 07/31/2023    CREATININE 1.16 07/31/2023    CALCIUM 9.1 07/31/2023    ASTSGOT 30 05/29/2023    ALTSGPT 29 05/29/2023    ALKPHOSPHAT 57 05/29/2023    TBILIRUBIN 0.4 05/29/2023    ALBUMIN 4.0 05/29/2023    AGRATIO 1.6 05/29/2023     VASCULAR IMAGING    MPI 2020   NUCLEAR IMAGING INTERPRETATION    No reversible defects that would indicate ischemia.    Matched defect in the inferior apex is either artifactual or due to a small    inferior wall infarct.    Normal left ventricular size, ejection fraction, and wall motion.    ECG INTERPRETATION    Normal ECG portion of a treadmill nuclear stress test.    Average exercise tolerance for age.    Normal BP response to exercise.    Sinus rhythm.    No ischemic ECG changes.    No chest pain during stress.    Ernandez treadmill score 6, low risk (assume no prior CAD).     CT a/p with 1/2023  Vasculature: Atherosclerosis.   IMPRESSION:   1.  Findings in keeping with acute pancreatitis involving the pancreatic tail.  2.  Small right inguinal hernia containing fat and the appendix.  3.  Atherosclerosis.    MR brain 8/2023       [facility] head/neck 9/2023 ([facility])        Zio patch 5/2024  DOS: 5/10/2024-5/23/2024   Summary:   The patient was monitored for 13 days 5 hours.  Predominant underlying rhythm was sinus rhythm.   3 runs of supraventricular tachycardia occurred, the fastest and longest run lasting 19 beats with a max rate of 182 bpm.   Rare PACs less than 1%.  Rare PVCs less than 1%.   No evidence of pauses or significant bradyarrhythmias.   Symptoms correlated with sinus rhythm " with heart rate 82 to 125 bpm with PACs.     Echo 6/2024  Prior study on 6/26/2020, compared to the report of the prior study,   there has been no significant change.   Normal left ventricular systolic function.  The left ventricular ejection fraction is visually estimated to be 55%.  Mild aortic insufficiency.  Estimated right ventricular systolic pressure is 20 mmHg.  Aorta  Normal aortic root for body surface area. The ascending aorta diameter is 3.7 cm.    Carotid 7/2024    1.  Mild plaque without evidence of hemodynamically significant stenosis in  bilateral internal carotid arteries.   2.  Loss of diastolic forward flow in right vertebral artery suggestive of  possible distal vessel occlusion.            ASSESSMENT AND PLAN  1. Mixed hyperlipidemia due to type 2 diabetes mellitus (HCC)  Comp Metabolic Panel    Lipid Profile    CRP HIGH SENSITIVE (CARDIAC)      2. Aortic atherosclerosis (HCC)        3. Type 2 diabetes mellitus with other circulatory complication, without long-term current use of insulin (HCC)        4. Mild atherosclerosis of carotid artery, bilateral        5. Occlusion of vertebral artery, right        6. Stented coronary artery        7. History of anterior wall myocardial infarction        8. Atherosclerosis of native coronary artery of native heart without angina pectoris        9. Statin intolerance        10. Primary hypertension          Patient Type: Secondary Prevention and Type 2 Diabetes Mellitus, very high risk, polyvascular     Established Atherosclerotic Cardiovascular Disease (ASCVD):  polyvascular     1) CAD, s/p MI with PCI LAD 2009, RCA 2015 - stable, no sx   2020 MPI - stable  6/2024 echo = normal   Plan:  - continue secondary prevention treatment goals, intensive med mgmt and lifestyle interventions   - ongoing care with cardiology     2) Non-aneurysmal aortic atherosclerosis (AS) - CT, no symptoms or prior known associated clinical manifestations  - general indicator of  systemic AS with higher potential AS in other vascular beds  Plan:  - no further imaging surveillance, continue med mgmt      3) right vertebral artery occlusion, V2-V3 seg - does not appear to be symptomatic, presumed  chronic   9/2023 CTA = occlusion,  reconstitutes from basilar and left vert a backward flow  6/2024 duplex = loss of diast flow R vert art   Plan  - continue med mgmt  - repeat duplex for surveillance in 1yr (8/2025)    Other Established (non-atherosclerotic) Vascular Disease:     1) new progressive presyncope vs dysequilibrium - resolved   Unlikely vasc etiology based upon prior normal MR brain and CTA neck however no functional duplex scanning to date, and risk for retrograde and/or steal phenomenon is plausible and worth evaluating further.  Lower prob in light of no other overt steal or posterior circ syndrome  - possible cardiogenic etiology in light of prior MIs and ischemic CM   - normal echo and zio patch (mild PACs),  no hx of arrhythmias reported, donna no AF/AFL  - findings of R vertebral artery occlusion with retrograde flow unlikely to be etiology  Plan:   - no further vascular w/u, defer ongoing to PCP, cardiology     Evidence of genetic dyslipidemia: No   FH genotyping: NO    Secondary causes/contributors: N\A    ACC/AHA Indication for Statin Therapy:  Secondary Prevention - Very high risk ASCVD - 2 major events or 1 event with other high-risk conditions    Calculated Risk for ASCVD  The ASCVD Risk score (Yola DK, et al., 2019) failed to calculate., N/A    Other Significant Risk Markers  Lp(a) = pending   apoB = pending     Goal LDL-C and nonHDL-C  LDL-C <55   At goal? No, 8/2024     - failed multiple agents     LIFESTYLE INTERVENTIONS  TOBACCO: Stages of Change: Maintenance (sustained change >6mo)    reports that he quit smoking about 26 years ago. His smoking use included cigarettes. He started smoking about 57 years ago. He has a 30 pack-year smoking history. He has never used  smokeless tobacco.   - continued complete avoidance of all tobacco products   PHYSICAL ACTIVITY: >150min/week of mod-intensity activity or as much as tolerated  NUTRITION: Mediterranean and reduce Na to 1500mg/day   ETOH: limit to 2 or less standard drinks/day   WT MGMT: maintain healthy weight    LIPID LOWERING MEDICATION MANAGEMENT:     Statin Therapy - COMPLETE INTOLERANCE   - failed 2+ statins including rosuva, atorva  - no further rechallenges     Non-Statin Medications  ZETIA: failed due ADRs   NEXLETOL: failed due to ADRs   BAS: limited benefit in this pt, not indicated   OMEGA-3 FAs: if TG >150 in light of DM, ASCVD - for 25% CVD risks reduction   FIBRATE: stopped fenofibrate at last visit as not beneficial unless TG >500  PCSK9 mAb: failed repatha due to GI distress that coincided to injections repeatedly, consider praluent   - there is slight increase risk for diarrhea, gastroenteritis compared to placebo in Rx information insertion   LEQVIO: strong candidate - gave information     *pt prefers to review with GI doctor prior to initiating any of the above additional options, we thoroughly review the prescribing information ADRs section at this visit     Indication for PCSK9 Inhibitor strategy:   PSCK9i indicated per 2018 ACC/AHA guidelines in this patient with established ASCVD whose LDL-C remains above threshold of 70 mg/dl despite maximally tolerated statin therapy.    BLOOD PRESSURE MANAGEMENT:  ACC/AHA (2017) goal <130/80  Home BP at goal:  yes  Office BP at goal:  yes- WCE noted   24h ABPM: not ordered to date  RDN candidate? NO  Plan:   - continue home BP monitoring, reviewed correct technique, provide BP log and instructions  - order 24h ABPM:  NO  - monitor lytes/gfr routinely   Medications:  - no current meds   - prior class indications are RASi, then consider BB in light of CAD hx     GLYCEMIC STATUS: Diabetic, T2 with mixed HLD, CAD  Goal A1c < 7  Lab Results   Component Value Date    HBA1C 6.1  (H) 01/31/2024    HBA1C 6.2 (H) 07/31/2023      Lab Results   Component Value Date/Time    MALBCRT NOTE 01/31/2024 07:40 AM    MICROALBUR <0.2 01/31/2024 07:40 AM   Plan:  - continue current medication plan   - recommmend for routine care with PCP (or endocrine) to include regular A1c monitoring, annual albumin/creatinine ratio (ACR), annual diabetic retinopathy screening, foot exams, annual flu vaccine, and updates to pneumonia vaccines as appropriate      ANTITHROMBOTIC THERAPY yes, ASA 81mg daily , if worsening tinnitus, then transition to plavix     OTHER:    # ANTIONE - new onset despite UPPP - worsening sx  STOP-BANG = 5pts   Plan:  - pending oral appliance eval and is aware of Inspire     # tinnitus - mild symptomatic     # gastric distress,  gastritis   - ongoing with GI doctor     Studies Ordered at Todays Visit:   carotid duplex 8/2025  - RN to track   Blood Work Ordered At Today’s visit: as noted above   Follow up: RTC in 3 months     Kyrie Sales M.D.  JAIRO, Board-certified clinical lipidologist   [facility]   [facility]   [phone]

## 2024-08-20 ENCOUNTER — APPOINTMENT (RX ONLY)
Dept: URBAN - METROPOLITAN AREA CLINIC 15 | Facility: CLINIC | Age: 74
Setting detail: DERMATOLOGY
End: 2024-08-20

## 2024-08-20 DIAGNOSIS — D18.0 HEMANGIOMA: ICD-10-CM

## 2024-08-20 DIAGNOSIS — Z87.2 PERSONAL HISTORY OF DISEASES OF THE SKIN AND SUBCUTANEOUS TISSUE: ICD-10-CM

## 2024-08-20 DIAGNOSIS — D22 MELANOCYTIC NEVI: ICD-10-CM

## 2024-08-20 DIAGNOSIS — L81.4 OTHER MELANIN HYPERPIGMENTATION: ICD-10-CM

## 2024-08-20 DIAGNOSIS — B00.1 HERPESVIRAL VESICULAR DERMATITIS: ICD-10-CM

## 2024-08-20 DIAGNOSIS — L82.1 OTHER SEBORRHEIC KERATOSIS: ICD-10-CM

## 2024-08-20 PROBLEM — D18.01 HEMANGIOMA OF SKIN AND SUBCUTANEOUS TISSUE: Status: ACTIVE | Noted: 2024-08-20

## 2024-08-20 PROBLEM — D22.5 MELANOCYTIC NEVI OF TRUNK: Status: ACTIVE | Noted: 2024-08-20

## 2024-08-20 PROCEDURE — ? ADDITIONAL NOTES

## 2024-08-20 PROCEDURE — ? COUNSELING

## 2024-08-20 PROCEDURE — 99213 OFFICE O/P EST LOW 20 MIN: CPT

## 2024-08-20 ASSESSMENT — LOCATION ZONE DERM
LOCATION ZONE: TRUNK
LOCATION ZONE: HAND

## 2024-08-20 ASSESSMENT — LOCATION DETAILED DESCRIPTION DERM
LOCATION DETAILED: LEFT BUTTOCK
LOCATION DETAILED: LEFT INFERIOR UPPER BACK
LOCATION DETAILED: RIGHT BUTTOCK
LOCATION DETAILED: RIGHT RADIAL DORSAL HAND
LOCATION DETAILED: RIGHT SUPERIOR LATERAL LOWER BACK

## 2024-08-20 ASSESSMENT — LOCATION SIMPLE DESCRIPTION DERM
LOCATION SIMPLE: LEFT UPPER BACK
LOCATION SIMPLE: RIGHT LOWER BACK
LOCATION SIMPLE: RIGHT HAND
LOCATION SIMPLE: LEFT BUTTOCK
LOCATION SIMPLE: RIGHT BUTTOCK

## 2024-08-20 NOTE — PROCEDURE: ADDITIONAL NOTES
Detail Level: Detailed
Render Risk Assessment In Note?: no
Additional Notes: Patient still using valacyclovir 500 mg, once every other day. Patient reports this has been extremely beneficial and has not had a flare. Patient does not need refills at this time, encouraged to call if he needs any refills.

## 2024-08-21 DIAGNOSIS — E78.2 MIXED HYPERLIPIDEMIA DUE TO TYPE 2 DIABETES MELLITUS (HCC): ICD-10-CM

## 2024-08-21 DIAGNOSIS — E11.69 MIXED HYPERLIPIDEMIA DUE TO TYPE 2 DIABETES MELLITUS (HCC): ICD-10-CM

## 2024-08-21 DIAGNOSIS — I10 PRIMARY HYPERTENSION: ICD-10-CM

## 2024-08-21 DIAGNOSIS — I25.10 ATHEROSCLEROSIS OF NATIVE CORONARY ARTERY OF NATIVE HEART WITHOUT ANGINA PECTORIS: ICD-10-CM

## 2024-08-21 DIAGNOSIS — Z95.5 STENTED CORONARY ARTERY: ICD-10-CM

## 2024-08-21 DIAGNOSIS — I70.0 AORTIC ATHEROSCLEROSIS (HCC): ICD-10-CM

## 2024-09-03 ENCOUNTER — OFFICE VISIT (OUTPATIENT)
Dept: CARDIOLOGY | Facility: MEDICAL CENTER | Age: 74
End: 2024-09-03
Attending: INTERNAL MEDICINE
Payer: MEDICARE

## 2024-09-03 VITALS
RESPIRATION RATE: 16 BRPM | SYSTOLIC BLOOD PRESSURE: 112 MMHG | WEIGHT: 168 LBS | HEART RATE: 86 BPM | BODY MASS INDEX: 22.75 KG/M2 | HEIGHT: 72 IN | OXYGEN SATURATION: 96 % | DIASTOLIC BLOOD PRESSURE: 72 MMHG

## 2024-09-03 DIAGNOSIS — Z78.9 STATIN INTOLERANCE: ICD-10-CM

## 2024-09-03 DIAGNOSIS — I25.10 CORONARY ARTERY DISEASE INVOLVING NATIVE CORONARY ARTERY OF NATIVE HEART WITHOUT ANGINA PECTORIS: ICD-10-CM

## 2024-09-03 DIAGNOSIS — I83.11 VARICOSE VEINS OF BOTH LOWER EXTREMITIES WITH INFLAMMATION: ICD-10-CM

## 2024-09-03 DIAGNOSIS — I65.23 MILD ATHEROSCLEROSIS OF CAROTID ARTERY, BILATERAL: ICD-10-CM

## 2024-09-03 DIAGNOSIS — I83.12 VARICOSE VEINS OF BOTH LOWER EXTREMITIES WITH INFLAMMATION: ICD-10-CM

## 2024-09-03 DIAGNOSIS — R42 ORTHOSTATIC LIGHTHEADEDNESS: Primary | ICD-10-CM

## 2024-09-03 DIAGNOSIS — Z95.5 STENTED CORONARY ARTERY: ICD-10-CM

## 2024-09-03 PROCEDURE — 3078F DIAST BP <80 MM HG: CPT | Performed by: INTERNAL MEDICINE

## 2024-09-03 PROCEDURE — 99215 OFFICE O/P EST HI 40 MIN: CPT | Performed by: INTERNAL MEDICINE

## 2024-09-03 PROCEDURE — 99213 OFFICE O/P EST LOW 20 MIN: CPT | Performed by: INTERNAL MEDICINE

## 2024-09-03 PROCEDURE — 3074F SYST BP LT 130 MM HG: CPT | Performed by: INTERNAL MEDICINE

## 2024-09-03 ASSESSMENT — FIBROSIS 4 INDEX: FIB4 SCORE: 1.93

## 2024-09-03 NOTE — PATIENT INSTRUCTIONS
Blood and 24 hour urine tests  Vein ultrasound    10 exercises for varicose veins: try to do at least 2 of them at least once a day    [email or web address]        - start/continue knee-high compression socks, 20-30mmHg, as much as tolerated, reviewed compressionstore.Lanica and sizing processes   - increase walking, avoid prolonged standing   - elevated legs while sitting and sleeping above heart level  - side sleeping with body pillow may improve lymphatic and venous return by reducing  extrinsic compression on IVC during sleep  - start/continue daily moisturizing lotion (such as Gold Bond Diabetic Foot Cream)   -  start horse chestnut seed extract 300mg 2 times daily for 3 to 6 months to determine if helps with venous health

## 2024-09-03 NOTE — PROGRESS NOTES
CARDIOLOGY established PATIENT: Patient wishes to establish care with new provider    PCP: Kailee Pappas M.D.    1. Orthostatic lightheadedness    2. Varicose veins of both lower extremities with inflammation    3. Coronary artery disease involving native coronary artery of native heart without angina pectoris    4. Statin intolerance    5. Stented coronary artery    6. Mild atherosclerosis of carotid artery, bilateral        Darren Brunson is here for follow-up regarding history of CAD with prior PCI and ongoing orthostatic lightheadedness    Chief Complaint   Patient presents with    Hypertension    Coronary Artery Disease     F/V Dx: Coronary artery disease involving native coronary artery of native heart without angina pectoris       History: Darren Brunson is a 74 y.o. male  ([employer]), retired anesthesia tech, with ANTIONE using mouthguard, CAD with LAD PCI 2009 and RCA PCI 2015, DL with statin / repatha / ezetimbe intolerance, chronic thrombocytopenia (90's 8/2024), asthma, prediabetes, BPH , is here to establish care with new cardiology provider.  Also establish with vascular medicine - Dr. Sales.    Plan to initiate leqvio soon.    LAD PCI 2009 - 1 stent  RCA PCI 2/2015 - 2 stents    Rare heartburn symptoms. No recurrence of chest pain sensations he had in 2009 and 2015 (neck / upper back / left arm, upset stomach , chest pain).    Exercise: yoga, walks, aerobic. Worsening orthostatic LH over last 3 years. Denies CP or SOB with exercise. Occasional mild left ankle swelling (fractured 1970s).    Occasional BP check at home : 2-3 days a week, usually 120/60's mmHg, up to 130/80's mmHg. Sometimes feels palpitations.    Recently started flomax, no change in orthostasis. Planning to hold off.    Normal TSH 1/2024 8/2024: off repatha for ~ 2 months (only took 2 doses)  Normal lipo a and CRP  , HDL 51 and   ApoB 93 (moderate)  A1C 6.1%    Carotid duplex 7/2024:   1.   Mild plaque without evidence of hemodynamically significant stenosis in    bilateral internal carotid arteries.   2.  Loss of diastolic forward flow in right vertebral artery suggestive of    possible distal vessel occlusion.    Cardiac event monitor 5/2024 personally reviewed  Predominant underlying rhythm was sinus rhythm.   3 runs of supraventricular tachycardia occurred, the fastest and longest run lasting 19 beats with a max rate of 182 bpm.   Rare PACs less than 1%.  Rare PVCs less than 1%.   No evidence of pauses or significant bradyarrhythmias.   Symptoms correlated with sinus rhythm with heart rate 82 to 125 bpm with PACs.       PE:  /72 (BP Location: Left arm, Patient Position: Sitting, BP Cuff Size: Adult)   Pulse 86   Resp 16   Ht 1.829 m (6')   Wt 76.2 kg (168 lb)   SpO2 96%   BMI 22.78 kg/m²     Gen: well  HEENT: Symmetric face.  NECK: No JVD.   CARDIAC: Regular, Normal S1, S2, No murmur  VASCULATURE: carotids are normal bilaterally without bruit  RESP: Clear to auscultation bilaterally   EXT: No edema Stasis dermatitis bilaterally, bilateral varicose veins  SKIN: Warm and dry  NEURO: No gross deficits  PSYCH: Appropriate affect, participates in conversation    The ASCVD Risk score (Richford DK, et al., 2019) failed to calculate.    Past Medical History:   Diagnosis Date    Acute myocardial infarction of other anterior wall, episode of care unspecified 2009; 2014    Anxiety state, unspecified      Arthritis     foot, ankle, jaw right side    Asthma     Bronchitis     CAD (coronary artery disease) 09/2009    PCI/stent (Promus 3.0 x 15mm) to the LAD.    Cataract     left eye no surgery    Chickenpox     Cough     Diabetes (HCC)     on oral meds    Erosive gastritis 2020    Gastric ulcer 2020    Glaucoma     Heart murmur     2/17/2023 patient not aware of having a murmur    High cholesterol     Hyperlipidemia     Mumps     Pain 01/14/2023    discomfort abdomen    Psychiatric problem     S/P  coronary artery stent placement      Shortness of breath     Sleep apnea 2017    resolved after surgery for sleep apnea    Snoring      Past Surgical History:   Procedure Laterality Date    INGUINAL HERNIA REPAIR ROBOTIC XI Right 8/24/2023    Procedure: ROBOT ASSISTED LAPAROSCOPIC RIGHT INGUINAL HERNIA REPAIR;  Surgeon: Morris Mcfarland M.D.;  Location: [facility];  Service: Gen Robotic    NV UPPER GI ENDOSCOPY,DIAGNOSIS N/A 2/20/2023    Procedure: UPPER RADIAL ENDOSCOPIC ULTRASOUND PANCREATITIS, TAIL OF PANCREATIC MASS - ENDOSCOPY FINE NEEDLE ASPIRATION WITH ANESTHESIA;  Surgeon: Reymundo Loya M.D.;  Location: [facility];  Service: EUS    EGD W/ENDOSCOPIC ULTRASOUND N/A 2/20/2023    Procedure: EGD, WITH ENDOSCOPIC US;  Surgeon: Reymundo Loya M.D.;  Location: [facility];  Service: EUS    EGD WITH ASP/BX N/A 2/20/2023    Procedure: EGD, WITH ASPIRATION BIOPSY;  Surgeon: Reymundo Loya M.D.;  Location: [facility];  Service: EUS    SEPTAL RECONSTRUCTION N/A 10/23/2017    Procedure: SEPTAL RECONSTRUCTION;  Surgeon: CHARLI Sanchez M.D.;  Location: SURGERY SAME DAY [facility];  Service: Ent    UVULOPHARYNGOPALATOPLASTY N/A 10/23/2017    Procedure: UVULOPHARYNGOPALATOPLASTY;  Surgeon: CHARLI Sanchez M.D.;  Location: SURGERY SAME DAY [facility];  Service: Ent    BIOPSY GENERAL Left 10/23/2017    Procedure: BIOPSY GENERAL LEFT UPPER NECK;  Surgeon: CHARLI Sanchez M.D.;  Location: SURGERY SAME DAY [facility];  Service: Ent    INGUINAL HERNIA REPAIR BILATERAL  2015    STENT PLACEMENT  2015    SINUSOTOMIES  11/2011    Sinus surgery    OTHER CARDIAC SURGERY  2009    cardiac cath with stent placement    TONSILLECTOMY      UMBILICAL HERNIA REPAIR       Allergies   Allergen Reactions    Lisinopril Anaphylaxis     Lip and tongue swelling    Aspirin      High dose only - aspirin induced tinnitus    Bempedoic Acid      Myalgia, HA, abd pain    Other  Reaction(s): Joint pain, stomach upset    Ciprofloxacin      Tendon pain  Other reaction(s): shoulder pain, blurred vision, abdominal pain  Tendon pain    Crestor [Rosuvastatin Calcium] Unspecified     Muscle pain     Hmg-Coa-R Inhibitors      Other reaction(s): Unspecified  Muscle pain     Atorvastatin     Ezetimibe     Repatha [Evolocumab]      GI distress, abdominal pain with 1st/2nd injection    Singulair Unspecified     Pain, anxiety    Topamax [Topiramate]      Tension, headache     Outpatient Encounter Medications as of 9/3/2024   Medication Sig Dispense Refill    sildenafil (REVATIO) 20 MG tablet TAKE 1-5 TAB ORALLY BY MOUTH 45 MINUTE PRIOR TO SEXUAL ENCOUNTER ON EMPTY STOMACH      budesonide (PULMICORT) 0.5 MG/2ML Suspension INHALE 1 VIAL VIA NEBULIZER ONCE DAILY AS DIRECTED *J32.0      valACYclovir (VALTREX) 500 MG Tab Take 1,000 mg by mouth every day.      tamsulosin (FLOMAX) 0.4 MG capsule Take 0.4 mg by mouth 1/2 hour after breakfast.      BREYNA 160-4.5 MCG/ACT Aerosol INHALE 2 PUFFS BY MOUTH TWICE A DAY. USE WITH SPACER AND RINSE MOUTH AFTER EACH USE 10.3 Each 5    fluticasone (FLONASE) 50 MCG/ACT nasal spray Administer 2 Sprays into affected nostril(S) as needed.      Cholecalciferol (VITAMIN D3) 125 MCG (5000 UT) Cap Take 5,000 Units by mouth every day.      glucose blood (ONETOUCH ULTRA) strip 1 Each by Other route every day.      pantoprazole (PROTONIX) 40 MG Tablet Delayed Response Take 1 Tablet by mouth every day.      aspirin (ASA) 81 MG Chew Tab chewable tablet Chew 81 mg every day.      fexofenadine (ALLEGRA) 180 MG tablet Take 180 mg by mouth 2 times a day.      Multiple Vitamins-Minerals (PRESERVISION AREDS 2+MULTI VIT PO) Take 1 Tablet by mouth 2 times a day.      albuterol 108 (90 Base) MCG/ACT Aero Soln inhalation aerosol INHALE 1-2 PUFFS BY MOUTH EVERY FOUR HOURS AS NEEDED FOR SHORTNESS OF BREATH. 8.5 Each 11    metformin ER (GLUCOPHAGE XR) 500 MG TABLET SR 24 HR Take 500 mg by mouth  every evening.      gabapentin (NEURONTIN) 100 MG Cap Take 100-200 mg by mouth 2 times a day. 100 mg every AM and 200 mg every night NIGHT      Probiotic Product (PROBIOTIC DAILY PO) Take 1 Tab by mouth every day.      [DISCONTINUED] polyethylene glycol 3350 ([employer] PURELAX) 17 GM/SCOOP Powder TAKE 1 PACKET BY MOUTH EVERY DAY. WHILE TAKING NARCOTICS, HOLD IF GREATER THEN 3 BMS A DAY      [DISCONTINUED] Evolocumab (REPATHA) 140 MG/ML Solution Auto-injector SubQ injection pen Inject 1 mL under the skin every 14 days. To lower cholesterol and heart disease risk (Patient not taking: Reported on 8/20/2024) 6 Each 3    [DISCONTINUED] Olopatadine HCl 0.6 % Solution INSTILL 2 SPRAY INTO EACH NOSTRIL ONCE DAILY (Patient not taking: Reported on 9/3/2024)      [DISCONTINUED] predniSONE (DELTASONE) 10 MG Tab Take 10 mg by mouth every day.      budesonide-formoterol (SYMBICORT) 160-4.5 MCG/ACT Aerosol Inhale 2 Puffs 2 times a day. (Patient not taking: Reported on 9/3/2024)      [DISCONTINUED] acetaminophen (TYLENOL) 325 MG Tab Take 2 Tablets by mouth every 6 hours. Alternate w/ ibuprofen to take a medication every 3 hrs, take scheduled for 3 days post-op then PRN 60 Tablet 0    [DISCONTINUED] ibuprofen (MOTRIN) 600 MG Tab Take 1 Tablet by mouth every 6 hours. Alternate w/ tylenol to take a medication every 3 hrs, take scheduled for 3 days post-op then PRN 45 Tablet 0    [DISCONTINUED] Menaquinone-7 (VITAMIN K2 PO) Take  by mouth every day.       No facility-administered encounter medications on file as of 9/3/2024.     Social History     Socioeconomic History    Marital status:      Spouse name: Not on file    Number of children: Not on file    Years of education: Not on file    Highest education level: Not on file   Occupational History    Not on file   Tobacco Use    Smoking status: Former     Current packs/day: 0.00     Average packs/day: 1 pack/day for 30.0 years (30.0 ttl pk-yrs)     Types: Cigarettes     Start date:  "1967     Quit date: 1997     Years since quittin.0    Smokeless tobacco: Never   Vaping Use    Vaping status: Never Used   Substance and Sexual Activity    Alcohol use: No    Drug use: No    Sexual activity: Not on file   Other Topics Concern    Not on file   Social History Narrative    Not on file     Social Determinants of Health     Financial Resource Strain: Not on file   Food Insecurity: Not on file   Transportation Needs: Not on file   Physical Activity: Not on file   Stress: Not on file   Social Connections: Not on file   Intimate Partner Violence: Not on file   Housing Stability: Not on file     Family History   Problem Relation Age of Onset    Heart Disease Maternal Aunt     Cancer Mother         colon         Studies    No results found for: \"TSHULTRASEN\"   No results found for: \"FREET4\"   Lab Results   Component Value Date/Time    HBA1C 6.1 (A) 2024 12:00 AM     Lab Results   Component Value Date/Time    CHOLSTRLTOT 190 2020 12:47 AM     (H) 2020 12:47 AM    HDL 47 2020 12:47 AM    TRIGLYCERIDE 116 2020 12:47 AM       Lab Results   Component Value Date/Time    SODIUM 136 2023 10:18 AM    POTASSIUM 3.9 2023 10:18 AM    CHLORIDE 101 2023 10:18 AM    CO2 26 2023 10:18 AM    GLUCOSE 102 (H) 2023 10:18 AM    BUN 17 2023 10:18 AM    CREATININE 1.16 2023 10:18 AM     Lab Results   Component Value Date/Time    ALKPHOSPHAT 57 2023 06:40 PM    ASTSGOT 30 2023 06:40 PM    ALTSGPT 29 2023 06:40 PM    TBILIRUBIN 0.4 2023 06:40 PM        Echocardiogram:  Results for orders placed or performed during the hospital encounter of 18   ECHOCARDIOGRAM COMP W/O CONT   Result Value Ref Range    Eject.Frac. MOD BP 72.05     Eject.Frac. MOD 4C 73.48     Eject.Frac. MOD 2C 67.88     Left Ventrical Ejection Fraction 70            Assessment and Recommendations:    Problem List Items Addressed This Visit       " Coronary artery disease involving native coronary artery of native heart without angina pectoris    Statin intolerance    Stented coronary artery    Mild atherosclerosis of carotid artery, bilateral    Varicose veins of both lower extremities with inflammation    Relevant Orders    US-EXTREMITY VENOUS LOWER BILAT    Orthostatic lightheadedness - Primary    Relevant Orders    CATECHOLAMINES PLASMA FRACTIONATED    URINE CATECHOLAMINES FRACTIONATED    CORTISOL - AM    SPEP W/REFLEX TO HERMAN, A, G, M    URINE SODIUM, 24 HR    Monoclonal Protein, Ur (24 hr or Random)    FREE K&L LT CHAINS, QT, SERUM    PROTEIN ELECTROPHORESIS 24 HR URINE     Mr. Brunson is doing well from a cardiac standpoint with no clinical concerns of angina or unstable arrhythmias or heart failure.    Regarding his ongoing orthostatic lightheadedness and dysautonomia, not on medical treatment agents at this time (Flomax did not make it worse, planning to stop soon), we discussed at length today the utility of the following urine / blood tests and managing varicose veins.  More details in the AVS regarding varicose vein supportive management.  Will also obtain bilateral lower extremity venous ultrasound with reflux study and accordingly will decide if procedural approach is warranted.  -Urine and plasma fractionated catecholamines  -Plasma cortisol level  -Monoclonal protein /amyloidosis workup with urine and plasma protein electrophoresis and monoclonal /light chain testing    If reassuring workup, will utilize low-dose midodrine as needed.  In the interim, advised complete light knee-high compression socks specially with his yoga activities since he seems to be symptomatic the most during yoga, continues to need to be well-hydrated and take it slow with change in position particularly from squatting/standing to standing.    LDL goal < 55 if possible, TG < 150.  Followed by the vascular medicine team, with plan to initiate leqvio soon given his  intolerance to statins, PCSK9 inhibitors, Zetia and unable to afford bempedoic acid?    Thank you for the opportunity to be involved in Darren Brunson 's  cardiovascular care; and please reach out with any questions or concerns.      Return in about 6 months (around 3/3/2025).    aMtty Estrada MD, MPH [facility]  Interventional Cardiologist  [facility]   of Clinical Internal Medicine - [facility]    ~ Portions of this note were completed using voice recognition software (Dragon Naturally speaking software) . Occasional transcription errors may have escaped proof reading. I have made every reasonable attempt to correct obvious errors, but I expect that there are errors of grammar and possibly content that I did not discover before finalizing the note. ~

## 2024-09-06 ENCOUNTER — OFFICE VISIT (OUTPATIENT)
Dept: URGENT CARE | Facility: CLINIC | Age: 74
End: 2024-09-06
Payer: MEDICARE

## 2024-09-06 VITALS
HEIGHT: 72 IN | WEIGHT: 168 LBS | BODY MASS INDEX: 22.75 KG/M2 | HEART RATE: 88 BPM | OXYGEN SATURATION: 97 % | DIASTOLIC BLOOD PRESSURE: 68 MMHG | TEMPERATURE: 96.7 F | RESPIRATION RATE: 16 BRPM | SYSTOLIC BLOOD PRESSURE: 132 MMHG

## 2024-09-06 DIAGNOSIS — I10 PRIMARY HYPERTENSION: ICD-10-CM

## 2024-09-06 DIAGNOSIS — L08.9 SKIN INFECTION: ICD-10-CM

## 2024-09-06 DIAGNOSIS — E11.69 TYPE 2 DIABETES MELLITUS WITH OTHER SPECIFIED COMPLICATION, WITHOUT LONG-TERM CURRENT USE OF INSULIN (HCC): ICD-10-CM

## 2024-09-06 PROCEDURE — 3075F SYST BP GE 130 - 139MM HG: CPT | Performed by: FAMILY MEDICINE

## 2024-09-06 PROCEDURE — 3078F DIAST BP <80 MM HG: CPT | Performed by: FAMILY MEDICINE

## 2024-09-06 PROCEDURE — 99214 OFFICE O/P EST MOD 30 MIN: CPT | Performed by: FAMILY MEDICINE

## 2024-09-06 RX ORDER — CEPHALEXIN 500 MG/1
500 CAPSULE ORAL 4 TIMES DAILY
Qty: 20 CAPSULE | Refills: 0 | Status: SHIPPED | OUTPATIENT
Start: 2024-09-06 | End: 2024-09-11

## 2024-09-06 ASSESSMENT — FIBROSIS 4 INDEX: FIB4 SCORE: 1.93

## 2024-09-06 NOTE — PROGRESS NOTES
Subjective     Darren Brunson is a 74 y.o. male who presents with Bug Bite (Right lower arm bug bite, irritated)    This is a  new problem with uncertain prognosis:   This is a very pleasant 74 y.o. who has come to the walk-in clinic today for suspected infected insect bite.  2 days ago was outside and noticed the bump on his right elbow and thinks something may have bit him did not see or feel a particular sting on the in the past 12 hours start area started to get red and swollen.  No fevers or chills      History of hypertension and diabetes well-controlled on current medications.  Most recent hemoglobin A1c 4 weeks ago was 6.1.    ALLERGIES:  Lisinopril, Aspirin, Bempedoic acid, Ciprofloxacin, Crestor [rosuvastatin calcium], Hmg-coa-r inhibitors, Atorvastatin, Ezetimibe, Repatha [evolocumab], Singulair, and Topamax [topiramate]     PMH:  Past Medical History:   Diagnosis Date    Acute myocardial infarction of other anterior wall, episode of care unspecified 2009; 2014    Anxiety state, unspecified      Arthritis     foot, ankle, jaw right side    Asthma     Bronchitis     CAD (coronary artery disease) 09/2009    PCI/stent (Promus 3.0 x 15mm) to the LAD.    Cataract     left eye no surgery    Chickenpox     Cough     Diabetes (HCC)     on oral meds    Erosive gastritis 2020    Gastric ulcer 2020    Glaucoma     Heart murmur     2/17/2023 patient not aware of having a murmur    High cholesterol     Hyperlipidemia     Mumps     Pain 01/14/2023    discomfort abdomen    Psychiatric problem     S/P coronary artery stent placement      Shortness of breath     Sleep apnea 2017    resolved after surgery for sleep apnea    Snoring         PSH:  Past Surgical History:   Procedure Laterality Date    INGUINAL HERNIA REPAIR ROBOTIC XI Right 8/24/2023    Procedure: ROBOT ASSISTED LAPAROSCOPIC RIGHT INGUINAL HERNIA REPAIR;  Surgeon: Morris Mcfarland M.D.;  Location: [facility];  Service: Gen Robotic    [place] UPPER GI  ENDOSCOPY,DIAGNOSIS N/A 2/20/2023    Procedure: UPPER RADIAL ENDOSCOPIC ULTRASOUND PANCREATITIS, TAIL OF PANCREATIC MASS - ENDOSCOPY FINE NEEDLE ASPIRATION WITH ANESTHESIA;  Surgeon: Reymundo Loya M.D.;  Location: [facility];  Service: EUS    EGD W/ENDOSCOPIC ULTRASOUND N/A 2/20/2023    Procedure: EGD, WITH ENDOSCOPIC US;  Surgeon: Reymundo Loya M.D.;  Location: [facility];  Service: EUS    EGD WITH ASP/BX N/A 2/20/2023    Procedure: EGD, WITH ASPIRATION BIOPSY;  Surgeon: Reymundo Loya M.D.;  Location: [facility];  Service: EUS    SEPTAL RECONSTRUCTION N/A 10/23/2017    Procedure: SEPTAL RECONSTRUCTION;  Surgeon: CHARLI Sanchez M.D.;  Location: SURGERY SAME DAY [facility];  Service: Ent    UVULOPHARYNGOPALATOPLASTY N/A 10/23/2017    Procedure: UVULOPHARYNGOPALATOPLASTY;  Surgeon: CHARLI Sanchez M.D.;  Location: SURGERY SAME DAY [facility];  Service: Ent    BIOPSY GENERAL Left 10/23/2017    Procedure: BIOPSY GENERAL LEFT UPPER NECK;  Surgeon: CHARLI Sanchez M.D.;  Location: SURGERY SAME DAY [facility];  Service: Ent    INGUINAL HERNIA REPAIR BILATERAL  2015    STENT PLACEMENT  2015    SINUSOTOMIES  11/2011    Sinus surgery    OTHER CARDIAC SURGERY  2009    cardiac cath with stent placement    TONSILLECTOMY      UMBILICAL HERNIA REPAIR         MEDS:    Current Outpatient Medications:     cephALEXin (KEFLEX) 500 MG Cap, Take 1 Capsule by mouth 4 times a day for 5 days., Disp: 20 Capsule, Rfl: 0    sildenafil (REVATIO) 20 MG tablet, TAKE 1-5 TAB ORALLY BY MOUTH 45 MINUTE PRIOR TO SEXUAL ENCOUNTER ON EMPTY STOMACH, Disp: , Rfl:     budesonide (PULMICORT) 0.5 MG/2ML Suspension, INHALE 1 VIAL VIA NEBULIZER ONCE DAILY AS DIRECTED *J32.0, Disp: , Rfl:     valACYclovir (VALTREX) 500 MG Tab, Take 1,000 mg by mouth every day., Disp: , Rfl:     tamsulosin (FLOMAX) 0.4 MG capsule, Take 0.4 mg by mouth 1/2 hour after breakfast., Disp: , Rfl:     BREYNA  160-4.5 MCG/ACT Aerosol, INHALE 2 PUFFS BY MOUTH TWICE A DAY. USE WITH SPACER AND RINSE MOUTH AFTER EACH USE, Disp: 10.3 Each, Rfl: 5    fluticasone (FLONASE) 50 MCG/ACT nasal spray, Administer 2 Sprays into affected nostril(S) as needed., Disp: , Rfl:     Cholecalciferol (VITAMIN D3) 125 MCG (5000 UT) Cap, Take 5,000 Units by mouth every day., Disp: , Rfl:     glucose blood (ONETOUCH ULTRA) strip, 1 Each by Other route every day., Disp: , Rfl:     pantoprazole (PROTONIX) 40 MG Tablet Delayed Response, Take 1 Tablet by mouth every day., Disp: , Rfl:     aspirin (ASA) 81 MG Chew Tab chewable tablet, Chew 81 mg every day., Disp: , Rfl:     fexofenadine (ALLEGRA) 180 MG tablet, Take 180 mg by mouth 2 times a day., Disp: , Rfl:     Multiple Vitamins-Minerals (PRESERVISION AREDS 2+MULTI VIT PO), Take 1 Tablet by mouth 2 times a day., Disp: , Rfl:     albuterol 108 (90 Base) MCG/ACT Aero Soln inhalation aerosol, INHALE 1-2 PUFFS BY MOUTH EVERY FOUR HOURS AS NEEDED FOR SHORTNESS OF BREATH., Disp: 8.5 Each, Rfl: 11    metformin ER (GLUCOPHAGE XR) 500 MG TABLET SR 24 HR, Take 500 mg by mouth every evening., Disp: , Rfl:     gabapentin (NEURONTIN) 100 MG Cap, Take 100-200 mg by mouth 2 times a day. 100 mg every AM and 200 mg every night NIGHT, Disp: , Rfl:     Probiotic Product (PROBIOTIC DAILY PO), Take 1 Tab by mouth every day., Disp: , Rfl:     ** I have documented what I find to be significant in regards to past medical, social, family and surgical history  in my HPI or under PMH/PSH/FH review section, otherwise it is noncontributory **           HPI    Review of Systems   Skin:  Positive for rash.   All other systems reviewed and are negative.             Objective     /68 (BP Location: Left arm, Patient Position: Sitting, BP Cuff Size: Adult)   Pulse 88   Temp 35.9 °C (96.7 °F) (Temporal)   Resp 16   Ht 1.829 m (6')   Wt 76.2 kg (168 lb)   SpO2 97%   BMI 22.78 kg/m²      Physical Exam  Vitals and nursing  note reviewed.   Constitutional:       General: He is not in acute distress.     Appearance: Normal appearance. He is well-developed.   HENT:      Head: Normocephalic.   Cardiovascular:      Rate and Rhythm: Normal rate and regular rhythm.   Pulmonary:      Effort: Pulmonary effort is normal. No respiratory distress.   Musculoskeletal:        Arms:       Comments: Right proximal forearm radial side with an approximate 1 cm area of edema induration or fluctuance with mild tenderness to palpation and some slight erythema around the area.   Neurological:      Mental Status: He is alert.      Motor: No abnormal muscle tone.   Psychiatric:         Mood and Affect: Mood normal.         Behavior: Behavior normal.                             Assessment & Plan     1. Skin infection  cephALEXin (KEFLEX) 500 MG Cap      2. Type 2 diabetes mellitus with other specified complication, without long-term current use of insulin (HCC)        3. Primary hypertension            74-year-old with well-controlled diabetes hypertension with suspected infected insect bite right forearm.  Will treat per assessment and plan      - Dx, plan & d/c instructions discussed       Follow up with your regular primary care providers office within a week to keep them updated and informed of this visit and for regular routine health maintenance check-ups. ER if not improving in 2-3 days or if feeling/getting worse. (If you do not have a primary care provider and need to schedule one you may call [facility] at [phone] to do this).    Patient left in stable condition       Discussed if any testing, labs or imaging studies are obtained outside of the [facility] facility, it is their responsibility to contact the Urgent Care and let us know that it was done and get us the results so adequate follow up can be initiated    Pertinent prior lab work and/or imaging studies in Epic have been reviewed by me today on day of this visit and taken into account for my  treatment and plan today    Pertinent PMH/PSH and/or chronic conditions and medications if any were reviewed today and taken into account for my treatment and plan today    Pertinent prior office visit notes in Epic have been reviewed by me today on day of this visit.    Please note that this dictation may have been created using voice recognition software, if so I have made every reasonable attempt to correct obvious errors, but I expect that there are errors of grammar and possibly content that I did not discover before finalizing the note.

## 2024-09-09 ENCOUNTER — OFFICE VISIT (OUTPATIENT)
Dept: URGENT CARE | Facility: CLINIC | Age: 74
End: 2024-09-09
Payer: MEDICARE

## 2024-09-09 ENCOUNTER — HOSPITAL ENCOUNTER (OUTPATIENT)
Dept: RADIOLOGY | Facility: MEDICAL CENTER | Age: 74
End: 2024-09-09
Attending: PHYSICIAN ASSISTANT
Payer: MEDICARE

## 2024-09-09 VITALS
DIASTOLIC BLOOD PRESSURE: 84 MMHG | BODY MASS INDEX: 23.03 KG/M2 | OXYGEN SATURATION: 96 % | SYSTOLIC BLOOD PRESSURE: 134 MMHG | HEART RATE: 94 BPM | RESPIRATION RATE: 16 BRPM | HEIGHT: 72 IN | WEIGHT: 170 LBS | TEMPERATURE: 97.7 F

## 2024-09-09 DIAGNOSIS — M79.89 SWELLING OF ARM: ICD-10-CM

## 2024-09-09 PROCEDURE — 3079F DIAST BP 80-89 MM HG: CPT | Performed by: PHYSICIAN ASSISTANT

## 2024-09-09 PROCEDURE — 76882 US LMTD JT/FCL EVL NVASC XTR: CPT | Mod: RT

## 2024-09-09 PROCEDURE — 3075F SYST BP GE 130 - 139MM HG: CPT | Performed by: PHYSICIAN ASSISTANT

## 2024-09-09 PROCEDURE — 99214 OFFICE O/P EST MOD 30 MIN: CPT | Performed by: PHYSICIAN ASSISTANT

## 2024-09-09 RX ORDER — DOXYCYCLINE HYCLATE 100 MG
100 TABLET ORAL 2 TIMES DAILY
Qty: 14 TABLET | Refills: 0 | Status: SHIPPED | OUTPATIENT
Start: 2024-09-09

## 2024-09-09 ASSESSMENT — ENCOUNTER SYMPTOMS
CONSTITUTIONAL NEGATIVE: 1
NEUROLOGICAL NEGATIVE: 1
MUSCULOSKELETAL NEGATIVE: 1
RESPIRATORY NEGATIVE: 1
CARDIOVASCULAR NEGATIVE: 1

## 2024-09-09 ASSESSMENT — FIBROSIS 4 INDEX: FIB4 SCORE: 1.93

## 2024-09-09 NOTE — PROGRESS NOTES
Subjective     Darren Brunson is a very pleasant 74 y.o. male who presents with Bug Bite (Poss bug bite to R elbow, still not improving after being seen on 9/6 )            HPI  Patient presenting for reevaluation of his right arm swelling.  4 days ago he developed a large painful itchy burning lump on his right lateral elbow.  He thought possibly a bug bite.  The next morning he had some surrounding spreading redness and was seen in the urgent care.  He was diagnosed with cellulitis and started on Keflex.  He states the bump has significantly improved but he continues to have redness and swelling distally from the area of the lump.  He denies any fever, chills, body aches, joint pain sweats or diarrhea.  Patient does take 81 mg aspirin.      PMH:  has a past medical history of Acute myocardial infarction of other anterior wall, episode of care unspecified (2009; 2014), Anxiety state, unspecified ( ), Arthritis, Asthma, Bronchitis, CAD (coronary artery disease) (09/2009), Cataract, Chickenpox, Cough, Diabetes (HCC), Erosive gastritis (2020), Gastric ulcer (2020), Glaucoma, Heart murmur, High cholesterol, Hyperlipidemia, Mumps, Pain (01/14/2023), Psychiatric problem, S/P coronary artery stent placement ( ), Shortness of breath, Sleep apnea (2017), and Snoring.    He has no past medical history of Painful breathing, Sputum production, or Wheezing.  MEDS:   Current Outpatient Medications:     cephALEXin (KEFLEX) 500 MG Cap, Take 1 Capsule by mouth 4 times a day for 5 days., Disp: 20 Capsule, Rfl: 0    sildenafil (REVATIO) 20 MG tablet, TAKE 1-5 TAB ORALLY BY MOUTH 45 MINUTE PRIOR TO SEXUAL ENCOUNTER ON EMPTY STOMACH, Disp: , Rfl:     budesonide (PULMICORT) 0.5 MG/2ML Suspension, INHALE 1 VIAL VIA NEBULIZER ONCE DAILY AS DIRECTED *J32.0, Disp: , Rfl:     valACYclovir (VALTREX) 500 MG Tab, Take 1,000 mg by mouth every day., Disp: , Rfl:     tamsulosin (FLOMAX) 0.4 MG capsule, Take 0.4 mg by mouth 1/2 hour after  breakfast., Disp: , Rfl:     BREYNA 160-4.5 MCG/ACT Aerosol, INHALE 2 PUFFS BY MOUTH TWICE A DAY. USE WITH SPACER AND RINSE MOUTH AFTER EACH USE, Disp: 10.3 Each, Rfl: 5    fluticasone (FLONASE) 50 MCG/ACT nasal spray, Administer 2 Sprays into affected nostril(S) as needed., Disp: , Rfl:     Cholecalciferol (VITAMIN D3) 125 MCG (5000 UT) Cap, Take 5,000 Units by mouth every day., Disp: , Rfl:     glucose blood (ONETOUCH ULTRA) strip, 1 Each by Other route every day., Disp: , Rfl:     pantoprazole (PROTONIX) 40 MG Tablet Delayed Response, Take 1 Tablet by mouth every day., Disp: , Rfl:     aspirin (ASA) 81 MG Chew Tab chewable tablet, Chew 81 mg every day., Disp: , Rfl:     fexofenadine (ALLEGRA) 180 MG tablet, Take 180 mg by mouth 2 times a day., Disp: , Rfl:     Multiple Vitamins-Minerals (PRESERVISION AREDS 2+MULTI VIT PO), Take 1 Tablet by mouth 2 times a day., Disp: , Rfl:     albuterol 108 (90 Base) MCG/ACT Aero Soln inhalation aerosol, INHALE 1-2 PUFFS BY MOUTH EVERY FOUR HOURS AS NEEDED FOR SHORTNESS OF BREATH., Disp: 8.5 Each, Rfl: 11    metformin ER (GLUCOPHAGE XR) 500 MG TABLET SR 24 HR, Take 500 mg by mouth every evening., Disp: , Rfl:     gabapentin (NEURONTIN) 100 MG Cap, Take 100-200 mg by mouth 2 times a day. 100 mg every AM and 200 mg every night NIGHT, Disp: , Rfl:     Probiotic Product (PROBIOTIC DAILY PO), Take 1 Tab by mouth every day., Disp: , Rfl:   ALLERGIES:   Allergies   Allergen Reactions    Lisinopril Anaphylaxis     Lip and tongue swelling    Aspirin      High dose only - aspirin induced tinnitus    Bempedoic Acid      Myalgia, HA, abd pain    Other Reaction(s): Joint pain, stomach upset    Ciprofloxacin      Tendon pain  Other reaction(s): shoulder pain, blurred vision, abdominal pain  Tendon pain    Crestor [Rosuvastatin Calcium] Unspecified     Muscle pain     Hmg-Coa-R Inhibitors      Other reaction(s): Unspecified  Muscle pain     Atorvastatin     Ezetimibe     Repatha [Evolocumab]       GI distress, abdominal pain with 1st/2nd injection    Singulair Unspecified     Pain, anxiety    Topamax [Topiramate]      Tension, headache     SURGHX:   Past Surgical History:   Procedure Laterality Date    INGUINAL HERNIA REPAIR ROBOTIC XI Right 8/24/2023    Procedure: ROBOT ASSISTED LAPAROSCOPIC RIGHT INGUINAL HERNIA REPAIR;  Surgeon: Morris Mcfarland M.D.;  Location: [facility];  Service: Gen Robotic    ID UPPER GI ENDOSCOPY,DIAGNOSIS N/A 2/20/2023    Procedure: UPPER RADIAL ENDOSCOPIC ULTRASOUND PANCREATITIS, TAIL OF PANCREATIC MASS - ENDOSCOPY FINE NEEDLE ASPIRATION WITH ANESTHESIA;  Surgeon: Reymundo Loya M.D.;  Location: [facility];  Service: EUS    EGD W/ENDOSCOPIC ULTRASOUND N/A 2/20/2023    Procedure: EGD, WITH ENDOSCOPIC US;  Surgeon: Reymundo Loya M.D.;  Location: [facility];  Service: EUS    EGD WITH ASP/BX N/A 2/20/2023    Procedure: EGD, WITH ASPIRATION BIOPSY;  Surgeon: Reymundo Loya M.D.;  Location: [facility];  Service: EUS    SEPTAL RECONSTRUCTION N/A 10/23/2017    Procedure: SEPTAL RECONSTRUCTION;  Surgeon: CHARLI Sanchez M.D.;  Location: SURGERY SAME DAY [facility];  Service: Ent    UVULOPHARYNGOPALATOPLASTY N/A 10/23/2017    Procedure: UVULOPHARYNGOPALATOPLASTY;  Surgeon: CHARLI Sanchez M.D.;  Location: SURGERY SAME DAY [facility];  Service: Ent    BIOPSY GENERAL Left 10/23/2017    Procedure: BIOPSY GENERAL LEFT UPPER NECK;  Surgeon: CHARLI Sanchez M.D.;  Location: SURGERY SAME DAY [facility];  Service: Ent    INGUINAL HERNIA REPAIR BILATERAL  2015    STENT PLACEMENT  2015    SINUSOTOMIES  11/2011    Sinus surgery    OTHER CARDIAC SURGERY  2009    cardiac cath with stent placement    TONSILLECTOMY      UMBILICAL HERNIA REPAIR       SOCHX:  reports that he quit smoking about 27 years ago. His smoking use included cigarettes. He started smoking about 57 years ago. He has a 30 pack-year smoking history. He  has never used smokeless tobacco. He reports that he does not drink alcohol and does not use drugs.  FH: family history includes Cancer in his mother; Heart Disease in his maternal aunt.        Review of Systems   Constitutional: Negative.    Respiratory: Negative.     Cardiovascular: Negative.    Musculoskeletal: Negative.    Skin:         Right lateral elbow swelling   Neurological: Negative.        Medications, Allergies, and current problem list reviewed today in Epic           Objective     /84   Pulse 94   Temp 36.5 °C (97.7 °F)   Resp 16   Ht 1.829 m (6')   Wt 77.1 kg (170 lb)   SpO2 96%   BMI 23.06 kg/m²      Physical Exam  Vitals and nursing note reviewed.   Constitutional:       General: He is not in acute distress.     Appearance: Normal appearance. He is well-developed. He is not diaphoretic.   HENT:      Head: Normocephalic.      Right Ear: Hearing and external ear normal.      Left Ear: Hearing and external ear normal.      Nose: Nose normal.      Mouth/Throat:      Mouth: Mucous membranes are moist.   Eyes:      General:         Right eye: No discharge.         Left eye: No discharge.      Conjunctiva/sclera: Conjunctivae normal.   Cardiovascular:      Rate and Rhythm: Normal rate and regular rhythm.   Pulmonary:      Effort: Pulmonary effort is normal. No respiratory distress.      Breath sounds: Normal breath sounds.   Musculoskeletal:      Cervical back: Normal range of motion and neck supple.   Skin:     General: Skin is warm and dry.      Findings: Ecchymosis present.             Comments: Patient points to the area of the previous lump as the proximal forearm near the lateral elbow.  There is no obvious deformity, swelling, erythema, warmth, open lesions, rash or discharge.  He does have purplish discoloration distally on the forearm which appears to be bruising.  No joint pain and range of motion is normal   Neurological:      General: No focal deficit present.      Mental Status:  He is alert and oriented to person, place, and time. Mental status is at baseline.   Psychiatric:         Mood and Affect: Mood normal.         Behavior: Behavior normal.         Thought Content: Thought content normal.         Judgment: Judgment normal.                         9/9/2024 3:51 PM     HISTORY/REASON FOR EXAM:  Palpable lump right forearm        TECHNIQUE/EXAM DESCRIPTION AND NUMBER OF VIEWS:  Ultrasound right forearm     COMPARISON: None     FINDINGS:  There is no evidence of solid mass at site of palpable abnormality. No cyst is identified. No fluid collection is noted. No abnormal shadowing is identified. Some diffuse edema appears to be present.     IMPRESSION:     1.  No evidence of soft tissue mass or abscess.     2.  Possible cellulitis.      Assessment & Plan      This is a very pleasant 74-year-old male presenting for reevaluation of his right lateral proximal forearm swelling.  It initially 4 days ago had a large lump he thought was a bug bite.  It was mildly tender with burning and itching.  The next morning he had spreading dark redness distally.  He was seen in the urgent care, diagnosed with cellulitis, and started on Keflex.  He states the lump have significantly improved but the redness of his forearm continues.  No systemic symptoms including fever, joint pain, sweats, chills or bodyaches.  Vital signs normal.  Exam shows no palpable deformity, lump, mass or fluid collection in the proximal lateral forearm.  Distally to the area there is purpleish discoloration of the skin which appears to be resolving ecchymosis.  There is no warmth, erythema, open lesions, discharge, blisters, vesicles.  Full passive and active range of motion without joint pain.  Symptoms and presentation appear to be a resolved hematoma and the spreading purplish discoloration appears to be ecchymotic in nature.  An ultrasound was ordered which showed no soft tissue mass or abscess.  Given the soft tissue edema  cellulitis was a differential.  I will  prescribe doxycycline to cover for infection however I did discuss OTC meds and conservative measures for treatment of a potential resolving hematoma.  Assessment & Plan  Swelling of arm    Orders:    US-EXTREMITY NON VASCULAR UNILATERAL RIGHT; Future    doxycycline (VIBRAMYCIN) 100 MG Tab; Take 1 Tablet by mouth 2 times a day.          I personally reviewed prior external notes and test results pertinent to today's visit. Return to clinic or go to ED if symptoms worsen or persist. Red flag symptoms and indications for ED discussed at length. Patient/Parent/Guardian voices understanding.  AVS with post-visit instructions printed and provided or given verbally.  Follow-up with your primary care provider in 3-5 days. All side effects and potential interactions of prescribed medication discussed including allergic response, GI upset, tendon injury, rash, sedation, OCP effectiveness, etc.    Please note that this dictation was created using voice recognition software. I have made every reasonable attempt to correct obvious errors, but I expect that there are errors of grammar and possibly content that I did not discover before finalizing the note.

## 2024-10-10 ENCOUNTER — HOSPITAL ENCOUNTER (OUTPATIENT)
Dept: RADIOLOGY | Facility: MEDICAL CENTER | Age: 74
End: 2024-10-10
Attending: INTERNAL MEDICINE
Payer: MEDICARE

## 2024-10-10 DIAGNOSIS — I83.11 VARICOSE VEINS OF BOTH LOWER EXTREMITIES WITH INFLAMMATION: ICD-10-CM

## 2024-10-10 DIAGNOSIS — I83.12 VARICOSE VEINS OF BOTH LOWER EXTREMITIES WITH INFLAMMATION: ICD-10-CM

## 2024-10-10 PROCEDURE — 93970 EXTREMITY STUDY: CPT

## 2024-10-10 PROCEDURE — 93970 EXTREMITY STUDY: CPT | Mod: 26 | Performed by: INTERNAL MEDICINE

## 2024-10-11 ENCOUNTER — TELEPHONE (OUTPATIENT)
Dept: CARDIOLOGY | Facility: MEDICAL CENTER | Age: 74
End: 2024-10-11
Payer: MEDICARE

## 2024-10-12 ENCOUNTER — PATIENT MESSAGE (OUTPATIENT)
Dept: CARDIOLOGY | Facility: MEDICAL CENTER | Age: 74
End: 2024-10-12
Payer: MEDICARE

## 2024-10-14 ENCOUNTER — TELEPHONE (OUTPATIENT)
Dept: CARDIOLOGY | Facility: MEDICAL CENTER | Age: 74
End: 2024-10-14
Payer: MEDICARE

## 2024-10-23 PROBLEM — S63.501A SPRAIN OF RIGHT WRIST: Status: ACTIVE | Noted: 2024-10-23

## 2024-10-23 PROBLEM — M19.031 OSTEOARTHRITIS OF RIGHT WRIST: Status: ACTIVE | Noted: 2024-10-23

## 2024-11-08 ENCOUNTER — TELEPHONE (OUTPATIENT)
Dept: CARDIOLOGY | Facility: MEDICAL CENTER | Age: 74
End: 2024-11-08
Payer: MEDICARE

## 2024-11-08 NOTE — TELEPHONE ENCOUNTER
AL        Caller: Darren Brunson      Topic/issue: Patient was asking for a call back to discuss some vascular doctors. One of them was a DR Adrian Posada and he asked for call back      Callback Number: [phone]      Thank you    -Sushant FORBES

## 2024-11-09 NOTE — TELEPHONE ENCOUNTER
AL: Please advise regarding conversation below. Thanks!    =============      Phone Number Called: [phone]    Call outcome: Spoke to patient regarding message below.    Message: Pt states Dr. Adrian Posada is vascular surgeon w/ [facility], and wanted to know if AL would  recommend him based on the current vascular issues the patient has. If so would AL be willing to put a referral in ?    I advised patient I will give him an update from AL as soon as I have one. Pt appreciative of phone call back.

## 2024-11-11 NOTE — TELEPHONE ENCOUNTER
Matty Estrada M.D.  You3 days ago       Vascular surgeons at [facility] do not see referrals for varicose vein management.

## 2024-11-11 NOTE — TELEPHONE ENCOUNTER
Spoke with patient @[phone] and advise of AL advisement. Pt verbalized understanding and stated that he will look into other vascular providers and reach back out to us for a referral. Pt was appreciative of phone call back

## 2024-11-27 DIAGNOSIS — R42 ORTHOSTATIC LIGHTHEADEDNESS: ICD-10-CM

## 2024-12-12 ENCOUNTER — TELEPHONE (OUTPATIENT)
Dept: CARDIOLOGY | Facility: MEDICAL CENTER | Age: 74
End: 2024-12-12
Payer: MEDICARE

## 2024-12-12 NOTE — TELEPHONE ENCOUNTER
AL    Caller: Darren Brunson    Topic/issue: Patient wanted to let Dr. Estrada know he scheduled with [facility].     Please advise.     Callback Number: [phone]    Thank you,     Miranda SANDOVAL

## 2024-12-19 ENCOUNTER — OFFICE VISIT (OUTPATIENT)
Dept: SLEEP MEDICINE | Facility: MEDICAL CENTER | Age: 74
End: 2024-12-19
Attending: INTERNAL MEDICINE
Payer: MEDICARE

## 2024-12-19 ENCOUNTER — OFFICE VISIT (OUTPATIENT)
Dept: VASCULAR LAB | Facility: MEDICAL CENTER | Age: 74
End: 2024-12-19
Attending: FAMILY MEDICINE
Payer: MEDICARE

## 2024-12-19 VITALS
SYSTOLIC BLOOD PRESSURE: 114 MMHG | HEART RATE: 88 BPM | DIASTOLIC BLOOD PRESSURE: 69 MMHG | HEIGHT: 72 IN | BODY MASS INDEX: 23.98 KG/M2 | WEIGHT: 177 LBS

## 2024-12-19 VITALS
SYSTOLIC BLOOD PRESSURE: 128 MMHG | WEIGHT: 174 LBS | OXYGEN SATURATION: 97 % | BODY MASS INDEX: 23.57 KG/M2 | HEART RATE: 72 BPM | HEIGHT: 72 IN | DIASTOLIC BLOOD PRESSURE: 68 MMHG

## 2024-12-19 DIAGNOSIS — J45.20 MILD INTERMITTENT ASTHMA WITHOUT COMPLICATION: ICD-10-CM

## 2024-12-19 DIAGNOSIS — E11.69 MIXED HYPERLIPIDEMIA DUE TO TYPE 2 DIABETES MELLITUS (HCC): ICD-10-CM

## 2024-12-19 DIAGNOSIS — I10 PRIMARY HYPERTENSION: ICD-10-CM

## 2024-12-19 DIAGNOSIS — I25.2 HISTORY OF ANTERIOR WALL MYOCARDIAL INFARCTION: ICD-10-CM

## 2024-12-19 DIAGNOSIS — I65.01: ICD-10-CM

## 2024-12-19 DIAGNOSIS — E78.2 MIXED HYPERLIPIDEMIA DUE TO TYPE 2 DIABETES MELLITUS (HCC): ICD-10-CM

## 2024-12-19 DIAGNOSIS — Z78.9 STATIN INTOLERANCE: Chronic | ICD-10-CM

## 2024-12-19 DIAGNOSIS — I25.10 ATHEROSCLEROSIS OF NATIVE CORONARY ARTERY OF NATIVE HEART WITHOUT ANGINA PECTORIS: ICD-10-CM

## 2024-12-19 DIAGNOSIS — I70.0 AORTIC ATHEROSCLEROSIS (HCC): ICD-10-CM

## 2024-12-19 DIAGNOSIS — Z95.5 STENTED CORONARY ARTERY: ICD-10-CM

## 2024-12-19 DIAGNOSIS — E11.59 TYPE 2 DIABETES MELLITUS WITH OTHER CIRCULATORY COMPLICATION, WITHOUT LONG-TERM CURRENT USE OF INSULIN (HCC): ICD-10-CM

## 2024-12-19 DIAGNOSIS — G47.33 OSA (OBSTRUCTIVE SLEEP APNEA): ICD-10-CM

## 2024-12-19 DIAGNOSIS — I65.23 MILD ATHEROSCLEROSIS OF CAROTID ARTERY, BILATERAL: ICD-10-CM

## 2024-12-19 PROCEDURE — 3078F DIAST BP <80 MM HG: CPT | Performed by: INTERNAL MEDICINE

## 2024-12-19 PROCEDURE — 3078F DIAST BP <80 MM HG: CPT | Performed by: FAMILY MEDICINE

## 2024-12-19 PROCEDURE — 3074F SYST BP LT 130 MM HG: CPT | Performed by: FAMILY MEDICINE

## 2024-12-19 PROCEDURE — 99212 OFFICE O/P EST SF 10 MIN: CPT

## 2024-12-19 PROCEDURE — 99214 OFFICE O/P EST MOD 30 MIN: CPT | Performed by: INTERNAL MEDICINE

## 2024-12-19 PROCEDURE — 3074F SYST BP LT 130 MM HG: CPT | Performed by: INTERNAL MEDICINE

## 2024-12-19 PROCEDURE — 99212 OFFICE O/P EST SF 10 MIN: CPT | Performed by: INTERNAL MEDICINE

## 2024-12-19 PROCEDURE — G2211 COMPLEX E/M VISIT ADD ON: HCPCS | Performed by: FAMILY MEDICINE

## 2024-12-19 PROCEDURE — 99214 OFFICE O/P EST MOD 30 MIN: CPT | Performed by: FAMILY MEDICINE

## 2024-12-19 RX ORDER — NYSTATIN 100000 [USP'U]/ML
SUSPENSION ORAL
Qty: 250 ML | Refills: 0 | Status: SHIPPED | OUTPATIENT
Start: 2024-12-19

## 2024-12-19 RX ORDER — FLUTICASONE FUROATE AND VILANTEROL 200; 25 UG/1; UG/1
1 POWDER RESPIRATORY (INHALATION) DAILY
Qty: 3 EACH | Refills: 3 | Status: SHIPPED | OUTPATIENT
Start: 2024-12-19 | End: 2024-12-19

## 2024-12-19 RX ORDER — FLUTICASONE FUROATE AND VILANTEROL 200; 25 UG/1; UG/1
1 POWDER RESPIRATORY (INHALATION) DAILY
Qty: 3 EACH | Refills: 3 | Status: SHIPPED | OUTPATIENT
Start: 2024-12-19

## 2024-12-19 RX ORDER — NYSTATIN 100000 [USP'U]/ML
SUSPENSION ORAL
Qty: 250 ML | Refills: 0 | Status: SHIPPED | OUTPATIENT
Start: 2024-12-19 | End: 2024-12-19

## 2024-12-19 ASSESSMENT — ENCOUNTER SYMPTOMS
FEVER: 0
HEADACHES: 0
FOCAL WEAKNESS: 0
FALLS: 0
HEARTBURN: 0
CLAUDICATION: 0
HEMOPTYSIS: 0
PND: 0
SEIZURES: 0
NAUSEA: 0
SHORTNESS OF BREATH: 0
DIARRHEA: 0
VOMITING: 0
DIAPHORESIS: 0
PHOTOPHOBIA: 0
SPEECH CHANGE: 0
DOUBLE VISION: 0
BACK PAIN: 0
CHILLS: 0
TINGLING: 0
PND: 0
ORTHOPNEA: 0
NECK PAIN: 0
WEIGHT LOSS: 0
SENSORY CHANGE: 0
SORE THROAT: 0
SINUS PAIN: 0
LOSS OF CONSCIOUSNESS: 0
CLAUDICATION: 0
WEAKNESS: 0
CHILLS: 0
COUGH: 0
PALPITATIONS: 0
ORTHOPNEA: 0
ABDOMINAL PAIN: 0
HEADACHES: 0
EYE DISCHARGE: 0
CONSTIPATION: 0
EYE PAIN: 0
HEMOPTYSIS: 0
FOCAL WEAKNESS: 0
SHORTNESS OF BREATH: 0
BLURRED VISION: 0
BLURRED VISION: 0
SPUTUM PRODUCTION: 0
COUGH: 0
EYE PAIN: 0
DOUBLE VISION: 0
TREMORS: 0
FEVER: 0
WHEEZING: 0
ABDOMINAL PAIN: 0
DIZZINESS: 1
STRIDOR: 0
TREMORS: 0
SPUTUM PRODUCTION: 0
DEPRESSION: 0
EYE REDNESS: 0
DIZZINESS: 0
SPEECH CHANGE: 0
PALPITATIONS: 0
WEAKNESS: 0
VOMITING: 0
MYALGIAS: 0
NAUSEA: 0
WHEEZING: 0

## 2024-12-19 ASSESSMENT — FIBROSIS 4 INDEX
FIB4 SCORE: 1.93
FIB4 SCORE: 1.93

## 2024-12-19 NOTE — PROGRESS NOTES
FOLLOW-UP FAMILY LIPID CLINIC   12/19/24     Darren Brunson, ref for eval/mgmt of HLD   Referral Source: Lala Camejo A.P.R.N. , Est 3/2024    Subjective     Interval hx/concerns: last seen 8/2024, had interval labs and VR duplex ordered per cardiology.   Stomach feeling better after stopping repatha.   Venous reflux - seen [facility] per cardiology.    Reports would like to get through several other pressing health concerns prior to considering initiation of leqvio though he would like to get the process started.      LIFESTYLE MGMT  Change in weight: Stable  Exercise habits:  active yoga, core training  - very active   Dietary patterns: Medi style   Etoh: see sochx  Reported barriers to care: none     MEDICATION MGMT  Current Rxs:   Statin: None  Non-Statin: none   Supplements: None  side effects?  N/a   Adherence? N/a     PERTINENT HLD PMHX  Initial visit hx:  seen by cardiology APRN 2/27/24 for ongoing CAD mgmt, noted to have uncontrolled HLD and prior statin  and zetia intol.  Here to review tx options.  Seen by [facility] lipid clinic and started on nexletol (3/22/24) - pending initiation.    Pending ENT eval for repeat inspire and revision of prior surgery for known ANTIONE.   Current concern  regards position dysequilibrium when leaning forward then arising up while doing yoga.  Progressive over last 1 yr.  Has had presyncope w/o overt syncope or falls.  Denies baseline vertigo or associated HA or other focal neuro s/s including visual changes or diplopia.   Seen by Dr. Smith for evaluation.    No other findings per neurology.  ENT reports no issues from ENT standpoint.     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     Age at Initial Diagnosis of Dyslipidemia: 50s    Baseline Lipids Prior to Treatment:     History  of ASCVD: History of prior MI >12 months ago  (2 episodes)    # CAD, s/p MI with PCI LAD , RCA  - currently no sx, seeing  cardiology     Other EstablishedVascular Disease:     # CVI with signficant BLE venous reflux and varicosities - symptomatic     Previously Attempted Interventions for Lipids - including outcome - SEE ALLERGY LIST     ANTIPLAT THERAPY: Yes, Details: ASA 81mg , no hx of bleeding  HTN: stable, good adherence/tolerance of meds, Home BP los/70s     Current Outpatient Medications on File Prior to Visit   Medication Sig Dispense Refill    fluticasone furoate-vilanterol (BREO ELLIPTA) 200-25 MCG/ACT AEROSOL POWDER, BREATH ACTIVATED Inhale 1 Puff every day. Rinse mouth after use. 3 Each 3    nystatin (MYCOSTATIN) 035025 UNIT/ML Suspension Swish and swallow 1 teaspoonful (= 5mL) 4 times daily. 250 mL 0    meloxicam (MOBIC) 15 MG tablet Take 1 Tablet by mouth every day. 30 Tablet 1    celecoxib (CELEBREX) 200 MG Cap Take 1 Capsule by mouth every day. 30 Capsule 1    doxycycline (VIBRAMYCIN) 100 MG Tab Take 1 Tablet by mouth 2 times a day. 14 Tablet 0    sildenafil (REVATIO) 20 MG tablet TAKE 1-5 TAB ORALLY BY MOUTH 45 MINUTE PRIOR TO SEXUAL ENCOUNTER ON EMPTY STOMACH      valACYclovir (VALTREX) 500 MG Tab Take 1,000 mg by mouth every day.      tamsulosin (FLOMAX) 0.4 MG capsule Take 0.4 mg by mouth 1/2 hour after breakfast.      fluticasone (FLONASE) 50 MCG/ACT nasal spray Administer 2 Sprays into affected nostril(S) as needed.      Cholecalciferol (VITAMIN D3) 125 MCG (5000 UT) Cap Take 5,000 Units by mouth every day.      glucose blood (ONETOUCH ULTRA) strip 1 Each by Other route every day.      pantoprazole (PROTONIX) 40 MG Tablet Delayed Response Take 1 Tablet by mouth every day.      aspirin (ASA) 81 MG Chew Tab chewable tablet Chew 81 mg every day.      fexofenadine (ALLEGRA) 180 MG tablet Take 180 mg by mouth 2 times a day.      Multiple Vitamins-Minerals (PRESERVISION AREDS 2+MULTI  VIT PO) Take 1 Tablet by mouth 2 times a day.      albuterol 108 (90 Base) MCG/ACT Aero Soln inhalation aerosol INHALE 1-2 PUFFS BY MOUTH EVERY FOUR HOURS AS NEEDED FOR SHORTNESS OF BREATH. 8.5 Each 11    metformin ER (GLUCOPHAGE XR) 500 MG TABLET SR 24 HR Take 500 mg by mouth every evening.      gabapentin (NEURONTIN) 100 MG Cap Take 100-200 mg by mouth 2 times a day. 100 mg every AM and 200 mg every night NIGHT      Probiotic Product (PROBIOTIC DAILY PO) Take 1 Tab by mouth every day.       No current facility-administered medications on file prior to visit.      Allergies   Allergen Reactions    Lisinopril Anaphylaxis     Lip and tongue swelling    Aspirin      High dose only - aspirin induced tinnitus    Bempedoic Acid      Myalgia, HA, abd pain    Other Reaction(s): Joint pain, stomach upset    Ciprofloxacin      Tendon pain  Other reaction(s): shoulder pain, blurred vision, abdominal pain  Tendon pain    Crestor [Rosuvastatin Calcium] Unspecified     Muscle pain     Hmg-Coa-R Inhibitors      Other reaction(s): Unspecified  Muscle pain     Atorvastatin     Ezetimibe     Repatha [Evolocumab]      GI distress, abdominal pain with 1st/2nd injection    Singulair Unspecified     Pain, anxiety    Topamax [Topiramate]      Tension, headache      Social History     Tobacco Use    Smoking status: Former     Current packs/day: 0.00     Average packs/day: 1 pack/day for 30.0 years (30.0 ttl pk-yrs)     Types: Cigarettes     Start date: 1967     Quit date: 1997     Years since quittin.3    Smokeless tobacco: Never   Vaping Use    Vaping status: Never Used   Substance Use Topics    Alcohol use: No    Drug use: No     Review of Systems   Constitutional:  Negative for chills, fever and malaise/fatigue.   Eyes:  Negative for blurred vision, double vision and pain.   Respiratory:  Negative for cough, hemoptysis, sputum production, shortness of breath and wheezing.    Cardiovascular:  Negative for chest pain,  palpitations, orthopnea, claudication, leg swelling and PND.   Gastrointestinal:  Negative for abdominal pain, nausea and vomiting.   Neurological:  Positive for dizziness. Negative for tingling, tremors, sensory change, speech change, focal weakness, seizures, loss of consciousness, weakness and headaches.       Objective    Vitals:    12/19/24 1415   BP: 114/69   BP Location: Left arm   Patient Position: Sitting   BP Cuff Size: Large adult   Pulse: 88   Weight: 80.3 kg (177 lb)   Height: 1.829 m (6')      BP Readings from Last 5 Encounters:   12/19/24 114/69   12/19/24 128/68   09/09/24 134/84   09/06/24 132/68   09/03/24 112/72     BMI Readings from Last 1 Encounters:   12/19/24 24.01 kg/m²      Wt Readings from Last 3 Encounters:   12/19/24 80.3 kg (177 lb)   12/19/24 78.9 kg (174 lb)   09/09/24 77.1 kg (170 lb)      Physical Exam  Constitutional:       Appearance: Normal appearance. He is well-developed.   HENT:      Head: Normocephalic and atraumatic.   Eyes:      Conjunctiva/sclera: Conjunctivae normal.   Neck:      Thyroid: No thyromegaly.      Vascular: No JVD.   Cardiovascular:      Rate and Rhythm: Normal rate and regular rhythm.      Pulses: Normal pulses.           Radial pulses are 2+ on the right side and 2+ on the left side.        Dorsalis pedis pulses are 2+ on the right side and 2+ on the left side.        Posterior tibial pulses are 2+ on the right side and 2+ on the left side.      Heart sounds: Normal heart sounds. No murmur heard.     No friction rub. No gallop.   Pulmonary:      Effort: Pulmonary effort is normal. No respiratory distress.      Breath sounds: Normal breath sounds.   Musculoskeletal:      Cervical back: Normal range of motion and neck supple.   Lymphadenopathy:      Cervical: No cervical adenopathy.   Skin:     General: Skin is warm and dry.   Neurological:      General: No focal deficit present.      Mental Status: He is alert and oriented to person, place, and time.       "Cranial Nerves: No cranial nerve deficit.      Coordination: Coordination normal.      Gait: Gait normal.       DATA REVIEW:  Most Recent Lipid Panel:     12/2024 [facility]   LDL = 112       [facility] 8/10/24   C 175 HDL 51    Lp(a), apoB pending status at time of visit   CMP: gluc 102, Cr 0.71  UACR wnl   CRP (run as regular and not hsCRP)  <3.0       [place] 1/2024       TSH wnl           Lab Results   Component Value Date/Time    CHOLSTRLTOT 190 06/27/2020 12:47 AM     (H) 06/27/2020 12:47 AM    HDL 47 06/27/2020 12:47 AM    TRIGLYCERIDE 116 06/27/2020 12:47 AM     Lab Results   Component Value Date/Time     (H) 06/27/2020 12:47 AM    LDL 98 03/20/2018 06:33 AM         No results found for: \"LIPOPROTA\"   No results found for: \"APOB\"   No results found for: \"CRPHIGHSEN\"      Other Pertinent Blood Work:   Lab Results   Component Value Date    SODIUM 136 07/31/2023    POTASSIUM 3.9 07/31/2023    CHLORIDE 101 07/31/2023    CO2 26 07/31/2023    ANION 9.0 07/31/2023    GLUCOSE 102 (H) 07/31/2023    BUN 17 07/31/2023    CREATININE 1.16 07/31/2023    CALCIUM 9.1 07/31/2023    ASTSGOT 30 05/29/2023    ALTSGPT 29 05/29/2023    ALKPHOSPHAT 57 05/29/2023    TBILIRUBIN 0.4 05/29/2023    ALBUMIN 4.0 05/29/2023    AGRATIO 1.6 05/29/2023     IMAGING    MPI 2020   NUCLEAR IMAGING INTERPRETATION    No reversible defects that would indicate ischemia.    Matched defect in the inferior apex is either artifactual or due to a small    inferior wall infarct.    Normal left ventricular size, ejection fraction, and wall motion.    ECG INTERPRETATION    Normal ECG portion of a treadmill nuclear stress test.    Average exercise tolerance for age.    Normal BP response to exercise.    Sinus rhythm.    No ischemic ECG changes.    No chest pain during stress.    Ernandez treadmill score 6, low risk (assume no prior CAD).     CT a/p with 1/2023  Vasculature: Atherosclerosis.   IMPRESSION:   1.  Findings in keeping with " acute pancreatitis involving the pancreatic tail.  2.  Small right inguinal hernia containing fat and the appendix.  3.  Atherosclerosis.    MR brain 8/2023       [facility] head[facility]neck 9/2023 ([facility])        Zio patch 5/2024  DOS: 5/10/2024-5/23/2024   Summary:   The patient was monitored for 13 days 5 hours.  Predominant underlying rhythm was sinus rhythm.   3 runs of supraventricular tachycardia occurred, the fastest and longest run lasting 19 beats with a max rate of 182 bpm.   Rare PACs less than 1%.  Rare PVCs less than 1%.   No evidence of pauses or significant bradyarrhythmias.   Symptoms correlated with sinus rhythm with heart rate 82 to 125 bpm with PACs.     Echo 6/2024  Prior study on 6/26/2020, compared to the report of the prior study,   there has been no significant change.   Normal left ventricular systolic function.  The left ventricular ejection fraction is visually estimated to be 55%.  Mild aortic insufficiency.  Estimated right ventricular systolic pressure is 20 mmHg.  Aorta  Normal aortic root for body surface area. The ascending aorta diameter is 3.7 cm.    Carotid 7/2024    1.  Mild plaque without evidence of hemodynamically significant stenosis in  bilateral internal carotid arteries.   2.  Loss of diastolic forward flow in right vertebral artery suggestive of  possible distal vessel occlusion.    10/2024 BLE venous    1.  No superficial or deep venous thrombosis in bilateral lower  extremities.   2.  There is significant reflux in the right great saphenous vein at the mid thigh and calf.  There is also significant reflux demonstrated in a varicosity of the right mid to distal thigh and proximal calf.   3.  There is significant reflux in the left great saphenous vein at multiple segments.  There is also significant reflux demonstrated in    varicosities in the left thigh and calf.        PROCEDURES:  NONE             ASSESSMENT AND PLAN  1. Mixed hyperlipidemia due to type 2 diabetes mellitus (HCC)   APOLIPOPROTEIN B    Comp Metabolic Panel    Lipid Profile      2. Type 2 diabetes mellitus with other circulatory complication, without long-term current use of insulin (HCC)        3. Aortic atherosclerosis (HCC)        4. Atherosclerosis of native coronary artery of native heart without angina pectoris        5. Stented coronary artery        6. History of anterior wall myocardial infarction        7. Mild atherosclerosis of carotid artery, bilateral        8. Occlusion of vertebral artery, right        9. Primary hypertension        10. Statin intolerance          Patient Type: Secondary Prevention and Type 2 Diabetes Mellitus, very high risk, polyvascular     MAJOR ASCVD:  polyvascular     1) CAD, s/p MI with PCI LAD 2009, RCA 2015 - stable, no sx   2020 MPI - stable  6/2024 echo = normal   Plan:  - continue secondary prevention treatment goals, intensive med mgmt and lifestyle interventions   - ongoing care with cardiology     Other Established Vascular Disease:     1) Non-aneurysmal aortic atherosclerosis - CT, no symptoms or prior known associated clinical manifestations  - general indicator of systemic AS with higher potential AS in other vascular beds  Plan:  - no further imaging surveillance, continue med mgmt      2) right vertebral artery occlusion, V2-V3 seg - does not appear to be symptomatic, presumed  chronic   9/2023 CTA = occlusion,  reconstitutes from basilar and left vert a backward flow  6/2024 duplex = loss of diast flow R vert art   Plan  - continue med mgmt  - repeat duplex for surveillance in 1yr (8/2025)    3) CHRONIC VENOUS DISEASE / INSUFFICIENCY - bilat superficial VR and varicosities   Plan:  - ongoing care with [facility]   - start/continue knee-high compression socks, 20-30mmHg, as much as tolerated, reviewed application, use of donning aide, resources for purchasing   - increase walking, avoid prolonged standing/sitting  - active calf pumping exercises if prolonged standing and elevate  "legs above heart level while sitting and sleeping   - emphasized need for reduction of central adiposity to reduce extrinsic compression of the low-pressure IVC  to improve venous return and reduce distal venous pressure in legs   - reviewed dietary changes and monitor weight and waist circumference  - side sleeping with body pillow may improve lymphatic and venous return by reducing  extrinsic compression on IVC during sleep  - reduce sodium (\"salt\") in diet to less than 2,000mg daily   - continue daily moisturizing lotion (such as Gold Bond Diabetic Foot Cream)  Medications:    - in general, diuretics will not help with CVI-assoc edema and should be avoided   - preferred evidence-based venoactive agents include micronized MPFF (Rx Vasculera or OTC diosmin/hespiridin), Ruscus extract ('s broom extract)  - other available agents include horse chestnut seed extract     Evidence of genetic dyslipidemia: No   FH genotyping: NO    Secondary causes/contributors: N\A    ACC/AHA Indication for Statin Therapy:  Secondary Prevention - Very high risk ASCVD - 2 major events or 1 event with other high-risk conditions    Calculated Risk for ASCVD  The ASCVD Risk score (Yola DK, et al., 2019) failed to calculate., N/A    Other Significant Risk Markers  Lp(a) = pending   apoB = pending     Goal LDL-C and nonHDL-C  LDL-C <55   At goal? No, 8/2024     - failed multiple agents     LIFESTYLE INTERVENTIONS  TOBACCO: Stages of Change: Maintenance (sustained change >6mo)    reports that he quit smoking about 27 years ago. His smoking use included cigarettes. He started smoking about 57 years ago. He has a 30 pack-year smoking history. He has never used smokeless tobacco.   - continued complete avoidance of all tobacco products   PHYSICAL ACTIVITY: >150min/week of mod-intensity activity or as much as tolerated  NUTRITION: Mediterranean and reduce Na to 1500mg/day   ETOH: limit to 2 or less standard drinks/day   WT MGMT: maintain " healthy weight    LIPID LOWERING MEDICATION MANAGEMENT:     Statin Therapy - COMPLETE INTOLERANCE   - failed 2+ statins including rosuva, atorva  - no further rechallenges     Non-Statin Medications  ZETIA: failed due ADRs   NEXLETOL: failed due to ADRs   BAS: limited benefit in this pt, not indicated   OMEGA-3 FAs: if TG >150 in light of DM, ASCVD - for 25% CVD risks reduction   FIBRATE: stopped fenofibrate at last visit as not beneficial unless TG >500    PCSK9 mAb: failed repatha due to GI distress that coincided to injections repeatedly, would not consider trial of Praluent as carries same risks   - there is slight increase risk for diarrhea, gastroenteritis compared to placebo in Rx information insertion     LEQVIO: will start prior auth process - reviewed use and and dosing schedule.  Patient will determine initial dosing date based upon other health conditions      *pt prefers to review with GI doctor prior to initiating any of the above additional options, we thoroughly review the prescribing information ADRs section at this visit     Indication for PCSK9 Inhibitor strategy:     PSCK9i indicated per 2018 ACC/AHA guidelines in this patient with established ASCVD whose LDL-C remains above threshold of 70 mg/dl despite maximally tolerated statin therapy.    BLOOD PRESSURE MANAGEMENT:  ACC/AHA (2017) goal <130/80  Home BP at goal:  yes  Office BP at goal:  yes- WCE noted   24h ABPM: not ordered to date  RDN candidate? NO  Plan:   - continue home BP monitoring, reviewed correct technique, provide BP log and instructions  - order 24h ABPM:  NO  - monitor lytes/gfr routinely   Medications:  - no current meds   - prior class indications are RASi, then consider BB in light of CAD hx     GLYCEMIC STATUS: Diabetic, T2 with mixed HLD, CAD  Goal A1c < 7  Lab Results   Component Value Date    HBA1C 6.2 (H) 11/21/2024    HBA1C 6.1 (A) 08/08/2024      Lab Results   Component Value Date/Time    MALBCRT NOTE 11/21/2024 07:37  AM    MICROALBUR <0.2 11/21/2024 07:37 AM   Plan:  - continue current medication plan   - recommmend for routine care with PCP (or endocrine) to include regular A1c monitoring, annual albumin/creatinine ratio (ACR), annual diabetic retinopathy screening, foot exams, annual flu vaccine, and updates to pneumonia vaccines as appropriate      ANTIPLAT THERAPY yes, ASA 81mg daily , if worsening tinnitus, then transition to plavix     OTHER:    # ANTIONE - new onset despite UPPP - worsening sx  STOP-BANG = 5pts   Plan:  - using mandibular advancement device     # tinnitus - mild symptomatic, no vasc etiology found      3) new progressive presyncope vs dysequilibrium - resolved   - Unlikely vasc etiology based upon prior normal MR brain and CTA neck however no functional duplex scanning to date, and risk for retrograde and/or steal phenomenon is plausible and worth evaluating further.  Lower prob in light of no other overt steal or posterior circ syndrome  - possible cardiogenic etiology in light of prior MIs and ischemic CM   - normal echo and zio patch (mild PACs),  no hx of arrhythmias reported, donna no AF/AFL  - findings of R vertebral artery occlusion with retrograde flow unlikely to be etiology  Plan:   - no further vascular w/u, defer ongoing to PCP, cardiology     STUDIES:  carotid duplex 8/2025  - RN to track   LABS: as noted above  FOLLOW-UP: 6 months     Kyrie Sales M.D.  JAIRO, Board-certified clinical lipidologist   [facility]   [facility]   [phone]

## 2024-12-20 ENCOUNTER — TELEPHONE (OUTPATIENT)
Dept: VASCULAR LAB | Facility: MEDICAL CENTER | Age: 74
End: 2024-12-20
Payer: MEDICARE

## 2024-12-20 ENCOUNTER — TELEPHONE (OUTPATIENT)
Dept: PHARMACY | Facility: MEDICAL CENTER | Age: 74
End: 2024-12-20
Payer: MEDICARE

## 2024-12-20 ENCOUNTER — DOCUMENTATION (OUTPATIENT)
Dept: VASCULAR LAB | Facility: MEDICAL CENTER | Age: 74
End: 2024-12-20
Payer: MEDICARE

## 2024-12-20 DIAGNOSIS — E78.00 HYPERCHOLESTEREMIA: ICD-10-CM

## 2024-12-20 DIAGNOSIS — I25.10 CORONARY ARTERY DISEASE INVOLVING NATIVE CORONARY ARTERY OF NATIVE HEART WITHOUT ANGINA PECTORIS: ICD-10-CM

## 2024-12-20 NOTE — PROGRESS NOTES
Requirements for Leqvio coverage:  Has [facility] investigated cost: Faxed today  Does pt need a PA: SHILA  When does the PA : N/a  Does pt qualify for copay card: No  When does the copay card : N/a  Has oncology financial counseling determined cost: Placed referral today  What is the cost needed to obtain from pt prior to injection: SHILA Sandoval, NagaD, BCACP

## 2024-12-20 NOTE — TELEPHONE ENCOUNTER
Received Refill PA request via [facility]  for fluticasone furoate-vilanterol (BREO ELLIPTA) 200-25 MCG/ACT AEROSOL POWDER, BREATH ACTIVATED . (Quantity:60, Day Supply:30)     Insurance: [employer]  Member ID:  [id number]  BIN: [id number]  PCN: [id number]  Group: [id number]     Ran Test claim via [employer] [facility] medication Pays for a $155.24 copay. Will outreach to patient to offer specialty pharmacy services and or release to preferred pharmacy

## 2024-12-20 NOTE — TELEPHONE ENCOUNTER
Confirmed labs from 12/10 CMP/Lipid  are resulted/listed in chart.     Raquel Vera, Med Ass't  [facility]  Ph. [phone]  Fx. [phone]

## 2024-12-20 NOTE — PROGRESS NOTES
Chief Complaint   Patient presents with    Follow-Up     Mild intermittent asthma without complication          HPI: This patient is a 74 y.o. male whom is followed in our clinic for asthma last seen by me on 6/6/24.   The pt also has a dx of ANTIONE for which he underwent UPPP in the past.  He is a former tobacco smoker with 30-pack-year history and quit in 1997.  CT chest from August 2020 showed subcentimeter pulmonary nodules and repeat CT chest from August 2021 showed stable pulmonary nodules largest of 5 mm in size without significant adenopathy. Hs last CT from 8/2023 showed no nodules. With regards to asthma, patient's symptoms were preiously mild and controlled with Symbicort 160 which he was using daily in the mornings on the days that he exercises but rarely needing BID. Spirometry from 9/2022 showed normal air flows with trend toward BD response.  He had a sleep study in 2023 showing severe ANTIONE with an AHI of 35 however he spent less than 10 minutes with saturation below 88%. No history of uncontrolled hypertension or atrial fibrillation but does have a history of coronary artery disease status post PCI and is followed by cardiology. He is using oral device but having a hard time adjusting to this and notes his daytime breathing is worse after a poor nights sleep but no asthma exacerbation since our last visit. He remains on symbicort but felt Breo was better for him in the past and would like to change. PFT from 8/23 was normal other than mild air trapping.     Past Medical History:   Diagnosis Date    Acute myocardial infarction of other anterior wall, episode of care unspecified 2009; 2014    Anxiety state, unspecified      Arthritis     foot, ankle, jaw right side    Asthma     Bronchitis     CAD (coronary artery disease) 09/2009    PCI/stent (Promus 3.0 x 15mm) to the LAD.    Cataract     left eye no surgery    Chickenpox     Cough     Diabetes (HCC)     on oral meds    Erosive gastritis 2020    Gastric  ulcer 2020    Glaucoma     Heart murmur     2023 patient not aware of having a murmur    High cholesterol     Hyperlipidemia     Mumps     Pain 2023    discomfort abdomen    Psychiatric problem     S/P coronary artery stent placement      Shortness of breath     Sleep apnea 2017    resolved after surgery for sleep apnea    Snoring        Social History     Socioeconomic History    Marital status:      Spouse name: Not on file    Number of children: Not on file    Years of education: Not on file    Highest education level: Not on file   Occupational History    Not on file   Tobacco Use    Smoking status: Former     Current packs/day: 0.00     Average packs/day: 1 pack/day for 30.0 years (30.0 ttl pk-yrs)     Types: Cigarettes     Start date: 1967     Quit date: 1997     Years since quittin.3    Smokeless tobacco: Never   Vaping Use    Vaping status: Never Used   Substance and Sexual Activity    Alcohol use: No    Drug use: No    Sexual activity: Not on file   Other Topics Concern    Not on file   Social History Narrative    Not on file     Social Drivers of Health     Financial Resource Strain: Not on file   Food Insecurity: Not on file   Transportation Needs: Not on file   Physical Activity: Not on file   Stress: Not on file   Social Connections: Not on file   Intimate Partner Violence: Not on file   Housing Stability: Not on file       Family History   Problem Relation Age of Onset    Heart Disease Maternal Aunt     Cancer Mother         colon       Current Outpatient Medications on File Prior to Visit   Medication Sig Dispense Refill    meloxicam (MOBIC) 15 MG tablet Take 1 Tablet by mouth every day. 30 Tablet 1    celecoxib (CELEBREX) 200 MG Cap Take 1 Capsule by mouth every day. 30 Capsule 1    doxycycline (VIBRAMYCIN) 100 MG Tab Take 1 Tablet by mouth 2 times a day. 14 Tablet 0    sildenafil (REVATIO) 20 MG tablet TAKE 1-5 TAB ORALLY BY MOUTH 45 MINUTE PRIOR TO SEXUAL ENCOUNTER  ON EMPTY STOMACH      valACYclovir (VALTREX) 500 MG Tab Take 1,000 mg by mouth every day.      tamsulosin (FLOMAX) 0.4 MG capsule Take 0.4 mg by mouth 1/2 hour after breakfast.      fluticasone (FLONASE) 50 MCG/ACT nasal spray Administer 2 Sprays into affected nostril(S) as needed.      Cholecalciferol (VITAMIN D3) 125 MCG (5000 UT) Cap Take 5,000 Units by mouth every day.      glucose blood (ONETOUCH ULTRA) strip 1 Each by Other route every day.      pantoprazole (PROTONIX) 40 MG Tablet Delayed Response Take 1 Tablet by mouth every day.      aspirin (ASA) 81 MG Chew Tab chewable tablet Chew 81 mg every day.      fexofenadine (ALLEGRA) 180 MG tablet Take 180 mg by mouth 2 times a day.      Multiple Vitamins-Minerals (PRESERVISION AREDS 2+MULTI VIT PO) Take 1 Tablet by mouth 2 times a day.      albuterol 108 (90 Base) MCG/ACT Aero Soln inhalation aerosol INHALE 1-2 PUFFS BY MOUTH EVERY FOUR HOURS AS NEEDED FOR SHORTNESS OF BREATH. 8.5 Each 11    metformin ER (GLUCOPHAGE XR) 500 MG TABLET SR 24 HR Take 500 mg by mouth every evening.      gabapentin (NEURONTIN) 100 MG Cap Take 100-200 mg by mouth 2 times a day. 100 mg every AM and 200 mg every night NIGHT      Probiotic Product (PROBIOTIC DAILY PO) Take 1 Tab by mouth every day.       No current facility-administered medications on file prior to visit.       Lisinopril, Aspirin, Bempedoic acid, Ciprofloxacin, Crestor [rosuvastatin calcium], Hmg-coa-r inhibitors, Atorvastatin, Ezetimibe, Repatha [evolocumab], Singulair, and Topamax [topiramate]      ROS:   Review of Systems   Constitutional:  Negative for chills, diaphoresis, fever, malaise/fatigue and weight loss.   HENT:  Negative for congestion, ear discharge, ear pain, hearing loss, nosebleeds, sinus pain, sore throat and tinnitus.    Eyes:  Negative for blurred vision, double vision, photophobia, pain, discharge and redness.   Respiratory:  Negative for cough, hemoptysis, sputum production, shortness of breath,  wheezing and stridor.    Cardiovascular:  Negative for chest pain, palpitations, orthopnea, claudication, leg swelling and PND.   Gastrointestinal:  Negative for abdominal pain, constipation, diarrhea, heartburn, nausea and vomiting.   Genitourinary:  Negative for dysuria and urgency.   Musculoskeletal:  Negative for back pain, falls, joint pain, myalgias and neck pain.   Skin:  Negative for itching and rash.   Neurological:  Negative for dizziness, tremors, speech change, focal weakness, weakness and headaches.   Endo/Heme/Allergies:  Negative for environmental allergies.   Psychiatric/Behavioral:  Negative for depression.        /68   Pulse 72   Ht 1.829 m (6')   Wt 78.9 kg (174 lb)   SpO2 97%   Physical Exam  Vitals reviewed.   Constitutional:       General: He is not in acute distress.     Appearance: Normal appearance.   HENT:      Head: Normocephalic and atraumatic.      Right Ear: External ear normal.      Left Ear: External ear normal.      Nose: Nose normal. No congestion.      Mouth/Throat:      Mouth: Mucous membranes are moist.      Pharynx: Oropharynx is clear. No oropharyngeal exudate.   Eyes:      General: No scleral icterus.     Extraocular Movements: Extraocular movements intact.      Conjunctiva/sclera: Conjunctivae normal.      Pupils: Pupils are equal, round, and reactive to light.   Cardiovascular:      Rate and Rhythm: Normal rate and regular rhythm.      Heart sounds: Normal heart sounds. No murmur heard.     No gallop.   Pulmonary:      Effort: No respiratory distress.      Breath sounds: Normal breath sounds. No wheezing or rales.   Abdominal:      Palpations: Abdomen is soft.   Musculoskeletal:         General: Normal range of motion.      Cervical back: Normal range of motion and neck supple.      Right lower leg: No edema.      Left lower leg: No edema.   Skin:     General: Skin is warm and dry.      Findings: No rash.   Neurological:      General: No focal deficit present.       Mental Status: He is alert and oriented to person, place, and time.      Cranial Nerves: No cranial nerve deficit.   Psychiatric:         Mood and Affect: Mood normal.         Behavior: Behavior normal.         PFTs as reviewed by me personally: as per hPI      Assessment:  1. Mild intermittent asthma without complication  fluticasone furoate-vilanterol (BREO ELLIPTA) 200-25 MCG/ACT AEROSOL POWDER, BREATH ACTIVATED    nystatin (MYCOSTATIN) 092218 UNIT/ML Suspension    Spirometry    DISCONTINUED: fluticasone furoate-vilanterol (BREO ELLIPTA) 200-25 MCG/ACT AEROSOL POWDER, BREATH ACTIVATED    DISCONTINUED: nystatin (MYCOSTATIN) 140830 UNIT/ML Suspension      2. ANTIONE (obstructive sleep apnea)            Plan:  Chronic, stable. Will transition to Breo 200. F/u 6 mos with spirometry  Working with outside providers.   Return in about 6 months (around 6/19/2025) for spirometry.

## 2024-12-20 NOTE — TELEPHONE ENCOUNTER
----- Message from Physician Kyrie Sales M.D. sent at 12/20/2024 10:05 AM PST -----  Regarding: copy of last lipid panel from [facility]  Not sure if Evi took a copy from the patient yesterday.  But I don't see anything scanned to media.  Need most recent lipid panel and CMP from [facility] scanned into media please.  Thanks

## 2024-12-30 NOTE — PROGRESS NOTES
Addendum 12/30/24:    [facility] waiting to investigate benefits until the new year. Will f/u later this week.    Rush Sandoval, PharmD, BCACP

## 2025-01-15 ENCOUNTER — TELEPHONE (OUTPATIENT)
Dept: SLEEP MEDICINE | Facility: MEDICAL CENTER | Age: 75
End: 2025-01-15

## 2025-01-15 ENCOUNTER — HOSPITAL ENCOUNTER (OUTPATIENT)
Dept: HEMATOLOGY ONCOLOGY | Facility: MEDICAL CENTER | Age: 75
End: 2025-01-15
Attending: STUDENT IN AN ORGANIZED HEALTH CARE EDUCATION/TRAINING PROGRAM
Payer: MEDICARE

## 2025-01-15 VITALS
DIASTOLIC BLOOD PRESSURE: 72 MMHG | SYSTOLIC BLOOD PRESSURE: 128 MMHG | HEIGHT: 72 IN | TEMPERATURE: 98.4 F | OXYGEN SATURATION: 97 % | BODY MASS INDEX: 23.95 KG/M2 | HEART RATE: 88 BPM | WEIGHT: 176.81 LBS

## 2025-01-15 DIAGNOSIS — T14.8XXA BRUISING: ICD-10-CM

## 2025-01-15 DIAGNOSIS — D72.810 LYMPHOPENIA: ICD-10-CM

## 2025-01-15 DIAGNOSIS — J45.20 MILD INTERMITTENT ASTHMA WITHOUT COMPLICATION: ICD-10-CM

## 2025-01-15 PROCEDURE — 99212 OFFICE O/P EST SF 10 MIN: CPT | Performed by: STUDENT IN AN ORGANIZED HEALTH CARE EDUCATION/TRAINING PROGRAM

## 2025-01-15 PROCEDURE — 99213 OFFICE O/P EST LOW 20 MIN: CPT | Performed by: STUDENT IN AN ORGANIZED HEALTH CARE EDUCATION/TRAINING PROGRAM

## 2025-01-15 ASSESSMENT — FIBROSIS 4 INDEX: FIB4 SCORE: 1.93

## 2025-01-15 NOTE — PROGRESS NOTES
Clinic Note:  Hematology/Oncology    Primary Care:  Ele Urbano P.A.-C.    Diagnosis: Lymphopenia    Chief Complaint:  Establish Care    History of Presenting Illness:  Darren Brunson is a 74 y.o. male with PMH HTN, HLD, CAD s/p PCI, IBS, ANTIONE, GERD who presents today to establish care for lymphopenia.    Patient overall feels well.  Reports some increased fatigue over the years increased fatigue over the years.  Last year he reported some unintentional weight loss but is recovering away, denies fever chills.     Is following with cardiology and vascular surgery.  Reports he is very active.  Reports after taking a shower a syncopal event lasted maybe 3 minutes and laid on the bathroom floor for 10 minutes because he was too weak and could not get back up.  He also had presyncopal symptoms during a hot yoga class.  Other complaints include electrical tingling to the right neck and shoulder.  Reports about a pancreatitis couple years ago but has not had any since.  Does note that he has intermittent headaches and pressure.  Reports that he may have some right easy bruising than he has had in the past    Past Medical History:   Diagnosis Date    Acute myocardial infarction of other anterior wall, episode of care unspecified 2009; 2014    Anxiety state, unspecified      Arthritis     foot, ankle, jaw right side    Asthma     Bronchitis     CAD (coronary artery disease) 09/2009    PCI/stent (Promus 3.0 x 15mm) to the LAD.    Cataract     left eye no surgery    Chickenpox     Cough     Diabetes (HCC)     on oral meds    Erosive gastritis 2020    Gastric ulcer 2020    Glaucoma     Heart murmur     2/17/2023 patient not aware of having a murmur    High cholesterol     Hyperlipidemia     Mumps     Pain 01/14/2023    discomfort abdomen    Psychiatric problem     S/P coronary artery stent placement      Shortness of breath     Sleep apnea 2017    resolved after surgery for sleep apnea    Snoring        Past Surgical  History:   Procedure Laterality Date    INGUINAL HERNIA REPAIR ROBOTIC XI Right 8/24/2023    Procedure: ROBOT ASSISTED LAPAROSCOPIC RIGHT INGUINAL HERNIA REPAIR;  Surgeon: Morris Mcfarland M.D.;  Location: SURGERY [facility];  Service: Gen Robotic    IA UPPER GI ENDOSCOPY,DIAGNOSIS N/A 2/20/2023    Procedure: UPPER RADIAL ENDOSCOPIC ULTRASOUND PANCREATITIS, TAIL OF PANCREATIC MASS - ENDOSCOPY FINE NEEDLE ASPIRATION WITH ANESTHESIA;  Surgeon: Reymundo Loya M.D.;  Location: [facility];  Service: EUS    EGD W/ENDOSCOPIC ULTRASOUND N/A 2/20/2023    Procedure: EGD, WITH ENDOSCOPIC US;  Surgeon: Reymundo Loya M.D.;  Location: [facility];  Service: EUS    EGD WITH ASP/BX N/A 2/20/2023    Procedure: EGD, WITH ASPIRATION BIOPSY;  Surgeon: Reymundo Loya M.D.;  Location: [facility];  Service: EUS    SEPTAL RECONSTRUCTION N/A 10/23/2017    Procedure: SEPTAL RECONSTRUCTION;  Surgeon: CHARLI Sanchez M.D.;  Location: SURGERY [facility] DAY [facility];  Service: Ent    UVULOPHARYNGOPALATOPLASTY N/A 10/23/2017    Procedure: UVULOPHARYNGOPALATOPLASTY;  Surgeon: CHARLI Sanchez M.D.;  Location: SURGERY SAME DAY [facility];  Service: Ent    BIOPSY GENERAL Left 10/23/2017    Procedure: BIOPSY GENERAL LEFT UPPER NECK;  Surgeon: CHARLI Sanchez M.D.;  Location: SURGERY SAME DAY [facility];  Service: Ent    INGUINAL HERNIA REPAIR BILATERAL  2015    STENT PLACEMENT  2015    SINUSOTOMIES  11/2011    Sinus surgery    OTHER CARDIAC SURGERY  2009    cardiac cath with stent placement    TONSILLECTOMY      UMBILICAL HERNIA REPAIR         Social History     Socioeconomic History    Marital status:      Spouse name: Not on file    Number of children: Not on file    Years of education: Not on file    Highest education level: Not on file   Occupational History    Not on file   Tobacco Use    Smoking status: Former     Current packs/day: 0.00     Average packs/day: 1 pack/day  for 30.0 years (30.0 ttl pk-yrs)     Types: Cigarettes     Start date: 1967     Quit date: 1997     Years since quittin.3    Smokeless tobacco: Never   Vaping Use    Vaping status: Never Used   Substance and Sexual Activity    Alcohol use: No    Drug use: No    Sexual activity: Not on file   Other Topics Concern    Not on file   Social History Narrative    Not on file     Social Drivers of Health     Financial Resource Strain: Not on file   Food Insecurity: Not on file   Transportation Needs: Not on file   Physical Activity: Not on file   Stress: Not on file   Social Connections: Not on file   Intimate Partner Violence: Not on file   Housing Stability: Not on file     Family History   Problem Relation Age of Onset    Heart Disease Maternal Aunt     Cancer Mother         colon     Allergies as of 01/15/2025 - Reviewed 2024   Allergen Reaction Noted    Lisinopril Anaphylaxis 2012    Aspirin  2012    Bempedoic acid  2024    Ciprofloxacin  2018    Crestor [rosuvastatin calcium] Unspecified 2018    Hmg-coa-r inhibitors  2012    Atorvastatin  2023    Ezetimibe  2024    Repatha [evolocumab]  2024    Singulair Unspecified 2023    Topamax [topiramate]  2023         Current Outpatient Medications:     fluticasone furoate-vilanterol (BREO ELLIPTA) 200-25 MCG/ACT AEROSOL POWDER, BREATH ACTIVATED, Inhale 1 Puff every day. Rinse mouth after use., Disp: 3 Each, Rfl: 3    nystatin (MYCOSTATIN) 259248 UNIT/ML Suspension, Swish and swallow 1 teaspoonful (= 5mL) 4 times daily., Disp: 250 mL, Rfl: 0    meloxicam (MOBIC) 15 MG tablet, Take 1 Tablet by mouth every day., Disp: 30 Tablet, Rfl: 1    celecoxib (CELEBREX) 200 MG Cap, Take 1 Capsule by mouth every day., Disp: 30 Capsule, Rfl: 1    doxycycline (VIBRAMYCIN) 100 MG Tab, Take 1 Tablet by mouth 2 times a day., Disp: 14 Tablet, Rfl: 0    sildenafil (REVATIO) 20 MG tablet, TAKE 1-5 TAB ORALLY BY  "MOUTH 45 MINUTE PRIOR TO SEXUAL ENCOUNTER ON EMPTY STOMACH, Disp: , Rfl:     valACYclovir (VALTREX) 500 MG Tab, Take 1,000 mg by mouth every day., Disp: , Rfl:     tamsulosin (FLOMAX) 0.4 MG capsule, Take 0.4 mg by mouth 1/2 hour after breakfast., Disp: , Rfl:     fluticasone (FLONASE) 50 MCG/ACT nasal spray, Administer 2 Sprays into affected nostril(S) as needed., Disp: , Rfl:     Cholecalciferol (VITAMIN D3) 125 MCG (5000 UT) Cap, Take 5,000 Units by mouth every day., Disp: , Rfl:     glucose blood (ONETOUCH ULTRA) strip, 1 Each by Other route every day., Disp: , Rfl:     pantoprazole (PROTONIX) 40 MG Tablet Delayed Response, Take 1 Tablet by mouth every day., Disp: , Rfl:     aspirin (ASA) 81 MG Chew Tab chewable tablet, Chew 81 mg every day., Disp: , Rfl:     fexofenadine (ALLEGRA) 180 MG tablet, Take 180 mg by mouth 2 times a day., Disp: , Rfl:     Multiple Vitamins-Minerals (PRESERVISION AREDS 2+MULTI VIT PO), Take 1 Tablet by mouth 2 times a day., Disp: , Rfl:     albuterol 108 (90 Base) MCG/ACT Aero Soln inhalation aerosol, INHALE 1-2 PUFFS BY MOUTH EVERY FOUR HOURS AS NEEDED FOR SHORTNESS OF BREATH., Disp: 8.5 Each, Rfl: 11    metformin ER (GLUCOPHAGE XR) 500 MG TABLET SR 24 HR, Take 500 mg by mouth every evening., Disp: , Rfl:     gabapentin (NEURONTIN) 100 MG Cap, Take 100-200 mg by mouth 2 times a day. 100 mg every AM and 200 mg every night NIGHT, Disp: , Rfl:     Probiotic Product (PROBIOTIC DAILY PO), Take 1 Tab by mouth every day., Disp: , Rfl:     Review of Systems:  ROS as above     Physical Exam:  Vitals:    01/15/25 1447   Height: 1.829 m (6' 0.01\")     Physical Exam  Constitutional:       General: He is not in acute distress.  HENT:      Head: Normocephalic and atraumatic.      Mouth/Throat:      Mouth: Mucous membranes are moist.      Pharynx: Oropharynx is clear.   Eyes:      General: No scleral icterus.     Conjunctiva/sclera: Conjunctivae normal.   Cardiovascular:      Pulses: Normal pulses. "   Pulmonary:      Effort: Pulmonary effort is normal. No respiratory distress.   Abdominal:      General: There is no distension.      Palpations: Abdomen is soft.      Tenderness: There is no abdominal tenderness.   Musculoskeletal:         General: No swelling. Normal range of motion.      Cervical back: Neck supple. No rigidity or tenderness.   Skin:     General: Skin is warm and dry.      Capillary Refill: Capillary refill takes less than 2 seconds.   Neurological:      General: No focal deficit present.      Mental Status: He is alert and oriented to person, place, and time.   Psychiatric:         Mood and Affect: Mood normal.         Thought Content: Thought content normal.         Judgment: Judgment normal.        Labs:  Lab Results   Component Value Date/Time    WBC 4.0 (L) 05/29/2023 06:40 PM    RBC 4.69 (L) 05/29/2023 06:40 PM    HEMOGLOBIN 14.5 05/29/2023 06:40 PM    HEMATOCRIT 41.2 (L) 05/29/2023 06:40 PM    MCV 87.8 05/29/2023 06:40 PM    MCH 30.9 05/29/2023 06:40 PM    MCHC 35.2 05/29/2023 06:40 PM    RDW 42.6 05/29/2023 06:40 PM    PLATELETCT 219 05/29/2023 06:40 PM    MPV 8.9 (L) 05/29/2023 06:40 PM    NEUTSPOLYS 61.00 05/29/2023 06:40 PM    LYMPHOCYTES 21.00 (L) 05/29/2023 06:40 PM    MONOCYTES 12.90 05/29/2023 06:40 PM    EOSINOPHILS 4.30 05/29/2023 06:40 PM    BASOPHILS 0.80 05/29/2023 06:40 PM        Lab Results   Component Value Date/Time    SODIUM 136 07/31/2023 10:18 AM    POTASSIUM 3.9 07/31/2023 10:18 AM    CHLORIDE 101 07/31/2023 10:18 AM    CO2 26 07/31/2023 10:18 AM    GLUCOSE 102 (H) 07/31/2023 10:18 AM    BUN 17 07/31/2023 10:18 AM    CREATININE 1.16 07/31/2023 10:18 AM      Imaging:   Reviewed    Assessment & Plan:  Darren Kaufman Nannette is a 74 y.o.PMH HTN, HLD, CAD s/p PCI, IBS, ANTIONE, GERD who presents today to establish care for lymphopenia.    -Previous workup included serum free light chains and serum electrophoresis with no light chain elevation or monoclonal protein  identified  -Lymphopenia reported as far back as 2018 and has been stable.  Has not had increased incidence of infections  -No further workup at that time at this time, if he does develop other cytopenias will consider further workup including flow cytometry and consideration for bone marrow biopsy    Any questions and concerns raised by the patient were answered to the best of my ability. Thank you for allowing me to participate in the care for this patient. Please feel free to contact me for any questions or concerns.

## 2025-01-15 NOTE — TELEPHONE ENCOUNTER
"Patient came into office requesting NEW RX for ADVAIR , per patient was informed that Breo is \"a couple hundred\" a month with insurance and would like to try a different medication.     Pt would like RX to go to   [facility]/pharmacy #[id number] - [place], [place] - [address]  [address] [place] [address]  Phone: [phone] Fax: [phone]      Please contact patient via Phone once rx has been sent in.       Last Seen 12/19/2024 with Dr. Vazquez    "

## 2025-01-16 ENCOUNTER — TELEPHONE (OUTPATIENT)
Dept: CARDIOLOGY | Facility: MEDICAL CENTER | Age: 75
End: 2025-01-16
Payer: MEDICARE

## 2025-01-16 DIAGNOSIS — R42 ORTHOSTATIC LIGHTHEADEDNESS: ICD-10-CM

## 2025-01-16 RX ORDER — MIDODRINE HYDROCHLORIDE 5 MG/1
5 TABLET ORAL 2 TIMES DAILY
Qty: 180 TABLET | Refills: 1 | Status: SHIPPED | OUTPATIENT
Start: 2025-01-16

## 2025-01-16 RX ORDER — FLUTICASONE PROPIONATE AND SALMETEROL 250; 50 UG/1; UG/1
1 POWDER RESPIRATORY (INHALATION) EVERY 12 HOURS
Qty: 3 EACH | Refills: 3 | Status: SHIPPED | OUTPATIENT
Start: 2025-01-16

## 2025-01-16 NOTE — TELEPHONE ENCOUNTER
S/W pt regarding AL recs. Medication instruction provided. Encouraged to wear compression stockings. Encouraged fluid intake of at least 80oz/day. Pt verbalized understanding and agreeable to plan.

## 2025-01-16 NOTE — TELEPHONE ENCOUNTER
Order placed for Midodrine 5mg BID.     Phone Number Called: [phone]     Call outcome:  Left detailed message and requested pt return call to discuss medication recommendation further and encourage use of compression socks.

## 2025-01-16 NOTE — TELEPHONE ENCOUNTER
----- Message -----  From: Matty Estrada M.D.  Sent: 1/15/2025   4:19 PM PST  To: Kyrie Sales M.D.; Danyell Chu R.N.; #  Subject: RE: Shared Patient                               Thank you for the notice.    Danyell: can you please call him and advise that I recommend he tries midodrine 5mg BID. We addressed this in his last clinic visit and at that time he preferred to hold off given overall stability of symptoms.and ensure he is utilizing compression socks.    Thank you  ----- Message -----  From: Rosario Harkins D.O.  Sent: 1/15/2025   4:15 PM PST  To: Matty Estrada M.D.; Kyrie Sales M.D.  Subject: Shared Patient                                   Hi Dr. Sales and Dr. Estrada,     I hope all is well.  I saw this patient today as a new clinic patient and he asked me to let you know that he had a syncopal episode a couple of days ago while in the bathroom.  Reports that he had a fall from standing and loss of consciousness that lasted about 3 minutes with a prodrome.  States that he laid on the bathroom floor for about 10 minutes as he felt too weak to get up.  He also noted it almost happened again while he was practicing yoga.     I saw him for mild lymphopenia which I don't suspect will need further workup, but he asked that I relay this specifically to both of you in case there was any further workup he needed for this.     I appreciate the shared care of this patient!    Best,  Rosario Harkins

## 2025-01-17 ENCOUNTER — TELEPHONE (OUTPATIENT)
Dept: PHARMACY | Facility: MEDICAL CENTER | Age: 75
End: 2025-01-17
Payer: MEDICARE

## 2025-01-17 NOTE — TELEPHONE ENCOUNTER
Received [employer] PA request via [employer]  for fluticasone-salmeterol (WIXELA INHUB) 250-50 MCG/ACT AEROSOL POWDER, BREATH ACTIVATED . (Quantity:60, Day Supply:30)     Insurance: [employer]  Member ID:  [id number]  BIN: [id number]  PCN: [id number]  Group: [id number]     Ran Test claim via [place] & medication  pays for $10    Will release prescription to preferred pharmacy for processing: [id number]

## 2025-01-21 ENCOUNTER — TELEPHONE (OUTPATIENT)
Dept: CARDIOLOGY | Facility: MEDICAL CENTER | Age: 75
End: 2025-01-21
Payer: MEDICARE

## 2025-01-22 NOTE — TELEPHONE ENCOUNTER
Will call pt in 24-48 hrs to collect information to calculate lion-ci score and fax to ins accordingly.    Rush Sandoval, NagaD, BCACP

## 2025-01-22 NOTE — TELEPHONE ENCOUNTER
Caller: Sonja     Topic/issue: [facility] [employer] is sending over a fax with additional information requesting for the EVELIN-CI Score.    Callback Number: [phone]     Thank you,  Niyah DYSON

## 2025-01-24 NOTE — TELEPHONE ENCOUNTER
In regard to statin trials and intolerance, pt EVELIN-CI Score: at least 10.     Rush Sandoval, NagaD, BCACP

## 2025-01-24 NOTE — TELEPHONE ENCOUNTER
PA  unfortunately.     Resubmitted new PA today via [employer].     Key: [id number]    Rush Sandoval, NagaD, BCACP

## 2025-01-28 ENCOUNTER — TELEPHONE (OUTPATIENT)
Dept: VASCULAR LAB | Facility: MEDICAL CENTER | Age: 75
End: 2025-01-28
Payer: MEDICARE

## 2025-01-28 NOTE — TELEPHONE ENCOUNTER
Caller: Darren Brunson     Topic/issue: Pt would like to touch base because [facility] does not do the coupon and pt thought this was going to [place]     Callback Number: [phone]    Thank You   Ele MARI

## 2025-01-28 NOTE — TELEPHONE ENCOUNTER
Caller: Darren Brunson     Topic/issue: Pt states that Rush called him last week and he is returning his call     Callback Number: [phone]    Thank You   Ele MARI

## 2025-01-31 NOTE — TELEPHONE ENCOUNTER
Called and s/w pt - advised him [employer] covered w/ no PA requirement. Billing dept working on copay estimate. Will f/u next week.    Rush Sandoval, PharmD, BCACP

## 2025-02-03 ENCOUNTER — TELEPHONE (OUTPATIENT)
Dept: VASCULAR LAB | Facility: MEDICAL CENTER | Age: 75
End: 2025-02-03
Payer: MEDICARE

## 2025-02-03 ENCOUNTER — PATIENT MESSAGE (OUTPATIENT)
Dept: VASCULAR LAB | Facility: MEDICAL CENTER | Age: 75
End: 2025-02-03
Payer: MEDICARE

## 2025-02-03 DIAGNOSIS — I83.12 VARICOSE VEINS OF BOTH LOWER EXTREMITIES WITH INFLAMMATION: ICD-10-CM

## 2025-02-03 DIAGNOSIS — I83.11 VARICOSE VEINS OF BOTH LOWER EXTREMITIES WITH INFLAMMATION: ICD-10-CM

## 2025-02-03 NOTE — TELEPHONE ENCOUNTER
Available options are [facility] or [facility]. We have used both. MA team to update

## 2025-02-03 NOTE — TELEPHONE ENCOUNTER
Called and s/w patient    Cardiology suggested to go to [facility] ([facility]) for varicose veins.  Patient was dissatisfied with service, and they did not have any providers available. Patient is requesting to go to a different vein clinic. Patient stated he does not need a referral for insurance. Please advise on other suggestions for treatment.    Patient has also had swelling in (L) leg, and is wearing compression stalking during the day. Not painful.  Follow-up not until Jun 2025. Please advise if patient should f/u with us or vein clinic.      Raquel Vera, Med Ass't  [facility]  Ph. [phone]  Fx. [phone]

## 2025-02-04 NOTE — TELEPHONE ENCOUNTER
Left message for patient to let him know we can send a referral to [facility] [place] or [facility]. Asked patient to call back to let us know which facility he would like the referral to go to.         Landry Mack, Medical Assistant   [facility]   Ph: [phone]  Fx: [phone]

## 2025-02-11 ENCOUNTER — HOSPITAL ENCOUNTER (OUTPATIENT)
Dept: LAB | Facility: MEDICAL CENTER | Age: 75
End: 2025-02-11
Attending: STUDENT IN AN ORGANIZED HEALTH CARE EDUCATION/TRAINING PROGRAM
Payer: MEDICARE

## 2025-02-11 DIAGNOSIS — T14.8XXA BRUISING: ICD-10-CM

## 2025-02-11 DIAGNOSIS — D72.810 LYMPHOPENIA: ICD-10-CM

## 2025-02-11 LAB
APTT PPP: 28.9 SEC (ref 24.7–36)
BASOPHILS # BLD AUTO: 0.2 % (ref 0–1.8)
BASOPHILS # BLD: 0.01 K/UL (ref 0–0.12)
EOSINOPHIL # BLD AUTO: 0.06 K/UL (ref 0–0.51)
EOSINOPHIL NFR BLD: 1.3 % (ref 0–6.9)
ERYTHROCYTE [DISTWIDTH] IN BLOOD BY AUTOMATED COUNT: 46.1 FL (ref 35.9–50)
HCT VFR BLD AUTO: 45.5 % (ref 42–52)
HGB BLD-MCNC: 15.5 G/DL (ref 14–18)
IMM GRANULOCYTES # BLD AUTO: 0.01 K/UL (ref 0–0.11)
IMM GRANULOCYTES NFR BLD AUTO: 0.2 % (ref 0–0.9)
INR PPP: 1.02 (ref 0.87–1.13)
LYMPHOCYTES # BLD AUTO: 0.69 K/UL (ref 1–4.8)
LYMPHOCYTES NFR BLD: 15.2 % (ref 22–41)
MCH RBC QN AUTO: 32.5 PG (ref 27–33)
MCHC RBC AUTO-ENTMCNC: 34.1 G/DL (ref 32.3–36.5)
MCV RBC AUTO: 95.4 FL (ref 81.4–97.8)
MONOCYTES # BLD AUTO: 0.48 K/UL (ref 0–0.85)
MONOCYTES NFR BLD AUTO: 10.6 % (ref 0–13.4)
MORPHOLOGY BLD-IMP: NORMAL
NEUTROPHILS # BLD AUTO: 3.28 K/UL (ref 1.82–7.42)
NEUTROPHILS NFR BLD: 72.5 % (ref 44–72)
NRBC # BLD AUTO: 0 K/UL
NRBC BLD-RTO: 0 /100 WBC (ref 0–0.2)
PLATELET # BLD AUTO: 196 K/UL (ref 164–446)
PMV BLD AUTO: 9.1 FL (ref 9–12.9)
PROTHROMBIN TIME: 13.4 SEC (ref 12–14.6)
RBC # BLD AUTO: 4.77 M/UL (ref 4.7–6.1)
WBC # BLD AUTO: 4.5 K/UL (ref 4.8–10.8)

## 2025-02-11 PROCEDURE — 36415 COLL VENOUS BLD VENIPUNCTURE: CPT

## 2025-02-11 PROCEDURE — 85025 COMPLETE CBC W/AUTO DIFF WBC: CPT

## 2025-02-11 PROCEDURE — 85730 THROMBOPLASTIN TIME PARTIAL: CPT

## 2025-02-11 PROCEDURE — 85610 PROTHROMBIN TIME: CPT

## 2025-02-14 NOTE — TELEPHONE ENCOUNTER
Caller:  Darren Brunson    Topic/issue:   Darren is returning call, he would like the referral to go to:   [facility]  Dr. Ricky Davis    Callback Number:   [phone]    Thank you,   Amrita MOCK

## 2025-02-18 ENCOUNTER — APPOINTMENT (OUTPATIENT)
Dept: URBAN - METROPOLITAN AREA CLINIC 15 | Facility: CLINIC | Age: 75
Setting detail: DERMATOLOGY
End: 2025-02-18

## 2025-02-18 ENCOUNTER — RX ONLY (RX ONLY)
Age: 75
End: 2025-02-18

## 2025-02-18 DIAGNOSIS — D22 MELANOCYTIC NEVI: ICD-10-CM

## 2025-02-18 DIAGNOSIS — L57.0 ACTINIC KERATOSIS: ICD-10-CM

## 2025-02-18 DIAGNOSIS — L82.1 OTHER SEBORRHEIC KERATOSIS: ICD-10-CM

## 2025-02-18 DIAGNOSIS — L81.4 OTHER MELANIN HYPERPIGMENTATION: ICD-10-CM

## 2025-02-18 DIAGNOSIS — Z87.2 PERSONAL HISTORY OF DISEASES OF THE SKIN AND SUBCUTANEOUS TISSUE: ICD-10-CM

## 2025-02-18 DIAGNOSIS — B00.1 HERPESVIRAL VESICULAR DERMATITIS: ICD-10-CM

## 2025-02-18 DIAGNOSIS — L82.0 INFLAMED SEBORRHEIC KERATOSIS: ICD-10-CM

## 2025-02-18 DIAGNOSIS — D18.0 HEMANGIOMA: ICD-10-CM

## 2025-02-18 PROBLEM — D18.01 HEMANGIOMA OF SKIN AND SUBCUTANEOUS TISSUE: Status: ACTIVE | Noted: 2025-02-18

## 2025-02-18 PROBLEM — D22.5 MELANOCYTIC NEVI OF TRUNK: Status: ACTIVE | Noted: 2025-02-18

## 2025-02-18 PROCEDURE — ? COUNSELING

## 2025-02-18 PROCEDURE — 17003 DESTRUCT PREMALG LES 2-14: CPT | Mod: 59

## 2025-02-18 PROCEDURE — ? LIQUID NITROGEN

## 2025-02-18 PROCEDURE — 17000 DESTRUCT PREMALG LESION: CPT | Mod: 59

## 2025-02-18 PROCEDURE — 17110 DESTRUCTION B9 LES UP TO 14: CPT

## 2025-02-18 PROCEDURE — ? ADDITIONAL NOTES

## 2025-02-18 PROCEDURE — 99213 OFFICE O/P EST LOW 20 MIN: CPT | Mod: 25

## 2025-02-18 RX ORDER — VALACYCLOVIR 500 MG/1
2 TABLET, FILM COATED ORAL QD
Qty: 60 | Refills: 3 | Status: ERX

## 2025-02-18 ASSESSMENT — LOCATION SIMPLE DESCRIPTION DERM
LOCATION SIMPLE: LEFT BUTTOCK
LOCATION SIMPLE: LEFT UPPER BACK
LOCATION SIMPLE: CHEST
LOCATION SIMPLE: RIGHT BUTTOCK
LOCATION SIMPLE: LEFT SHOULDER
LOCATION SIMPLE: RIGHT TEMPLE
LOCATION SIMPLE: LOWER BACK
LOCATION SIMPLE: LEFT FOREARM
LOCATION SIMPLE: RIGHT LOWER BACK
LOCATION SIMPLE: LEFT TEMPLE
LOCATION SIMPLE: RIGHT HAND

## 2025-02-18 ASSESSMENT — LOCATION DETAILED DESCRIPTION DERM
LOCATION DETAILED: RIGHT BUTTOCK
LOCATION DETAILED: LEFT VENTRAL DISTAL FOREARM
LOCATION DETAILED: LEFT INFERIOR UPPER BACK
LOCATION DETAILED: LEFT SUPERIOR UPPER BACK
LOCATION DETAILED: LEFT BUTTOCK
LOCATION DETAILED: LEFT MID TEMPLE
LOCATION DETAILED: LEFT PROXIMAL DORSAL FOREARM
LOCATION DETAILED: LEFT DISTAL DORSAL FOREARM
LOCATION DETAILED: LEFT LATERAL SUPERIOR CHEST
LOCATION DETAILED: RIGHT SUPERIOR LATERAL LOWER BACK
LOCATION DETAILED: RIGHT RADIAL DORSAL HAND
LOCATION DETAILED: LEFT PROXIMAL ULNAR DORSAL FOREARM
LOCATION DETAILED: RIGHT MID TEMPLE
LOCATION DETAILED: LEFT POSTERIOR SHOULDER
LOCATION DETAILED: SUPERIOR LUMBAR SPINE
LOCATION DETAILED: LEFT VENTRAL PROXIMAL FOREARM

## 2025-02-18 ASSESSMENT — LOCATION ZONE DERM
LOCATION ZONE: FACE
LOCATION ZONE: HAND
LOCATION ZONE: ARM
LOCATION ZONE: TRUNK

## 2025-02-18 NOTE — PROCEDURE: LIQUID NITROGEN
Medical Necessity Clause: This procedure was medically necessary because the lesions that were treated were:
Render Post-Care Instructions In Note?: no
Post-Care Instructions: I reviewed with the patient in detail post-care instructions. Patient is to wear sunprotection, and avoid picking at any of the treated lesions. Pt may apply Vaseline to crusted or scabbing areas.
Spray Paint Text: The liquid nitrogen was applied to the skin utilizing a spray paint frosting technique.
Medical Necessity Information: It is in your best interest to select a reason for this procedure from the list below. All of these items fulfill various CMS LCD requirements except the new and changing color options.
Consent: The patient's consent was obtained including but not limited to risks of crusting, scabbing, blistering, scarring, darker or lighter pigmentary change, recurrence, incomplete removal and infection.
Detail Level: Detailed
Show Spray Paint Technique Variable?: Yes
Number Of Freeze-Thaw Cycles: 1 freeze-thaw cycle
Aperture Size (Optional): A
Duration Of Freeze Thaw-Cycle (Seconds): 3

## 2025-02-18 NOTE — PROCEDURE: ADDITIONAL NOTES
Detail Level: Detailed
Render Risk Assessment In Note?: no
Additional Notes: Patient still using valacyclovir 500 mg, once every other day. Patient reports this has been extremely beneficial and has not had a flare. Patient does not need refills at this time, encouraged to call if he needs any refills.
Additional Notes: if lesion on temples doesn't resolve, consider doing a bx to r/o skin ca.

## 2025-02-19 NOTE — TELEPHONE ENCOUNTER
Spoke with patient to let him know the [place] [facility]             Landry Mack, Medical Assistant   [facility]   Ph: [phone]  Fx: [phone]

## 2025-02-21 NOTE — Clinical Note
REFERRAL APPROVAL NOTICE         Sent on February 20, 2025                   Darren Brunson  [address] [facility] [address]                   Dear Mr. Brunson,    After a careful review of the medical information and benefit coverage, [facility] has processed your referral. See below for additional details.    If applicable, you must be actively enrolled with your insurance for coverage of the authorized service. If you have any questions regarding your coverage, please contact your insurance directly.    REFERRAL INFORMATION   Referral #:  [id number]  Referred-To Provider    Referred-By Provider:  Vascular Surgery    KAVYA Disla MARK M      [address] [facility] [id number] [phone] [address]  [address] [id number] [phone]    Referral Start Date:  02/19/2025  Referral End Date:   02/19/2026             SCHEDULING  If you do not already have an appointment, please call [phone] to make an appointment.     MORE INFORMATION  If you do not already have a [facility] account, sign up at: [email or web address]  You can access your medical information, make appointments, see lab results, billing information, and more.  If you have questions regarding this referral, please contact  the [facility] Referrals department at:             [phone]. Monday - Friday 8:00AM - 5:00PM.     Sincerely,    [facility]

## 2025-03-06 ENCOUNTER — APPOINTMENT (OUTPATIENT)
Dept: CARDIOLOGY | Facility: MEDICAL CENTER | Age: 75
End: 2025-03-06
Attending: INTERNAL MEDICINE
Payer: MEDICARE

## 2025-03-12 ENCOUNTER — APPOINTMENT (OUTPATIENT)
Dept: VASCULAR LAB | Facility: MEDICAL CENTER | Age: 75
End: 2025-03-12
Payer: MEDICARE

## 2025-03-25 ENCOUNTER — NON-PROVIDER VISIT (OUTPATIENT)
Dept: VASCULAR LAB | Facility: MEDICAL CENTER | Age: 75
End: 2025-03-25
Attending: INTERNAL MEDICINE
Payer: MEDICARE

## 2025-03-25 DIAGNOSIS — Z95.5 STENTED CORONARY ARTERY: ICD-10-CM

## 2025-03-25 DIAGNOSIS — I70.0 AORTIC ATHEROSCLEROSIS (HCC): ICD-10-CM

## 2025-03-25 DIAGNOSIS — I25.10 CORONARY ARTERY DISEASE INVOLVING NATIVE CORONARY ARTERY OF NATIVE HEART WITHOUT ANGINA PECTORIS: ICD-10-CM

## 2025-03-25 PROCEDURE — 96372 THER/PROPH/DIAG INJ SC/IM: CPT | Performed by: PHARMACIST

## 2025-03-25 PROCEDURE — 99212 OFFICE O/P EST SF 10 MIN: CPT | Mod: 25 | Performed by: PHARMACIST

## 2025-03-25 PROCEDURE — 700111 HCHG RX REV CODE 636 W/ 250 OVERRIDE (IP): Mod: JZ,TB

## 2025-03-25 RX ADMIN — INCLISIRAN 284 MG: 284 INJECTION, SOLUTION SUBCUTANEOUS at 09:30

## 2025-03-25 NOTE — PROGRESS NOTES
[facility]     Pt here for Leqvio administration (284 mg subQ day 1, 90, then Q6mon). They are established with Vascular Medicine Provider for lipid management.    Counseled pt regarding Leqvio MOA, SE, and administration. Answered pt questions to their satisfaction.     Administered dose w/ no issues or noted side effects (Lot: [id number], Exp 5/31/25). Documented on MAR.     Follow up in 3 months for next dose.    [facility] Sandoval, PharmD     CC:  Ele Urbano P.A.-C.  Dr. Sales

## 2025-03-26 ENCOUNTER — TELEPHONE (OUTPATIENT)
Dept: CARDIOLOGY | Facility: MEDICAL CENTER | Age: 75
End: 2025-03-26
Payer: MEDICARE

## 2025-03-26 NOTE — TELEPHONE ENCOUNTER
Spoke to pt regarding his labs, Pt stated that he has completed a lot of labs in the last 3 months and labs have been uploaded to pt's chart in media tab.

## 2025-04-01 ENCOUNTER — APPOINTMENT (OUTPATIENT)
Dept: RADIOLOGY | Facility: MEDICAL CENTER | Age: 75
End: 2025-04-01
Attending: OTOLARYNGOLOGY
Payer: MEDICARE

## 2025-04-07 ENCOUNTER — OFFICE VISIT (OUTPATIENT)
Dept: CARDIOLOGY | Facility: MEDICAL CENTER | Age: 75
End: 2025-04-07
Attending: PHYSICIAN ASSISTANT
Payer: MEDICARE

## 2025-04-07 VITALS
HEART RATE: 74 BPM | WEIGHT: 173 LBS | BODY MASS INDEX: 23.43 KG/M2 | RESPIRATION RATE: 16 BRPM | SYSTOLIC BLOOD PRESSURE: 110 MMHG | HEIGHT: 72 IN | DIASTOLIC BLOOD PRESSURE: 58 MMHG | OXYGEN SATURATION: 96 %

## 2025-04-07 DIAGNOSIS — I25.10 CORONARY ARTERY DISEASE INVOLVING NATIVE CORONARY ARTERY OF NATIVE HEART WITHOUT ANGINA PECTORIS: ICD-10-CM

## 2025-04-07 DIAGNOSIS — Z95.5 STENTED CORONARY ARTERY: ICD-10-CM

## 2025-04-07 DIAGNOSIS — Z78.9 STATIN INTOLERANCE: ICD-10-CM

## 2025-04-07 DIAGNOSIS — R42 ORTHOSTATIC LIGHTHEADEDNESS: ICD-10-CM

## 2025-04-07 DIAGNOSIS — I83.12 VARICOSE VEINS OF BOTH LOWER EXTREMITIES WITH INFLAMMATION: ICD-10-CM

## 2025-04-07 DIAGNOSIS — I83.11 VARICOSE VEINS OF BOTH LOWER EXTREMITIES WITH INFLAMMATION: ICD-10-CM

## 2025-04-07 PROCEDURE — 3074F SYST BP LT 130 MM HG: CPT | Performed by: PHYSICIAN ASSISTANT

## 2025-04-07 PROCEDURE — 99214 OFFICE O/P EST MOD 30 MIN: CPT | Performed by: PHYSICIAN ASSISTANT

## 2025-04-07 PROCEDURE — 3078F DIAST BP <80 MM HG: CPT | Performed by: PHYSICIAN ASSISTANT

## 2025-04-07 PROCEDURE — 99212 OFFICE O/P EST SF 10 MIN: CPT | Performed by: PHYSICIAN ASSISTANT

## 2025-04-07 PROCEDURE — 99213 OFFICE O/P EST LOW 20 MIN: CPT | Performed by: PHYSICIAN ASSISTANT

## 2025-04-07 ASSESSMENT — ENCOUNTER SYMPTOMS
ORTHOPNEA: 0
PALPITATIONS: 0
LOSS OF CONSCIOUSNESS: 0
COUGH: 0
DIZZINESS: 1
SHORTNESS OF BREATH: 0
PND: 0

## 2025-04-07 ASSESSMENT — FIBROSIS 4 INDEX: FIB4 SCORE: 2.16

## 2025-04-07 NOTE — PROGRESS NOTES
Chief Complaint   Patient presents with    Follow-Up     FV DX:Dizziness          Subjective:   Darren Brunson is a 74 y.o. male who presents today for follow-up.     Patient of Dr. Estrada.  Current medical problems include ANTIONE using mouthguard, CAD with LAD PCI (1 LORA) 2009  and RCA PCI (2 LORA) 2015, DL with statin / repatha / ezetimbe intolerance, asthma, prediabetes, BPH, venous insufficiency.    After last visit he completed labs including an a.m. cortisol, SPEP with HERMAN, Urine sodium.  These returned normal.  He was started on midodrine 5 mg twice daily for lightheadedness.    Darren is in the process of scheduling a vein surgery with Dr. Davis. His lightheadedness has improved, no further syncope. Currently using midodrine in AM, but feels very tired by the afternoon. He is trialing a mouth piece for ANTIONE.     Past Medical History:   Diagnosis Date    Acute myocardial infarction of other anterior wall, episode of care unspecified 2009; 2014    Anxiety state, unspecified      Arthritis     foot, ankle, jaw right side    Asthma     Bronchitis     CAD (coronary artery disease) 09/2009    PCI/stent (Promus 3.0 x 15mm) to the LAD.    Cataract     left eye no surgery    Chickenpox     Cough     Diabetes (HCC)     on oral meds    Erosive gastritis 2020    Gastric ulcer 2020    Glaucoma     Heart murmur     2/17/2023 patient not aware of having a murmur    High cholesterol     Hyperlipidemia     Mumps     Pain 01/14/2023    discomfort abdomen    Psychiatric problem     S/P coronary artery stent placement      Shortness of breath     Sleep apnea 2017    resolved after surgery for sleep apnea    Snoring          Family History   Problem Relation Age of Onset    Heart Disease Maternal Aunt     Cancer Mother         colon         Social History     Tobacco Use    Smoking status: Former     Current packs/day: 0.00     Average packs/day: 1 pack/day for 30.0 years (30.0 ttl pk-yrs)     Types: Cigarettes     Start  date: 1967     Quit date: 1997     Years since quittin.6    Smokeless tobacco: Never   Vaping Use    Vaping status: Never Used   Substance Use Topics    Alcohol use: No    Drug use: No         Allergies   Allergen Reactions    Lisinopril Anaphylaxis     Lip and tongue swelling    Aspirin      High dose only - aspirin induced tinnitus    Bempedoic Acid      Myalgia, HA, abd pain    Other Reaction(s): Joint pain, stomach upset    Ciprofloxacin      Tendon pain  Other reaction(s): shoulder pain, blurred vision, abdominal pain  Tendon pain    Crestor [Rosuvastatin Calcium] Unspecified     Muscle pain     Hmg-Coa-R Inhibitors      Other reaction(s): Unspecified  Muscle pain     Atorvastatin     Ezetimibe     Repatha [Evolocumab]      GI distress, abdominal pain with 1st/2nd injection    Singulair Unspecified     Pain, anxiety    Topamax [Topiramate]      Tension, headache         Current Outpatient Medications   Medication Sig    midodrine (PROAMATINE) 5 MG Tab Take 1 Tablet by mouth 2 times a day.    fluticasone-salmeterol (WIXELA INHUB) 250-50 MCG/ACT AEROSOL POWDER, BREATH ACTIVATED Inhale 1 Puff every 12 hours.    nystatin (MYCOSTATIN) 417206 UNIT/ML Suspension Swish and swallow 1 teaspoonful (= 5mL) 4 times daily.    meloxicam (MOBIC) 15 MG tablet Take 1 Tablet by mouth every day.    sildenafil (REVATIO) 20 MG tablet TAKE 1-5 TAB ORALLY BY MOUTH 45 MINUTE PRIOR TO SEXUAL ENCOUNTER ON EMPTY STOMACH    valACYclovir (VALTREX) 500 MG Tab Take 1,000 mg by mouth every day.    tamsulosin (FLOMAX) 0.4 MG capsule Take 0.4 mg by mouth 1/2 hour after breakfast.    fluticasone (FLONASE) 50 MCG/ACT nasal spray Administer 2 Sprays into affected nostril(S) as needed.    Cholecalciferol (VITAMIN D3) 125 MCG (5000 UT) Cap Take 5,000 Units by mouth every day.    glucose blood (ONETOUCH ULTRA) strip 1 Each by Other route every day.    pantoprazole (PROTONIX) 40 MG Tablet Delayed Response Take 1 Tablet by mouth 2 times a  day.    aspirin (ASA) 81 MG Chew Tab chewable tablet Chew 81 mg every day.    fexofenadine (ALLEGRA) 180 MG tablet Take 180 mg by mouth 2 times a day.    Multiple Vitamins-Minerals (PRESERVISION AREDS 2+MULTI VIT PO) Take 1 Tablet by mouth 2 times a day.    albuterol 108 (90 Base) MCG/ACT Aero Soln inhalation aerosol INHALE 1-2 PUFFS BY MOUTH EVERY FOUR HOURS AS NEEDED FOR SHORTNESS OF BREATH.    metformin ER (GLUCOPHAGE XR) 500 MG TABLET SR 24 HR Take 500 mg by mouth every evening.    gabapentin (NEURONTIN) 100 MG Cap Take 100-200 mg by mouth in the morning, at noon, and at bedtime. 100 mg every AM and 200 mg every night NIGHT    Probiotic Product (PROBIOTIC DAILY PO) Take 1 Tab by mouth every day.    celecoxib (CELEBREX) 200 MG Cap Take 1 Capsule by mouth every day. (Patient not taking: Reported on 1/15/2025)         Review of Systems   Constitutional:  Positive for malaise/fatigue.   Respiratory:  Negative for cough and shortness of breath.    Cardiovascular:  Negative for chest pain, palpitations, orthopnea, leg swelling and PND.   Neurological:  Positive for dizziness. Negative for loss of consciousness.          Objective:   /58 (BP Location: Left arm, Patient Position: Sitting, BP Cuff Size: Large adult long)   Pulse 74   Resp 16   Ht 1.829 m (6')   Wt 78.5 kg (173 lb)   SpO2 96%  Body mass index is 23.46 kg/m².         Physical Exam  Vitals reviewed.   HENT:      Head: Normocephalic and atraumatic.   Cardiovascular:      Rate and Rhythm: Normal rate and regular rhythm.      Heart sounds: No murmur heard.  Pulmonary:      Effort: Pulmonary effort is normal. No respiratory distress.      Breath sounds: No rales.   Musculoskeletal:      Right lower leg: No edema.      Left lower leg: No edema.      Comments: Distended veins in lower legs, left more than right   Skin:     General: Skin is warm.   Neurological:      General: No focal deficit present.      Mental Status: He is alert.      Gait: Gait  normal.   Psychiatric:         Judgment: Judgment normal.            Assessment:     1. Varicose veins of both lower extremities with inflammation        2. Coronary artery disease involving native coronary artery of native heart without angina pectoris        3. Orthostatic lightheadedness        4. Statin intolerance        5. Stented coronary artery             Medical Decision Making:  Today's Assessment / Plan:     Varicose veins, venous insufficiency  Orthostatic dizziness  -Recommend proceeding with surgical correction with [facility]  -May continue using midodrine 5 mg in the morning, may use 2.5 mg or 5 mg in afternoon to assist with his symptoms    Coronary artery disease, stable  -Remote stenting, last in 2015.  Denies angina symptoms, continue aspirin 81 mg    Statin Intolerance  -Received first injection of Leqvio 2 weeks ago.  Monitor LDL at appropriate times with vascular medicine    Return in about 6 months (around 10/7/2025) for Dr. Estrada.  Sooner if problems.

## 2025-04-15 ENCOUNTER — HOSPITAL ENCOUNTER (OUTPATIENT)
Dept: HEMATOLOGY ONCOLOGY | Facility: MEDICAL CENTER | Age: 75
End: 2025-04-15
Attending: STUDENT IN AN ORGANIZED HEALTH CARE EDUCATION/TRAINING PROGRAM
Payer: MEDICARE

## 2025-04-15 VITALS
OXYGEN SATURATION: 96 % | HEIGHT: 72 IN | SYSTOLIC BLOOD PRESSURE: 110 MMHG | WEIGHT: 173.28 LBS | TEMPERATURE: 97.7 F | HEART RATE: 87 BPM | BODY MASS INDEX: 23.47 KG/M2 | DIASTOLIC BLOOD PRESSURE: 52 MMHG

## 2025-04-15 DIAGNOSIS — D72.810 LYMPHOPENIA: ICD-10-CM

## 2025-04-15 PROCEDURE — 99212 OFFICE O/P EST SF 10 MIN: CPT | Performed by: STUDENT IN AN ORGANIZED HEALTH CARE EDUCATION/TRAINING PROGRAM

## 2025-04-15 PROCEDURE — 99213 OFFICE O/P EST LOW 20 MIN: CPT | Performed by: STUDENT IN AN ORGANIZED HEALTH CARE EDUCATION/TRAINING PROGRAM

## 2025-04-15 ASSESSMENT — FIBROSIS 4 INDEX: FIB4 SCORE: 2.16

## 2025-04-15 NOTE — PROGRESS NOTES
Clinic Note:  Hematology/Oncology    Primary Care:  Ele Urbano P.A.-C.    Diagnosis: Lymphopenia    Chief Complaint:  Establish Care    History of Presenting Illness:  Darren Brunson is a 74 y.o. male with PMH HTN, HLD, CAD s/p PCI, IBS, ANTIONE, GERD who presents today to establish care for lymphopenia.    Patient overall feels well.  Reports some increased fatigue over the years increased fatigue over the years.  Last year he reported some unintentional weight loss but is recovering away, denies fever chills.     Is following with cardiology and vascular surgery.  Reports he is very active.  Reports after taking a shower a syncopal event lasted maybe 3 minutes and laid on the bathroom floor for 10 minutes because he was too weak and could not get back up.  He also had presyncopal symptoms during a hot yoga class.  Other complaints include electrical tingling to the right neck and shoulder.  Reports about a pancreatitis couple years ago but has not had any since.  Does note that he has intermittent headaches and pressure.  Reports that he may have some right easy bruising than he has had in the past    Interval History: He is on midodrine with improvement in pre-syncopal episodes.  He started HLD medication and reports it is giving GI upset. He has night sweats a few times a week and notes chills, continues to have bruising.  No unintentional weight loss.    Past Medical History:   Diagnosis Date    Acute myocardial infarction of other anterior wall, episode of care unspecified 2009; 2014    Anxiety state, unspecified      Arthritis     foot, ankle, jaw right side    Asthma     Bronchitis     CAD (coronary artery disease) 09/2009    PCI/stent (Promus 3.0 x 15mm) to the LAD.    Cataract     left eye no surgery    Chickenpox     Cough     Diabetes (HCC)     on oral meds    Erosive gastritis 2020    Gastric ulcer 2020    Glaucoma     Heart murmur     2/17/2023 patient not aware of having a murmur    High  cholesterol     Hyperlipidemia     Mumps     Pain 01/14/2023    discomfort abdomen    Psychiatric problem     S/P coronary artery stent placement      Shortness of breath     Sleep apnea 2017    resolved after surgery for sleep apnea    Snoring        Past Surgical History:   Procedure Laterality Date    INGUINAL HERNIA REPAIR ROBOTIC XI Right 8/24/2023    Procedure: ROBOT ASSISTED LAPAROSCOPIC RIGHT INGUINAL HERNIA REPAIR;  Surgeon: Morris Mcfarland M.D.;  Location: SURGERY [facility];  Service: Gen Robotic    PA UPPER GI ENDOSCOPY,DIAGNOSIS N/A 2/20/2023    Procedure: UPPER RADIAL ENDOSCOPIC ULTRASOUND PANCREATITIS, TAIL OF PANCREATIC MASS - ENDOSCOPY FINE NEEDLE ASPIRATION WITH ANESTHESIA;  Surgeon: Reymundo Loya M.D.;  Location: [facility];  Service: EUS    EGD W/ENDOSCOPIC ULTRASOUND N/A 2/20/2023    Procedure: EGD, WITH ENDOSCOPIC US;  Surgeon: Reymundo Loya M.D.;  Location: [facility];  Service: EUS    EGD WITH ASP/BX N/A 2/20/2023    Procedure: EGD, WITH ASPIRATION BIOPSY;  Surgeon: Reymundo Loya M.D.;  Location: [facility];  Service: EUS    SEPTAL RECONSTRUCTION N/A 10/23/2017    Procedure: SEPTAL RECONSTRUCTION;  Surgeon: CHARLI Sanchez M.D.;  Location: SURGERY [facility];  Service: Ent    UVULOPHARYNGOPALATOPLASTY N/A 10/23/2017    Procedure: UVULOPHARYNGOPALATOPLASTY;  Surgeon: CHARLI Sanchez M.D.;  Location: SURGERY [facility] DAY [facility];  Service: Ent    BIOPSY GENERAL Left 10/23/2017    Procedure: BIOPSY GENERAL LEFT UPPER NECK;  Surgeon: CHARLI Sanchez M.D.;  Location: SURGERY [facility];  Service: Ent    INGUINAL HERNIA REPAIR BILATERAL  2015    STENT PLACEMENT  2015    SINUSOTOMIES  11/2011    Sinus surgery    OTHER CARDIAC SURGERY  2009    cardiac cath with stent placement    TONSILLECTOMY      UMBILICAL HERNIA REPAIR         Social History     Socioeconomic History    Marital status:      Spouse  name: Not on file    Number of children: Not on file    Years of education: Not on file    Highest education level: Not on file   Occupational History    Not on file   Tobacco Use    Smoking status: Former     Current packs/day: 0.00     Average packs/day: 1 pack/day for 30.0 years (30.0 ttl pk-yrs)     Types: Cigarettes     Start date: 1967     Quit date: 1997     Years since quittin.6    Smokeless tobacco: Never   Vaping Use    Vaping status: Never Used   Substance and Sexual Activity    Alcohol use: No    Drug use: No    Sexual activity: Not on file   Other Topics Concern    Not on file   Social History Narrative    Not on file     Social Drivers of Health     Financial Resource Strain: Not on file   Food Insecurity: Not on file   Transportation Needs: Not on file   Physical Activity: Not on file   Stress: Not on file   Social Connections: Not on file   Intimate Partner Violence: Not on file   Housing Stability: Not on file     Family History   Problem Relation Age of Onset    Heart Disease Maternal Aunt     Cancer Mother         colon     Allergies as of 04/15/2025 - Reviewed 04/15/2025   Allergen Reaction Noted    Lisinopril Anaphylaxis 2012    Aspirin  2012    Bempedoic acid  2024    Ciprofloxacin  2018    Crestor [rosuvastatin calcium] Unspecified 2018    Hmg-coa-r inhibitors  2012    Atorvastatin  2023    Ezetimibe  2024    Repatha [evolocumab]  2024    Singulair Unspecified 2023    Topamax [topiramate]  2023         Current Outpatient Medications:     midodrine (PROAMATINE) 5 MG Tab, Take 1 Tablet by mouth 2 times a day., Disp: 180 Tablet, Rfl: 1    fluticasone-salmeterol (WIXELA INHUB) 250-50 MCG/ACT AEROSOL POWDER, BREATH ACTIVATED, Inhale 1 Puff every 12 hours., Disp: 3 Each, Rfl: 3    nystatin (MYCOSTATIN) 043029 UNIT/ML Suspension, Swish and swallow 1 teaspoonful (= 5mL) 4 times daily., Disp: 250 mL, Rfl: 0    meloxicam  (MOBIC) 15 MG tablet, Take 1 Tablet by mouth every day., Disp: 30 Tablet, Rfl: 1    celecoxib (CELEBREX) 200 MG Cap, Take 1 Capsule by mouth every day., Disp: 30 Capsule, Rfl: 1    sildenafil (REVATIO) 20 MG tablet, TAKE 1-5 TAB ORALLY BY MOUTH 45 MINUTE PRIOR TO SEXUAL ENCOUNTER ON EMPTY STOMACH, Disp: , Rfl:     valACYclovir (VALTREX) 500 MG Tab, Take 1,000 mg by mouth every day., Disp: , Rfl:     tamsulosin (FLOMAX) 0.4 MG capsule, Take 0.4 mg by mouth 1/2 hour after breakfast., Disp: , Rfl:     fluticasone (FLONASE) 50 MCG/ACT nasal spray, Administer 2 Sprays into affected nostril(S) as needed., Disp: , Rfl:     Cholecalciferol (VITAMIN D3) 125 MCG (5000 UT) Cap, Take 5,000 Units by mouth every day., Disp: , Rfl:     glucose blood (ONETOUCH ULTRA) strip, 1 Each by Other route every day., Disp: , Rfl:     pantoprazole (PROTONIX) 40 MG Tablet Delayed Response, Take 1 Tablet by mouth 2 times a day., Disp: , Rfl:     aspirin (ASA) 81 MG Chew Tab chewable tablet, Chew 81 mg every day., Disp: , Rfl:     fexofenadine (ALLEGRA) 180 MG tablet, Take 180 mg by mouth 2 times a day., Disp: , Rfl:     Multiple Vitamins-Minerals (PRESERVISION AREDS 2+MULTI VIT PO), Take 1 Tablet by mouth 2 times a day., Disp: , Rfl:     albuterol 108 (90 Base) MCG/ACT Aero Soln inhalation aerosol, INHALE 1-2 PUFFS BY MOUTH EVERY FOUR HOURS AS NEEDED FOR SHORTNESS OF BREATH., Disp: 8.5 Each, Rfl: 11    metformin ER (GLUCOPHAGE XR) 500 MG TABLET SR 24 HR, Take 500 mg by mouth every evening., Disp: , Rfl:     gabapentin (NEURONTIN) 100 MG Cap, Take 100-200 mg by mouth in the morning, at noon, and at bedtime. 100 mg every AM and 200 mg every night NIGHT, Disp: , Rfl:     Probiotic Product (PROBIOTIC DAILY PO), Take 1 Tab by mouth every day., Disp: , Rfl:     Current Facility-Administered Medications:     inclisiran (Leqvio) injection 284 mg, 284 mg, Subcutaneous, Q6 MO, , 284 mg at 03/25/25 6874    Review of Systems:  ROS as above     Physical  "Exam:  Vitals:    04/15/25 1053   BP: 110/52   BP Location: Right arm   Patient Position: Sitting   BP Cuff Size: Adult   Pulse: 87   Temp: 36.5 °C (97.7 °F)   TempSrc: Temporal   SpO2: 96%   Weight: 78.6 kg (173 lb 4.5 oz)   Height: 1.829 m (6' 0.01\")     Physical Exam  Constitutional:       General: He is not in acute distress.  HENT:      Head: Normocephalic and atraumatic.      Mouth/Throat:      Mouth: Mucous membranes are moist.      Pharynx: Oropharynx is clear.   Eyes:      General: No scleral icterus.     Conjunctiva/sclera: Conjunctivae normal.   Cardiovascular:      Pulses: Normal pulses.   Pulmonary:      Effort: Pulmonary effort is normal. No respiratory distress.   Abdominal:      General: There is no distension.      Palpations: Abdomen is soft.      Tenderness: There is no abdominal tenderness.   Musculoskeletal:         General: No swelling. Normal range of motion.      Cervical back: Neck supple. No rigidity or tenderness.   Skin:     General: Skin is warm and dry.      Capillary Refill: Capillary refill takes less than 2 seconds.   Neurological:      General: No focal deficit present.      Mental Status: He is alert and oriented to person, place, and time.   Psychiatric:         Mood and Affect: Mood normal.         Thought Content: Thought content normal.         Judgment: Judgment normal.        Labs:  Lab Results   Component Value Date/Time    WBC 4.5 (L) 02/11/2025 12:27 PM    RBC 4.77 02/11/2025 12:27 PM    HEMOGLOBIN 15.5 02/11/2025 12:27 PM    HEMATOCRIT 45.5 02/11/2025 12:27 PM    MCV 95.4 02/11/2025 12:27 PM    MCH 32.5 02/11/2025 12:27 PM    MCHC 34.1 02/11/2025 12:27 PM    RDW 46.1 02/11/2025 12:27 PM    PLATELETCT 196 02/11/2025 12:27 PM    MPV 9.1 02/11/2025 12:27 PM    NEUTSPOLYS 72.50 (H) 02/11/2025 12:27 PM    LYMPHOCYTES 15.20 (L) 02/11/2025 12:27 PM    MONOCYTES 10.60 02/11/2025 12:27 PM    EOSINOPHILS 1.30 02/11/2025 12:27 PM    BASOPHILS 0.20 02/11/2025 12:27 PM        Lab " Results   Component Value Date/Time    SODIUM 136 07/31/2023 10:18 AM    POTASSIUM 3.9 07/31/2023 10:18 AM    CHLORIDE 101 07/31/2023 10:18 AM    CO2 26 07/31/2023 10:18 AM    GLUCOSE 102 (H) 07/31/2023 10:18 AM    BUN 17 07/31/2023 10:18 AM    CREATININE 1.16 07/31/2023 10:18 AM      Imaging:   Reviewed    Assessment & Plan:  Darren Brunson is a 74 y.o.PMH HTN, HLD, CAD s/p PCI, IBS, ANTIONE, GERD who presents today to establish care for lymphopenia.    -Previous workup included serum free light chains and serum electrophoresis with no light chain elevation or monoclonal protein identified  -Lymphopenia reported as far back as 2018 and has been stable.  Has not had increased incidence of infections  -Most recent labs with trend down in total leukocytoes, discussed sending flow cytometry and repeating CBC  -PT/PTT/INR normal.  Ordered teg study to assess platelet function     Any questions and concerns raised by the patient were answered to the best of my ability. Thank you for allowing me to participate in the care for this patient. Please feel free to contact me for any questions or concerns.

## 2025-04-15 NOTE — ADDENDUM NOTE
Encounter addended by: Elvira Jacob, Med Ass't on: 4/15/2025 12:52 PM   Actions taken: Charge Capture section accepted

## 2025-04-16 ENCOUNTER — TELEPHONE (OUTPATIENT)
Dept: VASCULAR LAB | Facility: MEDICAL CENTER | Age: 75
End: 2025-04-16
Payer: MEDICARE

## 2025-04-16 NOTE — TELEPHONE ENCOUNTER
Caller:  Darren Brunson    Topic/issue:   Darren would like a call back from [facility], has some medical questions.     Callback Number:   [phone]    Thank you,   Amrita MOCK

## 2025-04-17 NOTE — TELEPHONE ENCOUNTER
Called pt back - he states he's had abdominal cramping/discomfort & increased flatulence. Pt wondering if this is d/t his Leqvio vs midodrine as the medications were started around the same time. Advised pt that his sx are likely d/t midodrine and NOT Leqvio.    Pt also requested recommendations for constipation - advised he could use polyethylene glycol or docusate PRN.    Rush Sandoval, NagaD, BCACP

## 2025-04-24 ENCOUNTER — HOSPITAL ENCOUNTER (OUTPATIENT)
Dept: LAB | Facility: MEDICAL CENTER | Age: 75
End: 2025-04-24
Attending: STUDENT IN AN ORGANIZED HEALTH CARE EDUCATION/TRAINING PROGRAM
Payer: MEDICARE

## 2025-04-24 DIAGNOSIS — D72.810 LYMPHOPENIA: ICD-10-CM

## 2025-04-24 LAB
BASOPHILS # BLD AUTO: 0.2 % (ref 0–1.8)
BASOPHILS # BLD: 0.01 K/UL (ref 0–0.12)
CFT BLD TEG: 7.5 MIN (ref 4.6–9.1)
CFT P HPASE BLD TEG: 8.1 MIN (ref 4.3–8.3)
CLOT ANGLE BLD TEG: 71.9 DEGREES (ref 63–78)
CT.EXTRINSIC BLD ROTEM: 1.4 MIN (ref 0.8–2.1)
EOSINOPHIL # BLD AUTO: 0.12 K/UL (ref 0–0.51)
EOSINOPHIL NFR BLD: 2.8 % (ref 0–6.9)
ERYTHROCYTE [DISTWIDTH] IN BLOOD BY AUTOMATED COUNT: 47.4 FL (ref 35.9–50)
HCT VFR BLD AUTO: 43.8 % (ref 42–52)
HGB BLD-MCNC: 14.9 G/DL (ref 14–18)
IMM GRANULOCYTES # BLD AUTO: 0.02 K/UL (ref 0–0.11)
IMM GRANULOCYTES NFR BLD AUTO: 0.5 % (ref 0–0.9)
LYMPHOCYTES # BLD AUTO: 0.6 K/UL (ref 1–4.8)
LYMPHOCYTES NFR BLD: 13.8 % (ref 22–41)
MCF BLD TEG: 55.2 MM (ref 52–69)
MCF.PLATELET INHIB BLD ROTEM: 18.9 MM (ref 15–32)
MCH RBC QN AUTO: 32 PG (ref 27–33)
MCHC RBC AUTO-ENTMCNC: 34 G/DL (ref 32.3–36.5)
MCV RBC AUTO: 94 FL (ref 81.4–97.8)
MONOCYTES # BLD AUTO: 0.49 K/UL (ref 0–0.85)
MONOCYTES NFR BLD AUTO: 11.2 % (ref 0–13.4)
NEUTROPHILS # BLD AUTO: 3.12 K/UL (ref 1.82–7.42)
NEUTROPHILS NFR BLD: 71.5 % (ref 44–72)
NRBC # BLD AUTO: 0 K/UL
NRBC BLD-RTO: 0 /100 WBC (ref 0–0.2)
PA AA BLD-ACNC: 24 % (ref 0–11)
PA ADP BLD-ACNC: 10.5 % (ref 0–17)
PLATELET # BLD AUTO: 200 K/UL (ref 164–446)
PMV BLD AUTO: 8.9 FL (ref 9–12.9)
RBC # BLD AUTO: 4.66 M/UL (ref 4.7–6.1)
TEG ALGORITHM TGALG: ABNORMAL
WBC # BLD AUTO: 4.4 K/UL (ref 4.8–10.8)

## 2025-04-24 PROCEDURE — 88184 FLOWCYTOMETRY/ TC 1 MARKER: CPT

## 2025-04-24 PROCEDURE — 85384 FIBRINOGEN ACTIVITY: CPT | Mod: 91

## 2025-04-24 PROCEDURE — 85025 COMPLETE CBC W/AUTO DIFF WBC: CPT

## 2025-04-24 PROCEDURE — 85576 BLOOD PLATELET AGGREGATION: CPT | Mod: 91

## 2025-04-24 PROCEDURE — 85347 COAGULATION TIME ACTIVATED: CPT

## 2025-04-24 PROCEDURE — 36415 COLL VENOUS BLD VENIPUNCTURE: CPT

## 2025-04-24 PROCEDURE — 88185 FLOWCYTOMETRY/TC ADD-ON: CPT | Mod: 91

## 2025-04-27 LAB
ACUTE LEUKEMIA MARKERS SPEC-IMP: NORMAL
EVENTS COUNTED SPEC: 26 MARKERS
SOURCE 9121: NORMAL

## 2025-05-01 DIAGNOSIS — I65.01: ICD-10-CM

## 2025-05-01 NOTE — PROGRESS NOTES
STUDIES:  carotid duplex 8/2025     Orders placed for vascular surveillance imaging.    [employer] message sent to pt to remind that they are due for their surveillance vascular imaging.       Dominique Hensley R.N.   [facility]

## 2025-05-02 ENCOUNTER — APPOINTMENT (OUTPATIENT)
Dept: RADIOLOGY | Facility: MEDICAL CENTER | Age: 75
End: 2025-05-02
Attending: STUDENT IN AN ORGANIZED HEALTH CARE EDUCATION/TRAINING PROGRAM
Payer: MEDICARE

## 2025-05-02 ENCOUNTER — HOSPITAL ENCOUNTER (OUTPATIENT)
Facility: MEDICAL CENTER | Age: 75
End: 2025-05-03
Attending: STUDENT IN AN ORGANIZED HEALTH CARE EDUCATION/TRAINING PROGRAM | Admitting: HOSPITALIST
Payer: MEDICARE

## 2025-05-02 ENCOUNTER — PATIENT MESSAGE (OUTPATIENT)
Dept: SLEEP MEDICINE | Facility: MEDICAL CENTER | Age: 75
End: 2025-05-02
Payer: MEDICARE

## 2025-05-02 DIAGNOSIS — J45.20 MILD INTERMITTENT ASTHMA WITHOUT COMPLICATION: ICD-10-CM

## 2025-05-02 DIAGNOSIS — R07.9 ACUTE CHEST PAIN: ICD-10-CM

## 2025-05-02 PROBLEM — I20.0 UNSTABLE ANGINA (HCC): Status: ACTIVE | Noted: 2025-05-02

## 2025-05-02 LAB
ALBUMIN SERPL BCP-MCNC: 4.1 G/DL (ref 3.2–4.9)
ALBUMIN/GLOB SERPL: 1.7 G/DL
ALP SERPL-CCNC: 59 U/L (ref 30–99)
ALT SERPL-CCNC: 28 U/L (ref 2–50)
ANION GAP SERPL CALC-SCNC: 8 MMOL/L (ref 7–16)
APPEARANCE UR: CLEAR
AST SERPL-CCNC: 27 U/L (ref 12–45)
BASOPHILS # BLD AUTO: 0.4 % (ref 0–1.8)
BASOPHILS # BLD: 0.02 K/UL (ref 0–0.12)
BILIRUB SERPL-MCNC: 0.3 MG/DL (ref 0.1–1.5)
BILIRUB UR QL STRIP.AUTO: NEGATIVE
BUN SERPL-MCNC: 16 MG/DL (ref 8–22)
CALCIUM ALBUM COR SERPL-MCNC: 9.2 MG/DL (ref 8.5–10.5)
CALCIUM SERPL-MCNC: 9.3 MG/DL (ref 8.5–10.5)
CHLORIDE SERPL-SCNC: 99 MMOL/L (ref 96–112)
CK SERPL-CCNC: 205 U/L (ref 0–154)
CO2 SERPL-SCNC: 28 MMOL/L (ref 20–33)
COLOR UR: YELLOW
CREAT SERPL-MCNC: 0.81 MG/DL (ref 0.5–1.4)
CRP SERPL HS-MCNC: <0.3 MG/DL (ref 0–0.75)
D DIMER PPP IA.FEU-MCNC: <0.27 UG/ML (FEU) (ref 0–0.5)
EKG IMPRESSION: NORMAL
EKG IMPRESSION: NORMAL
EOSINOPHIL # BLD AUTO: 0.07 K/UL (ref 0–0.51)
EOSINOPHIL NFR BLD: 1.2 % (ref 0–6.9)
ERYTHROCYTE [DISTWIDTH] IN BLOOD BY AUTOMATED COUNT: 47 FL (ref 35.9–50)
GFR SERPLBLD CREATININE-BSD FMLA CKD-EPI: 91 ML/MIN/1.73 M 2
GLOBULIN SER CALC-MCNC: 2.4 G/DL (ref 1.9–3.5)
GLUCOSE SERPL-MCNC: 85 MG/DL (ref 65–99)
GLUCOSE UR STRIP.AUTO-MCNC: NEGATIVE MG/DL
HCT VFR BLD AUTO: 42 % (ref 42–52)
HGB BLD-MCNC: 15 G/DL (ref 14–18)
IMM GRANULOCYTES # BLD AUTO: 0.02 K/UL (ref 0–0.11)
IMM GRANULOCYTES NFR BLD AUTO: 0.4 % (ref 0–0.9)
KETONES UR STRIP.AUTO-MCNC: NEGATIVE MG/DL
LEUKOCYTE ESTERASE UR QL STRIP.AUTO: NEGATIVE
LIPASE SERPL-CCNC: 20 U/L (ref 11–82)
LYMPHOCYTES # BLD AUTO: 0.77 K/UL (ref 1–4.8)
LYMPHOCYTES NFR BLD: 13.7 % (ref 22–41)
MCH RBC QN AUTO: 33.2 PG (ref 27–33)
MCHC RBC AUTO-ENTMCNC: 35.7 G/DL (ref 32.3–36.5)
MCV RBC AUTO: 92.9 FL (ref 81.4–97.8)
MICRO URNS: NORMAL
MONOCYTES # BLD AUTO: 0.59 K/UL (ref 0–0.85)
MONOCYTES NFR BLD AUTO: 10.5 % (ref 0–13.4)
NEUTROPHILS # BLD AUTO: 4.16 K/UL (ref 1.82–7.42)
NEUTROPHILS NFR BLD: 73.8 % (ref 44–72)
NITRITE UR QL STRIP.AUTO: NEGATIVE
NRBC # BLD AUTO: 0 K/UL
NRBC BLD-RTO: 0 /100 WBC (ref 0–0.2)
PH UR STRIP.AUTO: 7.5 [PH] (ref 5–8)
PLATELET # BLD AUTO: 197 K/UL (ref 164–446)
PMV BLD AUTO: 8.9 FL (ref 9–12.9)
POTASSIUM SERPL-SCNC: 3.9 MMOL/L (ref 3.6–5.5)
PROCALCITONIN SERPL-MCNC: <0.05 NG/ML
PROT SERPL-MCNC: 6.5 G/DL (ref 6–8.2)
PROT UR QL STRIP: NEGATIVE MG/DL
RBC # BLD AUTO: 4.52 M/UL (ref 4.7–6.1)
RBC UR QL AUTO: NEGATIVE
SODIUM SERPL-SCNC: 135 MMOL/L (ref 135–145)
SP GR UR STRIP.AUTO: 1.01
TROPONIN T SERPL-MCNC: 16 NG/L (ref 6–19)
TROPONIN T SERPL-MCNC: 17 NG/L (ref 6–19)
UROBILINOGEN UR STRIP.AUTO-MCNC: 0.2 EU/DL
WBC # BLD AUTO: 5.6 K/UL (ref 4.8–10.8)

## 2025-05-02 PROCEDURE — 82550 ASSAY OF CK (CPK): CPT

## 2025-05-02 PROCEDURE — 84484 ASSAY OF TROPONIN QUANT: CPT

## 2025-05-02 PROCEDURE — 99223 1ST HOSP IP/OBS HIGH 75: CPT | Performed by: HOSPITALIST

## 2025-05-02 PROCEDURE — 700111 HCHG RX REV CODE 636 W/ 250 OVERRIDE (IP): Mod: JZ | Performed by: STUDENT IN AN ORGANIZED HEALTH CARE EDUCATION/TRAINING PROGRAM

## 2025-05-02 PROCEDURE — 85652 RBC SED RATE AUTOMATED: CPT

## 2025-05-02 PROCEDURE — 71045 X-RAY EXAM CHEST 1 VIEW: CPT

## 2025-05-02 PROCEDURE — 700102 HCHG RX REV CODE 250 W/ 637 OVERRIDE(OP): Performed by: STUDENT IN AN ORGANIZED HEALTH CARE EDUCATION/TRAINING PROGRAM

## 2025-05-02 PROCEDURE — 93005 ELECTROCARDIOGRAM TRACING: CPT | Mod: TC

## 2025-05-02 PROCEDURE — 96374 THER/PROPH/DIAG INJ IV PUSH: CPT

## 2025-05-02 PROCEDURE — 96375 TX/PRO/DX INJ NEW DRUG ADDON: CPT

## 2025-05-02 PROCEDURE — 36415 COLL VENOUS BLD VENIPUNCTURE: CPT

## 2025-05-02 PROCEDURE — 93005 ELECTROCARDIOGRAM TRACING: CPT | Mod: TC,76 | Performed by: STUDENT IN AN ORGANIZED HEALTH CARE EDUCATION/TRAINING PROGRAM

## 2025-05-02 PROCEDURE — 83690 ASSAY OF LIPASE: CPT

## 2025-05-02 PROCEDURE — 85379 FIBRIN DEGRADATION QUANT: CPT

## 2025-05-02 PROCEDURE — 700111 HCHG RX REV CODE 636 W/ 250 OVERRIDE (IP): Mod: JZ | Performed by: HOSPITALIST

## 2025-05-02 PROCEDURE — 81003 URINALYSIS AUTO W/O SCOPE: CPT

## 2025-05-02 PROCEDURE — 85025 COMPLETE CBC W/AUTO DIFF WBC: CPT

## 2025-05-02 PROCEDURE — 80053 COMPREHEN METABOLIC PANEL: CPT

## 2025-05-02 PROCEDURE — 84145 PROCALCITONIN (PCT): CPT

## 2025-05-02 PROCEDURE — A9270 NON-COVERED ITEM OR SERVICE: HCPCS | Performed by: STUDENT IN AN ORGANIZED HEALTH CARE EDUCATION/TRAINING PROGRAM

## 2025-05-02 PROCEDURE — 99285 EMERGENCY DEPT VISIT HI MDM: CPT

## 2025-05-02 PROCEDURE — G0378 HOSPITAL OBSERVATION PER HR: HCPCS

## 2025-05-02 PROCEDURE — 86140 C-REACTIVE PROTEIN: CPT

## 2025-05-02 PROCEDURE — 700102 HCHG RX REV CODE 250 W/ 637 OVERRIDE(OP): Performed by: HOSPITALIST

## 2025-05-02 PROCEDURE — A9270 NON-COVERED ITEM OR SERVICE: HCPCS | Performed by: HOSPITALIST

## 2025-05-02 RX ORDER — TAMSULOSIN HYDROCHLORIDE 0.4 MG/1
0.4 CAPSULE ORAL
Status: DISCONTINUED | OUTPATIENT
Start: 2025-05-02 | End: 2025-05-03 | Stop reason: HOSPADM

## 2025-05-02 RX ORDER — LOSARTAN POTASSIUM 50 MG/1
25 TABLET ORAL
Status: DISCONTINUED | OUTPATIENT
Start: 2025-05-03 | End: 2025-05-03 | Stop reason: HOSPADM

## 2025-05-02 RX ORDER — GABAPENTIN 100 MG/1
100 CAPSULE ORAL EVERY MORNING
Status: DISCONTINUED | OUTPATIENT
Start: 2025-05-03 | End: 2025-05-03 | Stop reason: HOSPADM

## 2025-05-02 RX ORDER — GABAPENTIN 100 MG/1
200 CAPSULE ORAL EVERY EVENING
Status: DISCONTINUED | OUTPATIENT
Start: 2025-05-02 | End: 2025-05-03 | Stop reason: HOSPADM

## 2025-05-02 RX ORDER — FLUTICASONE PROPIONATE AND SALMETEROL 250; 50 UG/1; UG/1
1 POWDER RESPIRATORY (INHALATION) EVERY 12 HOURS
Status: DISCONTINUED | OUTPATIENT
Start: 2025-05-02 | End: 2025-05-02

## 2025-05-02 RX ORDER — ASPIRIN 81 MG/1
81 TABLET, CHEWABLE ORAL
Status: DISCONTINUED | OUTPATIENT
Start: 2025-05-02 | End: 2025-05-03 | Stop reason: HOSPADM

## 2025-05-02 RX ORDER — ACETAMINOPHEN 325 MG/1
650 TABLET ORAL EVERY 6 HOURS PRN
Status: DISCONTINUED | OUTPATIENT
Start: 2025-05-02 | End: 2025-05-03 | Stop reason: HOSPADM

## 2025-05-02 RX ORDER — INSULIN LISPRO 100 [IU]/ML
2-9 INJECTION, SOLUTION INTRAVENOUS; SUBCUTANEOUS
Status: DISCONTINUED | OUTPATIENT
Start: 2025-05-03 | End: 2025-05-03 | Stop reason: HOSPADM

## 2025-05-02 RX ORDER — FLUTICASONE PROPIONATE AND SALMETEROL 250; 50 UG/1; UG/1
1 POWDER RESPIRATORY (INHALATION) EVERY 12 HOURS
Status: SHIPPED | COMMUNITY
End: 2025-05-20

## 2025-05-02 RX ORDER — GABAPENTIN 100 MG/1
100-200 CAPSULE ORAL 3 TIMES DAILY
Status: DISCONTINUED | OUTPATIENT
Start: 2025-05-02 | End: 2025-05-02

## 2025-05-02 RX ORDER — GABAPENTIN 100 MG/1
100 CAPSULE ORAL EVERY MORNING
Status: DISCONTINUED | OUTPATIENT
Start: 2025-05-03 | End: 2025-05-02

## 2025-05-02 RX ORDER — OXYCODONE HYDROCHLORIDE 5 MG/1
5 TABLET ORAL EVERY 4 HOURS PRN
Refills: 0 | Status: DISCONTINUED | OUTPATIENT
Start: 2025-05-02 | End: 2025-05-03 | Stop reason: HOSPADM

## 2025-05-02 RX ORDER — GABAPENTIN 100 MG/1
200 CAPSULE ORAL EVERY EVENING
Status: DISCONTINUED | OUTPATIENT
Start: 2025-05-03 | End: 2025-05-02

## 2025-05-02 RX ORDER — NITROGLYCERIN 0.4 MG/1
0.4 TABLET SUBLINGUAL
Status: DISCONTINUED | OUTPATIENT
Start: 2025-05-02 | End: 2025-05-03 | Stop reason: HOSPADM

## 2025-05-02 RX ORDER — DEXTROSE MONOHYDRATE 25 G/50ML
25 INJECTION, SOLUTION INTRAVENOUS
Status: DISCONTINUED | OUTPATIENT
Start: 2025-05-02 | End: 2025-05-03 | Stop reason: HOSPADM

## 2025-05-02 RX ORDER — ALBUTEROL SULFATE 90 UG/1
1 INHALANT RESPIRATORY (INHALATION) EVERY 4 HOURS PRN
Status: DISCONTINUED | OUTPATIENT
Start: 2025-05-02 | End: 2025-05-03 | Stop reason: HOSPADM

## 2025-05-02 RX ORDER — HYDRALAZINE HYDROCHLORIDE 20 MG/ML
20 INJECTION INTRAMUSCULAR; INTRAVENOUS EVERY 4 HOURS PRN
Status: DISCONTINUED | OUTPATIENT
Start: 2025-05-02 | End: 2025-05-03 | Stop reason: HOSPADM

## 2025-05-02 RX ORDER — MORPHINE SULFATE 4 MG/ML
1 INJECTION INTRAVENOUS EVERY 4 HOURS PRN
Status: DISCONTINUED | OUTPATIENT
Start: 2025-05-02 | End: 2025-05-03 | Stop reason: HOSPADM

## 2025-05-02 RX ORDER — MORPHINE SULFATE 4 MG/ML
4 INJECTION INTRAVENOUS ONCE
Status: COMPLETED | OUTPATIENT
Start: 2025-05-02 | End: 2025-05-02

## 2025-05-02 RX ORDER — BUDESONIDE AND FORMOTEROL FUMARATE DIHYDRATE 160; 4.5 UG/1; UG/1
2 AEROSOL RESPIRATORY (INHALATION) 2 TIMES DAILY
Qty: 30.6 G | Refills: 3 | Status: SHIPPED | OUTPATIENT
Start: 2025-05-02

## 2025-05-02 RX ORDER — OMEPRAZOLE 20 MG/1
20 CAPSULE, DELAYED RELEASE ORAL DAILY
Status: DISCONTINUED | OUTPATIENT
Start: 2025-05-03 | End: 2025-05-03 | Stop reason: HOSPADM

## 2025-05-02 RX ADMIN — LIDOCAINE HYDROCHLORIDE 15 ML: 20 SOLUTION OROPHARYNGEAL at 16:25

## 2025-05-02 RX ADMIN — GABAPENTIN 200 MG: 100 CAPSULE ORAL at 22:57

## 2025-05-02 RX ADMIN — ASPIRIN 81 MG: 81 TABLET, CHEWABLE ORAL at 22:06

## 2025-05-02 RX ADMIN — NITROGLYCERIN 0.4 MG: 0.4 TABLET, ORALLY DISINTEGRATING SUBLINGUAL at 18:03

## 2025-05-02 RX ADMIN — TAMSULOSIN HYDROCHLORIDE 0.4 MG: 0.4 CAPSULE ORAL at 22:06

## 2025-05-02 RX ADMIN — MORPHINE SULFATE 4 MG: 4 INJECTION, SOLUTION INTRAMUSCULAR; INTRAVENOUS at 20:20

## 2025-05-02 RX ADMIN — HYDRALAZINE HYDROCHLORIDE 20 MG: 20 INJECTION INTRAMUSCULAR; INTRAVENOUS at 22:20

## 2025-05-02 RX ADMIN — FAMOTIDINE 20 MG: 10 INJECTION, SOLUTION INTRAVENOUS at 16:25

## 2025-05-02 SDOH — ECONOMIC STABILITY: TRANSPORTATION INSECURITY
IN THE PAST 12 MONTHS, HAS THE LACK OF TRANSPORTATION KEPT YOU FROM MEDICAL APPOINTMENTS OR FROM GETTING MEDICATIONS?: NO

## 2025-05-02 SDOH — ECONOMIC STABILITY: TRANSPORTATION INSECURITY
IN THE PAST 12 MONTHS, HAS LACK OF RELIABLE TRANSPORTATION KEPT YOU FROM MEDICAL APPOINTMENTS, MEETINGS, WORK OR FROM GETTING THINGS NEEDED FOR DAILY LIVING?: NO

## 2025-05-02 ASSESSMENT — ENCOUNTER SYMPTOMS
NAUSEA: 0
CLAUDICATION: 0
FEVER: 0
ORTHOPNEA: 0
DOUBLE VISION: 0
SORE THROAT: 0
ABDOMINAL PAIN: 1
FALLS: 0
WEAKNESS: 0
EYE PAIN: 0
DIZZINESS: 0
DIARRHEA: 0
BRUISES/BLEEDS EASILY: 0
BLOOD IN STOOL: 0
MYALGIAS: 0
PALPITATIONS: 0
HEARTBURN: 0
SINUS PAIN: 0
BACK PAIN: 0
BLURRED VISION: 0
TINGLING: 0
VOMITING: 0
HEMOPTYSIS: 0
PHOTOPHOBIA: 0
CONSTIPATION: 0
SHORTNESS OF BREATH: 0
DIAPHORESIS: 0
NECK PAIN: 0
SPUTUM PRODUCTION: 0
FLANK PAIN: 0
HALLUCINATIONS: 0
HEADACHES: 0
POLYDIPSIA: 0
WHEEZING: 0
COUGH: 0
STRIDOR: 0
PND: 0
DEPRESSION: 0
TREMORS: 0
CHILLS: 0

## 2025-05-02 ASSESSMENT — COGNITIVE AND FUNCTIONAL STATUS - GENERAL
MOBILITY SCORE: 24
SUGGESTED CMS G CODE MODIFIER MOBILITY: CH
SUGGESTED CMS G CODE MODIFIER DAILY ACTIVITY: CH
DAILY ACTIVITIY SCORE: 24

## 2025-05-02 ASSESSMENT — LIFESTYLE VARIABLES
TOTAL SCORE: 0
AVERAGE NUMBER OF DAYS PER WEEK YOU HAVE A DRINK CONTAINING ALCOHOL: 0
EVER HAD A DRINK FIRST THING IN THE MORNING TO STEADY YOUR NERVES TO GET RID OF A HANGOVER: NO
ON A TYPICAL DAY WHEN YOU DRINK ALCOHOL HOW MANY DRINKS DO YOU HAVE: 0
DOES PATIENT WANT TO STOP DRINKING: NO
ALCOHOL_USE: NO
HAVE PEOPLE ANNOYED YOU BY CRITICIZING YOUR DRINKING: NO
HOW MANY TIMES IN THE PAST YEAR HAVE YOU HAD 5 OR MORE DRINKS IN A DAY: 0
TOTAL SCORE: 0
EVER FELT BAD OR GUILTY ABOUT YOUR DRINKING: NO
CONSUMPTION TOTAL: NEGATIVE
TOTAL SCORE: 0
SUBSTANCE_ABUSE: 0
HAVE YOU EVER FELT YOU SHOULD CUT DOWN ON YOUR DRINKING: NO

## 2025-05-02 ASSESSMENT — SOCIAL DETERMINANTS OF HEALTH (SDOH)

## 2025-05-02 ASSESSMENT — PAIN DESCRIPTION - PAIN TYPE
TYPE: ACUTE PAIN

## 2025-05-02 ASSESSMENT — FIBROSIS 4 INDEX
FIB4 SCORE: 2.12
FIB4 SCORE: 1.97

## 2025-05-02 ASSESSMENT — PATIENT HEALTH QUESTIONNAIRE - PHQ9
1. LITTLE INTEREST OR PLEASURE IN DOING THINGS: NOT AT ALL
SUM OF ALL RESPONSES TO PHQ9 QUESTIONS 1 AND 2: 0
2. FEELING DOWN, DEPRESSED, IRRITABLE, OR HOPELESS: NOT AT ALL

## 2025-05-02 NOTE — ED TRIAGE NOTES
Chief Complaint   Patient presents with    Abdominal Pain     X 1 month     Chest Pain     X last few days      Pt states that he has been having ABD pain for the last month in the epigastric area, pt states over the last few days he has had more pain in his chest on the L side that radiates to his L arm. Pt states he has tried to make appt with GI but has not been able to get in. Pt denies worsening pain with eating. Pt denies N/V/D but reports bloating. EKG ordered in triage, abdominal pain protocol ordered.  Pt Ax0x4, ambulatory to triage, educated on wait time and triage process.       BP (!) 152/93   Pulse 91   Temp 36.2 °C (97.1 °F) (Temporal)   Resp 16   Ht 1.829 m (6')   Wt 77.9 kg (171 lb 11.8 oz)   SpO2 95%   BMI 23.29 kg/m²

## 2025-05-02 NOTE — ED NOTES
Pt escorted from lobby to room 38. Pt believes he can urinate, pt ambulatory to restroom and back.

## 2025-05-02 NOTE — ED PROVIDER NOTES
ED Provider Note    CHIEF COMPLAINT  Chief Complaint   Patient presents with    Abdominal Pain     X 1 month     Chest Pain     X last few days        EXTERNAL RECORDS REVIEWED  Outpatient labs & studies CT abdomen pelvis on 1/14/2023 demonstrates acute pancreatitis, inguinal hernia    HPI/ROS  LIMITATION TO HISTORY   Select: : None  OUTSIDE HISTORIAN(S):  Significant other reports that the patient has a history of 2 stents    Darren Brunson is a 74 y.o. male who presents with epigastric discomfort that is worse with breathing, radiates to the left chest.  Patient denies nausea vomiting or diarrhea.  Patient endorses bloating.  Patient denies black or bloody stools.  Patient reports this does not feel like his prior episode of acute coronary syndrome.  Patient denies heavy lifting.  Patient denies tearing pain radiating to the back.  Patient denies unilateral weakness or numbness.  Patient reports that this does feel like his typical GERD symptoms and worsen when he lie down.    PAST MEDICAL HISTORY   has a past medical history of Acute myocardial infarction of other anterior wall, episode of care unspecified (2009; 2014), Anxiety state, unspecified ( ), Arthritis, Asthma, Bronchitis, CAD (coronary artery disease) (09/2009), Cataract, Chickenpox, Cough, Diabetes (HCC), Erosive gastritis (2020), Gastric ulcer (2020), Glaucoma, Heart murmur, High cholesterol, Hyperlipidemia, Mumps, Pain (01/14/2023), Psychiatric problem, S/P coronary artery stent placement ( ), Shortness of breath, Sleep apnea (2017), and Snoring.    SURGICAL HISTORY   has a past surgical history that includes sinusotomies (11/2011); tonsillectomy; other cardiac surgery (2009); umbilical hernia repair; inguinal hernia repair bilateral (2015); septal reconstruction (N/A, 10/23/2017); uvulopharyngopalatoplasty (N/A, 10/23/2017); biopsy general (Left, 10/23/2017); stent placement (2015); upper gi endoscopy,diagnosis (N/A, 2/20/2023); egd  w/endoscopic ultrasound (N/A, 2023); egd with asp/bx (N/A, 2023); and inguinal hernia repair robotic xi (Right, 2023).    FAMILY HISTORY  Family History   Problem Relation Age of Onset    Heart Disease Maternal Aunt     Cancer Mother         colon       SOCIAL HISTORY  Social History     Tobacco Use    Smoking status: Former     Current packs/day: 0.00     Average packs/day: 1 pack/day for 30.0 years (30.0 ttl pk-yrs)     Types: Cigarettes     Start date: 1967     Quit date: 1997     Years since quittin.6    Smokeless tobacco: Never   Vaping Use    Vaping status: Never Used   Substance and Sexual Activity    Alcohol use: No    Drug use: No    Sexual activity: Not on file       CURRENT MEDICATIONS  Home Medications    **Home medications have not yet been reviewed for this encounter**       Audit from Redirected Encounters    **Home medications have not yet been reviewed for this encounter**         ALLERGIES  Allergies   Allergen Reactions    Lisinopril Anaphylaxis     Lip and tongue swelling    Aspirin      High dose only - aspirin induced tinnitus    Bempedoic Acid      Myalgia, HA, abd pain    Other Reaction(s): Joint pain, stomach upset    Ciprofloxacin      Tendon pain  Other reaction(s): shoulder pain, blurred vision, abdominal pain  Tendon pain    Crestor [Rosuvastatin Calcium] Unspecified     Muscle pain     Hmg-Coa-R Inhibitors      Other reaction(s): Unspecified  Muscle pain     Atorvastatin     Ezetimibe     Repatha [Evolocumab]      GI distress, abdominal pain with 1st/2nd injection    Singulair Unspecified     Pain, anxiety    Topamax [Topiramate]      Tension, headache       PHYSICAL EXAM  VITAL SIGNS: BP (!) 167/89   Pulse 83   Temp 36.2 °C (97.1 °F) (Temporal)   Resp 17   Ht 1.829 m (6')   Wt 77.9 kg (171 lb 11.8 oz)   SpO2 94%   BMI 23.29 kg/m²    Vitals and nursing note reviewed.   Constitutional:       Comments: Patient is lying in bed supine, pleasant,  conversant, speaking in complete sentences   HENT:      Head: Normocephalic and atraumatic.   Eyes:      Extraocular Movements: Extraocular movements intact.      Conjunctiva/sclera: Conjunctivae normal.      Pupils: Pupils are equal, round, and reactive to light.   Cardiovascular:      Pulses: Normal pulses.      Comments: HR 92  Pulmonary:      Effort: Pulmonary effort is normal. No respiratory distress.   Abdominal:      Comments: Abdomen is soft, epigastric tenderness to palpation, non-distended, non-rigid, no rebound, guarding, masses, no McBurney's point tenderness, no peritoneal signs, negative Rovsing sign, negative Shea sign.    Musculoskeletal:         General: No lower extremity swelling. Normal range of motion.      Cervical back: Normal range of motion. No rigidity.   Skin:     General: Skin is warm and dry.      Capillary Refill: Capillary refill takes less than 2 seconds.   Neurological:      Mental Status: Alert.       EKG/LABS  No acute ischemia  I have independently interpreted this EKG    RADIOLOGY/PROCEDURES   I have independently interpreted the diagnostic imaging associated with this visit and am waiting the final reading from the radiologist.   My preliminary interpretation is as follows: No edema or consolidation    Radiologist interpretation:  DX-CHEST-PORTABLE (1 VIEW)   Final Result      No evidence of acute cardiopulmonary process.          COURSE & MEDICAL DECISION MAKING    ASSESSMENT, COURSE AND PLAN  Care Narrative: CMP demonstrates no evidence of acute kidney injury, acute electrolyte abnormality, acute liver failure, CBC demonstrates no evidence of acute anemia or leukocytosis.  Urinalysis without evidence UTI.  Lipase negative, pancreatitis inconsistent with patient presentation at this time.  Troponin negative, ACS less likely, repeat EKG pending.  This is a high risk patient.  D-dimer to rule out PE.  Pepcid and GI cocktail for potential dyspepsia.    Electronically signed by:  John Orellana M.D., 5/2/2025 4:50 PM    D-dimer negative, PE inconsistent with patient presentation at time.  Patient reports no improvement following famotidine and GI cocktail.  Patient does report transient provide nitroglycerin.  Given patient's high risk history, age, improved nitroglycerin he will be admitted for chest pain rule out and possibly stress testing.  Patient admitted to the hospital medicine service.    This dictation has been created using voice recognition software. I am continuously working with the software to minimize the number of voice recognition errors and I have made every attempt to manually correct the errors within my dictation. However errors  related to this voice recognition software may still exist and should be interpreted within the appropriate context.     Electronically signed by: John Orellana M.D., 5/2/2025 8:13 PM      HEART SCORE:  History - 1  EKG - 0  Age - 2  Risk Factors - 2  Initial Troponin - 0  Total - 5          FINAL DIAGNOSIS  1. Acute chest pain         Electronically signed by: John Orellana M.D., 5/2/2025 4:05 PM

## 2025-05-03 ENCOUNTER — APPOINTMENT (OUTPATIENT)
Dept: RADIOLOGY | Facility: MEDICAL CENTER | Age: 75
End: 2025-05-03
Payer: MEDICARE

## 2025-05-03 ENCOUNTER — APPOINTMENT (OUTPATIENT)
Dept: CARDIOLOGY | Facility: MEDICAL CENTER | Age: 75
End: 2025-05-03
Attending: HOSPITALIST
Payer: MEDICARE

## 2025-05-03 VITALS
BODY MASS INDEX: 23.2 KG/M2 | HEIGHT: 72 IN | SYSTOLIC BLOOD PRESSURE: 157 MMHG | HEART RATE: 72 BPM | DIASTOLIC BLOOD PRESSURE: 78 MMHG | WEIGHT: 171.3 LBS | RESPIRATION RATE: 14 BRPM | OXYGEN SATURATION: 95 % | TEMPERATURE: 96.7 F

## 2025-05-03 LAB
ALBUMIN SERPL BCP-MCNC: 3.7 G/DL (ref 3.2–4.9)
ALBUMIN/GLOB SERPL: 1.7 G/DL
ALP SERPL-CCNC: 55 U/L (ref 30–99)
ALT SERPL-CCNC: 24 U/L (ref 2–50)
ANION GAP SERPL CALC-SCNC: 9 MMOL/L (ref 7–16)
AST SERPL-CCNC: 25 U/L (ref 12–45)
BASOPHILS # BLD AUTO: 0.3 % (ref 0–1.8)
BASOPHILS # BLD: 0.02 K/UL (ref 0–0.12)
BILIRUB SERPL-MCNC: 0.3 MG/DL (ref 0.1–1.5)
BUN SERPL-MCNC: 17 MG/DL (ref 8–22)
CALCIUM ALBUM COR SERPL-MCNC: 9.1 MG/DL (ref 8.5–10.5)
CALCIUM SERPL-MCNC: 8.9 MG/DL (ref 8.5–10.5)
CHLORIDE SERPL-SCNC: 101 MMOL/L (ref 96–112)
CO2 SERPL-SCNC: 26 MMOL/L (ref 20–33)
CREAT SERPL-MCNC: 0.84 MG/DL (ref 0.5–1.4)
EOSINOPHIL # BLD AUTO: 0.06 K/UL (ref 0–0.51)
EOSINOPHIL NFR BLD: 1 % (ref 0–6.9)
ERYTHROCYTE [DISTWIDTH] IN BLOOD BY AUTOMATED COUNT: 46.9 FL (ref 35.9–50)
ERYTHROCYTE [SEDIMENTATION RATE] IN BLOOD BY WESTERGREN METHOD: 3 MM/HOUR (ref 0–20)
GFR SERPLBLD CREATININE-BSD FMLA CKD-EPI: 90 ML/MIN/1.73 M 2
GLOBULIN SER CALC-MCNC: 2.2 G/DL (ref 1.9–3.5)
GLUCOSE BLD STRIP.AUTO-MCNC: 102 MG/DL (ref 65–99)
GLUCOSE BLD STRIP.AUTO-MCNC: 79 MG/DL (ref 65–99)
GLUCOSE SERPL-MCNC: 125 MG/DL (ref 65–99)
HCT VFR BLD AUTO: 41.8 % (ref 42–52)
HGB BLD-MCNC: 14.8 G/DL (ref 14–18)
IMM GRANULOCYTES # BLD AUTO: 0.02 K/UL (ref 0–0.11)
IMM GRANULOCYTES NFR BLD AUTO: 0.3 % (ref 0–0.9)
LV EJECT FRACT  99904: 65
LV EJECT FRACT MOD 2C 99903: 58.82
LV EJECT FRACT MOD 4C 99902: 58.67
LV EJECT FRACT MOD BP 99901: 57.96
LYMPHOCYTES # BLD AUTO: 0.47 K/UL (ref 1–4.8)
LYMPHOCYTES NFR BLD: 7.5 % (ref 22–41)
MCH RBC QN AUTO: 32.4 PG (ref 27–33)
MCHC RBC AUTO-ENTMCNC: 35.4 G/DL (ref 32.3–36.5)
MCV RBC AUTO: 91.5 FL (ref 81.4–97.8)
MONOCYTES # BLD AUTO: 0.56 K/UL (ref 0–0.85)
MONOCYTES NFR BLD AUTO: 8.9 % (ref 0–13.4)
NEUTROPHILS # BLD AUTO: 5.17 K/UL (ref 1.82–7.42)
NEUTROPHILS NFR BLD: 82 % (ref 44–72)
NRBC # BLD AUTO: 0 K/UL
NRBC BLD-RTO: 0 /100 WBC (ref 0–0.2)
PLATELET # BLD AUTO: 170 K/UL (ref 164–446)
PMV BLD AUTO: 8.5 FL (ref 9–12.9)
POTASSIUM SERPL-SCNC: 3.6 MMOL/L (ref 3.6–5.5)
PROT SERPL-MCNC: 5.9 G/DL (ref 6–8.2)
RBC # BLD AUTO: 4.57 M/UL (ref 4.7–6.1)
SODIUM SERPL-SCNC: 136 MMOL/L (ref 135–145)
TROPONIN T SERPL-MCNC: 16 NG/L (ref 6–19)
WBC # BLD AUTO: 6.3 K/UL (ref 4.8–10.8)

## 2025-05-03 PROCEDURE — 82962 GLUCOSE BLOOD TEST: CPT

## 2025-05-03 PROCEDURE — 700102 HCHG RX REV CODE 250 W/ 637 OVERRIDE(OP): Performed by: HOSPITALIST

## 2025-05-03 PROCEDURE — 700117 HCHG RX CONTRAST REV CODE 255

## 2025-05-03 PROCEDURE — G0378 HOSPITAL OBSERVATION PER HR: HCPCS

## 2025-05-03 PROCEDURE — 84484 ASSAY OF TROPONIN QUANT: CPT

## 2025-05-03 PROCEDURE — 700117 HCHG RX CONTRAST REV CODE 255: Performed by: HOSPITALIST

## 2025-05-03 PROCEDURE — 99239 HOSP IP/OBS DSCHRG MGMT >30: CPT | Mod: FS

## 2025-05-03 PROCEDURE — 93356 MYOCRD STRAIN IMG SPCKL TRCK: CPT | Performed by: INTERNAL MEDICINE

## 2025-05-03 PROCEDURE — 93306 TTE W/DOPPLER COMPLETE: CPT | Mod: 26 | Performed by: INTERNAL MEDICINE

## 2025-05-03 PROCEDURE — A9270 NON-COVERED ITEM OR SERVICE: HCPCS | Performed by: HOSPITALIST

## 2025-05-03 PROCEDURE — 85025 COMPLETE CBC W/AUTO DIFF WBC: CPT

## 2025-05-03 PROCEDURE — 80053 COMPREHEN METABOLIC PANEL: CPT

## 2025-05-03 PROCEDURE — 93356 MYOCRD STRAIN IMG SPCKL TRCK: CPT

## 2025-05-03 PROCEDURE — 74177 CT ABD & PELVIS W/CONTRAST: CPT

## 2025-05-03 RX ADMIN — HUMAN ALBUMIN MICROSPHERES AND PERFLUTREN 3 ML: 10; .22 INJECTION, SOLUTION INTRAVENOUS at 10:45

## 2025-05-03 RX ADMIN — IOHEXOL 98 ML: 350 INJECTION, SOLUTION INTRAVENOUS at 12:45

## 2025-05-03 RX ADMIN — GABAPENTIN 100 MG: 100 CAPSULE ORAL at 05:27

## 2025-05-03 RX ADMIN — LOSARTAN POTASSIUM 25 MG: 50 TABLET, FILM COATED ORAL at 05:26

## 2025-05-03 ASSESSMENT — PAIN DESCRIPTION - PAIN TYPE: TYPE: ACUTE PAIN

## 2025-05-03 NOTE — ASSESSMENT & PLAN NOTE
Unknown etiology  D-dimer negative  Chest x-ray does not show pneumonia  Cardiac echo has been ordered to rule out pericarditis  Trend troponins  Monitor on telemetry  As needed nitro for chest pain

## 2025-05-03 NOTE — ED NOTES
RN rounded, pt reports pain has increased since med admin to 5/10. PT does not wish to have pain meds at this time. Pt resting with even chest rise and fall, reports no needs at this time, call light available and in reach.

## 2025-05-03 NOTE — ED NOTES
CDU ACT at bedside to transport pt to CDU. Pt alert and oriented with even and regular respirations on RA. Pt with all belongings and chart. Report called to receiving RN previously.

## 2025-05-03 NOTE — H&P
Hospital Medicine History & Physical Note    Date of Service  5/2/2025    Primary Care Physician  Ele Urbano P.A.-C.    Consultants      Specialist Names:     Code Status  Full Code    Chief Complaint  Chief Complaint   Patient presents with    Abdominal Pain     X 1 month     Chest Pain     X last few days        History of Presenting Illness  Darren Brunson is a 74 y.o. male who presented 5/2/2025 with past medical history of coronary disease status post stent , hypertension, history of pancreatitis, history of gastritis who presents to the hospital for chest pain.  He complains of chest pain is mostly in the left side and radiates to his epigastric area.  It is worse when he lays backwards or when he takes a deep breath.  The chest pain is not exertional.  He states is mostly a sharp sensation.  He denies any nausea, vomiting, diaphoresis, diarrhea, bloody stools, fevers, cough.  He said that his symptoms started after Leqvio injection.  He was also taken off of midodrine.  The patient's last colonoscopy was 2 years ago which was negative.  He denies any significant weight loss.  The patient denies any smoking cigarettes, drink alcohol or drug use.    The patient presents to the hospital with a blood pressure 175/96  Chest x-ray interpreted by me found no acute pulmonary process  EKG interpreted by me found normal sinus rhythm, LVH      I discussed the plan of care with patient.    Review of Systems  Review of Systems   Constitutional:  Negative for chills, diaphoresis, fever and malaise/fatigue.   HENT:  Negative for congestion, ear discharge, ear pain, hearing loss, nosebleeds, sinus pain, sore throat and tinnitus.    Eyes:  Negative for blurred vision, double vision, photophobia and pain.   Respiratory:  Negative for cough, hemoptysis, sputum production, shortness of breath, wheezing and stridor.    Cardiovascular:  Positive for chest pain. Negative for palpitations, orthopnea, claudication, leg  swelling and PND.   Gastrointestinal:  Positive for abdominal pain. Negative for blood in stool, constipation, diarrhea, heartburn, melena, nausea and vomiting.   Genitourinary:  Negative for dysuria, flank pain, frequency, hematuria and urgency.   Musculoskeletal:  Negative for back pain, falls, joint pain, myalgias and neck pain.   Skin:  Negative for itching and rash.   Neurological:  Negative for dizziness, tingling, tremors, weakness and headaches.   Endo/Heme/Allergies:  Negative for environmental allergies and polydipsia. Does not bruise/bleed easily.   Psychiatric/Behavioral:  Negative for depression, hallucinations, substance abuse and suicidal ideas.        Past Medical History   has a past medical history of Acute myocardial infarction of other anterior wall, episode of care unspecified (2009; 2014), Anxiety state, unspecified ( ), Arthritis, Asthma, Bronchitis, CAD (coronary artery disease) (09/2009), Cataract, Chickenpox, Cough, Diabetes (HCC), Erosive gastritis (2020), Gastric ulcer (2020), Glaucoma, Heart murmur, High cholesterol, Hyperlipidemia, Mumps, Pain (01/14/2023), Psychiatric problem, S/P coronary artery stent placement ( ), Shortness of breath, Sleep apnea (2017), and Snoring.    Surgical History   has a past surgical history that includes sinusotomies (11/2011); tonsillectomy; other cardiac surgery (2009); umbilical hernia repair; inguinal hernia repair bilateral (2015); septal reconstruction (N/A, 10/23/2017); uvulopharyngopalatoplasty (N/A, 10/23/2017); biopsy general (Left, 10/23/2017); stent placement (2015); pr upper gi endoscopy,diagnosis (N/A, 2/20/2023); egd w/endoscopic ultrasound (N/A, 2/20/2023); egd with asp/bx (N/A, 2/20/2023); and inguinal hernia repair robotic xi (Right, 8/24/2023).     Family History  family history includes Cancer in his mother; Heart Disease in his maternal aunt.   Family history reviewed with patient. There is no family history that is pertinent to the  chief complaint.     Social History   reports that he quit smoking about 27 years ago. His smoking use included cigarettes. He started smoking about 57 years ago. He has a 30 pack-year smoking history. He has never used smokeless tobacco. He reports that he does not drink alcohol and does not use drugs.    Allergies  Allergies   Allergen Reactions    Lisinopril Anaphylaxis     Lip and tongue swelling    Aspirin      High dose only - aspirin induced tinnitus    Atorvastatin Unspecified          Bempedoic Acid      Myalgia, HA, abd pain    Other Reaction(s): Joint pain, stomach upset    Ciprofloxacin      Tendon pain  Other reaction(s): shoulder pain, blurred vision, abdominal pain  Tendon pain    Crestor [Rosuvastatin Calcium] Unspecified     Muscle pain     Ezetimibe Unspecified          Hmg-Coa-R Inhibitors      Other reaction(s): Unspecified  Muscle pain     Repatha [Evolocumab] Unspecified     GI distress, abdominal pain with 1st/2nd injection    Singulair Anxiety and Unspecified     Pain, anxiety    Topamax [Topiramate] Unspecified     Tension, headache       Medications  Prior to Admission Medications   Prescriptions Last Dose Informant Patient Reported? Taking?   Probiotic Product (ALIGN PO) 5/1/2025 Bedtime Patient Yes Yes   Sig: Take 1 Capsule by mouth every day.   albuterol 108 (90 Base) MCG/ACT Aero Soln inhalation aerosol Unknown Patient No No   Sig: INHALE 1-2 PUFFS BY MOUTH EVERY FOUR HOURS AS NEEDED FOR SHORTNESS OF BREATH.   Patient taking differently: Inhale 1-2 Puffs every four hours as needed for Shortness of Breath.   aspirin (ASA) 81 MG Chew Tab chewable tablet 5/1/2025 Bedtime Patient Yes Yes   Sig: Chew 81 mg every day.   budesonide-formoterol (SYMBICORT) 160-4.5 MCG/ACT Aerosol Unknown Patient No No   Sig: Inhale 2 Puffs 2 times a day. Use spacer. Rinse mouth after each use.   Patient not taking: Reported on 5/2/2025   fexofenadine (ALLEGRA) 180 MG tablet 5/2/2025 Evening Patient Yes Yes    Sig: Take 180 mg by mouth 2 times a day as needed (allergies). 1-2 times daily prn   fluticasone (FLONASE) 50 MCG/ACT nasal spray 4/30/2025 Patient Yes No   Sig: Administer 2 Sprays into affected nostril(S) as needed.   fluticasone-salmeterol (ADVAIR DISKUS) 250-50 MCG/ACT AEROSOL POWDER, BREATH ACTIVATED 5/2/2025 Morning Patient Yes Yes   Sig: Inhale 1 Puff every 12 hours.   gabapentin (NEURONTIN) 100 MG Cap 5/2/2025 Evening Patient Yes Yes   Sig: Take 100-200 mg by mouth in the morning, at noon, and at bedtime. 100 mg every AM and 200 mg every night NIGHT   meloxicam (MOBIC) 15 MG tablet 4/28/2025 Patient No No   Sig: Take 1 Tablet by mouth every day.   Patient taking differently: Take 15 mg by mouth 1 time a day as needed for Mild Pain or Inflammation.   metformin ER (GLUCOPHAGE XR) 500 MG TABLET SR 24 HR 5/2/2025 Evening Patient Yes Yes   Sig: Take 500 mg by mouth 2 times a day.   pantoprazole (PROTONIX) 40 MG Tablet Delayed Response 5/2/2025 Evening Patient Yes Yes   Sig: Take 1 Tablet by mouth 2 times a day.   tamsulosin (FLOMAX) 0.4 MG capsule 5/1/2025 Bedtime Patient Yes Yes   Sig: Take 0.4 mg by mouth 1/2 hour after breakfast.   valACYclovir (VALTREX) 500 MG Tab 5/1/2025 Bedtime Patient Yes Yes   Sig: Take 1,000 mg by mouth every day. 2 tablets= 1000mg      Facility-Administered Medications Last Administration Doses Remaining   inclisiran (Leqvio) injection 284 mg 3/25/2025  9:30 AM           Physical Exam  Temp:  [36.2 °C (97.1 °F)] 36.2 °C (97.1 °F)  Pulse:  [79-95] 79  Resp:  [16-17] 17  BP: (151-175)/(81-96) 175/96  SpO2:  [91 %-96 %] 91 %  Blood Pressure : (!) 169/96   Temperature: 36.2 °C (97.1 °F)   Pulse: 79   Respiration: 17   Pulse Oximetry: 94 %       Physical Exam  Vitals and nursing note reviewed.   Constitutional:       General: He is not in acute distress.     Appearance: Normal appearance. He is not ill-appearing, toxic-appearing or diaphoretic.   HENT:      Head: Normocephalic and  "atraumatic.      Nose: No congestion or rhinorrhea.      Mouth/Throat:      Pharynx: No posterior oropharyngeal erythema.   Eyes:      General: No scleral icterus.        Right eye: No discharge.   Cardiovascular:      Rate and Rhythm: Normal rate and regular rhythm.      Pulses: Normal pulses.      Heart sounds: Normal heart sounds. No murmur heard.     No friction rub. No gallop.   Pulmonary:      Effort: Pulmonary effort is normal. No respiratory distress.      Breath sounds: Normal breath sounds. No stridor. No wheezing, rhonchi or rales.   Abdominal:      General: There is no distension.      Tenderness: There is no abdominal tenderness.   Musculoskeletal:         General: No swelling, tenderness, deformity or signs of injury.      Cervical back: Normal range of motion.      Right lower leg: No edema.      Left lower leg: No edema.   Skin:     Coloration: Skin is not jaundiced or pale.      Findings: No bruising, erythema, lesion or rash.   Neurological:      General: No focal deficit present.      Mental Status: He is alert and oriented to person, place, and time.         Laboratory:  Recent Labs     05/02/25  1532   WBC 5.6   RBC 4.52*   HEMOGLOBIN 15.0   HEMATOCRIT 42.0   MCV 92.9   MCH 33.2*   MCHC 35.7   RDW 47.0   PLATELETCT 197   MPV 8.9*     Recent Labs     05/02/25  1532   SODIUM 135   POTASSIUM 3.9   CHLORIDE 99   CO2 28   GLUCOSE 85   BUN 16   CREATININE 0.81   CALCIUM 9.3     Recent Labs     05/02/25  1532   ALTSGPT 28   ASTSGOT 27   ALKPHOSPHAT 59   TBILIRUBIN 0.3   LIPASE 20   GLUCOSE 85         No results for input(s): \"NTPROBNP\" in the last 72 hours.      Recent Labs     05/02/25  1532   TROPONINT 16       Imaging:  DX-CHEST-PORTABLE (1 VIEW)   Final Result      No evidence of acute cardiopulmonary process.      EC-ECHOCARDIOGRAM COMPLETE W/O CONT    (Results Pending)       X-Ray:  I have personally reviewed the images and compared with prior images.  EKG:  I have personally reviewed the images " and compared with prior images.    Assessment/Plan:  Justification for Admission Status  I anticipate this patient is appropriate for observation status at this time because chest pain    Patient will need a Telemetry bed on MEDICAL service .  The need is secondary to chest pain.    * Pleuritic chest pain- (present on admission)  Assessment & Plan  Unknown etiology  D-dimer negative  Chest x-ray does not show pneumonia  Cardiac echo has been ordered to rule out pericarditis  Trend troponins  Monitor on telemetry  As needed nitro for chest pain    Stented coronary artery- (present on admission)  Assessment & Plan  History of stent in 2009  Continue aspirin  EKG without ST segment changes    Hypertensive urgency- (present on admission)  Assessment & Plan  Uncontrolled  Not on home medications  Start losartan  IV as needed medications have been ordered          VTE prophylaxis: SCDs/TEDs

## 2025-05-03 NOTE — DISCHARGE SUMMARY
Discharge Summary    CHIEF COMPLAINT ON ADMISSION  Chief Complaint   Patient presents with    Abdominal Pain     X 1 month     Chest Pain     X last few days        Reason for Admission  Abd Pain     Admission Date  5/2/2025    CODE STATUS  Full Code    HPI & HOSPITAL COURSE  Darren Brunson is a 74 y.o. male with a past medical history of coronary disease status post stent , hypertension, history of pancreatitis, history of gastritis who presented 5/2/2025 with chest and abdominal pain.      He complains of chest pain is mostly in the left side and radiates to his epigastric area.  It is worse when he lays backwards or when he takes a deep breath.  The chest pain is not exertional.  He states is mostly a sharp sensation.  He denies any nausea, vomiting, diaphoresis, diarrhea, bloody stools, fevers, cough.  Additionally patient states that he has been having this abdominal pain that he describes as constant and sharp for the past month. He said that his symptoms started after Leqvio injection.  He was also taken off of midodrine.  The patient's last colonoscopy was 2 years ago which was negative.  He denies any significant weight loss.  The patient denies any smoking cigarettes, drink alcohol or drug use.     In the emergency department vital signs stable.  Labs stable.  Chest x-ray showing no evidence of acute cardiopulmonary process.    Patient seen and examined this a.m.  He states he is having no further chest pain.  He states that he came to the emergency department for further management of his abdominal pain since he is unable to see his GI doctor until October.  Discussed with the patient that we would obtain an abdomen pelvis CT with contrast which shows no acute abnormality in the abdomen or pelvis.  Additionally obtain echocardiogram which shows 60% EF.  Discussed with the patient that he will need to follow-up with his primary care provider and GI doctor outpatient for further workup and  management.  Patient expresses understanding.    Therefore, he is discharged in good and stable condition to home with close outpatient follow-up.    The patient recovered much more quickly than anticipated on admission.    Discharge Date  05/03/25    FOLLOW UP ITEMS POST DISCHARGE  Discharge Instructions per MONIKA Reese     - Follow-up with your primary care provider status post hospitalization.     DIET: As tolerated     ACTIVITY: As tolerated     DIAGNOSIS: Abdominal pain     Return to ER if you start experiencing numbness and tingling, chest pain, shortness of breath.    DISCHARGE DIAGNOSES  Principal Problem (Resolved):    Pleuritic chest pain (POA: Yes)  Active Problems:    Stented coronary artery (POA: Yes)      Overview: 3.5 mm promus stent LAD 2009      Overlapping 3.5x12 and 3.5 x 18 mm Xience stent RCA 2015  Resolved Problems:    Hypertensive urgency (POA: Yes)      Overview: ICD-10 transition      FOLLOW UP  Future Appointments   Date Time Provider Department Center   5/20/2025  8:00 AM SPIROMETRY/6MW [facility] None   5/20/2025  8:30 AM Saira Vazquez M.D. [facility] None   5/21/2025  9:00 AM Rosario Harkins D.O. [facility] None   6/3/2025 11:20 AM Kyrie Sales M.D. [facility] None   10/16/2025 10:00 AM Matty Estrada M.D. [facility] None     Ele Urbano P.A.-C.  [address] [address]  [phone]    Schedule an appointment as soon as possible for a visit in 1 week(s)        MEDICATIONS ON DISCHARGE     Medication List        CONTINUE taking these medications        Instructions   Advair Diskus 250-50 MCG/ACT Aepb  Generic drug: fluticasone-salmeterol   Inhale 1 Puff every 12 hours.  Dose: 1 Puff     albuterol 108 (90 Base) MCG/ACT Aers inhalation aerosol   Doctor's comments: DX Code Needed  .  INHALE 1-2 PUFFS BY MOUTH EVERY FOUR HOURS AS NEEDED FOR SHORTNESS OF BREATH.     ALIGN PO   Take 1 Capsule by mouth every day.  Dose: 1 Capsule     aspirin 81 MG Chew chewable  tablet  Commonly known as: Asa   Chew 81 mg every day.  Dose: 81 mg     fexofenadine 180 MG tablet  Commonly known as: Allegra   Take 180 mg by mouth 2 times a day as needed (allergies). 1-2 times daily prn  Dose: 180 mg     Flomax 0.4 MG capsule  Generic drug: tamsulosin   Take 0.4 mg by mouth 1/2 hour after breakfast.  Dose: 0.4 mg     fluticasone 50 MCG/ACT nasal spray  Commonly known as: Flonase   Administer 2 Sprays into affected nostril(S) as needed.  Dose: 2 Spray     gabapentin 100 MG Caps  Commonly known as: Neurontin   Take 100-200 mg by mouth 2 times a day. 100 mg every AM and 200 mg every night NIGHT  Dose: 100-200 mg     meloxicam 15 MG tablet  Commonly known as: Mobic   Take 1 Tablet by mouth every day.  Dose: 15 mg     metFORMIN  MG Tb24  Commonly known as: Glucophage XR   Take 500 mg by mouth 2 times a day.  Dose: 500 mg     pantoprazole 40 MG Tbec  Commonly known as: Protonix   Take 1 Tablet by mouth 2 times a day.  Dose: 1 Tablet     valACYclovir 500 MG Tabs  Commonly known as: Valtrex   Take 1,000 mg by mouth every day. 2 tablets= 1000mg  Dose: 1,000 mg            ASK your doctor about these medications        Instructions   budesonide-formoterol 160-4.5 MCG/ACT Aero  Commonly known as: Symbicort   Doctor's comments: 3 month supply x 1 year  Inhale 2 Puffs 2 times a day. Use spacer. Rinse mouth after each use.  Dose: 2 Puff              Allergies  Allergies   Allergen Reactions    Lisinopril Anaphylaxis     Lip and tongue swelling    Aspirin      High dose only - aspirin induced tinnitus    Atorvastatin Unspecified          Bempedoic Acid      Myalgia, HA, abd pain    Other Reaction(s): Joint pain, stomach upset    Ciprofloxacin      Tendon pain  Other reaction(s): shoulder pain, blurred vision, abdominal pain  Tendon pain    Crestor [Rosuvastatin Calcium] Unspecified     Muscle pain     Ezetimibe Unspecified          Hmg-Coa-R Inhibitors      Other reaction(s): Unspecified  Muscle pain      Repatha [Evolocumab] Unspecified     GI distress, abdominal pain with 1st/2nd injection    Singulair Anxiety and Unspecified     Pain, anxiety    Topamax [Topiramate] Unspecified     Tension, headache       DIET  Orders Placed This Encounter   Procedures    Diet Order Diet: Cardiac     Standing Status:   Standing     Number of Occurrences:   1     Diet::   Cardiac [6]       ACTIVITY  As tolerated.  Weight bearing as tolerated    CONSULTATIONS  None    PROCEDURES  None    LABORATORY  Lab Results   Component Value Date    SODIUM 136 05/03/2025    POTASSIUM 3.6 05/03/2025    CHLORIDE 101 05/03/2025    CO2 26 05/03/2025    GLUCOSE 125 (H) 05/03/2025    BUN 17 05/03/2025    CREATININE 0.84 05/03/2025        Lab Results   Component Value Date    WBC 6.3 05/03/2025    HEMOGLOBIN 14.8 05/03/2025    HEMATOCRIT 41.8 (L) 05/03/2025    PLATELETCT 170 05/03/2025        Victoria CHAND A.P.R.N. performed a substantiated portion of the service face-to-face with same patient on the same date of service INDEPENDENTLY FROM THE MD ON ASSESSMENT, EXAMINATION, AND DISCUSSION IN PLAN OF CARE FOR 18 MINUTES.  I was personally involved in reviewing and conducting the medical decision making, including the information as described below:

## 2025-05-03 NOTE — HOSPITAL COURSE
Darren Brunson is a 74 y.o. male with a past medical history of coronary disease status post stent , hypertension, history of pancreatitis, history of gastritis who presented 5/2/2025 with chest and abdominal pain.      He complains of chest pain is mostly in the left side and radiates to his epigastric area.  It is worse when he lays backwards or when he takes a deep breath.  The chest pain is not exertional.  He states is mostly a sharp sensation.  He denies any nausea, vomiting, diaphoresis, diarrhea, bloody stools, fevers, cough.  Additionally patient states that he has been having this abdominal pain that he describes as constant and sharp for the past month. He said that his symptoms started after Leqvio injection.  He was also taken off of midodrine.  The patient's last colonoscopy was 2 years ago which was negative.  He denies any significant weight loss.  The patient denies any smoking cigarettes, drink alcohol or drug use.     In the emergency department vital signs stable.  Labs stable.  Chest x-ray showing no evidence of acute cardiopulmonary process.    Patient seen and examined this a.m.  He states he is having no further chest pain.  He states that he came to the emergency department for further management of his abdominal pain since he is unable to see his GI doctor until October.  Discussed with the patient that we would obtain an abdomen pelvis CT with contrast which shows no acute abnormality in the abdomen or pelvis.  Additionally obtain echocardiogram which shows 60% EF.  Discussed with the patient that he will need to follow-up with his primary care provider and GI doctor outpatient for further workup and management.  Patient expresses understanding.

## 2025-05-03 NOTE — ED NOTES
Bedside report received from off going RN: Jennifer, assumed care of patient.  POC discussed with patient. Call light within reach, all needs addressed at this time.       Fall risk interventions in place: Patient's personal possessions are with in their safe reach and Keep floor surfaces clean and dry (all applicable per [facility] Fall risk assessment)   Continuous monitoring: Cardiac Leads, Pulse Ox, or Blood Pressure  IVF/IV medications: Not Applicable   Oxygen: Room Air  Bedside sitter: Not Applicable   Isolation: Not Applicable

## 2025-05-03 NOTE — PROGRESS NOTES
Pt arrived to unit. Assumed care of pt. Pt A&Ox4, NAD, VSS on RA. C/o mild abdominal pain, denies need for medication.Call light in reach. Bed in lowest position. Plan of care discussed with pt. Pt agreeing to plan of care. Communication board updated. All questions answered. Assessment completed.

## 2025-05-03 NOTE — DISCHARGE INSTRUCTIONS
Discharge Instructions per MONIKA Reese     - Follow-up with your primary care provider status post hospitalization.     DIET: As tolerated     ACTIVITY: As tolerated     DIAGNOSIS: Abdominal pain     Return to ER if you start experiencing numbness and tingling, chest pain, shortness of breath.      Discharge Instructions    Discharged to home by car with relative. Discharged via walking, hospital escort: Yes.  Special equipment needed: Not Applicable    Be sure to schedule a follow-up appointment with your primary care doctor or any specialists as instructed.     Discharge Plan:   Diet Plan: Discussed  Activity Level: Discussed  Confirmed Follow up Appointment: Patient to Call and Schedule Appointment  Confirmed Symptoms Management: Discussed  Medication Reconciliation Updated: Yes    I understand that a diet low in cholesterol, fat, and sodium is recommended for good health. Unless I have been given specific instructions below for another diet, I accept this instruction as my diet prescription.   Other diet: Regular    Special Instructions: None    -Is this patient being discharged with medication to prevent blood clots?  No    Is patient discharged on Warfarin / Coumadin?   No       Abdominal Pain, Adult  Many things can cause belly (abdominal) pain. Most times, belly pain is not dangerous. Many cases of belly pain can be watched and treated at home. Sometimes, though, belly pain is serious. Your doctor will try to find the cause of your belly pain.  Follow these instructions at home:    Medicines  Take over-the-counter and prescription medicines only as told by your doctor.  Do not take medicines that help you poop (laxatives) unless told by your doctor.  General instructions  Watch your belly pain for any changes.  Drink enough fluid to keep your pee (urine) pale yellow.  Keep all follow-up visits as told by your doctor. This is important.  Contact a doctor if:  Your belly pain changes or gets  worse.  You are not hungry, or you lose weight without trying.  You are having trouble pooping (constipated) or have watery poop (diarrhea) for more than 2-3 days.  You have pain when you pee or poop.  Your belly pain wakes you up at night.  Your pain gets worse with meals, after eating, or with certain foods.  You are vomiting and cannot keep anything down.  You have a fever.  You have blood in your pee.  Get help right away if:  Your pain does not go away as soon as your doctor says it should.  You cannot stop vomiting.  Your pain is only in areas of your belly, such as the right side or the left lower part of the belly.  You have bloody or black poop, or poop that looks like tar.  You have very bad pain, cramping, or bloating in your belly.  You have signs of not having enough fluid or water in your body (dehydration), such as:  Dark pee, very little pee, or no pee.  Cracked lips.  Dry mouth.  Sunken eyes.  Sleepiness.  Weakness.  You have trouble breathing or chest pain.  Summary  Many cases of belly pain can be watched and treated at home.  Watch your belly pain for any changes.  Take over-the-counter and prescription medicines only as told by your doctor.  Contact a doctor if your belly pain changes or gets worse.  Get help right away if you have very bad pain, cramping, or bloating in your belly.  This information is not intended to replace advice given to you by your health care provider. Make sure you discuss any questions you have with your health care provider.  Document Revised: 04/27/2020 Document Reviewed: 04/27/2020  Elsevier Patient Education © 2023 [employer]

## 2025-05-03 NOTE — ASSESSMENT & PLAN NOTE
Uncontrolled  Not on home medications  Start losartan  IV as needed medications have been ordered

## 2025-05-03 NOTE — PROGRESS NOTES
4 Eyes Skin Assessment Completed by RICARDO Deshpande and PANTERA Blanco.    Head WDL  Ears WDL  Nose WDL  Mouth WDL  Neck WDL  Breast/Chest WDL  Shoulder Blades WDL  Spine WDL  (R) Arm/Elbow/Hand Blanching, Bruising, Scab, and Scar  (L) Arm/Elbow/Hand Blanching, Bruising, Scab, and Scar  Abdomen Bloating  Groin WDL  Scrotum/Coccyx/Buttocks WDL  (R) Leg Blanching and Bruising  (L) Leg Blanching, Bruising, and Swelling  (R) Heel/Foot/Toe Blanching and Swelling  (L) Heel/Foot/Toe WDL          Devices In Places Tele Box and Pulse Ox      Interventions In Place Pillows    Possible Skin Injury No    Pictures Uploaded Into Epic N/A  Wound Consult Placed N/A  RN Wound Prevention Protocol Ordered No

## 2025-05-03 NOTE — ED NOTES
Medication history reviewed with patient at bedside.   Med rec is complete  Allergies reviewed.     Patient has not had any outpatient antibiotics in the last 30 days.   Anticoagulants: No  Dispense history available in EPIC: Yes      Mayco Fox

## 2025-05-03 NOTE — ED NOTES
"Pt resting with even chest rise and fall, pt believes nitro \"slightly relieved pain\", reports no needs at this time, call light available and in reach.    "

## 2025-05-03 NOTE — PROGRESS NOTES
Report received from Gagan RN. Assume care. Pt is AAOx4.  Assessment completed. VSS. Discomfort to abd reported,  fully ambulatory, Pt was update for the care for the day. White board updated, All question answered. Pt has call light within reach,  bed is in the lowest position. Pt has no other needs at this time.

## 2025-05-03 NOTE — CARE PLAN
The patient is Stable - Low risk of patient condition declining or worsening    Shift Goals  Clinical Goals: Pain control, Echo, BLE US  Patient Goals: Comfort, GI consult  Family Goals: Not at bedside    Progress made toward(s) clinical / shift goals:      Problem: Knowledge Deficit - Standard  Goal: Patient and family/care givers will demonstrate understanding of plan of care, disease process/condition, diagnostic tests and medications  Description: Target End Date:  1-3 days or as soon as patient condition allowsDocument in Patient Education1.  Patient and family/caregiver oriented to unit, equipment, visitation policy and means for communicating concern2.  Complete/review Learning Assessment3.  Assess knowledge level of disease process/condition, treatment plan, diagnostic tests and medications4.  Explain disease process/condition, treatment plan, diagnostic tests and medications  Outcome: Progressing     Problem: Pain - Standard  Goal: Alleviation of pain or a reduction in pain to the patient’s comfort goal  Description: Target End Date:  Prior to discharge or change in level of careDocument on Vitals flowsheet1.  Document pain using the appropriate pain scale per order or unit policy2.  Educate and implement non-pharmacologic comfort measures (i.e. relaxation, distraction, massage, cold/heat therapy, etc.)3.  Pain management medications as ordered4.  Reassess pain after pain med administration per policy5.  If opiods administered assess patient's response to pain medication is appropriate per POSS sedation scale6.  Follow pain management plan developed in collaboration with patient and interdisciplinary team (including palliative care or pain specialists if applicable)  Outcome: Progressing       Patient is not progressing towards the following goals:

## 2025-05-03 NOTE — PROGRESS NOTES
Monitor summary:  Sinus rhythm  Rate 79-97  Occasional, frequent PVCs  0.17/0.06/0.36    Per monitor tech interpretation.

## 2025-05-13 NOTE — Clinical Note
REFERRAL APPROVAL NOTICE         Sent on May 13, 2025                   Darren Brunson  [address]  [place] [address]                   Dear Mr. Brunson,    After a careful review of the medical information and benefit coverage, [facility] has processed your referral. See below for additional details.    If applicable, you must be actively enrolled with your insurance for coverage of the authorized service. If you have any questions regarding your coverage, please contact your insurance directly.    REFERRAL INFORMATION   Referral #:  [id number]  Referred-To Department    Referred-By Provider:  Speech Therapy    Megan Mccallum M.D.   Speech Therapy [facility]      [address]  [place] [address]  [phone] [address]  [address]  [place] [address] [phone]    Referral Start Date:  05/13/2025  Referral End Date:   05/13/2026             SCHEDULING  If you do not already have an appointment, please call [phone] to make an appointment.     MORE INFORMATION  If you do not already have a [facility] account, sign up at: [email or web address]  You can access your medical information, make appointments, see lab results, billing information, and more.  If you have questions regarding this referral, please contact  the [facility] Referrals department at:             [phone]. Monday - Friday 8:00AM - 5:00PM.     Sincerely,    [facility]

## 2025-05-20 ENCOUNTER — OFFICE VISIT (OUTPATIENT)
Dept: SLEEP MEDICINE | Facility: MEDICAL CENTER | Age: 75
End: 2025-05-20
Attending: INTERNAL MEDICINE
Payer: MEDICARE

## 2025-05-20 VITALS
HEART RATE: 69 BPM | SYSTOLIC BLOOD PRESSURE: 114 MMHG | HEIGHT: 72 IN | BODY MASS INDEX: 23.16 KG/M2 | WEIGHT: 171 LBS | OXYGEN SATURATION: 97 % | DIASTOLIC BLOOD PRESSURE: 66 MMHG

## 2025-05-20 VITALS — BODY MASS INDEX: 24.08 KG/M2 | WEIGHT: 172 LBS | HEIGHT: 71 IN

## 2025-05-20 DIAGNOSIS — T78.40XS ALLERGY, SEQUELA: ICD-10-CM

## 2025-05-20 DIAGNOSIS — J45.20 MILD INTERMITTENT ASTHMA WITHOUT COMPLICATION: ICD-10-CM

## 2025-05-20 DIAGNOSIS — J45.20 MILD INTERMITTENT ASTHMA WITHOUT COMPLICATION: Primary | ICD-10-CM

## 2025-05-20 PROCEDURE — 94060 EVALUATION OF WHEEZING: CPT | Performed by: INTERNAL MEDICINE

## 2025-05-20 PROCEDURE — 99214 OFFICE O/P EST MOD 30 MIN: CPT | Performed by: INTERNAL MEDICINE

## 2025-05-20 PROCEDURE — 3078F DIAST BP <80 MM HG: CPT | Performed by: INTERNAL MEDICINE

## 2025-05-20 PROCEDURE — 3074F SYST BP LT 130 MM HG: CPT | Performed by: INTERNAL MEDICINE

## 2025-05-20 PROCEDURE — 99212 OFFICE O/P EST SF 10 MIN: CPT | Performed by: INTERNAL MEDICINE

## 2025-05-20 RX ORDER — ALBUTEROL SULFATE 90 UG/1
1-2 INHALANT RESPIRATORY (INHALATION) EVERY 4 HOURS PRN
Qty: 8.5 EACH | Refills: 11 | Status: SHIPPED | OUTPATIENT
Start: 2025-05-20 | End: 2025-05-20

## 2025-05-20 RX ORDER — ALBUTEROL SULFATE 90 UG/1
1-2 INHALANT RESPIRATORY (INHALATION) EVERY 4 HOURS PRN
Qty: 8.5 EACH | Refills: 11 | Status: SHIPPED | OUTPATIENT
Start: 2025-05-20

## 2025-05-20 ASSESSMENT — ENCOUNTER SYMPTOMS
HEARTBURN: 0
WEIGHT LOSS: 0
WEAKNESS: 0
NAUSEA: 0
FALLS: 0
SHORTNESS OF BREATH: 0
FEVER: 0
NECK PAIN: 0
EYE PAIN: 0
STRIDOR: 0
SINUS PAIN: 0
DIAPHORESIS: 0
SPEECH CHANGE: 0
CLAUDICATION: 0
MYALGIAS: 0
CHILLS: 0
WHEEZING: 0
SPUTUM PRODUCTION: 0
DEPRESSION: 0
BLURRED VISION: 0
HEADACHES: 0
DOUBLE VISION: 0
TREMORS: 0
EYE REDNESS: 0
PHOTOPHOBIA: 0
HEMOPTYSIS: 0
PND: 0
VOMITING: 0
BACK PAIN: 0
DIZZINESS: 0
ORTHOPNEA: 0
DIARRHEA: 0
FOCAL WEAKNESS: 0
COUGH: 0
CONSTIPATION: 0
ABDOMINAL PAIN: 0
PALPITATIONS: 0
EYE DISCHARGE: 0
SORE THROAT: 0

## 2025-05-20 ASSESSMENT — FIBROSIS 4 INDEX
FIB4 SCORE: 2.28
FIB4 SCORE: 2.28

## 2025-05-20 NOTE — PROCEDURES
Technician: Belkis Beckham [facility]  Tech notes:  Good pt effort and cooperation. ATS standards met.  Albuterol HFA 2 puffs via spacer administered.    Interpretation:  Baseline spirometry shows normal airflows.  No significant bronchodilator response.  Normal spirometry with a normal flow-volume loop.

## 2025-05-21 ENCOUNTER — APPOINTMENT (OUTPATIENT)
Dept: HEMATOLOGY ONCOLOGY | Facility: MEDICAL CENTER | Age: 75
End: 2025-05-21
Attending: STUDENT IN AN ORGANIZED HEALTH CARE EDUCATION/TRAINING PROGRAM
Payer: MEDICARE

## 2025-05-21 ENCOUNTER — TELEPHONE (OUTPATIENT)
Dept: HEMATOLOGY ONCOLOGY | Facility: MEDICAL CENTER | Age: 75
End: 2025-05-21
Payer: MEDICARE

## 2025-05-21 NOTE — PROGRESS NOTES
Chief Complaint   Patient presents with    Follow-Up     LAST SEEN 24    Results     CXR  25  BRADEN 25           HPI: This patient is a 74 y.o. male whom is followed in our clinic for asthma last seen by me on 24.  The pt also has a dx of ANTIONE for which he underwent UPPP in the past.  He is a former tobacco smoker with 30-pack-year history and quit in .  CT chest from 2020 showed subcentimeter pulmonary nodules and repeat CT chest from 2021 showed stable pulmonary nodules largest of 5 mm in size without significant adenopathy. Hs last CT from 2023 showed no nodules. With regards to asthma, patient's symptoms were preiously mild and controlled with Symbicort 160 which he was using daily in the mornings on the days that he exercises but rarely needing BID. Spirometry from 2022 showed normal air flows with trend toward BD response.  He had a sleep study in  showing severe ANTIONE with an AHI of 35 however he spent less than 10 minutes with saturation below 88%.  He now has OAD.  He has a hx of CAD status post PCI and is followed by cardiology. He is on symbicort and asthma has been well controlled however he has been suffering dyspepsia with abdominal distension following initiation of Leqvio. His FVC and FEV1 are both mildly reduced on spirometry today which he attributes to abdominal distension.     Past Medical History[1]    Social History     Socioeconomic History    Marital status:      Spouse name: Not on file    Number of children: Not on file    Years of education: Not on file    Highest education level: Not on file   Occupational History    Not on file   Tobacco Use    Smoking status: Former     Current packs/day: 0.00     Average packs/day: 1 pack/day for 30.0 years (30.0 ttl pk-yrs)     Types: Cigarettes     Start date: 1967     Quit date: 1997     Years since quittin.7    Smokeless tobacco: Never   Vaping Use    Vaping status: Never Used    Substance and Sexual Activity    Alcohol use: No    Drug use: No    Sexual activity: Not on file   Other Topics Concern    Not on file   Social History Narrative    Not on file     Social Drivers of Health     Financial Resource Strain: Not on file   Food Insecurity: No Food Insecurity (5/2/2025)    Hunger Vital Sign     Worried About Running Out of Food in the Last Year: Never true     Ran Out of Food in the Last Year: Never true   Transportation Needs: No Transportation Needs (5/2/2025)    PRAPARE - Transportation     Lack of Transportation (Medical): No     Lack of Transportation (Non-Medical): No   Physical Activity: Not on file   Stress: Not on file   Social Connections: Not on file   Intimate Partner Violence: Not At Risk (5/2/2025)    Humiliation, Afraid, Rape, and Kick questionnaire     Fear of Current or Ex-Partner: No     Emotionally Abused: No     Physically Abused: No     Sexually Abused: No   Housing Stability: Low Risk  (5/2/2025)    Housing Stability Vital Sign     Unable to Pay for Housing in the Last Year: No     Number of Times Moved in the Last Year: 0     Homeless in the Last Year: No       Family History   Problem Relation Age of Onset    Heart Disease Maternal Aunt     Cancer Mother         colon       Medications Ordered Prior to Encounter[2]    Lisinopril, Aspirin, Atorvastatin, Bempedoic acid, Ciprofloxacin, Crestor [rosuvastatin calcium], Ezetimibe, Hmg-coa-r inhibitors, Repatha [evolocumab], Singulair, and Topamax [topiramate]      ROS:   Review of Systems   Constitutional:  Negative for chills, diaphoresis, fever, malaise/fatigue and weight loss.   HENT:  Negative for congestion, ear discharge, ear pain, hearing loss, nosebleeds, sinus pain, sore throat and tinnitus.    Eyes:  Negative for blurred vision, double vision, photophobia, pain, discharge and redness.   Respiratory:  Negative for cough, hemoptysis, sputum production, shortness of breath, wheezing and stridor.     Cardiovascular:  Negative for chest pain, palpitations, orthopnea, claudication, leg swelling and PND.   Gastrointestinal:  Negative for abdominal pain, constipation, diarrhea, heartburn, nausea and vomiting.   Genitourinary:  Negative for dysuria and urgency.   Musculoskeletal:  Negative for back pain, falls, joint pain, myalgias and neck pain.   Skin:  Negative for itching and rash.   Neurological:  Negative for dizziness, tremors, speech change, focal weakness, weakness and headaches.   Endo/Heme/Allergies:  Negative for environmental allergies.   Psychiatric/Behavioral:  Negative for depression.        /66 (BP Location: Right arm, Patient Position: Sitting, BP Cuff Size: Adult)   Pulse 69   Ht 1.829 m (6')   Wt 77.6 kg (171 lb)   SpO2 97%   Physical Exam  Vitals reviewed.   Constitutional:       General: He is not in acute distress.     Appearance: Normal appearance.   HENT:      Head: Normocephalic and atraumatic.      Right Ear: External ear normal.      Left Ear: External ear normal.      Nose: Nose normal. No congestion.      Mouth/Throat:      Mouth: Mucous membranes are moist.      Pharynx: Oropharynx is clear. No oropharyngeal exudate.   Eyes:      General: No scleral icterus.     Extraocular Movements: Extraocular movements intact.      Conjunctiva/sclera: Conjunctivae normal.      Pupils: Pupils are equal, round, and reactive to light.   Cardiovascular:      Rate and Rhythm: Normal rate and regular rhythm.      Heart sounds: Normal heart sounds. No murmur heard.     No gallop.   Pulmonary:      Effort: No respiratory distress.      Breath sounds: Normal breath sounds. No wheezing or rales.   Abdominal:      Palpations: Abdomen is soft.   Musculoskeletal:         General: Normal range of motion.      Cervical back: Normal range of motion and neck supple.      Right lower leg: No edema.      Left lower leg: No edema.   Skin:     General: Skin is warm and dry.      Findings: No rash.    Neurological:      General: No focal deficit present.      Mental Status: He is alert and oriented to person, place, and time.      Cranial Nerves: No cranial nerve deficit.   Psychiatric:         Mood and Affect: Mood normal.         Behavior: Behavior normal.         PFTs as reviewed by me personally: as per hPI    Imagaing as reviewed by me personally:  5/2/25 clear CXR    Assessment:  1. Mild intermittent asthma without complication  albuterol 108 (90 Base) MCG/ACT Aero Soln inhalation aerosol      2. Allergy, sequela            Plan:  Chronic. Controlled. Currently with mild decline in PFT values due to GI distension. No change in regimen. F/U 6 mos or sooner if needed  Chronic but currently well controlled  Return in about 6 months (around 11/20/2025).             [1]   Past Medical History:  Diagnosis Date    Acute myocardial infarction of other anterior wall, episode of care unspecified 2009; 2014    Anxiety state, unspecified      Arthritis     foot, ankle, jaw right side    Asthma     Bronchitis     CAD (coronary artery disease) 09/2009    PCI/stent (Promus 3.0 x 15mm) to the LAD.    Cataract     left eye no surgery    Chickenpox     Cough     Diabetes (HCC)     on oral meds    Erosive gastritis 2020    Gastric ulcer 2020    Glaucoma     Heart murmur     2/17/2023 patient not aware of having a murmur    High cholesterol     Hyperlipidemia     Mumps     Pain 01/14/2023    discomfort abdomen    Psychiatric problem     S/P coronary artery stent placement      Shortness of breath     Sleep apnea 2017    resolved after surgery for sleep apnea    Snoring    [2]   Current Outpatient Medications on File Prior to Visit   Medication Sig Dispense Refill    budesonide-formoterol (SYMBICORT) 160-4.5 MCG/ACT Aerosol Inhale 2 Puffs 2 times a day. Use spacer. Rinse mouth after each use. 30.6 g 3    Probiotic Product (ALIGN PO) Take 1 Capsule by mouth every day.      meloxicam (MOBIC) 15 MG tablet Take 1 Tablet by mouth  every day. (Patient taking differently: Take 15 mg by mouth 1 time a day as needed for Mild Pain or Inflammation.) 30 Tablet 1    valACYclovir (VALTREX) 500 MG Tab Take 1,000 mg by mouth every day. 2 tablets= 1000mg      tamsulosin (FLOMAX) 0.4 MG capsule Take 0.4 mg by mouth 1/2 hour after breakfast.      fluticasone (FLONASE) 50 MCG/ACT nasal spray Administer 2 Sprays into affected nostril(S) as needed.      pantoprazole (PROTONIX) 40 MG Tablet Delayed Response Take 1 Tablet by mouth 2 times a day.      aspirin (ASA) 81 MG Chew Tab chewable tablet Chew 81 mg every day.      fexofenadine (ALLEGRA) 180 MG tablet Take 180 mg by mouth 2 times a day as needed (allergies). 1-2 times daily prn      metformin ER (GLUCOPHAGE XR) 500 MG TABLET SR 24 HR Take 500 mg by mouth 2 times a day.      gabapentin (NEURONTIN) 100 MG Cap Take 100-200 mg by mouth 2 times a day. 100 mg every AM and 200 mg every night NIGHT       Current Facility-Administered Medications on File Prior to Visit   Medication Dose Route Frequency Provider Last Rate Last Admin    inclisiran (Leqvio) injection 284 mg  284 mg Subcutaneous Q6 MO    284 mg at 03/25/25 0930

## 2025-05-21 NOTE — TELEPHONE ENCOUNTER
Called patient to him know his appointment with Dr. Harkins next Thursday 5/29 will be at the [facility], and left him the address. Asked patient to give a call back if this did not work for him, and left the office number.

## 2025-05-27 ENCOUNTER — HOSPITAL ENCOUNTER (OUTPATIENT)
Dept: LAB | Facility: MEDICAL CENTER | Age: 75
End: 2025-05-27
Attending: FAMILY MEDICINE
Payer: MEDICARE

## 2025-05-27 DIAGNOSIS — Z95.5 STENTED CORONARY ARTERY: ICD-10-CM

## 2025-05-27 DIAGNOSIS — I70.0 AORTIC ATHEROSCLEROSIS (HCC): ICD-10-CM

## 2025-05-27 DIAGNOSIS — I25.10 CORONARY ARTERY DISEASE INVOLVING NATIVE CORONARY ARTERY OF NATIVE HEART WITHOUT ANGINA PECTORIS: ICD-10-CM

## 2025-05-27 LAB
ALBUMIN SERPL BCP-MCNC: 3.8 G/DL (ref 3.2–4.9)
ALBUMIN/GLOB SERPL: 1.6 G/DL
ALP SERPL-CCNC: 71 U/L (ref 30–99)
ALT SERPL-CCNC: 23 U/L (ref 2–50)
ANION GAP SERPL CALC-SCNC: 10 MMOL/L (ref 7–16)
AST SERPL-CCNC: 27 U/L (ref 12–45)
BILIRUB SERPL-MCNC: 0.5 MG/DL (ref 0.1–1.5)
BUN SERPL-MCNC: 16 MG/DL (ref 8–22)
CALCIUM ALBUM COR SERPL-MCNC: 9.1 MG/DL (ref 8.5–10.5)
CALCIUM SERPL-MCNC: 8.9 MG/DL (ref 8.5–10.5)
CHLORIDE SERPL-SCNC: 98 MMOL/L (ref 96–112)
CHOLEST SERPL-MCNC: 124 MG/DL (ref 100–199)
CO2 SERPL-SCNC: 25 MMOL/L (ref 20–33)
CREAT SERPL-MCNC: 0.73 MG/DL (ref 0.5–1.4)
GFR SERPLBLD CREATININE-BSD FMLA CKD-EPI: 94 ML/MIN/1.73 M 2
GLOBULIN SER CALC-MCNC: 2.4 G/DL (ref 1.9–3.5)
GLUCOSE SERPL-MCNC: 102 MG/DL (ref 65–99)
HDLC SERPL-MCNC: 51 MG/DL
LDLC SERPL CALC-MCNC: 60 MG/DL
POTASSIUM SERPL-SCNC: 4.2 MMOL/L (ref 3.6–5.5)
PROT SERPL-MCNC: 6.2 G/DL (ref 6–8.2)
SODIUM SERPL-SCNC: 133 MMOL/L (ref 135–145)
TRIGL SERPL-MCNC: 67 MG/DL (ref 0–149)

## 2025-05-27 PROCEDURE — 82172 ASSAY OF APOLIPOPROTEIN: CPT

## 2025-05-27 PROCEDURE — 80053 COMPREHEN METABOLIC PANEL: CPT

## 2025-05-27 PROCEDURE — 36415 COLL VENOUS BLD VENIPUNCTURE: CPT

## 2025-05-27 PROCEDURE — 80061 LIPID PANEL: CPT

## 2025-05-29 ENCOUNTER — TELEPHONE (OUTPATIENT)
Dept: VASCULAR LAB | Facility: MEDICAL CENTER | Age: 75
End: 2025-05-29
Payer: MEDICARE

## 2025-05-29 ENCOUNTER — HOSPITAL ENCOUNTER (OUTPATIENT)
Facility: MEDICAL CENTER | Age: 75
End: 2025-05-29
Attending: STUDENT IN AN ORGANIZED HEALTH CARE EDUCATION/TRAINING PROGRAM
Payer: MEDICARE

## 2025-05-29 VITALS
BODY MASS INDEX: 24.21 KG/M2 | DIASTOLIC BLOOD PRESSURE: 56 MMHG | HEIGHT: 71 IN | HEART RATE: 70 BPM | OXYGEN SATURATION: 97 % | SYSTOLIC BLOOD PRESSURE: 102 MMHG | WEIGHT: 172.95 LBS | TEMPERATURE: 97.7 F

## 2025-05-29 DIAGNOSIS — I70.0 AORTIC ATHEROSCLEROSIS (HCC): ICD-10-CM

## 2025-05-29 DIAGNOSIS — I65.01: Primary | ICD-10-CM

## 2025-05-29 DIAGNOSIS — D72.810 LYMPHOPENIA: Primary | ICD-10-CM

## 2025-05-29 DIAGNOSIS — Z95.5 STENTED CORONARY ARTERY: ICD-10-CM

## 2025-05-29 DIAGNOSIS — I25.10 CORONARY ARTERY DISEASE INVOLVING NATIVE CORONARY ARTERY OF NATIVE HEART WITHOUT ANGINA PECTORIS: ICD-10-CM

## 2025-05-29 DIAGNOSIS — I25.10 ATHEROSCLEROSIS OF NATIVE CORONARY ARTERY OF NATIVE HEART WITHOUT ANGINA PECTORIS: ICD-10-CM

## 2025-05-29 LAB — APO B100 SERPL-MCNC: 52 MG/DL (ref 66–133)

## 2025-05-29 PROCEDURE — 99213 OFFICE O/P EST LOW 20 MIN: CPT | Performed by: STUDENT IN AN ORGANIZED HEALTH CARE EDUCATION/TRAINING PROGRAM

## 2025-05-29 PROCEDURE — 99212 OFFICE O/P EST SF 10 MIN: CPT | Performed by: STUDENT IN AN ORGANIZED HEALTH CARE EDUCATION/TRAINING PROGRAM

## 2025-05-29 ASSESSMENT — FIBROSIS 4 INDEX: FIB4 SCORE: 2.52

## 2025-05-29 NOTE — TELEPHONE ENCOUNTER
Established patient  Chart prep for upcoming appointment.    Any pending/incomplete orders from last visit? Yes Imaging  Was patient called and reminded to complete pending orders? Yes [employer] message was sent   Were any records requested?  No    Referral up to date? Yes renewal ordered, sent to provider to co-sign, AND new referral attached to upcoming appointment.    Referral attached to appointment (renewals and New patients only)? Yes  Virtual appointment? No

## 2025-05-29 NOTE — PROGRESS NOTES
Clinic Note:  Hematology/Oncology    Primary Care:  Ele Urbano P.A.-C.    Diagnosis: Lymphopenia    Chief Complaint:  Establish Care    History of Presenting Illness:  Darren Burnson is a 74 y.o. male with PMH HTN, HLD, CAD s/p PCI, IBS, ANTIONE, GERD who presents today to establish care for lymphopenia.    Patient overall feels well.  Reports some increased fatigue over the years increased fatigue over the years.  Last year he reported some unintentional weight loss but is recovering away, denies fever chills.     Is following with cardiology and vascular surgery.  Reports he is very active.  Reports after taking a shower a syncopal event lasted maybe 3 minutes and laid on the bathroom floor for 10 minutes because he was too weak and could not get back up.  He also had presyncopal symptoms during a hot yoga class.  Other complaints include electrical tingling to the right neck and shoulder.  Reports about a pancreatitis couple years ago but has not had any since.  Does note that he has intermittent headaches and pressure.  Reports that he may have some right easy bruising than he has had in the past    Interval History: He is on midodrine with improvement in pre-syncopal episodes.  He started HLD medication and reports it is giving GI upset. He has night sweats a few times a week and notes chills, continues to have bruising.  No unintentional weight loss.    Interval History   He reports distention in his abdomen and epigastric pain at worst rated about a 5-6 and he was evaluated in the emergency.  CT scan with fat stranding consistent with previous history of pancreatitis.  He attributes this to recently starting PCSK9 inhibitor.    Past Medical History:   Diagnosis Date    Acute myocardial infarction of other anterior wall, episode of care unspecified 2009; 2014    Anxiety state, unspecified      Arthritis     foot, ankle, jaw right side    Asthma     Bronchitis     CAD (coronary artery disease) 09/2009     PCI/stent (Promus 3.0 x 15mm) to the LAD.    Cataract     left eye no surgery    Chickenpox     Cough     Diabetes (HCC)     on oral meds    Erosive gastritis 2020    Gastric ulcer 2020    Glaucoma     Heart murmur     2/17/2023 patient not aware of having a murmur    High cholesterol     Hyperlipidemia     Mumps     Pain 01/14/2023    discomfort abdomen    Psychiatric problem     S/P coronary artery stent placement      Shortness of breath     Sleep apnea 2017    resolved after surgery for sleep apnea    Snoring        Past Surgical History:   Procedure Laterality Date    INGUINAL HERNIA REPAIR ROBOTIC XI Right 8/24/2023    Procedure: ROBOT ASSISTED LAPAROSCOPIC RIGHT INGUINAL HERNIA REPAIR;  Surgeon: Morris Mcfarland M.D.;  Location: [facility];  Service: Gen Robotic    OH UPPER GI ENDOSCOPY,DIAGNOSIS N/A 2/20/2023    Procedure: UPPER RADIAL ENDOSCOPIC ULTRASOUND PANCREATITIS, TAIL OF PANCREATIC MASS - ENDOSCOPY FINE NEEDLE ASPIRATION WITH ANESTHESIA;  Surgeon: Reymundo Loya M.D.;  Location: [facility];  Service: EUS    EGD W/ENDOSCOPIC ULTRASOUND N/A 2/20/2023    Procedure: EGD, WITH ENDOSCOPIC US;  Surgeon: Reymundo Loya M.D.;  Location: [facility];  Service: EUS    EGD WITH ASP/BX N/A 2/20/2023    Procedure: EGD, WITH ASPIRATION BIOPSY;  Surgeon: Reymundo Loya M.D.;  Location: [facility];  Service: EUS    SEPTAL RECONSTRUCTION N/A 10/23/2017    Procedure: SEPTAL RECONSTRUCTION;  Surgeon: CHARLI Sanchez M.D.;  Location: SURGERY SAME DAY [facility];  Service: Ent    UVULOPHARYNGOPALATOPLASTY N/A 10/23/2017    Procedure: UVULOPHARYNGOPALATOPLASTY;  Surgeon: CHARLI Sanchez M.D.;  Location: SURGERY SAME DAY [facility];  Service: Ent    BIOPSY GENERAL Left 10/23/2017    Procedure: BIOPSY GENERAL LEFT UPPER NECK;  Surgeon: CHARLI Sanchez M.D.;  Location: SURGERY SAME DAY [facility];  Service: Ent    INGUINAL HERNIA REPAIR BILATERAL   2015    STENT PLACEMENT  2015    SINUSOTOMIES  2011    Sinus surgery    OTHER CARDIAC SURGERY  2009    cardiac cath with stent placement    TONSILLECTOMY      UMBILICAL HERNIA REPAIR         Social History     Socioeconomic History    Marital status:      Spouse name: Not on file    Number of children: Not on file    Years of education: Not on file    Highest education level: Not on file   Occupational History    Not on file   Tobacco Use    Smoking status: Former     Current packs/day: 0.00     Average packs/day: 1 pack/day for 30.0 years (30.0 ttl pk-yrs)     Types: Cigarettes     Start date: 1967     Quit date: 1997     Years since quittin.7    Smokeless tobacco: Never   Vaping Use    Vaping status: Never Used   Substance and Sexual Activity    Alcohol use: No    Drug use: No    Sexual activity: Not on file   Other Topics Concern    Not on file   Social History Narrative    Not on file     Social Drivers of Health     Financial Resource Strain: Not on file   Food Insecurity: No Food Insecurity (2025)    Hunger Vital Sign     Worried About Running Out of Food in the Last Year: Never true     Ran Out of Food in the Last Year: Never true   Transportation Needs: No Transportation Needs (2025)    PRAPARE - Transportation     Lack of Transportation (Medical): No     Lack of Transportation (Non-Medical): No   Physical Activity: Not on file   Stress: Not on file   Social Connections: Not on file   Intimate Partner Violence: Not At Risk (2025)    Humiliation, Afraid, Rape, and Kick questionnaire     Fear of Current or Ex-Partner: No     Emotionally Abused: No     Physically Abused: No     Sexually Abused: No   Housing Stability: Low Risk  (2025)    Housing Stability Vital Sign     Unable to Pay for Housing in the Last Year: No     Number of Times Moved in the Last Year: 0     Homeless in the Last Year: No     Family History   Problem Relation Age of Onset    Heart Disease Maternal  Aunt     Cancer Mother         colon     Allergies as of 05/29/2025 - Reviewed 05/29/2025   Allergen Reaction Noted    Lisinopril Anaphylaxis 08/23/2012    Aspirin  08/23/2012    Atorvastatin Unspecified 05/12/2023    Bempedoic acid  05/28/2024    Ciprofloxacin  03/19/2018    Crestor [rosuvastatin calcium] Unspecified 08/02/2018    Ezetimibe Unspecified 03/26/2024    Hmg-coa-r inhibitors  08/23/2012    Repatha [evolocumab] Unspecified 08/13/2024    Singulair Anxiety and Unspecified 06/29/2023    Topamax [topiramate] Unspecified 06/29/2023         Current Outpatient Medications:     albuterol 108 (90 Base) MCG/ACT Aero Soln inhalation aerosol, Inhale 1-2 Puffs every four hours as needed for Shortness of Breath. INHALE 1-2 PUFFS BY MOUTH EVERY FOUR HOURS AS NEEDED FOR SHORTNESS OF BREATH., Disp: 8.5 Each, Rfl: 11    budesonide-formoterol (SYMBICORT) 160-4.5 MCG/ACT Aerosol, Inhale 2 Puffs 2 times a day. Use spacer. Rinse mouth after each use., Disp: 30.6 g, Rfl: 3    Probiotic Product (ALIGN PO), Take 1 Capsule by mouth every day., Disp: , Rfl:     meloxicam (MOBIC) 15 MG tablet, Take 1 Tablet by mouth every day. (Patient taking differently: Take 15 mg by mouth 1 time a day as needed for Mild Pain or Inflammation.), Disp: 30 Tablet, Rfl: 1    valACYclovir (VALTREX) 500 MG Tab, Take 1,000 mg by mouth every day. 2 tablets= 1000mg, Disp: , Rfl:     tamsulosin (FLOMAX) 0.4 MG capsule, Take 0.4 mg by mouth 1/2 hour after breakfast., Disp: , Rfl:     fluticasone (FLONASE) 50 MCG/ACT nasal spray, Administer 2 Sprays into affected nostril(S) as needed., Disp: , Rfl:     pantoprazole (PROTONIX) 40 MG Tablet Delayed Response, Take 1 Tablet by mouth 2 times a day., Disp: , Rfl:     aspirin (ASA) 81 MG Chew Tab chewable tablet, Chew 81 mg every day., Disp: , Rfl:     fexofenadine (ALLEGRA) 180 MG tablet, Take 180 mg by mouth 2 times a day as needed (allergies). 1-2 times daily prn, Disp: , Rfl:     metformin ER (GLUCOPHAGE XR)  "500 MG TABLET SR 24 HR, Take 500 mg by mouth 2 times a day., Disp: , Rfl:     gabapentin (NEURONTIN) 100 MG Cap, Take 100-200 mg by mouth 2 times a day. 100 mg every AM and 200 mg every night NIGHT, Disp: , Rfl:     Current Facility-Administered Medications:     inclisiran (Leqvio) injection 284 mg, 284 mg, Subcutaneous, Q6 MO, , 284 mg at 03/25/25 0930    Review of Systems:  ROS as above     Physical Exam:  Vitals:    05/29/25 0946   BP: 102/56   BP Location: Left arm   Patient Position: Sitting   BP Cuff Size: Adult   Pulse: 70   Temp: 36.5 °C (97.7 °F)   TempSrc: Temporal   SpO2: 97%   Weight: 78.4 kg (172 lb 15.2 oz)   Height: 1.803 m (5' 10.98\")     Physical Exam  Constitutional:       General: He is not in acute distress.  HENT:      Head: Normocephalic and atraumatic.      Mouth/Throat:      Mouth: Mucous membranes are moist.      Pharynx: Oropharynx is clear.   Eyes:      General: No scleral icterus.     Conjunctiva/sclera: Conjunctivae normal.   Neck:      Comments: 1 cm soft, mildly tender lymph node right submandibular  Cardiovascular:      Pulses: Normal pulses.   Pulmonary:      Effort: Pulmonary effort is normal. No respiratory distress.   Abdominal:      General: There is no distension.      Palpations: Abdomen is soft.      Tenderness: There is no abdominal tenderness.   Musculoskeletal:         General: No swelling. Normal range of motion.      Cervical back: Neck supple. No rigidity or tenderness.   Skin:     General: Skin is warm and dry.      Capillary Refill: Capillary refill takes less than 2 seconds.   Neurological:      General: No focal deficit present.      Mental Status: He is alert and oriented to person, place, and time.   Psychiatric:         Mood and Affect: Mood normal.         Thought Content: Thought content normal.         Judgment: Judgment normal.        Labs:  Lab Results   Component Value Date/Time    WBC 6.3 05/03/2025 02:03 AM    RBC 4.57 (L) 05/03/2025 02:03 AM    HEMOGLOBIN " 14.8 05/03/2025 02:03 AM    HEMATOCRIT 41.8 (L) 05/03/2025 02:03 AM    MCV 91.5 05/03/2025 02:03 AM    MCH 32.4 05/03/2025 02:03 AM    MCHC 35.4 05/03/2025 02:03 AM    RDW 46.9 05/03/2025 02:03 AM    PLATELETCT 170 05/03/2025 02:03 AM    MPV 8.5 (L) 05/03/2025 02:03 AM    NEUTSPOLYS 82.00 (H) 05/03/2025 02:03 AM    LYMPHOCYTES 7.50 (L) 05/03/2025 02:03 AM    MONOCYTES 8.90 05/03/2025 02:03 AM    EOSINOPHILS 1.00 05/03/2025 02:03 AM    BASOPHILS 0.30 05/03/2025 02:03 AM        Lab Results   Component Value Date/Time    SODIUM 133 (L) 05/27/2025 09:12 AM    POTASSIUM 4.2 05/27/2025 09:12 AM    CHLORIDE 98 05/27/2025 09:12 AM    CO2 25 05/27/2025 09:12 AM    GLUCOSE 102 (H) 05/27/2025 09:12 AM    BUN 16 05/27/2025 09:12 AM    CREATININE 0.73 05/27/2025 09:12 AM      Imaging:   Reviewed    Assessment & Plan:  Darren Brunson is a 74 y.o.PMH HTN, HLD, CAD s/p PCI, IBS, ANTIONE, GERD who presents today to establish care for lymphopenia.    -Previous workup included serum free light chains and serum electrophoresis with no light chain elevation or monoclonal protein identified.  Flow cytometry also ordered and was negative for any monoclonal population  -Lymphopenia reported as far back as 2018 and has been stable.  Has not had increased incidence of infections  -Most recent labs with trend down in total leukocytoes, discussed sending flow cytometry and repeating CBC  -At this time I do not believe any further workup needs to be done.  Discussed bone marrow biopsy but given that he is not having complications for leukopenia we will plan to monitor for now  - RTC 6 months    Any questions and concerns raised by the patient were answered to the best of my ability. Thank you for allowing me to participate in the care for this patient. Please feel free to contact me for any questions or concerns.

## 2025-05-29 NOTE — ADDENDUM NOTE
Encounter addended by: Elvira Jacob, Med Ass't on: 5/29/2025 1:15 PM   Actions taken: Charge Capture section accepted

## 2025-06-03 ENCOUNTER — OFFICE VISIT (OUTPATIENT)
Dept: VASCULAR LAB | Facility: MEDICAL CENTER | Age: 75
End: 2025-06-03
Attending: FAMILY MEDICINE
Payer: MEDICARE

## 2025-06-03 VITALS
HEIGHT: 72 IN | HEART RATE: 77 BPM | SYSTOLIC BLOOD PRESSURE: 124 MMHG | WEIGHT: 173 LBS | DIASTOLIC BLOOD PRESSURE: 68 MMHG | BODY MASS INDEX: 23.43 KG/M2

## 2025-06-03 DIAGNOSIS — E11.69 MIXED HYPERLIPIDEMIA DUE TO TYPE 2 DIABETES MELLITUS (HCC): ICD-10-CM

## 2025-06-03 DIAGNOSIS — I10 PRIMARY HYPERTENSION: ICD-10-CM

## 2025-06-03 DIAGNOSIS — E78.2 MIXED HYPERLIPIDEMIA DUE TO TYPE 2 DIABETES MELLITUS (HCC): ICD-10-CM

## 2025-06-03 DIAGNOSIS — I25.2 HISTORY OF ANTERIOR WALL MYOCARDIAL INFARCTION: ICD-10-CM

## 2025-06-03 DIAGNOSIS — Z78.9 STATIN INTOLERANCE: ICD-10-CM

## 2025-06-03 DIAGNOSIS — R14.0 ABDOMINAL DISTENSION: ICD-10-CM

## 2025-06-03 DIAGNOSIS — E11.59 TYPE 2 DIABETES MELLITUS WITH OTHER CIRCULATORY COMPLICATION, WITHOUT LONG-TERM CURRENT USE OF INSULIN (HCC): ICD-10-CM

## 2025-06-03 DIAGNOSIS — I25.10 CORONARY ARTERY DISEASE INVOLVING NATIVE CORONARY ARTERY OF NATIVE HEART WITHOUT ANGINA PECTORIS: ICD-10-CM

## 2025-06-03 DIAGNOSIS — I70.0 AORTIC ATHEROSCLEROSIS (HCC): ICD-10-CM

## 2025-06-03 DIAGNOSIS — Z95.5 STENTED CORONARY ARTERY: ICD-10-CM

## 2025-06-03 DIAGNOSIS — I65.01: Primary | ICD-10-CM

## 2025-06-03 DIAGNOSIS — I65.23 MILD ATHEROSCLEROSIS OF CAROTID ARTERY, BILATERAL: ICD-10-CM

## 2025-06-03 PROCEDURE — 99212 OFFICE O/P EST SF 10 MIN: CPT

## 2025-06-03 PROCEDURE — G2211 COMPLEX E/M VISIT ADD ON: HCPCS | Performed by: FAMILY MEDICINE

## 2025-06-03 PROCEDURE — 99215 OFFICE O/P EST HI 40 MIN: CPT | Performed by: FAMILY MEDICINE

## 2025-06-03 PROCEDURE — 3078F DIAST BP <80 MM HG: CPT | Performed by: FAMILY MEDICINE

## 2025-06-03 PROCEDURE — 3074F SYST BP LT 130 MM HG: CPT | Performed by: FAMILY MEDICINE

## 2025-06-03 ASSESSMENT — ENCOUNTER SYMPTOMS
TINGLING: 0
PALPITATIONS: 0
HEMOPTYSIS: 0
DIZZINESS: 1
SPEECH CHANGE: 0
DOUBLE VISION: 0
PND: 0
TREMORS: 0
HEADACHES: 0
CONSTIPATION: 1
WHEEZING: 0
WEAKNESS: 0
SENSORY CHANGE: 0
FEVER: 0
BLOOD IN STOOL: 0
SPUTUM PRODUCTION: 0
FOCAL WEAKNESS: 0
BLURRED VISION: 0
CHILLS: 0
ORTHOPNEA: 0
SHORTNESS OF BREATH: 0
VOMITING: 0
ABDOMINAL PAIN: 1
CLAUDICATION: 0
COUGH: 0
SEIZURES: 0
NAUSEA: 0
LOSS OF CONSCIOUSNESS: 0
EYE PAIN: 0

## 2025-06-03 ASSESSMENT — FIBROSIS 4 INDEX: FIB4 SCORE: 2.52

## 2025-06-03 NOTE — PROGRESS NOTES
FOLLOW-UP FAMILY LIPID CLINIC   06/03/25     Darren Brunson, ref for eval/mgmt of HLD, Est 3/2024  Referral Source: Lala Camejo A.P.R.N (cardiology)    Subjective     Interval hx/concerns: last seen 12/2024, had initiation dose of Leqvio 3/25/25.  About 1mo later reported increase flatulence, constipation and thin stools with ongoing abd pain with cramping and constipation.  Had been advised to f/u with PCP and GI due to increase risk for primary GI disorder including CRC.  Declined getting 3mo booster of leqvio due to above sx.   Seen in ED early 5/2025 and admitted due to CP and abd pain.  Had normal echo and CT scan   Had also stopped midodrine which he had started under care of cardiology due to presyncope and lower BPs.  .  Had normal colonoscopy 2023.  Seeing GI MD ongoing.   Reports ongoing abd distension.  Negative SIBO testing.  Ongoing with GI consultants and pending EGD/colonoscopy 7/2/25.  Reports ongoing labored breathing from abd distension  Distension with water intake in the AM.   Hoarseness.     DYSLIPIDEMIA  LIFESTYLE MGMT  Change in weight: Stable  Exercise habits: active yoga, core training - very active   Dietary patterns: Medi style   Etoh: see sochx  Reported barriers to care: none     MEDICATION MGMT  Current Rxs:   Statin: None  Non-Statin: Leqvio (initial 3/2025, declined 3mo booster)  Supplements: None  side effects?  Concern about abd pain, constipation, thin stools   Adherence? no    PERTINENT HLD PMHX  Age at Initial Diagnosis of Dyslipidemia: 50s   Initial visit hx:  seen by cardiology APRN 2/27/24 for ongoing CAD mgmt, noted to have uncontrolled HLD and prior statin  and zetia intol.  Here to review tx options.  Seen by pharm lipid clinic and started on nexletol (3/22/24) - pending initiation.    Pending ENT eval for repeat inspire and revision of prior surgery for known ANTIONE.   Current concern  regards position dysequilibrium when leaning forward then arising up while doing  yoga.  Progressive over last 1 yr.  Has had presyncope w/o overt syncope or falls.  Denies baseline vertigo or associated HA or other focal neuro s/s including visual changes or diplopia.   Seen by Dr. Smith for evaluation.    No other findings per neurology.  ENT reports no issues from ENT standpoint.     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    Baseline Lipids Prior to Treatment:     History of ASCVD: History of prior MI >12 months ago  (2 episodes)    # CAD, s/p MI with PCI LAD , RCA  - currently no sx, seeing  cardiology     Other EstablishedVascular Disease:     # CVI with signficant BLE venous reflux and varicosities - symptomatic     Previously Attempted Interventions for Lipids - including outcome - SEE ALLERGY LIST     HTN: stable, good adherence/tolerance of meds, Home BP los/70s    ANTITHROMB THERAPY: ASA 81mg, no hx of bleeding     Current Outpatient Medications on File Prior to Visit   Medication Sig Dispense Refill    albuterol 108 (90 Base) MCG/ACT Aero Soln inhalation aerosol Inhale 1-2 Puffs every four hours as needed for Shortness of Breath. INHALE 1-2 PUFFS BY MOUTH EVERY FOUR HOURS AS NEEDED FOR SHORTNESS OF BREATH. 8.5 Each 11    budesonide-formoterol (SYMBICORT) 160-4.5 MCG/ACT Aerosol Inhale 2 Puffs 2 times a day. Use spacer. Rinse mouth after each use. 30.6 g 3    Probiotic Product (ALIGN PO) Take 1 Capsule by mouth every day.      meloxicam (MOBIC) 15 MG tablet Take 1 Tablet by mouth every day. (Patient taking differently: Take 15 mg by mouth 1 time a day as needed for Mild Pain or Inflammation.) 30 Tablet 1    valACYclovir (VALTREX) 500 MG Tab Take 1,000 mg by mouth every day. 2 tablets= 1000mg      tamsulosin (FLOMAX) 0.4 MG capsule Take 0.4 mg by mouth 1/2 hour after breakfast.       fluticasone (FLONASE) 50 MCG/ACT nasal spray Administer 2 Sprays into affected nostril(S) as needed.      pantoprazole (PROTONIX) 40 MG Tablet Delayed Response Take 1 Tablet by mouth 2 times a day.      aspirin (ASA) 81 MG Chew Tab chewable tablet Chew 81 mg every day.      fexofenadine (ALLEGRA) 180 MG tablet Take 180 mg by mouth 2 times a day as needed (allergies). 1-2 times daily prn      metformin ER (GLUCOPHAGE XR) 500 MG TABLET SR 24 HR Take 500 mg by mouth 2 times a day.      gabapentin (NEURONTIN) 100 MG Cap Take 100-200 mg by mouth 2 times a day. 100 mg every AM and 200 mg every night NIGHT       Current Facility-Administered Medications on File Prior to Visit   Medication Dose Route Frequency Provider Last Rate Last Admin    inclisiran (Leqvio) injection 284 mg  284 mg Subcutaneous Q6 MO    284 mg at 25 0930      Allergies   Allergen Reactions    Lisinopril Anaphylaxis     Lip and tongue swelling    Aspirin      High dose only - aspirin induced tinnitus    Atorvastatin Unspecified          Bempedoic Acid      Myalgia, HA, abd pain    Other Reaction(s): Joint pain, stomach upset    Ciprofloxacin      Tendon pain  Other reaction(s): shoulder pain, blurred vision, abdominal pain  Tendon pain    Crestor [Rosuvastatin Calcium] Unspecified     Muscle pain     Ezetimibe Unspecified          Hmg-Coa-R Inhibitors      Other reaction(s): Unspecified  Muscle pain     Repatha [Evolocumab] Unspecified     GI distress, abdominal pain with 1st/2nd injection    Singulair Anxiety and Unspecified     Pain, anxiety    Topamax [Topiramate] Unspecified     Tension, headache      Social History     Tobacco Use    Smoking status: Former     Current packs/day: 0.00     Average packs/day: 1 pack/day for 30.0 years (30.0 ttl pk-yrs)     Types: Cigarettes     Start date: 1967     Quit date: 1997     Years since quittin.7    Smokeless tobacco: Never   Vaping Use    Vaping status: Never Used   Substance Use Topics     Alcohol use: No    Drug use: No     Review of Systems   Constitutional:  Negative for chills, fever and malaise/fatigue.   Eyes:  Negative for blurred vision, double vision and pain.   Respiratory:  Negative for cough, hemoptysis, sputum production, shortness of breath and wheezing.    Cardiovascular:  Negative for chest pain, palpitations, orthopnea, claudication, leg swelling and PND.   Gastrointestinal:  Positive for abdominal pain and constipation. Negative for blood in stool, melena, nausea and vomiting.   Skin:  Negative for itching and rash.   Neurological:  Positive for dizziness. Negative for tingling, tremors, sensory change, speech change, focal weakness, seizures, loss of consciousness, weakness and headaches.       Objective    Vitals:    06/03/25 1121   BP: 124/68   BP Location: Left arm   Patient Position: Sitting   BP Cuff Size: Adult   Pulse: 77   Weight: 78.5 kg (173 lb)   Height: 1.829 m (6')      BP Readings from Last 5 Encounters:   06/03/25 124/68   05/29/25 102/56   05/20/25 114/66   05/03/25 (!) 157/78   04/15/25 110/52     BMI Readings from Last 1 Encounters:   06/03/25 23.46 kg/m²      Wt Readings from Last 3 Encounters:   06/03/25 78.5 kg (173 lb)   05/29/25 78.4 kg (172 lb 15.2 oz)   05/20/25 77.6 kg (171 lb)      Physical Exam  Constitutional:       Appearance: Normal appearance. He is well-developed.   HENT:      Head: Normocephalic and atraumatic.   Eyes:      Conjunctiva/sclera: Conjunctivae normal.   Neck:      Thyroid: No thyromegaly.      Vascular: No JVD.   Cardiovascular:      Rate and Rhythm: Normal rate and regular rhythm.      Pulses: Normal pulses.           Radial pulses are 2+ on the right side and 2+ on the left side.        Dorsalis pedis pulses are 2+ on the right side and 2+ on the left side.        Posterior tibial pulses are 2+ on the right side and 2+ on the left side.      Heart sounds: Normal heart sounds. No murmur heard.     No friction rub. No gallop.  "  Pulmonary:      Effort: Pulmonary effort is normal. No respiratory distress.      Breath sounds: Normal breath sounds.   Abdominal:      General: Bowel sounds are increased. There is distension.      Palpations: There is no mass.   Musculoskeletal:      Cervical back: Normal range of motion and neck supple.   Lymphadenopathy:      Cervical: No cervical adenopathy.   Skin:     General: Skin is warm and dry.   Neurological:      General: No focal deficit present.      Mental Status: He is alert and oriented to person, place, and time.      Cranial Nerves: No cranial nerve deficit.      Coordination: Coordination normal.      Gait: Gait normal.       DATA REVIEW:  Most Recent Lipid Panel:   Lab Results   Component Value Date/Time    CHOLSTRLTOT 124 05/27/2025 09:12 AM    LDL 60 05/27/2025 09:12 AM    HDL 51 05/27/2025 09:12 AM    TRIGLYCERIDE 67 05/27/2025 09:12 AM         Lab Results   Component Value Date/Time    LDL 60 05/27/2025 09:12 AM     (H) 06/27/2020 12:47 AM    LDL 98 03/20/2018 06:33 AM     No results found for: \"LIPOPROTA\"   Lab Results   Component Value Date/Time    APOB 52 (L) 05/27/2025 09:12 AM      No results found for: \"CRPHIGHSEN\"      12/2024 [facility]   LDL = 112     8/10/24   C 175 HDL 51    Lp(a), apoB pending status at time of visit   CMP: gluc 102, Cr 0.71  UACR wnl   CRP (run as regular and not hsCRP)  <3.0       [place] 1/2024       TSH wnl       Other Pertinent Blood Work:   Lab Results   Component Value Date    SODIUM 133 (L) 05/27/2025    POTASSIUM 4.2 05/27/2025    CHLORIDE 98 05/27/2025    CO2 25 05/27/2025    ANION 10.0 05/27/2025    GLUCOSE 102 (H) 05/27/2025    BUN 16 05/27/2025    CREATININE 0.73 05/27/2025    CALCIUM 8.9 05/27/2025    ASTSGOT 27 05/27/2025    ALTSGPT 23 05/27/2025    ALKPHOSPHAT 71 05/27/2025    TBILIRUBIN 0.5 05/27/2025    ALBUMIN 3.8 05/27/2025    AGRATIO 1.6 05/27/2025    CREACTPROT <0.30 05/02/2025    CPKTOTAL 205 (H) 05/02/2025 "     IMAGING    MPI 2020   NUCLEAR IMAGING INTERPRETATION    No reversible defects that would indicate ischemia.    Matched defect in the inferior apex is either artifactual or due to a small    inferior wall infarct.    Normal left ventricular size, ejection fraction, and wall motion.    ECG INTERPRETATION    Normal ECG portion of a treadmill nuclear stress test.    Average exercise tolerance for age.    Normal BP response to exercise.    Sinus rhythm.    No ischemic ECG changes.    No chest pain during stress.    Ernandez treadmill score 6, low risk (assume no prior CAD).     CT a/p with 1/2023  Vasculature: Atherosclerosis.   IMPRESSION:   1.  Findings in keeping with acute pancreatitis involving the pancreatic tail.  2.  Small right inguinal hernia containing fat and the appendix.  3.  Atherosclerosis.    MR brain 8/2023       [facility] head/neck 9/2023 ([facility])        Zio patch 5/2024  DOS: 5/10/2024-5/23/2024   Summary:   The patient was monitored for 13 days 5 hours.  Predominant underlying rhythm was sinus rhythm.   3 runs of supraventricular tachycardia occurred, the fastest and longest run lasting 19 beats with a max rate of 182 bpm.   Rare PACs less than 1%.  Rare PVCs less than 1%.   No evidence of pauses or significant bradyarrhythmias.   Symptoms correlated with sinus rhythm with heart rate 82 to 125 bpm with PACs.     Echo 6/2024  Prior study on 6/26/2020, compared to the report of the prior study,   there has been no significant change.   Normal left ventricular systolic function.  The left ventricular ejection fraction is visually estimated to be 55%.  Mild aortic insufficiency.  Estimated right ventricular systolic pressure is 20 mmHg.  Aorta  Normal aortic root for body surface area. The ascending aorta diameter is 3.7 cm.    Carotid 7/2024    1.  Mild plaque without evidence of hemodynamically significant stenosis in  bilateral internal carotid arteries.   2.  Loss of diastolic forward flow in right vertebral  artery suggestive of  possible distal vessel occlusion.    10/2024 BLE venous    1.  No superficial or deep venous thrombosis in bilateral lower  extremities.   2.  There is significant reflux in the right great saphenous vein at the mid thigh and calf.  There is also significant reflux demonstrated in a varicosity of the right mid to distal thigh and proximal calf.   3.  There is significant reflux in the left great saphenous vein at multiple segments.  There is also significant reflux demonstrated in    varicosities in the left thigh and calf.    5/2025 CT a/p with   Aorta: No aneurysm. Atherosclerosis.   Pelvic organs: Unremarkable.  IMPRESSION:   1.  No acute abnormality in the abdomen or pelvis.  2.  Intraductal hyperdensity and minimal adjacent fat stranding, likely sequela of prior pancreatitis. No pancreatic mass lesions.  Stomach, small bowel, colon: No bowel wall thickening or obstruction.    5/2025 Echo   Contrast study.  The left ventricular ejection fraction is visually estimated to be 65%.  No significant valve or Doppler abnormality.  Normal estimated right atrial pressure, unable to estimate RVSP.  No pericardial effusion.  Compared to the prior study on 7/1/24, no significant change.        PROCEDURES:  NONE         ASSESSMENT AND PLAN  1. Occlusion of vertebral artery, right        2. Mixed hyperlipidemia due to type 2 diabetes mellitus ([facility])        3. Aortic atherosclerosis ([facility])        4. Type 2 diabetes mellitus with other circulatory complication, without long-term current use of insulin ([facility])        5. Coronary artery disease involving native coronary artery of native heart without angina pectoris        6. Stented coronary artery        7. History of anterior wall myocardial infarction        8. Mild atherosclerosis of carotid artery, bilateral        9. Primary hypertension        10. Statin intolerance        11. Abdominal distension          Patient Type: Secondary Prevention and Type 2  "Diabetes Mellitus, very high risk, polyvascular     MAJOR ASCVD:  polyvascular     1) CAD, s/p MI with PCI LAD 2009, RCA 2015 - stable, no sx   2020 MPI - stable  6/2024 echo = normal   Plan:  - continue secondary prevention treatment goals, intensive med mgmt and lifestyle interventions   - ongoing care with cardiology     Other Established Vascular Disease:     1) Non-aneurysmal aortic atherosclerosis - CT, no symptoms or prior known associated clinical manifestations  - general indicator of systemic AS with higher potential AS in other vascular beds  Plan:  - no further imaging surveillance, continue med mgmt      2) right vertebral artery occlusion, V2-V3 seg - does not appear to be symptomatic, presumed  chronic   9/2023 CTA = occlusion,  reconstitutes from basilar and left vert a backward flow  6/2024 duplex = loss of diast flow R vert art   Plan  - continue med mgmt  - repeat duplex for surveillance in 1yr (8/2025), if stable then repeat q2-3y    3) bilat carotid athero w/o stenosis - stable    4 ) CHRONIC VENOUS DISEASE / INSUFFICIENCY - bilat superficial VR and varicosities   S/p LLE EVLA (Dr. Davis, 5/2025)  Plan:  - ongoing care with Dr. Davis   - start/continue knee-high compression socks, 20-30mmHg, as much as tolerated, reviewed application, use of donning aide, resources for purchasing   - increase walking, avoid prolonged standing/sitting  - active calf pumping exercises if prolonged standing and elevate legs above heart level while sitting and sleeping   - emphasized need for reduction of central adiposity to reduce extrinsic compression of the low-pressure IVC  to improve venous return and reduce distal venous pressure in legs   - reviewed dietary changes and monitor weight and waist circumference  - side sleeping with body pillow may improve lymphatic and venous return by reducing  extrinsic compression on IVC during sleep  - reduce sodium (\"salt\") in diet to less than 2,000mg daily   - continue " daily moisturizing lotion (such as [place] Diabetic Foot Cream)  Medications:    - in general, diuretics will not help with CVI-assoc edema and should be avoided   - preferred evidence-based venoactive agents include micronized MPFF (Rx Vasculera or OTC diosmin/hespiridin), Ruscus extract ('s broom extract)  - other available agents include horse chestnut seed extract     Evidence of genetic dyslipidemia: No   FH genotyping: NO    Secondary causes/contributors: N\A    ACC/AHA Indication for Statin Therapy:  Secondary Prevention - Very high risk ASCVD - 2 major events or 1 event with other high-risk conditions    Other Significant Risk Markers  Lp(a) = pending     LIPID TARGETS / GOALS:   As of 6/2025?  LDL-C <55 mg/dl  MET   apoB <60 mg/dl  MET      - though unlikely to continue goals based upon leqvio cessation as discussed below     LIFESTYLE INTERVENTIONS  TOBACCO: Stages of Change: Maintenance (sustained change >6mo)    reports that he quit smoking about 27 years ago. His smoking use included cigarettes. He started smoking about 57 years ago. He has a 30 pack-year smoking history. He has never used smokeless tobacco.   - continued complete avoidance of all tobacco products   PHYSICAL ACTIVITY: >150min/week of mod-intensity activity or as much as tolerated  NUTRITION: Mediterranean and reduce Na to 1500mg/day   ETOH: limit to 2 or less standard drinks/day   WT MGMT: maintain healthy weight    LIPID LOWERING MEDICATION MANAGEMENT:   At this time we are out of treatment options for Mr. Brunson due to multiple drug intolerances    Statin Therapy - COMPLETE INTOLERANCE   - failed 2+ statins including rosuva, atorva  - no further rechallenges     Non-Statin Medications  ZETIA: failed due ADRs   NEXLETOL: failed due to ADRs   BAS: highly unlikely to tolerate due to cramping, constipation potential, will avoid   OMEGA-3 FAs: if TG >150 in light of DM, ASCVD - for 25% CVD risks reduction   FIBRATE: stopped  fenofibrate at last visit as not beneficial unless TG >500    PCSK9 mAb: failed repatha due to GI distress that coincided to injections repeatedly, would not consider trial of Praluent as carries same risks   - there is slight increase risk for diarrhea, gastroenteritis compared to placebo in Rx information insertion     LEQVIO: initiated 3/2025, howevere, we will continue to hold leqvio for now while further w/u for GI conditions   - as reviewed with Mr. Brunson, all post-marketing and primary trial evidence shows no GI side effects for leqvio, so his GI sx cannot be explained as side effect from this medication    - from a biologic plausibility standpoint it is not plausible for leqvio to lead to chronic abd distension, cramping or flatuence  - leqvio is a liver specific (Gal-NAC) targeted siRNA therapy that specifically targets the hepatocytes with reduction in PCSK9 enzyme leading to reduced breakdown of LDL-receptors on hepatocytes leading to increase uptake of LDL-P from the blood.  This targeted therapy has an elimination 1/2 life of about 9 hours out of the blood stream after injection and has minimal if any absorption to other tissues of the body    Indication for PCSK9 Inhibitor strategy:     PSCK9i indicated per 2018 ACC/AHA guidelines in this patient with established ASCVD whose LDL-C remains above threshold of 70 mg/dl despite maximally tolerated statin therapy.    BLOOD PRESSURE MANAGEMENT:  Office BP goal per ACC/AHA <130/80   Home BP at goal:  yes  Office BP at goal:  yes- WCE noted   24h ABPM: not ordered to date  RDN candidate? NO  Plan:   - continue home BP monitoring, reviewed correct technique, provide BP log and instructions  - order 24h ABPM:  NO  - monitor lytes/gfr routinely   Medications:  - no current meds   - prior class indications are RASi, then consider BB in light of CAD hx     GLYCEMIC STATUS: Diabetic, T2 with mixed HLD, CAD  Goal A1c < 7  Lab Results   Component Value Date     HBA1C 6.1 (H) 02/19/2025    HBA1C 6.1 (A) 08/08/2024      Lab Results   Component Value Date/Time    MALBCRT NOTE 02/19/2025 08:25 AM    MICROALBUR <0.2 02/19/2025 08:25 AM   Plan:  - continue current medication plan   - recommmend for routine care with PCP (or endocrine) to include regular A1c monitoring, annual albumin/creatinine ratio (ACR), annual diabetic retinopathy screening, foot exams, annual flu vaccine, and updates to pneumonia vaccines as appropriate      ANTITHROMBOTIC THERAPY: ASA 81mg daily , if worsening tinnitus, then transition to plavix     OTHER:    # abd cramping, flatuence - unclear etiology  - good confidence based upon lit review and biologic implausibility that this is Leqvio mediated as noted above   - midodrine does carry side effect profile that meets this pattern of symptoms, however remains unclear how a med with 1/2 life of 4-6 hours which he has been off for 2 months would lead to chronic symptoms such as he has.    - open evidence search yields a case report of midodrine-induced severe ileus related to its alpha receptor agonist activity, so there is biologic plausibility, however there is scanty to no evidence of it mediating chronic distension or ileus  Plan:   - will avoid leqvio and midodrine to avoid additional variables while ongoing evaluation with GI   - continue ongoing care with cardiology and GI (DR. Mosqueda) for whom I have sent this encounter note for their review     # ANTIONE - new onset despite UPPP - worsening sx  STOP-BANG = 5pts   Plan:  - using mandibular advancement device     # tinnitus - mild symptomatic, no vasc etiology found      # hx of progressive presyncope vs dysequilibrium (2024) - resolved   - Unlikely vasc etiology based upon prior normal MR brain and CTA neck however no functional duplex scanning to date, and risk for retrograde and/or steal phenomenon is plausible and worth evaluating further.  Lower prob in light of no other overt steal or posterior circ  syndrome  - possible cardiogenic etiology in light of prior MIs and ischemic CM   - normal echo and zio patch (mild PACs),  no hx of arrhythmias reported, donna no AF/AFL  - findings of R vertebral artery occlusion with retrograde flow unlikely to be etiology  Plan:   - no further vascular w/u, defer ongoing to PCP, cardiology   - avoiding midodrine - as noted in discussion above     STUDIES:   8/2025 carotid duplex - ordered, RN to track   FOLLOW-UP: 3 months      Total time: 48min - chart review/prep, review of other providers' records, imaging/lab review, face-to-face time for history/examination, ordering, prescribing,  review of results/meds/ treatment plan with patient/family/caregiver, documentation in EMR, care coordination (cardio, GI, pharm clinic)     Kyrie Sales M.D.  JAIRO, Board-certified clinical lipidologist   [facility]   [facility]   [phone]

## 2025-07-07 ENCOUNTER — APPOINTMENT (OUTPATIENT)
Dept: SPEECH THERAPY | Facility: REHABILITATION | Age: 75
End: 2025-07-07
Attending: OTOLARYNGOLOGY
Payer: MEDICARE

## 2025-07-14 ENCOUNTER — SPEECH THERAPY (OUTPATIENT)
Dept: SPEECH THERAPY | Facility: REHABILITATION | Age: 75
End: 2025-07-14
Attending: OTOLARYNGOLOGY
Payer: MEDICARE

## 2025-07-14 DIAGNOSIS — K21.00 GASTROESOPHAGEAL REFLUX DISEASE WITH ESOPHAGITIS, UNSPECIFIED WHETHER HEMORRHAGE: ICD-10-CM

## 2025-07-14 DIAGNOSIS — R13.12 DYSPHAGIA, OROPHARYNGEAL PHASE: Primary | ICD-10-CM

## 2025-07-14 PROCEDURE — 92610 EVALUATE SWALLOWING FUNCTION: CPT

## 2025-07-15 ASSESSMENT — ENCOUNTER SYMPTOMS
PAIN SCALE: 2
PAIN LOCATION: STOMACH

## 2025-07-15 NOTE — OP THERAPY EVALUATION
Outpatient Speech Therapy  INITIAL EVALUATION    [facility] Speech [address]  [address]  [place] [address]  Phone:  [phone]  Fax:  [phone]    Date of Evaluation: 2025    Patient: Darren Brunson  YOB: 1950  MRN: [id number]     Referring Provider: Megan Mccallum M.D.  [address] [address]   Referring Diagnosis Gastro-esophageal reflux disease with esophagitis, without bleeding [K21.00];Dysphagia, oropharyngeal phase [R13.12]     Time Calculation    Start time: 1500  Stop time: 1545 Time Calculation (min): 45 minutes           Chief Complaint: No chief complaint on file.    Visit Diagnoses     ICD-10-CM   1. Dysphagia, oropharyngeal phase  R13.12   2. Gastroesophageal reflux disease with esophagitis, unspecified whether hemorrhage  K21.00     Subjective:   Reason for Therapy:     Reason For Evaluation:  Dysphagia    Onset Date:  2025    Onset Description:  Pt reported swallowing deficits beginning approximately 3 years ago.  Pt stated that he had an esophageal dilation on 2025, and an MBS on 2025.  Pt reported history of reflux.  Pt lives with his wife.   Social Support:     Social support system: alone; lives with his wife.  Progress Factors:     Progression:  Getting Better  Pain:     Current pain ratin    Location:  Stomach  Therapy History:     Previous Diet:  Normal Diet and Thin Liquids    Current Diet:  Regular Diet and Thin Liquids    Dentition:  Complete Dentition    Past Medical History[1]  Past Surgical History[2]  Objective:   Treatments/Interventions Performed:  Dysphagia treatment  Objective Details:  The Eating Assessment Tool (EAT-10) was administered this day.  Pt was required to rate 10 aspects of swallow on a 0-4 scale (0 = no problem; 4 = severe problem).  Pt scored a 5 on the EAT-10.     The Swallowing Ability and Function Evaluation was administered this day with the following  results:    Physical Evaluation  Raw Score: 78  Percentile: 90  Stanine: 9 (WNL)    Oral Phase  Raw Score: 21  Percentile: 90  Stanine: 9 (WNL)    Pharyngeal Phase  Raw Score: 21  Percentile: 90  Stanine: 9 (WNL)           Speech Therapy Assessment:     Oral Motor Status:     Labial strength and control for patient: Good    Lingual strength and control for patient: Good    Patient saliva management: GoodPatient oral sensation and awareness: Good    Patient awareness of swallow problem: Good    Previous modified barium swallow: Yes    Date of previous modified barium swallow: 4/24/2025    Study outcome: No aspiration or penetration.  Possible esophageal dysphagia.    Oral Phase Assessment:     Types of food within functional limits: Puree, Mechanical Soft, Regular and Thin Liquid    Pharyngeal Phase Assessment:     Delayed Swallow    Food types within functional limits: Puree, Mechanical Soft, Regular and Thin Liquid    Speech Therapy Plan :   Prognosis & Recommendations  Impression Summary:  Swallow function is deemed within in functional limits.  Pt tolerated all textures and consistencies without oral residue or s/s of aspiration.    Prognosis:  Good  Prognosis Details:  Highly motivated  Compensatory swallow strategies:  Upright position 90 degrees during meal and 45 minutes after meal, Reduce bolus size, Multiple swallows, Alternate liquids/solids, Place food in right side and No talking while eating  Diet Recommendation:  Normal Consistency  Liquid Recommendation:  Thin  Medication Administration:  Whole with thin  Goals  Short Term Goals:  Pt verbalized understanding of swallow exercises and strategies.  Short Term Goal Duration (Weeks):  1-2 weeks  Long Term Goals:  Pt verbalized understanding of swallow exercises and strategies.  Long Term Goal Duration (Weeks):  1-2 weeks  Patient Stated Goal:  Improve swallow function  Therapy Recommendations  Recommendation:  No further speech therapy indicated at this  time,  Frequency:  1x week  Duration (in visits):  1  Duration (in weeks):  1  Plan Comments  Additional comments:  Pt was provided with effortful swallow, and Shaker exercise handout, to maintain swallow function.  Pt was provided with swallow strategies.  Pt verbalized understanding.  Pt may be referred back to this service as deemed appropriate.       Functional Assessment Used   SAFE; EAT-10    Referring provider co-signature:  I have reviewed this plan of care and my co-signature certifies the need for services.    Certification Period: 07/14/2025 to  08/13/25    Physician Signature: ________________________________ Date: ______________                 [1]   Past Medical History:  Diagnosis Date    Acute myocardial infarction of other anterior wall, episode of care unspecified 2009; 2014    Anxiety state, unspecified      Arthritis     foot, ankle, jaw right side    Asthma     Bronchitis     CAD (coronary artery disease) 09/2009    PCI/stent (Promus 3.0 x 15mm) to the LAD.    Cataract     left eye no surgery    Chickenpox     Cough     Diabetes (HCC)     on oral meds    Erosive gastritis 2020    Gastric ulcer 2020    Glaucoma     Heart murmur     2/17/2023 patient not aware of having a murmur    High cholesterol     Hyperlipidemia     Mumps     Pain 01/14/2023    discomfort abdomen    Psychiatric problem     S/P coronary artery stent placement      Shortness of breath     Sleep apnea 2017    resolved after surgery for sleep apnea    Snoring    [2]   Past Surgical History:  Procedure Laterality Date    INGUINAL HERNIA REPAIR ROBOTIC XI Right 8/24/2023    Procedure: ROBOT ASSISTED LAPAROSCOPIC RIGHT INGUINAL HERNIA REPAIR;  Surgeon: Morris Mcfarland M.D.;  Location: SURGERY [facility];  Service: Gen Robotic    OK UPPER GI ENDOSCOPY,DIAGNOSIS N/A 2/20/2023    Procedure: UPPER RADIAL ENDOSCOPIC ULTRASOUND PANCREATITIS, TAIL OF PANCREATIC MASS - ENDOSCOPY FINE NEEDLE ASPIRATION WITH ANESTHESIA;  Surgeon: Reymundo MCKOY  KAVYA Loya;  Location: [facility];  Service: EUS    EGD W/ENDOSCOPIC ULTRASOUND N/A 2/20/2023    Procedure: EGD, WITH ENDOSCOPIC US;  Surgeon: Reymundo Loya M.D.;  Location: [facility];  Service: EUS    EGD WITH ASP/BX N/A 2/20/2023    Procedure: EGD, WITH ASPIRATION BIOPSY;  Surgeon: Reymundo Loya M.D.;  Location: [facility];  Service: EUS    SEPTAL RECONSTRUCTION N/A 10/23/2017    Procedure: SEPTAL RECONSTRUCTION;  Surgeon: CHARLI Sanchez M.D.;  Location: SURGERY SAME DAY [facility];  Service: Ent    UVULOPHARYNGOPALATOPLASTY N/A 10/23/2017    Procedure: UVULOPHARYNGOPALATOPLASTY;  Surgeon: CHARLI Sanchez M.D.;  Location: SURGERY SAME DAY [facility];  Service: Ent    BIOPSY GENERAL Left 10/23/2017    Procedure: BIOPSY GENERAL LEFT UPPER NECK;  Surgeon: CHARLI Sanchez M.D.;  Location: SURGERY SAME DAY [facility];  Service: Ent    INGUINAL HERNIA REPAIR BILATERAL  2015    STENT PLACEMENT  2015    SINUSOTOMIES  11/2011    Sinus surgery    OTHER CARDIAC SURGERY  2009    cardiac cath with stent placement    TONSILLECTOMY      UMBILICAL HERNIA REPAIR

## 2025-07-24 ENCOUNTER — TELEPHONE (OUTPATIENT)
Dept: CARDIOLOGY | Facility: MEDICAL CENTER | Age: 75
End: 2025-07-24
Payer: MEDICARE

## 2025-07-24 NOTE — TELEPHONE ENCOUNTER
APPLE      Caller: Darren Manuel     Topic/issue: Pt is very concerned about going back on the Midodrione he had severe issues with this and he would like to have a call back re this please - Please advise     Callback Number: [phone]    Thank You   Ele MARI

## 2025-07-24 NOTE — TELEPHONE ENCOUNTER
"JA - Please advise. Pt hesitant to resume midodrine after recent hospital stay on 5/2/25 for severe abdominal pain. Pt asking about possible change to an alternative medication for his orthostatic hypotension/dizziness/lightheadedness because he is worried it contributed to his abdominal symptoms. Thank you!    In chart review it looks like this medication was discontinued by Mayco Fox on 5/2/25 at 2018. Pt had a hospitalization on 5/2/25 for chest and abdominal pain where it was noted he stopped his Leqvio infusions and his midodrine.   =======================================================  Phone Number Called: [phone]  Call outcome: Did not leave a detailed message. Requested patient to call back.  Message: Called to discuss patient questions regarding midodrine medication. Pt reporting significant stomach issues on leqvio infusions that he eventually went to the hospital for on 5/2/25. Pt also suspicious of midodrine as the cause and reports he was told his stomach issues were possibly a side effect of this medication. Pt wondering if there is another med alternative to midodrine that would be less likely to cause stomach issues going forward or if JA would recommend that he try midodrine again. Pt concerned and states he \"[does] not want to go through that again.\"  Reported BPs: runs around 120/76 most of the time. Another /70 something. Pt reports running low occasionally around 104/64.   Pt reports he had reflux in his left leg and had laser done recently. Pt thought for a couple days he was better but then the dizziness/lightheadedness/near syncope resumed. Pt reports it occurs with any large movement, especially if that movement is fast.   Informed pt that we will reach out to JA for her recommendations and reach back out to him to update him once we receive them. Pt verbalizes understanding. Advised pt to be cautious with any large movements in the meantime and to go to the ER if has " any syncope, pt verbalizes understanding.

## 2025-07-25 NOTE — TELEPHONE ENCOUNTER
Noted:  Nikki Lewis P.A.-C. to Me     7/25/25 12:24 PM  Probably best to discuss at a follow up, he can wait for Dr. Estrada or see me before then.     Phone Number Called: [phone]  Call outcome: Spoke to patient regarding message below.  Message: Called to inform patient of JA recs above. Pt verbalizes understanding. Pt states that he will call scheduling to make an earlier appointment with JA to discuss his symptoms and medications later today when he has his calendar with him. Provided pt with schedulers number to call back. Pt denies further questions/concerns at this time.

## 2025-07-30 ENCOUNTER — OFFICE VISIT (OUTPATIENT)
Dept: CARDIOLOGY | Facility: MEDICAL CENTER | Age: 75
End: 2025-07-30
Attending: PHYSICIAN ASSISTANT
Payer: MEDICARE

## 2025-07-30 VITALS
BODY MASS INDEX: 22.86 KG/M2 | SYSTOLIC BLOOD PRESSURE: 110 MMHG | HEIGHT: 72 IN | OXYGEN SATURATION: 96 % | WEIGHT: 168.8 LBS | RESPIRATION RATE: 20 BRPM | DIASTOLIC BLOOD PRESSURE: 70 MMHG | HEART RATE: 84 BPM

## 2025-07-30 DIAGNOSIS — Z95.5 STENTED CORONARY ARTERY: ICD-10-CM

## 2025-07-30 DIAGNOSIS — I83.12 VARICOSE VEINS OF BOTH LOWER EXTREMITIES WITH INFLAMMATION: ICD-10-CM

## 2025-07-30 DIAGNOSIS — Z78.9 STATIN INTOLERANCE: ICD-10-CM

## 2025-07-30 DIAGNOSIS — I25.10 CORONARY ARTERY DISEASE INVOLVING NATIVE CORONARY ARTERY OF NATIVE HEART WITHOUT ANGINA PECTORIS: Primary | ICD-10-CM

## 2025-07-30 DIAGNOSIS — I83.11 VARICOSE VEINS OF BOTH LOWER EXTREMITIES WITH INFLAMMATION: ICD-10-CM

## 2025-07-30 DIAGNOSIS — R42 ORTHOSTATIC LIGHTHEADEDNESS: ICD-10-CM

## 2025-07-30 PROCEDURE — 99212 OFFICE O/P EST SF 10 MIN: CPT | Performed by: PHYSICIAN ASSISTANT

## 2025-07-30 ASSESSMENT — ENCOUNTER SYMPTOMS
SHORTNESS OF BREATH: 0
PALPITATIONS: 0
DIZZINESS: 1

## 2025-07-30 ASSESSMENT — FIBROSIS 4 INDEX: FIB4 SCORE: 2.55

## 2025-07-30 NOTE — PROGRESS NOTES
Chief Complaint   Patient presents with    Coronary Artery Disease     F/v Dx Orthostatic lightheadedness        Subjective:   Darren Brunson is a 89 y.o. male who presents today for follow-up.     Patient of Dr. Estrada.  Current medical problems include ANTIONE using mouthguard, CAD with LAD PCI (1 LORA) 2009  and RCA PCI (2 LORA) 2015, DL with statin / repatha / ezetimbe intolerance, asthma, prediabetes, BPH, venous insufficiency.    After last visit he completed labs including an a.m. cortisol, SPEP with HERMAN, Urine sodium.  These returned normal.  He was started on midodrine 5 mg twice daily for lightheadedness.  Since his last visit he had 1 leg treated for venous insufficiency with Dr. Davis, for several days afterwards he experienced improvement in his lightheadedness but since then has not seen any change.    In May he was hospitalized for severe abdominal pain.  Ultimately he felt it was related to one of his 2 new medications (midodrine or Leqvio).  Following the hospitalization he stopped both medications.  He continues to monitor his blood pressures at home which are generally low normal to normal readings.    He is in the process of being refitted for a new oral appliance for sleep apnea.  He has a pulse oximeter test to wear shortly.  He is also considering inspire.    He has been tried on many different medications including statins, PCSK9 inhibitors, ezetimibe and now inclisiran which she has elected to stop.  He intends to treat his dyslipidemia with omega-3's.      Past Medical History:   Diagnosis Date    Acute myocardial infarction of other anterior wall, episode of care unspecified 2009; 2014    Anxiety state, unspecified      Arthritis     foot, ankle, jaw right side    Asthma     Bronchitis     CAD (coronary artery disease) 09/2009    PCI/stent (Promus 3.0 x 15mm) to the LAD.    Cataract     left eye no surgery    Chickenpox     Cough     Diabetes (HCC)     on oral meds    Erosive  gastritis     Gastric ulcer     Glaucoma     Heart murmur     2023 patient not aware of having a murmur    High cholesterol     Hyperlipidemia     Mumps     Pain 2023    discomfort abdomen    Psychiatric problem     S/P coronary artery stent placement      Shortness of breath     Sleep apnea     resolved after surgery for sleep apnea    Snoring          Family History   Problem Relation Age of Onset    Heart Disease Maternal Aunt     Cancer Mother         colon         Social History     Tobacco Use    Smoking status: Former     Current packs/day: 0.00     Average packs/day: 1 pack/day for 30.0 years (30.0 ttl pk-yrs)     Types: Cigarettes     Start date: 1967     Quit date: 1997     Years since quittin.9    Smokeless tobacco: Never   Vaping Use    Vaping status: Never Used   Substance Use Topics    Alcohol use: No    Drug use: No         Allergies   Allergen Reactions    Lisinopril Anaphylaxis     Lip and tongue swelling    Aspirin      High dose only - aspirin induced tinnitus    Atorvastatin Unspecified          Bempedoic Acid      Myalgia, HA, abd pain    Other Reaction(s): Joint pain, stomach upset    Ciprofloxacin      Tendon pain  Other reaction(s): shoulder pain, blurred vision, abdominal pain  Tendon pain    Crestor [Rosuvastatin Calcium] Unspecified     Muscle pain     Ezetimibe Unspecified          Hmg-Coa-R Inhibitors      Other reaction(s): Unspecified  Muscle pain     Repatha [Evolocumab] Unspecified     GI distress, abdominal pain with 1st/2nd injection    Singulair Anxiety and Unspecified     Pain, anxiety    Topamax [Topiramate] Unspecified     Tension, headache         Current Outpatient Medications   Medication Sig    albuterol 108 (90 Base) MCG/ACT Aero Soln inhalation aerosol Inhale 1-2 Puffs every four hours as needed for Shortness of Breath. INHALE 1-2 PUFFS BY MOUTH EVERY FOUR HOURS AS NEEDED FOR SHORTNESS OF BREATH.    budesonide-formoterol (SYMBICORT)  160-4.5 MCG/ACT Aerosol Inhale 2 Puffs 2 times a day. Use spacer. Rinse mouth after each use.    Probiotic Product (ALIGN PO) Take 1 Capsule by mouth every day.    meloxicam (MOBIC) 15 MG tablet Take 1 Tablet by mouth every day.    valACYclovir (VALTREX) 500 MG Tab Take 1,000 mg by mouth every day. 2 tablets= 1000mg    tamsulosin (FLOMAX) 0.4 MG capsule Take 0.4 mg by mouth 1/2 hour after breakfast.    fluticasone (FLONASE) 50 MCG/ACT nasal spray Administer 2 Sprays into affected nostril(S) as needed.    pantoprazole (PROTONIX) 40 MG Tablet Delayed Response Take 1 Tablet by mouth 2 times a day.    aspirin (ASA) 81 MG Chew Tab chewable tablet Chew 81 mg every day.    fexofenadine (ALLEGRA) 180 MG tablet Take 180 mg by mouth 2 times a day as needed (allergies). 1-2 times daily prn    metformin ER (GLUCOPHAGE XR) 500 MG TABLET SR 24 HR Take 500 mg by mouth 2 times a day.    gabapentin (NEURONTIN) 100 MG Cap Take 100-200 mg by mouth 2 times a day. 100 mg every AM and 200 mg every night NIGHT         Review of Systems   Constitutional:  Positive for malaise/fatigue.   Respiratory:  Negative for shortness of breath.    Cardiovascular:  Negative for chest pain and palpitations.   Neurological:  Positive for dizziness.          Objective:   /70 (BP Location: Left arm, Patient Position: Sitting, BP Cuff Size: Adult)   Pulse 84   Resp 20   Ht 1.829 m (6')   Wt 76.6 kg (168 lb 12.8 oz)   SpO2 96%  Body mass index is 22.89 kg/m².         Physical Exam  Vitals reviewed.   HENT:      Head: Normocephalic and atraumatic.   Cardiovascular:      Rate and Rhythm: Normal rate and regular rhythm.      Heart sounds: No murmur heard.  Pulmonary:      Effort: Pulmonary effort is normal. No respiratory distress.      Breath sounds: No rales.   Musculoskeletal:      Right lower leg: No edema.      Left lower leg: No edema.   Skin:     General: Skin is warm.   Neurological:      Mental Status: He is alert.      Gait: Gait normal.    Psychiatric:         Judgment: Judgment normal.            Assessment:     1. Coronary artery disease involving native coronary artery of native heart without angina pectoris        2. Stented coronary artery        3. Orthostatic lightheadedness        4. Varicose veins of both lower extremities with inflammation        5. Statin intolerance               Medical Decision Making:  Today's Assessment / Plan:   He returns today for follow-up to update us on his medication changes.  He is elected to stop the Leqvio out of concern a cause of his abdominal pain he also has stopped the midodrine which I think is a more likely side effect of abdominal pain.    Varicose veins, venous insufficiency  - He is currently undergoing surgical correction with [facility]    Vertebral artery disease  - repeating carotid doppler with Dr. Sales    Orthostatic dizziness  - He has stopped midodrine due to the hospitalization for 0 abdominal pain out of concern this was caused by the medication.  His blood pressures are adequate both in the office and at home and I think this is a reasonable choice.  I do suggest avoiding yoga as this has frequent ups and downs and often can exacerbate any lightheadedness.  He will engage in a new walking program and some light weightlifting.    Coronary artery disease, stable  -Remote stenting, last in 2015.  Denies angina symptoms, continue aspirin 81 mg    Statin Intolerance  -At this point he declines any medical therapy for his dyslipidemia.  He will start omega-3 supplements and continue his heart healthy diet.      No follow-ups on file.  Sooner if problems.

## 2025-08-01 ENCOUNTER — HOSPITAL ENCOUNTER (OUTPATIENT)
Dept: RADIOLOGY | Facility: MEDICAL CENTER | Age: 75
End: 2025-08-01
Attending: FAMILY MEDICINE
Payer: MEDICARE

## 2025-08-01 DIAGNOSIS — I65.01: ICD-10-CM

## 2025-08-01 PROCEDURE — 93880 EXTRACRANIAL BILAT STUDY: CPT

## 2025-08-01 PROCEDURE — 93880 EXTRACRANIAL BILAT STUDY: CPT | Mod: 26 | Performed by: INTERNAL MEDICINE

## 2025-08-04 ENCOUNTER — RESULTS FOLLOW-UP (OUTPATIENT)
Dept: VASCULAR LAB | Facility: MEDICAL CENTER | Age: 75
End: 2025-08-04
Payer: MEDICARE

## 2025-08-19 ENCOUNTER — APPOINTMENT (OUTPATIENT)
Dept: URBAN - METROPOLITAN AREA CLINIC 15 | Facility: CLINIC | Age: 75
Setting detail: DERMATOLOGY
End: 2025-08-19

## 2025-08-19 DIAGNOSIS — L82.1 OTHER SEBORRHEIC KERATOSIS: ICD-10-CM

## 2025-08-19 DIAGNOSIS — L57.0 ACTINIC KERATOSIS: ICD-10-CM

## 2025-08-19 DIAGNOSIS — D18.0 HEMANGIOMA: ICD-10-CM

## 2025-08-19 DIAGNOSIS — B00.1 HERPESVIRAL VESICULAR DERMATITIS: ICD-10-CM

## 2025-08-19 DIAGNOSIS — L81.4 OTHER MELANIN HYPERPIGMENTATION: ICD-10-CM

## 2025-08-19 DIAGNOSIS — D22 MELANOCYTIC NEVI: ICD-10-CM

## 2025-08-19 DIAGNOSIS — Z87.2 PERSONAL HISTORY OF DISEASES OF THE SKIN AND SUBCUTANEOUS TISSUE: ICD-10-CM

## 2025-08-19 DIAGNOSIS — Z71.89 OTHER SPECIFIED COUNSELING: ICD-10-CM

## 2025-08-19 PROBLEM — D22.5 MELANOCYTIC NEVI OF TRUNK: Status: ACTIVE | Noted: 2025-08-19

## 2025-08-19 PROBLEM — D18.01 HEMANGIOMA OF SKIN AND SUBCUTANEOUS TISSUE: Status: ACTIVE | Noted: 2025-08-19

## 2025-08-19 PROCEDURE — ? COUNSELING

## 2025-08-19 PROCEDURE — ? ADDITIONAL NOTES

## 2025-08-19 PROCEDURE — ? LIQUID NITROGEN

## 2025-08-19 PROCEDURE — ? SUNSCREEN RECOMMENDATIONS

## 2025-08-19 ASSESSMENT — LOCATION DETAILED DESCRIPTION DERM
LOCATION DETAILED: NASAL INFRATIP
LOCATION DETAILED: LEFT INFERIOR UPPER BACK
LOCATION DETAILED: LEFT BUTTOCK
LOCATION DETAILED: RIGHT SUPERIOR LATERAL LOWER BACK
LOCATION DETAILED: RIGHT BUTTOCK
LOCATION DETAILED: RIGHT RADIAL DORSAL HAND

## 2025-08-19 ASSESSMENT — LOCATION SIMPLE DESCRIPTION DERM
LOCATION SIMPLE: RIGHT LOWER BACK
LOCATION SIMPLE: RIGHT HAND
LOCATION SIMPLE: RIGHT BUTTOCK
LOCATION SIMPLE: LEFT BUTTOCK
LOCATION SIMPLE: NOSE
LOCATION SIMPLE: LEFT UPPER BACK

## 2025-08-19 ASSESSMENT — LOCATION ZONE DERM
LOCATION ZONE: TRUNK
LOCATION ZONE: HAND
LOCATION ZONE: NOSE

## (undated) DEVICE — SLEEVE, VASO, THIGH, MED

## (undated) DEVICE — COVER TIP ENDOWRIST HOT SHEAR - (10EA/BX) DA VINCI

## (undated) DEVICE — TUBE SHILEY ENDOTRACHEAL ORAL RAE CUFFED 8.0MM WITH TAPERGUARD (10EA/BX)

## (undated) DEVICE — PACK ENT OR - (2EA/CA)

## (undated) DEVICE — GLOVE, LITE (PAIR)

## (undated) DEVICE — CLOSURE SKIN STRIP 1/2 X 4 IN - (STERI STRIP) (50/BX 4BX/CA)

## (undated) DEVICE — TUBING CLEARLINK DUO-VENT - C-FLO (48EA/CA)

## (undated) DEVICE — TOWELS CLOTH SURGICAL - (4/PK 20PK/CA)

## (undated) DEVICE — ELECTRODE 850 FOAM ADHESIVE - HYDROGEL RADIOTRNSPRNT (50/PK)

## (undated) DEVICE — HEAD HOLDER JUNIOR/ADULT

## (undated) DEVICE — GLOVE SIZE 7.0 SURGEON ACCELERATOR FREE GREEN (50PR/BX 4BX/CA)

## (undated) DEVICE — SOD. CHL. INJ. 0.9% 1000 ML - (14EA/CA 60CA/PF)

## (undated) DEVICE — BITE BLOCK ADULT 60FR (100EA/CA)

## (undated) DEVICE — GOWN SURGEONS LARGE - (32/CA)

## (undated) DEVICE — DERMABOND ADVANCED - (12EA/BX)

## (undated) DEVICE — SET LEADWIRE 5 LEAD BEDSIDE DISPOSABLE ECG (1SET OF 5/EA)

## (undated) DEVICE — KIT  I.V. START (100EA/CA)

## (undated) DEVICE — LACTATED RINGERS INJ 1000 ML - (14EA/CA 60CA/PF)

## (undated) DEVICE — GLOVE SZ 7 BIOGEL PI MICRO - PF LF (50PR/BX 4BX/CA)

## (undated) DEVICE — SYRINGE SAFETY 5 ML 18 GA X 1-1/2 BLUNT LL (100/BX 4BX/CA)

## (undated) DEVICE — SURGIFOAM (12X7) - (12EA/CA)

## (undated) DEVICE — GOWN WARMING STANDARD FLEX - (30/CA)

## (undated) DEVICE — GLOVE BIOGEL SZ 6.5 SURGICAL PF LTX (50PR/BX 4BX/CA)

## (undated) DEVICE — CORDS BIPOLAR COAGULATION - 12FT STERILE DISP. (10EA/BX)

## (undated) DEVICE — BOVIE FOOT CONTROL SUCTION - 6IN 10FR (25EA/CA)

## (undated) DEVICE — CANISTER SUCTION 3000ML MECHANICAL FILTER AUTO SHUTOFF MEDI-VAC NONSTERILE LF DISP  (40EA/CA)

## (undated) DEVICE — OBTURATOR BLADELESS STANDARD 8MM (6EA/BX)

## (undated) DEVICE — SET EXTENSION WITH 2 PORTS (48EA/CA) ***PART #2C8610 IS A SUBSTITUTE*****

## (undated) DEVICE — ADHESIVE MASTISOL - (48/BX)

## (undated) DEVICE — WATER IRRIGATION STERILE 1000ML (12EA/CA)

## (undated) DEVICE — STERI STRIP COMPOUND BENZOIN - TINCTURE 0.6ML WITH APPLICATOR (40EA/BX)

## (undated) DEVICE — GLOVE BIOGEL SZ 7 SURGICAL PF LTX - (50PR/BX 4BX/CA)

## (undated) DEVICE — SUTURE 4-0 ETHILON FS-2 18 (36PK/BX)"

## (undated) DEVICE — ROBOTIC SURGERY SERVICES

## (undated) DEVICE — SENSOR OXIMETER ADULT SPO2 RD SET (20EA/BX)

## (undated) DEVICE — Device

## (undated) DEVICE — ANTI-FOG SOLUTION - 60BTL/CA

## (undated) DEVICE — MASK WITH FACE SHIELD (25/BX 4BX/CA)

## (undated) DEVICE — DRAPE COLUMN  BOX OF 20

## (undated) DEVICE — SYRINGE SAFETY 3 ML 18 GA X 1 1/2 BLUNT LL (100/BX 8BX/CA)

## (undated) DEVICE — DRAPE ARM  BOX OF 20

## (undated) DEVICE — SUTURE 3-0 ETHILON PS-1 (36PK/BX)

## (undated) DEVICE — MASK ANESTHESIA ADULT  - (100/CA)

## (undated) DEVICE — BOVIE NEEDLE TIP INSULATD NON-SAFETY 2CM (50/PK)

## (undated) DEVICE — CANISTER SUCTION RIGID RED 1500CC (40EA/CA)

## (undated) DEVICE — SEAL 5MM-8MM UNIVERSAL  BOX OF 10

## (undated) DEVICE — CATHETER IV 20 GA X 1-1/4 ---SURG.& SDS ONLY--- (50EA/BX)

## (undated) DEVICE — SUTURE 2-0 20CM STRATAFIX SPIRAL SH NEEDLE (12/BX)

## (undated) DEVICE — FORCEP RADIAL JAW 4 STANDARD CAPACITY W/NEEDLE 240CM (40EA/BX)

## (undated) DEVICE — SUTURE 3-0 CHROMIC GUT SH 27 (36PK/BX)"

## (undated) DEVICE — SUTURE 4-0 PLAIN GUT SC-1 ETHICON (36PK/BX)

## (undated) DEVICE — ELECTRODE NEEDLE NON-SAFETY 2.8 IN (150EA/CT)

## (undated) DEVICE — SUTURE 4-0 CHROMIC GUT - 18 INCH G-3 D/A (12/BX)

## (undated) DEVICE — GLOVE BIOGEL SZ 8 SURGICAL PF LTX - (50PR/BX 4BX/CA)

## (undated) DEVICE — SHEARS MONOPOLAR CURVED  DA VINCI 10X'S REUSABLE

## (undated) DEVICE — NEEDLE DRIVER MEGA SUTURECUT DA VINCI 15X'S REUSABLE

## (undated) DEVICE — SUTURE 5-0 ETHILON P-3 18 (12PK/BX)"

## (undated) DEVICE — PENCIL ELECTSURG 10FT BTN SWH - (50/CA)

## (undated) DEVICE — CUFF BP ADULT LARGE DISPOSABLE (20EA/CA)

## (undated) DEVICE — SUCTION INSTRUMENT YANKAUER BULBOUS TIP W/O VENT (50EA/CA)

## (undated) DEVICE — SENSOR SPO2 NEO LNCS ADHESIVE (20/BX) SEE USER NOTES

## (undated) DEVICE — GOWN SURGEONS X-LARGE - DISP. (30/CA)

## (undated) DEVICE — COVER LIGHT HANDLE ALC PLUS DISP (18EA/BX)

## (undated) DEVICE — SUTURE 4-0 MONOCRYL PLUS PS-2 - 27 INCH (36/BX)

## (undated) DEVICE — SUTURE 4-0 VICRYL PLUS SH - UNDYED 27 INCH (36PK/BX)

## (undated) DEVICE — GLOVE BIOGEL INDICATOR SZ 8 SURGICAL PF LTX - (50/BX 4BX/CA)

## (undated) DEVICE — BIPOLAR FORCE DA VINCI 12X'S REISABLE

## (undated) DEVICE — SYRINGE SAFETY 10 ML 18 GA X 1 1/2 BLUNT LL (100/BX 4BX/CA)

## (undated) DEVICE — SPONGE GAUZE NON-STERILE 4X4 - (2000/CA 10PK/CA)

## (undated) DEVICE — GLOVE SZ 7.5 BIOGEL PI MICRO - PF LF (50PR/BX)

## (undated) DEVICE — BLOCK BITE ENDOSCOPIC 2809 - (100/BX) INTERMEDIATE

## (undated) DEVICE — SUTURE 3-0 ETHILON FS-1 - (36/BX) 30 INCH

## (undated) DEVICE — TUBE CONNECTING SUCTION - CLEAR PLASTIC STERILE 72 IN (50EA/CA)

## (undated) DEVICE — SPONGE GAUZE NON-STERILE 2X2 - 4-PLY (200/PK 40PK/CA)

## (undated) DEVICE — MASK O2 VNYL ADLT RBRTH HI - (50/CS)

## (undated) DEVICE — TUBE E-T HI-LO CUFF 7.0MM (10EA/PK)

## (undated) DEVICE — BLADE SURGICAL #15 - (50/BX 3BX/CA)

## (undated) DEVICE — SUTURE 6-0 ETHILON P-1 18 (12PK/BX)"

## (undated) DEVICE — GVL 3 STAT DISPOSABLE - (10/BX)

## (undated) DEVICE — SUTURE GENERAL

## (undated) DEVICE — NEEDLE 25GA ACQUIRE FNB

## (undated) DEVICE — GLOVE BIOGEL PI INDICATOR SZ 8.0 SURGICAL PF LF -(50/BX 4BX/CA)

## (undated) DEVICE — CATHETER IV SAFETY 20 GA X 1-1/4 (50/BX)

## (undated) DEVICE — KIT ANESTHESIA W/CIRCUIT & 3/LT BAG W/FILTER (20EA/CA)

## (undated) DEVICE — SYSTEM CLEARIFY VISUALIZATION (10EA/PK)

## (undated) DEVICE — SODIUM CHL IRRIGATION 0.9% 1000ML (12EA/CA)

## (undated) DEVICE — PAD MAGNETIC INSTRUMENT FOAM HOLDER (200/CA)

## (undated) DEVICE — TUBE SUCTION YANKAUER  1/4 X 6FT (20EA/CA)"

## (undated) DEVICE — GLOVE BIOGEL PI INDICATOR SZ 7.0 SURGICAL PF LF - (50/BX 4BX/CA)

## (undated) DEVICE — KIT CUSTOM PROCEDURE SINGLE FOR ENDO  (15/CA)

## (undated) DEVICE — GLOVE BIOGEL SZ 8.5 SURGICAL PF LTX - (50PR/BX 4BX/CA)

## (undated) DEVICE — DENTAL ROLL 1-1/2 X 3/8 (5EA/PK 50PK/CA)

## (undated) DEVICE — PROTECTOR ULNA NERVE - (36PR/CA)

## (undated) DEVICE — GLOVE BIOGEL SZ 7.5 SURGICAL PF LTX - (50PR/BX 4BX/CA)

## (undated) DEVICE — TROCAR 5X100 NON BLADED Z-TH - READ KII (6/BX)

## (undated) DEVICE — DRAPE MAYO STAND - (30/CA)

## (undated) DEVICE — ELECTRODE DUAL RETURN W/ CORD - (50/PK)

## (undated) DEVICE — PATTIES SURG X-RAYCOTTONOID - 1/2 X 3 IN (200/CA)

## (undated) DEVICE — NEPTUNE 4 PORT MANIFOLD - (20/PK)

## (undated) DEVICE — BLADE SURGICAL #11 - (50/BX)